# Patient Record
Sex: FEMALE | Race: WHITE | NOT HISPANIC OR LATINO
[De-identification: names, ages, dates, MRNs, and addresses within clinical notes are randomized per-mention and may not be internally consistent; named-entity substitution may affect disease eponyms.]

---

## 2017-01-12 ENCOUNTER — APPOINTMENT (OUTPATIENT)
Dept: FAMILY MEDICINE | Facility: CLINIC | Age: 67
End: 2017-01-12

## 2017-01-12 VITALS
WEIGHT: 193 LBS | HEIGHT: 62.5 IN | BODY MASS INDEX: 34.63 KG/M2 | SYSTOLIC BLOOD PRESSURE: 142 MMHG | DIASTOLIC BLOOD PRESSURE: 74 MMHG | RESPIRATION RATE: 16 BRPM | TEMPERATURE: 98.3 F | HEART RATE: 86 BPM | OXYGEN SATURATION: 97 %

## 2017-01-12 DIAGNOSIS — H81.10 BENIGN PAROXYSMAL VERTIGO, UNSPECIFIED EAR: ICD-10-CM

## 2017-01-26 ENCOUNTER — APPOINTMENT (OUTPATIENT)
Dept: FAMILY MEDICINE | Facility: CLINIC | Age: 67
End: 2017-01-26

## 2017-01-26 VITALS
HEIGHT: 62.5 IN | BODY MASS INDEX: 34.49 KG/M2 | SYSTOLIC BLOOD PRESSURE: 134 MMHG | HEART RATE: 82 BPM | WEIGHT: 192.25 LBS | DIASTOLIC BLOOD PRESSURE: 62 MMHG

## 2017-01-26 VITALS — HEART RATE: 80 BPM | DIASTOLIC BLOOD PRESSURE: 70 MMHG | RESPIRATION RATE: 16 BRPM | SYSTOLIC BLOOD PRESSURE: 112 MMHG

## 2017-01-26 RX ORDER — METHYLPREDNISOLONE 4 MG/1
4 TABLET ORAL
Qty: 1 | Refills: 0 | Status: COMPLETED | COMMUNITY
Start: 2017-01-12 | End: 2017-01-26

## 2017-01-26 RX ORDER — AZITHROMYCIN 250 MG/1
250 TABLET, FILM COATED ORAL
Qty: 1 | Refills: 0 | Status: COMPLETED | COMMUNITY
Start: 2017-01-12 | End: 2017-01-26

## 2017-01-28 LAB
ALBUMIN SERPL ELPH-MCNC: 4 G/DL
ALP BLD-CCNC: 136 U/L
ALT SERPL-CCNC: 11 U/L
ANION GAP SERPL CALC-SCNC: 17 MMOL/L
APPEARANCE: ABNORMAL
AST SERPL-CCNC: 17 U/L
BACTERIA: NEGATIVE
BASOPHILS # BLD AUTO: 0.02 K/UL
BASOPHILS NFR BLD AUTO: 0.2 %
BILIRUB SERPL-MCNC: 0.2 MG/DL
BILIRUBIN URINE: NEGATIVE
BLOOD URINE: NEGATIVE
BUN SERPL-MCNC: 16 MG/DL
CALCIUM SERPL-MCNC: 10 MG/DL
CHLORIDE SERPL-SCNC: 103 MMOL/L
CHOLEST SERPL-MCNC: 194 MG/DL
CHOLEST/HDLC SERPL: 5.1 RATIO
CO2 SERPL-SCNC: 22 MMOL/L
COLOR: YELLOW
CREAT SERPL-MCNC: 1.43 MG/DL
CREAT SPEC-SCNC: 89 MG/DL
EOSINOPHIL # BLD AUTO: 0.14 K/UL
EOSINOPHIL NFR BLD AUTO: 1.6 %
GLUCOSE QUALITATIVE U: NORMAL MG/DL
GLUCOSE SERPL-MCNC: 86 MG/DL
HBA1C MFR BLD HPLC: 6 %
HCT VFR BLD CALC: 33.8 %
HDLC SERPL-MCNC: 38 MG/DL
HGB BLD-MCNC: 10.7 G/DL
HYALINE CASTS: 0 /LPF
IMM GRANULOCYTES NFR BLD AUTO: 0.2 %
KETONES URINE: NEGATIVE
LDLC SERPL CALC-MCNC: 81 MG/DL
LEUKOCYTE ESTERASE URINE: NEGATIVE
LYMPHOCYTES # BLD AUTO: 1.9 K/UL
LYMPHOCYTES NFR BLD AUTO: 22.1 %
MAN DIFF?: NORMAL
MCHC RBC-ENTMCNC: 29.3 PG
MCHC RBC-ENTMCNC: 31.7 GM/DL
MCV RBC AUTO: 92.6 FL
MICROALBUMIN 24H UR DL<=1MG/L-MCNC: 1.1 MG/DL
MICROALBUMIN/CREAT 24H UR-RTO: 12 UG/MG
MICROSCOPIC-UA: NORMAL
MONOCYTES # BLD AUTO: 0.43 K/UL
MONOCYTES NFR BLD AUTO: 5 %
NEUTROPHILS # BLD AUTO: 6.09 K/UL
NEUTROPHILS NFR BLD AUTO: 70.9 %
NITRITE URINE: NEGATIVE
PH URINE: 6
PLATELET # BLD AUTO: 194 K/UL
POTASSIUM SERPL-SCNC: 5.4 MMOL/L
PROT SERPL-MCNC: 7.1 G/DL
PROTEIN URINE: NEGATIVE MG/DL
RBC # BLD: 3.65 M/UL
RBC # FLD: 13.7 %
RED BLOOD CELLS URINE: 3 /HPF
SODIUM SERPL-SCNC: 142 MMOL/L
SPECIFIC GRAVITY URINE: 1.01
SQUAMOUS EPITHELIAL CELLS: 1 /HPF
T4 SERPL-MCNC: 6.9 UG/DL
TRIGL SERPL-MCNC: 373 MG/DL
TSH SERPL-ACNC: 1.16 UIU/ML
URATE SERPL-MCNC: 8.8 MG/DL
UROBILINOGEN URINE: NORMAL MG/DL
WBC # FLD AUTO: 8.6 K/UL
WHITE BLOOD CELLS URINE: 0 /HPF

## 2017-02-08 ENCOUNTER — APPOINTMENT (OUTPATIENT)
Dept: FAMILY MEDICINE | Facility: CLINIC | Age: 67
End: 2017-02-08

## 2017-02-08 VITALS
SYSTOLIC BLOOD PRESSURE: 120 MMHG | HEIGHT: 62.5 IN | WEIGHT: 192 LBS | BODY MASS INDEX: 34.45 KG/M2 | DIASTOLIC BLOOD PRESSURE: 60 MMHG

## 2017-02-14 ENCOUNTER — RX RENEWAL (OUTPATIENT)
Age: 67
End: 2017-02-14

## 2017-02-14 ENCOUNTER — APPOINTMENT (OUTPATIENT)
Dept: FAMILY MEDICINE | Facility: CLINIC | Age: 67
End: 2017-02-14

## 2017-02-14 VITALS
HEIGHT: 62.5 IN | BODY MASS INDEX: 34.45 KG/M2 | SYSTOLIC BLOOD PRESSURE: 138 MMHG | OXYGEN SATURATION: 95 % | TEMPERATURE: 98.4 F | RESPIRATION RATE: 16 BRPM | HEART RATE: 101 BPM | DIASTOLIC BLOOD PRESSURE: 60 MMHG | WEIGHT: 192 LBS

## 2017-03-02 ENCOUNTER — APPOINTMENT (OUTPATIENT)
Dept: FAMILY MEDICINE | Facility: CLINIC | Age: 67
End: 2017-03-02

## 2017-03-02 VITALS
SYSTOLIC BLOOD PRESSURE: 118 MMHG | RESPIRATION RATE: 16 BRPM | OXYGEN SATURATION: 95 % | WEIGHT: 192 LBS | HEART RATE: 103 BPM | HEIGHT: 62.5 IN | BODY MASS INDEX: 34.45 KG/M2 | DIASTOLIC BLOOD PRESSURE: 62 MMHG | TEMPERATURE: 97.6 F

## 2017-04-08 ENCOUNTER — APPOINTMENT (OUTPATIENT)
Dept: FAMILY MEDICINE | Facility: CLINIC | Age: 67
End: 2017-04-08

## 2017-04-08 VITALS
HEART RATE: 96 BPM | SYSTOLIC BLOOD PRESSURE: 130 MMHG | OXYGEN SATURATION: 97 % | RESPIRATION RATE: 16 BRPM | DIASTOLIC BLOOD PRESSURE: 70 MMHG | WEIGHT: 192 LBS | HEIGHT: 62.5 IN | BODY MASS INDEX: 34.45 KG/M2

## 2017-04-09 ENCOUNTER — FORM ENCOUNTER (OUTPATIENT)
Age: 67
End: 2017-04-09

## 2017-04-10 ENCOUNTER — OUTPATIENT (OUTPATIENT)
Dept: OUTPATIENT SERVICES | Facility: HOSPITAL | Age: 67
LOS: 1 days | End: 2017-04-10
Payer: MEDICARE

## 2017-04-10 ENCOUNTER — APPOINTMENT (OUTPATIENT)
Dept: RADIOLOGY | Facility: CLINIC | Age: 67
End: 2017-04-10

## 2017-04-10 DIAGNOSIS — Z00.8 ENCOUNTER FOR OTHER GENERAL EXAMINATION: ICD-10-CM

## 2017-04-10 PROCEDURE — 73610 X-RAY EXAM OF ANKLE: CPT

## 2017-05-01 ENCOUNTER — FORM ENCOUNTER (OUTPATIENT)
Age: 67
End: 2017-05-01

## 2017-05-02 ENCOUNTER — RX RENEWAL (OUTPATIENT)
Age: 67
End: 2017-05-02

## 2017-05-02 ENCOUNTER — APPOINTMENT (OUTPATIENT)
Dept: RADIOLOGY | Facility: CLINIC | Age: 67
End: 2017-05-02

## 2017-05-02 ENCOUNTER — APPOINTMENT (OUTPATIENT)
Dept: FAMILY MEDICINE | Facility: CLINIC | Age: 67
End: 2017-05-02

## 2017-05-02 ENCOUNTER — OUTPATIENT (OUTPATIENT)
Dept: OUTPATIENT SERVICES | Facility: HOSPITAL | Age: 67
LOS: 1 days | End: 2017-05-02
Payer: MEDICARE

## 2017-05-02 VITALS
DIASTOLIC BLOOD PRESSURE: 80 MMHG | HEIGHT: 62.5 IN | WEIGHT: 192 LBS | HEART RATE: 99 BPM | TEMPERATURE: 97.8 F | OXYGEN SATURATION: 97 % | SYSTOLIC BLOOD PRESSURE: 160 MMHG | BODY MASS INDEX: 34.45 KG/M2

## 2017-05-02 DIAGNOSIS — Z00.8 ENCOUNTER FOR OTHER GENERAL EXAMINATION: ICD-10-CM

## 2017-05-02 PROCEDURE — 73630 X-RAY EXAM OF FOOT: CPT

## 2017-05-02 PROCEDURE — 71046 X-RAY EXAM CHEST 2 VIEWS: CPT

## 2017-05-11 ENCOUNTER — APPOINTMENT (OUTPATIENT)
Dept: ULTRASOUND IMAGING | Facility: CLINIC | Age: 67
End: 2017-05-11

## 2017-05-11 ENCOUNTER — OUTPATIENT (OUTPATIENT)
Dept: OUTPATIENT SERVICES | Facility: HOSPITAL | Age: 67
LOS: 1 days | End: 2017-05-11
Payer: MEDICARE

## 2017-05-11 DIAGNOSIS — Z00.8 ENCOUNTER FOR OTHER GENERAL EXAMINATION: ICD-10-CM

## 2017-05-11 PROCEDURE — 76856 US EXAM PELVIC COMPLETE: CPT

## 2017-05-11 PROCEDURE — 76830 TRANSVAGINAL US NON-OB: CPT

## 2017-05-16 ENCOUNTER — APPOINTMENT (OUTPATIENT)
Dept: FAMILY MEDICINE | Facility: CLINIC | Age: 67
End: 2017-05-16

## 2017-05-16 VITALS
HEIGHT: 62.5 IN | DIASTOLIC BLOOD PRESSURE: 68 MMHG | SYSTOLIC BLOOD PRESSURE: 130 MMHG | BODY MASS INDEX: 33.26 KG/M2 | WEIGHT: 185.38 LBS

## 2017-05-16 VITALS — SYSTOLIC BLOOD PRESSURE: 100 MMHG | HEART RATE: 96 BPM | DIASTOLIC BLOOD PRESSURE: 70 MMHG | RESPIRATION RATE: 16 BRPM

## 2017-05-16 DIAGNOSIS — R60.9 EDEMA, UNSPECIFIED: ICD-10-CM

## 2017-05-16 DIAGNOSIS — Z87.09 PERSONAL HISTORY OF OTHER DISEASES OF THE RESPIRATORY SYSTEM: ICD-10-CM

## 2017-05-16 DIAGNOSIS — S93.402A SPRAIN OF UNSPECIFIED LIGAMENT OF LEFT ANKLE, INITIAL ENCOUNTER: ICD-10-CM

## 2017-05-16 DIAGNOSIS — Z87.448 PERSONAL HISTORY OF OTHER DISEASES OF URINARY SYSTEM: ICD-10-CM

## 2017-05-16 DIAGNOSIS — S06.0X0A CONCUSSION W/OUT LOSS OF CONSCIOUSNESS, INITIAL ENCOUNTER: ICD-10-CM

## 2017-05-16 DIAGNOSIS — N28.9 DISORDER OF KIDNEY AND URETER, UNSPECIFIED: ICD-10-CM

## 2017-05-16 DIAGNOSIS — S01.01XA LACERATION W/OUT FOREIGN BODY OF SCALP, INITIAL ENCOUNTER: ICD-10-CM

## 2017-05-16 DIAGNOSIS — M79.672 PAIN IN LEFT FOOT: ICD-10-CM

## 2017-05-16 DIAGNOSIS — M70.22 OLECRANON BURSITIS, LEFT ELBOW: ICD-10-CM

## 2017-05-16 DIAGNOSIS — B36.0 PITYRIASIS VERSICOLOR: ICD-10-CM

## 2017-05-16 RX ORDER — LEVOFLOXACIN 500 MG/1
500 TABLET, FILM COATED ORAL DAILY
Qty: 10 | Refills: 0 | Status: COMPLETED | COMMUNITY
Start: 2017-05-02 | End: 2017-05-16

## 2017-05-16 RX ORDER — HYDROCODONE BITARTRATE AND ACETAMINOPHEN 5; 325 MG/1; MG/1
5-325 TABLET ORAL
Qty: 30 | Refills: 0 | Status: COMPLETED | COMMUNITY
Start: 2017-05-02 | End: 2017-05-16

## 2017-05-16 RX ORDER — METHYLPREDNISOLONE 4 MG/1
4 TABLET ORAL
Qty: 1 | Refills: 0 | Status: COMPLETED | COMMUNITY
Start: 2017-03-02 | End: 2017-05-16

## 2017-05-16 RX ORDER — AZITHROMYCIN 250 MG/1
250 TABLET, FILM COATED ORAL
Qty: 1 | Refills: 0 | Status: COMPLETED | COMMUNITY
Start: 2017-03-02 | End: 2017-05-16

## 2017-06-20 ENCOUNTER — APPOINTMENT (OUTPATIENT)
Dept: FAMILY MEDICINE | Facility: CLINIC | Age: 67
End: 2017-06-20

## 2017-06-20 VITALS
HEART RATE: 107 BPM | BODY MASS INDEX: 32.47 KG/M2 | SYSTOLIC BLOOD PRESSURE: 120 MMHG | DIASTOLIC BLOOD PRESSURE: 60 MMHG | WEIGHT: 181 LBS | RESPIRATION RATE: 16 BRPM | HEIGHT: 62.5 IN | TEMPERATURE: 98.6 F | OXYGEN SATURATION: 98 %

## 2017-06-20 VITALS — SYSTOLIC BLOOD PRESSURE: 120 MMHG | RESPIRATION RATE: 16 BRPM | DIASTOLIC BLOOD PRESSURE: 74 MMHG | HEART RATE: 92 BPM

## 2017-06-21 LAB
ALBUMIN SERPL ELPH-MCNC: 3.7 G/DL
ALP BLD-CCNC: 140 U/L
ALT SERPL-CCNC: 7 U/L
ANION GAP SERPL CALC-SCNC: 18 MMOL/L
APPEARANCE: CLEAR
AST SERPL-CCNC: 16 U/L
BACTERIA: NEGATIVE
BASOPHILS # BLD AUTO: 0.01 K/UL
BASOPHILS NFR BLD AUTO: 0.1 %
BILIRUB SERPL-MCNC: 0.2 MG/DL
BILIRUBIN URINE: NEGATIVE
BLOOD URINE: NEGATIVE
BUN SERPL-MCNC: 18 MG/DL
CALCIUM SERPL-MCNC: 9.4 MG/DL
CHLORIDE SERPL-SCNC: 101 MMOL/L
CHOLEST SERPL-MCNC: 170 MG/DL
CHOLEST/HDLC SERPL: 4 RATIO
CO2 SERPL-SCNC: 21 MMOL/L
COLOR: YELLOW
CREAT SERPL-MCNC: 1.52 MG/DL
CREAT SPEC-SCNC: 105 MG/DL
EOSINOPHIL # BLD AUTO: 0.16 K/UL
EOSINOPHIL NFR BLD AUTO: 2.3 %
GLUCOSE QUALITATIVE U: NORMAL MG/DL
GLUCOSE SERPL-MCNC: 79 MG/DL
HBA1C MFR BLD HPLC: 5.6 %
HCT VFR BLD CALC: 30.1 %
HDLC SERPL-MCNC: 43 MG/DL
HGB BLD-MCNC: 9.5 G/DL
HYALINE CASTS: 0 /LPF
IMM GRANULOCYTES NFR BLD AUTO: 0.3 %
KETONES URINE: NEGATIVE
LDLC SERPL CALC-MCNC: 64 MG/DL
LEUKOCYTE ESTERASE URINE: ABNORMAL
LYMPHOCYTES # BLD AUTO: 1.58 K/UL
LYMPHOCYTES NFR BLD AUTO: 22.3 %
MAN DIFF?: NORMAL
MCHC RBC-ENTMCNC: 28.5 PG
MCHC RBC-ENTMCNC: 31.6 GM/DL
MCV RBC AUTO: 90.4 FL
MICROALBUMIN 24H UR DL<=1MG/L-MCNC: 0.8 MG/DL
MICROALBUMIN/CREAT 24H UR-RTO: 8 MG/G
MICROSCOPIC-UA: NORMAL
MONOCYTES # BLD AUTO: 0.51 K/UL
MONOCYTES NFR BLD AUTO: 7.2 %
NEUTROPHILS # BLD AUTO: 4.79 K/UL
NEUTROPHILS NFR BLD AUTO: 67.8 %
NITRITE URINE: NEGATIVE
PH URINE: 6.5
PLATELET # BLD AUTO: 233 K/UL
POTASSIUM SERPL-SCNC: 5.5 MMOL/L
PROT SERPL-MCNC: 7.2 G/DL
PROTEIN URINE: NEGATIVE MG/DL
RBC # BLD: 3.33 M/UL
RBC # FLD: 14.8 %
RED BLOOD CELLS URINE: 3 /HPF
SODIUM SERPL-SCNC: 140 MMOL/L
SPECIFIC GRAVITY URINE: 1.02
SQUAMOUS EPITHELIAL CELLS: 8 /HPF
T4 SERPL-MCNC: 6.2 UG/DL
TRIGL SERPL-MCNC: 313 MG/DL
TSH SERPL-ACNC: 1.77 UIU/ML
URATE SERPL-MCNC: 8.8 MG/DL
UROBILINOGEN URINE: NORMAL MG/DL
WBC # FLD AUTO: 7.07 K/UL
WHITE BLOOD CELLS URINE: 4 /HPF

## 2017-08-15 ENCOUNTER — RX RENEWAL (OUTPATIENT)
Age: 67
End: 2017-08-15

## 2017-10-10 ENCOUNTER — APPOINTMENT (OUTPATIENT)
Dept: FAMILY MEDICINE | Facility: CLINIC | Age: 67
End: 2017-10-10
Payer: MEDICARE

## 2017-10-10 VITALS
DIASTOLIC BLOOD PRESSURE: 66 MMHG | WEIGHT: 181 LBS | HEART RATE: 90 BPM | OXYGEN SATURATION: 98 % | RESPIRATION RATE: 16 BRPM | TEMPERATURE: 98.4 F | SYSTOLIC BLOOD PRESSURE: 126 MMHG | HEIGHT: 62.5 IN | BODY MASS INDEX: 32.47 KG/M2

## 2017-10-10 PROCEDURE — 99214 OFFICE O/P EST MOD 30 MIN: CPT

## 2017-10-23 ENCOUNTER — RX RENEWAL (OUTPATIENT)
Age: 67
End: 2017-10-23

## 2017-10-31 ENCOUNTER — LABORATORY RESULT (OUTPATIENT)
Age: 67
End: 2017-10-31

## 2017-10-31 ENCOUNTER — APPOINTMENT (OUTPATIENT)
Dept: FAMILY MEDICINE | Facility: CLINIC | Age: 67
End: 2017-10-31
Payer: MEDICARE

## 2017-10-31 VITALS — HEART RATE: 72 BPM | SYSTOLIC BLOOD PRESSURE: 110 MMHG | DIASTOLIC BLOOD PRESSURE: 70 MMHG | RESPIRATION RATE: 16 BRPM

## 2017-10-31 VITALS
RESPIRATION RATE: 16 BRPM | WEIGHT: 182 LBS | HEART RATE: 98 BPM | SYSTOLIC BLOOD PRESSURE: 128 MMHG | DIASTOLIC BLOOD PRESSURE: 68 MMHG | BODY MASS INDEX: 32.65 KG/M2 | OXYGEN SATURATION: 98 % | HEIGHT: 62.5 IN

## 2017-10-31 DIAGNOSIS — N28.89 OTHER SPECIFIED DISORDERS OF KIDNEY AND URETER: ICD-10-CM

## 2017-10-31 PROCEDURE — 99214 OFFICE O/P EST MOD 30 MIN: CPT | Mod: 25

## 2017-10-31 PROCEDURE — 90662 IIV NO PRSV INCREASED AG IM: CPT

## 2017-10-31 PROCEDURE — G0008: CPT

## 2017-10-31 RX ORDER — AZITHROMYCIN 250 MG/1
250 TABLET, FILM COATED ORAL
Qty: 1 | Refills: 0 | Status: COMPLETED | COMMUNITY
Start: 2017-10-10 | End: 2017-10-31

## 2017-10-31 RX ORDER — METHYLPREDNISOLONE 4 MG/1
4 TABLET ORAL
Qty: 1 | Refills: 0 | Status: COMPLETED | COMMUNITY
Start: 2017-06-09 | End: 2017-10-31

## 2017-10-31 RX ORDER — MECLIZINE HYDROCHLORIDE 12.5 MG/1
12.5 TABLET ORAL
Qty: 30 | Refills: 0 | Status: COMPLETED | COMMUNITY
Start: 2017-01-12 | End: 2017-10-31

## 2017-11-01 LAB
ALBUMIN SERPL ELPH-MCNC: 3.8 G/DL
ALP BLD-CCNC: 136 U/L
ALT SERPL-CCNC: 8 U/L
ANION GAP SERPL CALC-SCNC: 22 MMOL/L
APPEARANCE: CLEAR
AST SERPL-CCNC: 20 U/L
BACTERIA: NEGATIVE
BASOPHILS # BLD AUTO: 0.01 K/UL
BASOPHILS NFR BLD AUTO: 0.2 %
BILIRUB SERPL-MCNC: 0.2 MG/DL
BILIRUBIN URINE: NEGATIVE
BLOOD URINE: NEGATIVE
BUN SERPL-MCNC: 19 MG/DL
CALCIUM SERPL-MCNC: 10 MG/DL
CHLORIDE SERPL-SCNC: 101 MMOL/L
CHOLEST SERPL-MCNC: 193 MG/DL
CHOLEST/HDLC SERPL: 4.4 RATIO
CO2 SERPL-SCNC: 19 MMOL/L
COLOR: YELLOW
CREAT SERPL-MCNC: 1.66 MG/DL
CREAT SPEC-SCNC: 161 MG/DL
EOSINOPHIL # BLD AUTO: 0.1 K/UL
EOSINOPHIL NFR BLD AUTO: 1.6 %
GLUCOSE QUALITATIVE U: NEGATIVE MG/DL
GLUCOSE SERPL-MCNC: 96 MG/DL
HBA1C MFR BLD HPLC: 5.7 %
HCT VFR BLD CALC: 30.9 %
HDLC SERPL-MCNC: 44 MG/DL
HGB BLD-MCNC: 9.6 G/DL
HYALINE CASTS: 0 /LPF
IMM GRANULOCYTES NFR BLD AUTO: 0.2 %
KETONES URINE: NEGATIVE
LDLC SERPL CALC-MCNC: 77 MG/DL
LEUKOCYTE ESTERASE URINE: NEGATIVE
LYMPHOCYTES # BLD AUTO: 1.62 K/UL
LYMPHOCYTES NFR BLD AUTO: 25.4 %
MAN DIFF?: NORMAL
MCHC RBC-ENTMCNC: 28.1 PG
MCHC RBC-ENTMCNC: 31.1 GM/DL
MCV RBC AUTO: 90.4 FL
MICROALBUMIN 24H UR DL<=1MG/L-MCNC: 1 MG/DL
MICROALBUMIN/CREAT 24H UR-RTO: 6 MG/G
MICROSCOPIC-UA: NORMAL
MONOCYTES # BLD AUTO: 0.43 K/UL
MONOCYTES NFR BLD AUTO: 6.7 %
NEUTROPHILS # BLD AUTO: 4.22 K/UL
NEUTROPHILS NFR BLD AUTO: 65.9 %
NITRITE URINE: NEGATIVE
PH URINE: 5
PLATELET # BLD AUTO: 219 K/UL
POTASSIUM SERPL-SCNC: 5.2 MMOL/L
PROT SERPL-MCNC: 7.5 G/DL
PROTEIN URINE: NEGATIVE MG/DL
RBC # BLD: 3.42 M/UL
RBC # FLD: 14.9 %
RED BLOOD CELLS URINE: 0 /HPF
SODIUM SERPL-SCNC: 142 MMOL/L
SPECIFIC GRAVITY URINE: 1.02
SQUAMOUS EPITHELIAL CELLS: 7 /HPF
T4 SERPL-MCNC: 7.4 UG/DL
TRIGL SERPL-MCNC: 361 MG/DL
TSH SERPL-ACNC: 1.81 UIU/ML
URATE SERPL-MCNC: 8.1 MG/DL
UROBILINOGEN URINE: NEGATIVE MG/DL
WBC # FLD AUTO: 6.39 K/UL
WHITE BLOOD CELLS URINE: 4 /HPF

## 2017-11-02 ENCOUNTER — MOBILE ON CALL (OUTPATIENT)
Age: 67
End: 2017-11-02

## 2017-11-08 ENCOUNTER — APPOINTMENT (OUTPATIENT)
Dept: FAMILY MEDICINE | Facility: CLINIC | Age: 67
End: 2017-11-08
Payer: MEDICARE

## 2017-11-08 VITALS
DIASTOLIC BLOOD PRESSURE: 80 MMHG | TEMPERATURE: 98.3 F | WEIGHT: 181.38 LBS | BODY MASS INDEX: 32.54 KG/M2 | HEIGHT: 62.5 IN | OXYGEN SATURATION: 98 % | SYSTOLIC BLOOD PRESSURE: 146 MMHG | HEART RATE: 98 BPM

## 2017-11-08 PROCEDURE — 99213 OFFICE O/P EST LOW 20 MIN: CPT

## 2017-11-08 RX ORDER — CLINDAMYCIN HYDROCHLORIDE 300 MG/1
300 CAPSULE ORAL
Qty: 21 | Refills: 0 | Status: DISCONTINUED | COMMUNITY
Start: 2017-05-22

## 2017-11-27 ENCOUNTER — APPOINTMENT (OUTPATIENT)
Dept: FAMILY MEDICINE | Facility: CLINIC | Age: 67
End: 2017-11-27
Payer: COMMERCIAL

## 2017-11-27 VITALS
DIASTOLIC BLOOD PRESSURE: 80 MMHG | BODY MASS INDEX: 33.08 KG/M2 | SYSTOLIC BLOOD PRESSURE: 130 MMHG | HEIGHT: 62.5 IN | WEIGHT: 184.38 LBS

## 2017-11-27 VITALS — HEART RATE: 76 BPM | DIASTOLIC BLOOD PRESSURE: 80 MMHG | SYSTOLIC BLOOD PRESSURE: 130 MMHG | RESPIRATION RATE: 16 BRPM

## 2017-11-27 DIAGNOSIS — S13.9XXA SPRAIN OF JOINTS AND LIGAMENTS OF UNSPECIFIED PARTS OF NECK, INITIAL ENCOUNTER: ICD-10-CM

## 2017-11-27 PROCEDURE — 99203 OFFICE O/P NEW LOW 30 MIN: CPT

## 2017-11-30 ENCOUNTER — OUTPATIENT (OUTPATIENT)
Dept: OUTPATIENT SERVICES | Facility: HOSPITAL | Age: 67
LOS: 1 days | Discharge: HOME | End: 2017-11-30

## 2017-11-30 ENCOUNTER — APPOINTMENT (OUTPATIENT)
Dept: FAMILY MEDICINE | Facility: CLINIC | Age: 67
End: 2017-11-30

## 2017-11-30 DIAGNOSIS — D50.9 IRON DEFICIENCY ANEMIA, UNSPECIFIED: ICD-10-CM

## 2017-12-05 ENCOUNTER — RESULT REVIEW (OUTPATIENT)
Age: 67
End: 2017-12-05

## 2017-12-05 ENCOUNTER — APPOINTMENT (OUTPATIENT)
Dept: HEMATOLOGY ONCOLOGY | Facility: CLINIC | Age: 67
End: 2017-12-05
Payer: MEDICARE

## 2017-12-05 VITALS
DIASTOLIC BLOOD PRESSURE: 70 MMHG | BODY MASS INDEX: 33.99 KG/M2 | HEART RATE: 98 BPM | HEIGHT: 61.81 IN | TEMPERATURE: 98.71 F | WEIGHT: 184.73 LBS | SYSTOLIC BLOOD PRESSURE: 127 MMHG | OXYGEN SATURATION: 99 %

## 2017-12-05 LAB
BASOPHILS # BLD AUTO: 0.1 K/UL — SIGNIFICANT CHANGE UP (ref 0–0.2)
BASOPHILS NFR BLD AUTO: 1.3 % — SIGNIFICANT CHANGE UP (ref 0–2)
EOSINOPHIL # BLD AUTO: 0.2 K/UL — SIGNIFICANT CHANGE UP (ref 0–0.5)
EOSINOPHIL NFR BLD AUTO: 3.2 % — SIGNIFICANT CHANGE UP (ref 0–6)
HCT VFR BLD CALC: 31 % — LOW (ref 34.5–45)
HGB BLD-MCNC: 10.2 G/DL — LOW (ref 11.5–15.5)
LYMPHOCYTES # BLD AUTO: 2 K/UL — SIGNIFICANT CHANGE UP (ref 1–3.3)
LYMPHOCYTES # BLD AUTO: 28.6 % — SIGNIFICANT CHANGE UP (ref 13–44)
MCHC RBC-ENTMCNC: 28.9 PG — SIGNIFICANT CHANGE UP (ref 27–34)
MCHC RBC-ENTMCNC: 32.8 GM/DL — SIGNIFICANT CHANGE UP (ref 32–36)
MCV RBC AUTO: 88.2 FL — SIGNIFICANT CHANGE UP (ref 80–100)
MONOCYTES # BLD AUTO: 0.4 K/UL — SIGNIFICANT CHANGE UP (ref 0–0.9)
MONOCYTES NFR BLD AUTO: 6.1 % — SIGNIFICANT CHANGE UP (ref 2–14)
NEUTROPHILS # BLD AUTO: 4.2 K/UL — SIGNIFICANT CHANGE UP (ref 1.8–7.4)
NEUTROPHILS NFR BLD AUTO: 60.9 % — SIGNIFICANT CHANGE UP (ref 43–77)
PLATELET # BLD AUTO: 244 K/UL — SIGNIFICANT CHANGE UP (ref 150–400)
RBC # BLD: 3.51 M/UL — LOW (ref 3.8–5.2)
RBC # FLD: 14.2 % — SIGNIFICANT CHANGE UP (ref 10.3–14.5)
WBC # BLD: 6.9 K/UL — SIGNIFICANT CHANGE UP (ref 3.8–10.5)
WBC # FLD AUTO: 6.9 K/UL — SIGNIFICANT CHANGE UP (ref 3.8–10.5)

## 2017-12-05 PROCEDURE — 99204 OFFICE O/P NEW MOD 45 MIN: CPT

## 2017-12-12 ENCOUNTER — APPOINTMENT (OUTPATIENT)
Dept: INFUSION THERAPY | Facility: CLINIC | Age: 67
End: 2017-12-12

## 2017-12-12 ENCOUNTER — OTHER (OUTPATIENT)
Age: 67
End: 2017-12-12

## 2017-12-13 DIAGNOSIS — N18.3 CHRONIC KIDNEY DISEASE, STAGE 3 (MODERATE): ICD-10-CM

## 2017-12-19 ENCOUNTER — APPOINTMENT (OUTPATIENT)
Dept: INFUSION THERAPY | Facility: CLINIC | Age: 67
End: 2017-12-19

## 2017-12-20 DIAGNOSIS — E53.8 DEFICIENCY OF OTHER SPECIFIED B GROUP VITAMINS: ICD-10-CM

## 2017-12-28 ENCOUNTER — APPOINTMENT (OUTPATIENT)
Dept: FAMILY MEDICINE | Facility: CLINIC | Age: 67
End: 2017-12-28
Payer: MEDICARE

## 2017-12-28 VITALS — SYSTOLIC BLOOD PRESSURE: 120 MMHG | RESPIRATION RATE: 16 BRPM | DIASTOLIC BLOOD PRESSURE: 80 MMHG | HEART RATE: 84 BPM

## 2017-12-28 VITALS
HEART RATE: 105 BPM | WEIGHT: 180.25 LBS | SYSTOLIC BLOOD PRESSURE: 128 MMHG | BODY MASS INDEX: 33.17 KG/M2 | DIASTOLIC BLOOD PRESSURE: 60 MMHG | HEIGHT: 62 IN | OXYGEN SATURATION: 98 %

## 2017-12-28 PROCEDURE — 99214 OFFICE O/P EST MOD 30 MIN: CPT

## 2017-12-28 RX ORDER — AZITHROMYCIN 250 MG/1
250 TABLET, FILM COATED ORAL
Qty: 1 | Refills: 0 | Status: COMPLETED | COMMUNITY
Start: 2017-11-08 | End: 2017-12-28

## 2017-12-28 RX ORDER — METHYLPREDNISOLONE 4 MG/1
4 TABLET ORAL
Qty: 1 | Refills: 0 | Status: COMPLETED | COMMUNITY
Start: 2017-11-08 | End: 2017-12-28

## 2017-12-28 RX ORDER — CYCLOBENZAPRINE HYDROCHLORIDE 10 MG/1
10 TABLET, FILM COATED ORAL
Qty: 30 | Refills: 0 | Status: COMPLETED | COMMUNITY
Start: 2017-11-27 | End: 2017-12-28

## 2017-12-31 LAB
BASOPHILS # BLD AUTO: 0.01 K/UL
BASOPHILS NFR BLD AUTO: 0.2 %
EOSINOPHIL # BLD AUTO: 0.11 K/UL
EOSINOPHIL NFR BLD AUTO: 1.7 %
HCT VFR BLD CALC: 34.8 %
HGB BLD-MCNC: 10.7 G/DL
IMM GRANULOCYTES NFR BLD AUTO: 0.2 %
LYMPHOCYTES # BLD AUTO: 1.65 K/UL
LYMPHOCYTES NFR BLD AUTO: 26.2 %
MAN DIFF?: NORMAL
MCHC RBC-ENTMCNC: 28.7 PG
MCHC RBC-ENTMCNC: 30.7 GM/DL
MCV RBC AUTO: 93.3 FL
MONOCYTES # BLD AUTO: 0.32 K/UL
MONOCYTES NFR BLD AUTO: 5.1 %
NEUTROPHILS # BLD AUTO: 4.2 K/UL
NEUTROPHILS NFR BLD AUTO: 66.6 %
PLATELET # BLD AUTO: 208 K/UL
RBC # BLD: 3.73 M/UL
RBC # FLD: 17.2 %
WBC # FLD AUTO: 6.3 K/UL

## 2018-01-26 ENCOUNTER — APPOINTMENT (OUTPATIENT)
Dept: FAMILY MEDICINE | Facility: CLINIC | Age: 68
End: 2018-01-26
Payer: MEDICARE

## 2018-01-26 ENCOUNTER — OUTPATIENT (OUTPATIENT)
Dept: OUTPATIENT SERVICES | Facility: HOSPITAL | Age: 68
LOS: 1 days | Discharge: HOME | End: 2018-01-26

## 2018-01-26 VITALS
OXYGEN SATURATION: 96 % | HEART RATE: 91 BPM | WEIGHT: 180 LBS | DIASTOLIC BLOOD PRESSURE: 66 MMHG | SYSTOLIC BLOOD PRESSURE: 138 MMHG | RESPIRATION RATE: 16 BRPM | HEIGHT: 62 IN | BODY MASS INDEX: 33.13 KG/M2 | TEMPERATURE: 98.6 F

## 2018-01-26 DIAGNOSIS — E53.8 DEFICIENCY OF OTHER SPECIFIED B GROUP VITAMINS: ICD-10-CM

## 2018-01-26 DIAGNOSIS — D50.9 IRON DEFICIENCY ANEMIA, UNSPECIFIED: ICD-10-CM

## 2018-01-26 DIAGNOSIS — N18.3 CHRONIC KIDNEY DISEASE, STAGE 3 (MODERATE): ICD-10-CM

## 2018-01-26 PROCEDURE — 99214 OFFICE O/P EST MOD 30 MIN: CPT

## 2018-02-21 ENCOUNTER — RESULT REVIEW (OUTPATIENT)
Age: 68
End: 2018-02-21

## 2018-02-21 ENCOUNTER — APPOINTMENT (OUTPATIENT)
Dept: HEMATOLOGY ONCOLOGY | Facility: CLINIC | Age: 68
End: 2018-02-21
Payer: MEDICARE

## 2018-02-21 VITALS
DIASTOLIC BLOOD PRESSURE: 70 MMHG | BODY MASS INDEX: 33.63 KG/M2 | HEART RATE: 86 BPM | TEMPERATURE: 99.5 F | SYSTOLIC BLOOD PRESSURE: 138 MMHG | WEIGHT: 183.84 LBS | OXYGEN SATURATION: 98 %

## 2018-02-21 LAB
BASOPHILS # BLD AUTO: 0.1 K/UL — SIGNIFICANT CHANGE UP (ref 0–0.2)
BASOPHILS NFR BLD AUTO: 1.5 % — SIGNIFICANT CHANGE UP (ref 0–2)
EOSINOPHIL # BLD AUTO: 0.2 K/UL — SIGNIFICANT CHANGE UP (ref 0–0.5)
EOSINOPHIL NFR BLD AUTO: 3.2 % — SIGNIFICANT CHANGE UP (ref 0–6)
HCT VFR BLD CALC: 35.7 % — SIGNIFICANT CHANGE UP (ref 34.5–45)
HGB BLD-MCNC: 12.2 G/DL — SIGNIFICANT CHANGE UP (ref 11.5–15.5)
LYMPHOCYTES # BLD AUTO: 1.8 K/UL — SIGNIFICANT CHANGE UP (ref 1–3.3)
LYMPHOCYTES # BLD AUTO: 29.3 % — SIGNIFICANT CHANGE UP (ref 13–44)
MCHC RBC-ENTMCNC: 32 PG — SIGNIFICANT CHANGE UP (ref 27–34)
MCHC RBC-ENTMCNC: 34.1 GM/DL — SIGNIFICANT CHANGE UP (ref 32–36)
MCV RBC AUTO: 94 FL — SIGNIFICANT CHANGE UP (ref 80–100)
MONOCYTES # BLD AUTO: 0.4 K/UL — SIGNIFICANT CHANGE UP (ref 0–0.9)
MONOCYTES NFR BLD AUTO: 5.8 % — SIGNIFICANT CHANGE UP (ref 2–14)
NEUTROPHILS # BLD AUTO: 3.7 K/UL — SIGNIFICANT CHANGE UP (ref 1.8–7.4)
NEUTROPHILS NFR BLD AUTO: 60.3 % — SIGNIFICANT CHANGE UP (ref 43–77)
PLATELET # BLD AUTO: 210 K/UL — SIGNIFICANT CHANGE UP (ref 150–400)
RBC # BLD: 3.8 M/UL — SIGNIFICANT CHANGE UP (ref 3.8–5.2)
RBC # FLD: 14.8 % — HIGH (ref 10.3–14.5)
WBC # BLD: 6.1 K/UL — SIGNIFICANT CHANGE UP (ref 3.8–10.5)
WBC # FLD AUTO: 6.1 K/UL — SIGNIFICANT CHANGE UP (ref 3.8–10.5)

## 2018-02-21 PROCEDURE — 99213 OFFICE O/P EST LOW 20 MIN: CPT

## 2018-02-22 ENCOUNTER — APPOINTMENT (OUTPATIENT)
Dept: FAMILY MEDICINE | Facility: CLINIC | Age: 68
End: 2018-02-22
Payer: MEDICARE

## 2018-02-22 VITALS — DIASTOLIC BLOOD PRESSURE: 80 MMHG | HEART RATE: 80 BPM | RESPIRATION RATE: 16 BRPM | SYSTOLIC BLOOD PRESSURE: 126 MMHG

## 2018-02-22 VITALS
SYSTOLIC BLOOD PRESSURE: 130 MMHG | WEIGHT: 184.25 LBS | BODY MASS INDEX: 33.9 KG/M2 | DIASTOLIC BLOOD PRESSURE: 60 MMHG | HEIGHT: 62 IN

## 2018-02-22 PROCEDURE — 99214 OFFICE O/P EST MOD 30 MIN: CPT

## 2018-02-22 RX ORDER — METHYLPREDNISOLONE 4 MG/1
4 TABLET ORAL
Qty: 1 | Refills: 0 | Status: COMPLETED | COMMUNITY
Start: 2018-01-26 | End: 2018-02-22

## 2018-02-22 RX ORDER — AZITHROMYCIN 250 MG/1
250 TABLET, FILM COATED ORAL
Qty: 1 | Refills: 0 | Status: COMPLETED | COMMUNITY
Start: 2018-01-26 | End: 2018-02-22

## 2018-02-22 RX ORDER — PREDNISONE 10 MG/1
10 TABLET ORAL DAILY
Qty: 20 | Refills: 0 | Status: COMPLETED | COMMUNITY
Start: 2017-12-28 | End: 2018-02-22

## 2018-02-23 ENCOUNTER — MOBILE ON CALL (OUTPATIENT)
Age: 68
End: 2018-02-23

## 2018-02-25 LAB
ALBUMIN SERPL ELPH-MCNC: 4.2 G/DL
ALP BLD-CCNC: 139 U/L
ALT SERPL-CCNC: 7 U/L
ANION GAP SERPL CALC-SCNC: 20 MMOL/L
APPEARANCE: CLEAR
AST SERPL-CCNC: 12 U/L
BACTERIA: NEGATIVE
BILIRUB SERPL-MCNC: <0.2 MG/DL
BILIRUBIN URINE: NEGATIVE
BLOOD URINE: NEGATIVE
BUN SERPL-MCNC: 24 MG/DL
CALCIUM SERPL-MCNC: 9.6 MG/DL
CHLORIDE SERPL-SCNC: 99 MMOL/L
CHOLEST SERPL-MCNC: 183 MG/DL
CHOLEST/HDLC SERPL: 4 RATIO
CO2 SERPL-SCNC: 23 MMOL/L
COLOR: YELLOW
CREAT SERPL-MCNC: 1.76 MG/DL
CREAT SPEC-SCNC: 95 MG/DL
GLUCOSE QUALITATIVE U: NEGATIVE MG/DL
GLUCOSE SERPL-MCNC: 93 MG/DL
HBA1C MFR BLD HPLC: 5.4 %
HDLC SERPL-MCNC: 46 MG/DL
HYALINE CASTS: 4 /LPF
KETONES URINE: NEGATIVE
LDLC SERPL CALC-MCNC: 85 MG/DL
LEUKOCYTE ESTERASE URINE: ABNORMAL
MICROALBUMIN 24H UR DL<=1MG/L-MCNC: 0.4 MG/DL
MICROALBUMIN/CREAT 24H UR-RTO: 4 MG/G
MICROSCOPIC-UA: NORMAL
NITRITE URINE: NEGATIVE
PH URINE: 6
POTASSIUM SERPL-SCNC: 5.4 MMOL/L
PROT SERPL-MCNC: 6.7 G/DL
PROTEIN URINE: NEGATIVE MG/DL
RED BLOOD CELLS URINE: 3 /HPF
SODIUM SERPL-SCNC: 142 MMOL/L
SPECIFIC GRAVITY URINE: 1.02
SQUAMOUS EPITHELIAL CELLS: 6 /HPF
T4 SERPL-MCNC: 7.6 UG/DL
TRIGL SERPL-MCNC: 259 MG/DL
TSH SERPL-ACNC: 1.97 UIU/ML
URATE SERPL-MCNC: 8.1 MG/DL
UROBILINOGEN URINE: NEGATIVE MG/DL
WHITE BLOOD CELLS URINE: 3 /HPF

## 2018-04-23 ENCOUNTER — APPOINTMENT (OUTPATIENT)
Dept: FAMILY MEDICINE | Facility: CLINIC | Age: 68
End: 2018-04-23
Payer: MEDICARE

## 2018-04-23 VITALS
SYSTOLIC BLOOD PRESSURE: 124 MMHG | WEIGHT: 184.38 LBS | DIASTOLIC BLOOD PRESSURE: 60 MMHG | BODY MASS INDEX: 33.93 KG/M2 | HEIGHT: 62 IN

## 2018-04-23 VITALS — SYSTOLIC BLOOD PRESSURE: 90 MMHG | RESPIRATION RATE: 16 BRPM | DIASTOLIC BLOOD PRESSURE: 70 MMHG | HEART RATE: 76 BPM

## 2018-04-23 PROCEDURE — 99214 OFFICE O/P EST MOD 30 MIN: CPT | Mod: 25

## 2018-04-23 PROCEDURE — 99406 BEHAV CHNG SMOKING 3-10 MIN: CPT

## 2018-04-29 ENCOUNTER — FORM ENCOUNTER (OUTPATIENT)
Age: 68
End: 2018-04-29

## 2018-04-30 ENCOUNTER — APPOINTMENT (OUTPATIENT)
Dept: FAMILY MEDICINE | Facility: CLINIC | Age: 68
End: 2018-04-30
Payer: MEDICARE

## 2018-04-30 ENCOUNTER — APPOINTMENT (OUTPATIENT)
Dept: ULTRASOUND IMAGING | Facility: CLINIC | Age: 68
End: 2018-04-30
Payer: MEDICARE

## 2018-04-30 ENCOUNTER — OUTPATIENT (OUTPATIENT)
Dept: OUTPATIENT SERVICES | Facility: HOSPITAL | Age: 68
LOS: 1 days | End: 2018-04-30
Payer: MEDICARE

## 2018-04-30 VITALS
HEIGHT: 62 IN | DIASTOLIC BLOOD PRESSURE: 80 MMHG | HEART RATE: 114 BPM | OXYGEN SATURATION: 98 % | SYSTOLIC BLOOD PRESSURE: 152 MMHG | WEIGHT: 184 LBS | BODY MASS INDEX: 33.86 KG/M2 | TEMPERATURE: 98.7 F

## 2018-04-30 DIAGNOSIS — Z00.8 ENCOUNTER FOR OTHER GENERAL EXAMINATION: ICD-10-CM

## 2018-04-30 DIAGNOSIS — M79.89 OTHER SPECIFIED SOFT TISSUE DISORDERS: ICD-10-CM

## 2018-04-30 PROCEDURE — 93971 EXTREMITY STUDY: CPT | Mod: 26,RT

## 2018-04-30 PROCEDURE — 93971 EXTREMITY STUDY: CPT

## 2018-04-30 PROCEDURE — 99213 OFFICE O/P EST LOW 20 MIN: CPT

## 2018-05-01 ENCOUNTER — RX RENEWAL (OUTPATIENT)
Age: 68
End: 2018-05-01

## 2018-05-16 ENCOUNTER — APPOINTMENT (OUTPATIENT)
Dept: FAMILY MEDICINE | Facility: CLINIC | Age: 68
End: 2018-05-16
Payer: MEDICARE

## 2018-05-16 VITALS
HEART RATE: 99 BPM | OXYGEN SATURATION: 97 % | WEIGHT: 180 LBS | HEIGHT: 62.5 IN | TEMPERATURE: 98.7 F | BODY MASS INDEX: 32.3 KG/M2 | SYSTOLIC BLOOD PRESSURE: 140 MMHG | DIASTOLIC BLOOD PRESSURE: 80 MMHG

## 2018-05-16 PROCEDURE — 99214 OFFICE O/P EST MOD 30 MIN: CPT

## 2018-05-16 NOTE — HISTORY OF PRESENT ILLNESS
[FreeTextEntry8] : few days of cough congestion pnd sinus pressure with discharge no fever otc not effective

## 2018-05-16 NOTE — PHYSICAL EXAM
[No Acute Distress] : no acute distress [Well Nourished] : well nourished [Well Developed] : well developed [Well-Appearing] : well-appearing [Normal Sclera/Conjunctiva] : normal sclera/conjunctiva [Supple] : supple [No Respiratory Distress] : no respiratory distress  [Clear to Auscultation] : lungs were clear to auscultation bilaterally [No Accessory Muscle Use] : no accessory muscle use [de-identified] : pnd congestion

## 2018-05-17 ENCOUNTER — OUTPATIENT (OUTPATIENT)
Dept: OUTPATIENT SERVICES | Facility: HOSPITAL | Age: 68
LOS: 1 days | Discharge: HOME | End: 2018-05-17

## 2018-05-17 DIAGNOSIS — D50.9 IRON DEFICIENCY ANEMIA, UNSPECIFIED: ICD-10-CM

## 2018-05-22 ENCOUNTER — APPOINTMENT (OUTPATIENT)
Dept: HEMATOLOGY ONCOLOGY | Facility: CLINIC | Age: 68
End: 2018-05-22
Payer: MEDICARE

## 2018-05-22 ENCOUNTER — RESULT REVIEW (OUTPATIENT)
Age: 68
End: 2018-05-22

## 2018-05-22 VITALS
DIASTOLIC BLOOD PRESSURE: 69 MMHG | HEART RATE: 97 BPM | SYSTOLIC BLOOD PRESSURE: 151 MMHG | BODY MASS INDEX: 33.63 KG/M2 | WEIGHT: 186.84 LBS | TEMPERATURE: 98.4 F | OXYGEN SATURATION: 97 %

## 2018-05-22 LAB
BASOPHILS # BLD AUTO: 0.1 K/UL — SIGNIFICANT CHANGE UP (ref 0–0.2)
BASOPHILS NFR BLD AUTO: 0.8 % — SIGNIFICANT CHANGE UP (ref 0–2)
EOSINOPHIL # BLD AUTO: 0.2 K/UL — SIGNIFICANT CHANGE UP (ref 0–0.5)
EOSINOPHIL NFR BLD AUTO: 1.9 % — SIGNIFICANT CHANGE UP (ref 0–6)
HCT VFR BLD CALC: 36.3 % — SIGNIFICANT CHANGE UP (ref 34.5–45)
HGB BLD-MCNC: 11.8 G/DL — SIGNIFICANT CHANGE UP (ref 11.5–15.5)
LYMPHOCYTES # BLD AUTO: 2.5 K/UL — SIGNIFICANT CHANGE UP (ref 1–3.3)
LYMPHOCYTES # BLD AUTO: 27.8 % — SIGNIFICANT CHANGE UP (ref 13–44)
MCHC RBC-ENTMCNC: 31.5 PG — SIGNIFICANT CHANGE UP (ref 27–34)
MCHC RBC-ENTMCNC: 32.5 GM/DL — SIGNIFICANT CHANGE UP (ref 32–36)
MCV RBC AUTO: 97.1 FL — SIGNIFICANT CHANGE UP (ref 80–100)
MONOCYTES # BLD AUTO: 0.5 K/UL — SIGNIFICANT CHANGE UP (ref 0–0.9)
MONOCYTES NFR BLD AUTO: 6.1 % — SIGNIFICANT CHANGE UP (ref 2–14)
NEUTROPHILS # BLD AUTO: 5.7 K/UL — SIGNIFICANT CHANGE UP (ref 1.8–7.4)
NEUTROPHILS NFR BLD AUTO: 63.4 % — SIGNIFICANT CHANGE UP (ref 43–77)
PLATELET # BLD AUTO: 209 K/UL — SIGNIFICANT CHANGE UP (ref 150–400)
RBC # BLD: 3.74 M/UL — LOW (ref 3.8–5.2)
RBC # FLD: 13.4 % — SIGNIFICANT CHANGE UP (ref 10.3–14.5)
WBC # BLD: 9 K/UL — SIGNIFICANT CHANGE UP (ref 3.8–10.5)
WBC # FLD AUTO: 9 K/UL — SIGNIFICANT CHANGE UP (ref 3.8–10.5)

## 2018-05-22 PROCEDURE — 99213 OFFICE O/P EST LOW 20 MIN: CPT

## 2018-05-22 RX ORDER — METHYLPREDNISOLONE 4 MG/1
4 TABLET ORAL
Qty: 1 | Refills: 0 | Status: DISCONTINUED | COMMUNITY
Start: 2018-04-30 | End: 2018-05-22

## 2018-05-22 RX ORDER — AZITHROMYCIN 250 MG/1
250 TABLET, FILM COATED ORAL
Qty: 1 | Refills: 0 | Status: DISCONTINUED | COMMUNITY
Start: 2018-05-16 | End: 2018-05-22

## 2018-05-23 LAB
ALBUMIN SERPL ELPH-MCNC: 4.3 G/DL
ALP BLD-CCNC: 132 U/L
ALT SERPL-CCNC: 10 U/L
ANION GAP SERPL CALC-SCNC: 16 MMOL/L
AST SERPL-CCNC: 10 U/L
BILIRUB SERPL-MCNC: 0.2 MG/DL
BUN SERPL-MCNC: 37 MG/DL
CALCIUM SERPL-MCNC: 9.7 MG/DL
CHLORIDE SERPL-SCNC: 103 MMOL/L
CO2 SERPL-SCNC: 23 MMOL/L
CREAT SERPL-MCNC: 1.62 MG/DL
FERRITIN SERPL-MCNC: 79 NG/ML
GLUCOSE SERPL-MCNC: 82 MG/DL
POTASSIUM SERPL-SCNC: 5.4 MMOL/L
PROT SERPL-MCNC: 6.9 G/DL
SODIUM SERPL-SCNC: 142 MMOL/L
VIT B12 SERPL-MCNC: 414 PG/ML

## 2018-06-06 ENCOUNTER — RX RENEWAL (OUTPATIENT)
Age: 68
End: 2018-06-06

## 2018-06-19 ENCOUNTER — RX RENEWAL (OUTPATIENT)
Age: 68
End: 2018-06-19

## 2018-06-24 ENCOUNTER — FORM ENCOUNTER (OUTPATIENT)
Age: 68
End: 2018-06-24

## 2018-06-25 ENCOUNTER — APPOINTMENT (OUTPATIENT)
Dept: FAMILY MEDICINE | Facility: CLINIC | Age: 68
End: 2018-06-25
Payer: MEDICARE

## 2018-06-25 ENCOUNTER — LABORATORY RESULT (OUTPATIENT)
Age: 68
End: 2018-06-25

## 2018-06-25 ENCOUNTER — APPOINTMENT (OUTPATIENT)
Dept: RADIOLOGY | Facility: CLINIC | Age: 68
End: 2018-06-25
Payer: MEDICARE

## 2018-06-25 ENCOUNTER — OUTPATIENT (OUTPATIENT)
Dept: OUTPATIENT SERVICES | Facility: HOSPITAL | Age: 68
LOS: 1 days | End: 2018-06-25
Payer: MEDICARE

## 2018-06-25 VITALS
WEIGHT: 187.38 LBS | SYSTOLIC BLOOD PRESSURE: 140 MMHG | BODY MASS INDEX: 33.62 KG/M2 | HEIGHT: 62.5 IN | DIASTOLIC BLOOD PRESSURE: 80 MMHG | HEART RATE: 100 BPM | OXYGEN SATURATION: 98 %

## 2018-06-25 VITALS — RESPIRATION RATE: 16 BRPM | SYSTOLIC BLOOD PRESSURE: 130 MMHG | DIASTOLIC BLOOD PRESSURE: 80 MMHG | HEART RATE: 80 BPM

## 2018-06-25 DIAGNOSIS — R10.30 LOWER ABDOMINAL PAIN, UNSPECIFIED: ICD-10-CM

## 2018-06-25 DIAGNOSIS — E88.81 METABOLIC SYNDROME: ICD-10-CM

## 2018-06-25 DIAGNOSIS — Z00.8 ENCOUNTER FOR OTHER GENERAL EXAMINATION: ICD-10-CM

## 2018-06-25 PROCEDURE — 73522 X-RAY EXAM HIPS BI 3-4 VIEWS: CPT

## 2018-06-25 PROCEDURE — 73522 X-RAY EXAM HIPS BI 3-4 VIEWS: CPT | Mod: 26

## 2018-06-25 PROCEDURE — 36415 COLL VENOUS BLD VENIPUNCTURE: CPT

## 2018-06-25 PROCEDURE — 99214 OFFICE O/P EST MOD 30 MIN: CPT | Mod: 25

## 2018-06-25 NOTE — HISTORY OF PRESENT ILLNESS
[Diabetes Mellitus] : Diabetes Mellitus [Hypertension] : Hypertension [Other: ___] : [unfilled] [No episodes] : No hypoglycemic episodes since the last visit. [Most Recent A1C: ___] : Most recent A1C was [unfilled] [Does not check BP] : The patient is not checking blood pressure [<130/90] : Target blood pressure is  <130/90 [Target goal met] : BP target goal met [FreeTextEntry6] : 2  recent  gout  attacks  has  had left  groin pain for 1 mo painful  when walking relieved when lying on right  side  tylenol did not help

## 2018-06-25 NOTE — PHYSICAL EXAM
[No Acute Distress] : no acute distress [Normal Voice/Communication] : normal voice/communication [Normal Sclera/Conjunctiva] : normal sclera/conjunctiva [Normal Outer Ear/Nose] : the outer ears and nose were normal in appearance [No JVD] : no jugular venous distention [Normal Rate] : normal rate  [No Edema] : there was no peripheral edema [Soft] : abdomen soft [Non Tender] : non-tender [Normal Posterior Cervical Nodes] : no posterior cervical lymphadenopathy [Normal Anterior Cervical Nodes] : no anterior cervical lymphadenopathy [No Joint Swelling] : no joint swelling [Normal Gait] : normal gait [Speech Grossly Normal] : speech grossly normal [de-identified] : bilateral  wheezes  [de-identified] : left  groin teneder  to palpation decreased rom of hip  pain on flexion no groin mass

## 2018-06-25 NOTE — REVIEW OF SYSTEMS
[Fever] : no fever [Shortness Of Breath] : no shortness of breath [Abdominal Pain] : no abdominal pain [Constipation] : no constipation [Diarrhea] : diarrhea [Dysuria] : no dysuria [FreeTextEntry9] : left  groin pain

## 2018-06-27 LAB
ALBUMIN SERPL ELPH-MCNC: 4.2 G/DL
ALP BLD-CCNC: 138 U/L
ALT SERPL-CCNC: 12 U/L
ANION GAP SERPL CALC-SCNC: 16 MMOL/L
APPEARANCE: CLEAR
AST SERPL-CCNC: 18 U/L
BACTERIA: NEGATIVE
BASOPHILS # BLD AUTO: 0.01 K/UL
BASOPHILS NFR BLD AUTO: 0.1 %
BILIRUB SERPL-MCNC: 0.2 MG/DL
BILIRUBIN URINE: NEGATIVE
BLOOD URINE: NEGATIVE
BUN SERPL-MCNC: 22 MG/DL
CALCIUM SERPL-MCNC: 10 MG/DL
CHLORIDE SERPL-SCNC: 103 MMOL/L
CHOLEST SERPL-MCNC: 180 MG/DL
CHOLEST/HDLC SERPL: 4.6 RATIO
CO2 SERPL-SCNC: 23 MMOL/L
COLOR: YELLOW
CREAT SERPL-MCNC: 1.55 MG/DL
EOSINOPHIL # BLD AUTO: 0.11 K/UL
EOSINOPHIL NFR BLD AUTO: 1.6 %
GLUCOSE QUALITATIVE U: NEGATIVE MG/DL
GLUCOSE SERPL-MCNC: 82 MG/DL
HBA1C MFR BLD HPLC: 5.7 %
HCT VFR BLD CALC: 35.4 %
HDLC SERPL-MCNC: 39 MG/DL
HGB BLD-MCNC: 11.7 G/DL
HYALINE CASTS: 3 /LPF
IMM GRANULOCYTES NFR BLD AUTO: 0.3 %
KETONES URINE: NEGATIVE
LDLC SERPL CALC-MCNC: 76 MG/DL
LEUKOCYTE ESTERASE URINE: NEGATIVE
LYMPHOCYTES # BLD AUTO: 2.18 K/UL
LYMPHOCYTES NFR BLD AUTO: 30.9 %
MAN DIFF?: NORMAL
MCHC RBC-ENTMCNC: 30.9 PG
MCHC RBC-ENTMCNC: 33.1 GM/DL
MCV RBC AUTO: 93.4 FL
MICROSCOPIC-UA: NORMAL
MONOCYTES # BLD AUTO: 0.38 K/UL
MONOCYTES NFR BLD AUTO: 5.4 %
NEUTROPHILS # BLD AUTO: 4.35 K/UL
NEUTROPHILS NFR BLD AUTO: 61.7 %
NITRITE URINE: NEGATIVE
PH URINE: 5.5
PLATELET # BLD AUTO: 199 K/UL
POTASSIUM SERPL-SCNC: 5.9 MMOL/L
PROT SERPL-MCNC: 7.2 G/DL
PROTEIN URINE: NEGATIVE MG/DL
RBC # BLD: 3.79 M/UL
RBC # FLD: 13.9 %
RED BLOOD CELLS URINE: 3 /HPF
SODIUM SERPL-SCNC: 142 MMOL/L
SPECIFIC GRAVITY URINE: 1.02
SQUAMOUS EPITHELIAL CELLS: 3 /HPF
T4 SERPL-MCNC: 8 UG/DL
TRIGL SERPL-MCNC: 326 MG/DL
TSH SERPL-ACNC: 2.84 UIU/ML
URATE SERPL-MCNC: 9.1 MG/DL
UROBILINOGEN URINE: NEGATIVE MG/DL
WBC # FLD AUTO: 7.05 K/UL
WHITE BLOOD CELLS URINE: 3 /HPF

## 2018-08-23 ENCOUNTER — APPOINTMENT (OUTPATIENT)
Dept: FAMILY MEDICINE | Facility: CLINIC | Age: 68
End: 2018-08-23
Payer: MEDICARE

## 2018-08-23 VITALS
HEART RATE: 116 BPM | TEMPERATURE: 99.2 F | BODY MASS INDEX: 33.44 KG/M2 | WEIGHT: 186.38 LBS | SYSTOLIC BLOOD PRESSURE: 120 MMHG | DIASTOLIC BLOOD PRESSURE: 80 MMHG | HEIGHT: 62.5 IN | OXYGEN SATURATION: 97 %

## 2018-08-23 VITALS — HEART RATE: 108 BPM | DIASTOLIC BLOOD PRESSURE: 80 MMHG | SYSTOLIC BLOOD PRESSURE: 110 MMHG | RESPIRATION RATE: 16 BRPM

## 2018-08-23 PROCEDURE — 99214 OFFICE O/P EST MOD 30 MIN: CPT

## 2018-08-23 RX ORDER — METHYLPREDNISOLONE 4 MG/1
4 TABLET ORAL
Qty: 1 | Refills: 1 | Status: COMPLETED | COMMUNITY
Start: 2017-10-10 | End: 2018-08-23

## 2018-08-23 NOTE — HISTORY OF PRESENT ILLNESS
[Diabetes Mellitus] : Diabetes Mellitus [Hyperlipidemia] : Hyperlipidemia [Hypertension] : Hypertension [FreeTextEntry6] : sinus  congestion productive  cough low  grade temp  for  2  days  [No episodes] : No hypoglycemic episodes since the last visit. [Check glucose ___ x/week] : Patient checks blood glucose [unfilled]  times a week [Fasting:  ___] : Fasting Blood Sugar: [unfilled] mg/dL [Most Recent A1C: ___] : Most recent A1C was [unfilled] [Does not check BP] : The patient is not checking blood pressure [<130/90] : Target blood pressure is  <130/90 [Target goal met] : BP target goal met [Doing Well] : Patient is doing well [Low Intensity Therapy] : Patient is currently on low intensity statin  therapy [Cigarettes ___ packs/day] : Patient smokes [unfilled] packs of cigarettes per day [Not Ready] : Patient is not ready for cessation intervention [Pre-contemplation] : Pre-contemplation: patient is not planning to quit within the next 6 months

## 2018-08-23 NOTE — REVIEW OF SYSTEMS
[Fever] : fever [Hoarseness] : hoarseness [Postnasal Drip] : postnasal drip [Shortness Of Breath] : shortness of breath [Wheezing] : wheezing [Cough] : cough [Diarrhea] : diarrhea

## 2018-08-23 NOTE — PHYSICAL EXAM
[No Acute Distress] : no acute distress [Normal Oropharynx] : the oropharynx was normal [Supple] : supple [Clear to Auscultation] : lungs were clear to auscultation bilaterally [Regular Rhythm] : with a regular rhythm [No Edema] : there was no peripheral edema [Soft] : abdomen soft [Non Tender] : non-tender [No HSM] : no HSM [Normal Bowel Sounds] : normal bowel sounds [Normal Gait] : normal gait [Speech Grossly Normal] : speech grossly normal [de-identified] : tachycardia

## 2018-10-11 ENCOUNTER — RX RENEWAL (OUTPATIENT)
Age: 68
End: 2018-10-11

## 2018-10-18 ENCOUNTER — APPOINTMENT (OUTPATIENT)
Dept: FAMILY MEDICINE | Facility: CLINIC | Age: 68
End: 2018-10-18
Payer: MEDICARE

## 2018-10-18 VITALS — SYSTOLIC BLOOD PRESSURE: 132 MMHG | DIASTOLIC BLOOD PRESSURE: 80 MMHG | HEART RATE: 72 BPM | RESPIRATION RATE: 16 BRPM

## 2018-10-18 VITALS
TEMPERATURE: 98.4 F | HEART RATE: 88 BPM | WEIGHT: 184 LBS | HEIGHT: 62.5 IN | OXYGEN SATURATION: 97 % | SYSTOLIC BLOOD PRESSURE: 150 MMHG | RESPIRATION RATE: 16 BRPM | DIASTOLIC BLOOD PRESSURE: 70 MMHG | BODY MASS INDEX: 33.01 KG/M2

## 2018-10-18 PROCEDURE — 90662 IIV NO PRSV INCREASED AG IM: CPT

## 2018-10-18 PROCEDURE — G0008: CPT

## 2018-10-18 PROCEDURE — 99214 OFFICE O/P EST MOD 30 MIN: CPT | Mod: 25

## 2018-10-18 RX ORDER — METHYLPREDNISOLONE 4 MG/1
4 TABLET ORAL
Qty: 1 | Refills: 0 | Status: COMPLETED | COMMUNITY
Start: 2018-09-17 | End: 2018-10-18

## 2018-10-18 RX ORDER — METHYLPREDNISOLONE 4 MG/1
4 TABLET ORAL
Qty: 1 | Refills: 0 | Status: COMPLETED | COMMUNITY
Start: 2018-08-23 | End: 2018-10-18

## 2018-10-18 RX ORDER — LEVOFLOXACIN 500 MG/1
500 TABLET, FILM COATED ORAL DAILY
Qty: 7 | Refills: 0 | Status: COMPLETED | COMMUNITY
Start: 2018-08-23 | End: 2018-10-18

## 2018-10-18 NOTE — PHYSICAL EXAM
[No Acute Distress] : no acute distress [Normal Oropharynx] : the oropharynx was normal [Supple] : supple [Clear to Auscultation] : lungs were clear to auscultation bilaterally [Normal Rate] : normal rate  [Regular Rhythm] : with a regular rhythm [No Murmur] : no murmur heard [No Edema] : there was no peripheral edema [Soft] : abdomen soft [Non Tender] : non-tender [No HSM] : no HSM [Normal Bowel Sounds] : normal bowel sounds [Normal Gait] : normal gait [Speech Grossly Normal] : speech grossly normal

## 2018-10-18 NOTE — REVIEW OF SYSTEMS
[Hoarseness] : hoarseness [Postnasal Drip] : postnasal drip [Shortness Of Breath] : shortness of breath [Wheezing] : wheezing [Fever] : no fever [Chest Pain] : no chest pain [Palpitations] : no palpitations [Cough] : no cough [Dyspnea on Exertion] : no dyspnea on exertion [Abdominal Pain] : no abdominal pain [Diarrhea] : diarrhea

## 2018-10-18 NOTE — HISTORY OF PRESENT ILLNESS
[Diabetes Mellitus] : Diabetes Mellitus [Hyperlipidemia] : Hyperlipidemia [Hypertension] : Hypertension [Check glucose ___ x/day] : Patient checks  blood glucose [unfilled]  times a day [Fasting:  ___] : Fasting Blood Sugar: [unfilled] mg/dL [Most Recent A1C: ___] : Most recent A1C was [unfilled] [Does not check BP] : The patient is not checking blood pressure [120/80] : Target blood pressure is 120/80 [Target goal met] : BP target goal met [Doing Well] : Patient is doing well [Low Intensity Therapy] : Patient is currently on low intensity statin  therapy [Weight Loss ___ Pounds] : Weight loss of [unfilled] pounds [Good understanding] : Patient has a good understanding of disease, goals and obesity follow-up plan [FreeTextEntry6] : still  smoking going  for mri of  abd  for  kidney cancer

## 2018-11-01 ENCOUNTER — APPOINTMENT (OUTPATIENT)
Dept: FAMILY MEDICINE | Facility: CLINIC | Age: 68
End: 2018-11-01
Payer: MEDICARE

## 2018-11-01 VITALS
TEMPERATURE: 98.8 F | WEIGHT: 183 LBS | OXYGEN SATURATION: 97 % | HEIGHT: 62.5 IN | DIASTOLIC BLOOD PRESSURE: 82 MMHG | HEART RATE: 75 BPM | BODY MASS INDEX: 32.83 KG/M2 | SYSTOLIC BLOOD PRESSURE: 134 MMHG

## 2018-11-01 PROCEDURE — 99214 OFFICE O/P EST MOD 30 MIN: CPT

## 2018-11-01 NOTE — HISTORY OF PRESENT ILLNESS
[FreeTextEntry8] : recent onset of cough congestion pnd sinus pressure with discharge no fever otc not effective

## 2018-11-01 NOTE — PHYSICAL EXAM
[No Acute Distress] : no acute distress [Well Nourished] : well nourished [Well Developed] : well developed [Well-Appearing] : well-appearing [Normal Sclera/Conjunctiva] : normal sclera/conjunctiva [Supple] : supple [de-identified] : pnd congestion  [de-identified] : mild right side wheeze

## 2018-12-13 ENCOUNTER — LABORATORY RESULT (OUTPATIENT)
Age: 68
End: 2018-12-13

## 2018-12-13 ENCOUNTER — APPOINTMENT (OUTPATIENT)
Dept: FAMILY MEDICINE | Facility: CLINIC | Age: 68
End: 2018-12-13
Payer: MEDICARE

## 2018-12-13 VITALS — RESPIRATION RATE: 16 BRPM | SYSTOLIC BLOOD PRESSURE: 90 MMHG | DIASTOLIC BLOOD PRESSURE: 70 MMHG | HEART RATE: 72 BPM

## 2018-12-13 VITALS
SYSTOLIC BLOOD PRESSURE: 132 MMHG | WEIGHT: 189 LBS | OXYGEN SATURATION: 94 % | RESPIRATION RATE: 18 BRPM | BODY MASS INDEX: 34.02 KG/M2 | TEMPERATURE: 98.9 F | HEART RATE: 95 BPM | DIASTOLIC BLOOD PRESSURE: 70 MMHG

## 2018-12-13 PROCEDURE — 99214 OFFICE O/P EST MOD 30 MIN: CPT

## 2018-12-13 RX ORDER — METHYLPREDNISOLONE 4 MG/1
4 TABLET ORAL
Qty: 1 | Refills: 0 | Status: COMPLETED | COMMUNITY
Start: 2018-10-18 | End: 2018-12-13

## 2018-12-13 RX ORDER — AZITHROMYCIN 250 MG/1
250 TABLET, FILM COATED ORAL
Qty: 1 | Refills: 0 | Status: COMPLETED | COMMUNITY
Start: 2018-11-01 | End: 2018-12-13

## 2018-12-13 NOTE — HISTORY OF PRESENT ILLNESS
[Diabetes Mellitus] : Diabetes Mellitus [Hyperlipidemia] : Hyperlipidemia [Hypertension] : Hypertension [No episodes] : No hypoglycemic episodes since the last visit. [Check glucose ___ x/day] : Patient checks  blood glucose [unfilled]  times a day [Review glucose log over ___ months] : Glucose logs reviewed over the past [unfilled] months reveal: [Most Recent A1C: ___] : Most recent A1C was [unfilled] [Doing Well] : Patient is doing well [Low Intensity Therapy] : Patient is currently on low intensity statin  therapy [Cigarettes ___ packs/day] : Patient smokes [unfilled] packs of cigarettes per day [Declined] : Patient has declined cessation intervention [Patient refused treatment] : Patient refused treatment [FreeTextEntry6] : cough congested for 2  days  productive  cough no temp

## 2018-12-18 LAB
ALBUMIN SERPL ELPH-MCNC: 4.2 G/DL
ALP BLD-CCNC: 192 U/L
ALT SERPL-CCNC: 7 U/L
ANION GAP SERPL CALC-SCNC: 12 MMOL/L
APPEARANCE: ABNORMAL
AST SERPL-CCNC: 10 U/L
BACTERIA: NEGATIVE
BASOPHILS # BLD AUTO: 0.01 K/UL
BASOPHILS NFR BLD AUTO: 0.1 %
BILIRUB SERPL-MCNC: <0.2 MG/DL
BILIRUBIN URINE: NEGATIVE
BLOOD URINE: NEGATIVE
BUN SERPL-MCNC: 26 MG/DL
CALCIUM SERPL-MCNC: 9.5 MG/DL
CHLORIDE SERPL-SCNC: 102 MMOL/L
CHOLEST SERPL-MCNC: 192 MG/DL
CHOLEST/HDLC SERPL: 5.1 RATIO
CO2 SERPL-SCNC: 26 MMOL/L
COLOR: YELLOW
CREAT SERPL-MCNC: 1.63 MG/DL
CREAT SPEC-SCNC: 129 MG/DL
EOSINOPHIL # BLD AUTO: 0.12 K/UL
EOSINOPHIL NFR BLD AUTO: 1.7 %
GLUCOSE QUALITATIVE U: NEGATIVE MG/DL
GLUCOSE SERPL-MCNC: 76 MG/DL
HBA1C MFR BLD HPLC: 5.7 %
HCT VFR BLD CALC: 35.2 %
HDLC SERPL-MCNC: 38 MG/DL
HGB BLD-MCNC: 11.1 G/DL
HYALINE CASTS: 1 /LPF
IMM GRANULOCYTES NFR BLD AUTO: 0.4 %
KETONES URINE: NEGATIVE
LDLC SERPL CALC-MCNC: 87 MG/DL
LEUKOCYTE ESTERASE URINE: ABNORMAL
LYMPHOCYTES # BLD AUTO: 1.68 K/UL
LYMPHOCYTES NFR BLD AUTO: 23.5 %
MAN DIFF?: NORMAL
MCHC RBC-ENTMCNC: 30.4 PG
MCHC RBC-ENTMCNC: 31.5 GM/DL
MCV RBC AUTO: 96.4 FL
MICROALBUMIN 24H UR DL<=1MG/L-MCNC: 2.1 MG/DL
MICROALBUMIN/CREAT 24H UR-RTO: 16 MG/G
MICROSCOPIC-UA: NORMAL
MONOCYTES # BLD AUTO: 0.54 K/UL
MONOCYTES NFR BLD AUTO: 7.6 %
NEUTROPHILS # BLD AUTO: 4.76 K/UL
NEUTROPHILS NFR BLD AUTO: 66.7 %
NITRITE URINE: NEGATIVE
PH URINE: 6
PLATELET # BLD AUTO: 243 K/UL
POTASSIUM SERPL-SCNC: 5.4 MMOL/L
PROT SERPL-MCNC: 6.6 G/DL
PROTEIN URINE: NEGATIVE MG/DL
RBC # BLD: 3.65 M/UL
RBC # FLD: 13.8 %
RED BLOOD CELLS URINE: 2 /HPF
SODIUM SERPL-SCNC: 140 MMOL/L
SPECIFIC GRAVITY URINE: 1.02
SQUAMOUS EPITHELIAL CELLS: 4 /HPF
T4 SERPL-MCNC: 7.3 UG/DL
TRIGL SERPL-MCNC: 335 MG/DL
TSH SERPL-ACNC: 1.75 UIU/ML
URATE SERPL-MCNC: 7.8 MG/DL
UROBILINOGEN URINE: NEGATIVE MG/DL
WBC # FLD AUTO: 7.14 K/UL
WHITE BLOOD CELLS URINE: 6 /HPF

## 2018-12-22 ENCOUNTER — APPOINTMENT (OUTPATIENT)
Dept: FAMILY MEDICINE | Facility: CLINIC | Age: 68
End: 2018-12-22
Payer: MEDICARE

## 2018-12-22 VITALS
BODY MASS INDEX: 33.91 KG/M2 | OXYGEN SATURATION: 93 % | WEIGHT: 189 LBS | SYSTOLIC BLOOD PRESSURE: 136 MMHG | DIASTOLIC BLOOD PRESSURE: 70 MMHG | HEIGHT: 62.5 IN | TEMPERATURE: 98.6 F | HEART RATE: 93 BPM

## 2018-12-22 VITALS — SYSTOLIC BLOOD PRESSURE: 120 MMHG | HEART RATE: 72 BPM | DIASTOLIC BLOOD PRESSURE: 80 MMHG | RESPIRATION RATE: 16 BRPM

## 2018-12-22 PROCEDURE — 99213 OFFICE O/P EST LOW 20 MIN: CPT | Mod: 25

## 2018-12-22 NOTE — HISTORY OF PRESENT ILLNESS
[Moderate] : moderate [___ Weeks ago] :  [unfilled] weeks ago [Constant] : constant [Wheezing] : wheezing [Shortness Of Breath] : shortness of breath [Congestion] : no congestion [Cough] : no cough [FreeTextEntry8] : still  smoking

## 2019-01-04 ENCOUNTER — RX RENEWAL (OUTPATIENT)
Age: 69
End: 2019-01-04

## 2019-01-07 ENCOUNTER — APPOINTMENT (OUTPATIENT)
Dept: FAMILY MEDICINE | Facility: CLINIC | Age: 69
End: 2019-01-07
Payer: MEDICARE

## 2019-01-07 VITALS
TEMPERATURE: 98.4 F | DIASTOLIC BLOOD PRESSURE: 80 MMHG | WEIGHT: 194 LBS | HEART RATE: 80 BPM | OXYGEN SATURATION: 94 % | BODY MASS INDEX: 34.81 KG/M2 | SYSTOLIC BLOOD PRESSURE: 140 MMHG | HEIGHT: 62.5 IN

## 2019-01-07 PROCEDURE — 99214 OFFICE O/P EST MOD 30 MIN: CPT

## 2019-01-07 RX ORDER — METHYLPREDNISOLONE 4 MG/1
4 TABLET ORAL
Qty: 1 | Refills: 0 | Status: COMPLETED | COMMUNITY
Start: 2018-12-13 | End: 2019-01-07

## 2019-01-07 RX ORDER — DOXYCYCLINE HYCLATE 100 MG/1
100 TABLET ORAL
Qty: 14 | Refills: 0 | Status: COMPLETED | COMMUNITY
Start: 2018-12-13 | End: 2019-01-07

## 2019-01-07 RX ORDER — PREDNISONE 10 MG/1
10 TABLET ORAL DAILY
Qty: 20 | Refills: 0 | Status: COMPLETED | COMMUNITY
Start: 2018-12-22 | End: 2019-01-07

## 2019-01-07 NOTE — PLAN
[FreeTextEntry1] : Sinusitis- start azithromycin. Cont flonase. Advised to add decongestant. f/u if no relief\par Gout flare- start medrol dose ashely

## 2019-01-07 NOTE — REVIEW OF SYSTEMS
[Fever] : no fever [Chills] : no chills [Discharge] : no discharge [Vision Problems] : no vision problems [Earache] : earache [Nasal Discharge] : nasal discharge [Sore Throat] : sore throat [Postnasal Drip] : postnasal drip [Chest Pain] : no chest pain [Palpitations] : no palpitations [Shortness Of Breath] : no shortness of breath [Wheezing] : wheezing [Cough] : cough [Abdominal Pain] : no abdominal pain [Nausea] : no nausea [Diarrhea] : diarrhea [Vomiting] : no vomiting [Muscle Pain] : no muscle pain [Back Pain] : no back pain [Headache] : headache [Dizziness] : no dizziness [Fainting] : no fainting

## 2019-01-07 NOTE — PHYSICAL EXAM
[No Acute Distress] : no acute distress [Well-Appearing] : well-appearing [Normal Sclera/Conjunctiva] : normal sclera/conjunctiva [PERRL] : pupils equal round and reactive to light [Normal Outer Ear/Nose] : the outer ears and nose were normal in appearance [Normal Oropharynx] : the oropharynx was normal [Supple] : supple [No Lymphadenopathy] : no lymphadenopathy [No Respiratory Distress] : no respiratory distress  [Normal Rate] : normal rate  [Regular Rhythm] : with a regular rhythm [Soft] : abdomen soft [Non Tender] : non-tender [No Masses] : no abdominal mass palpated [Normal Bowel Sounds] : normal bowel sounds [Normal Affect] : the affect was normal [Normal Insight/Judgement] : insight and judgment were intact [de-identified] : +TTP to frontal and maxillary sinuses  [de-identified] : + diffuse wheeze

## 2019-01-07 NOTE — HISTORY OF PRESENT ILLNESS
[FreeTextEntry8] : 68 year old female with pmhx of COPD, initially seen for symptoms 1 month ago and started on doxycycline. Returned with minimal improvement and was given nasal spray. She states she felt better for a few days, now presenting with 1 week history of headache, facial pressure and congestion. Also states she believes she is starting to get gout flare. Due to nephrectomy cannot take gout prophylaxis so is given medrol ashely.

## 2019-02-07 ENCOUNTER — APPOINTMENT (OUTPATIENT)
Dept: FAMILY MEDICINE | Facility: CLINIC | Age: 69
End: 2019-02-07
Payer: MEDICARE

## 2019-02-07 VITALS — DIASTOLIC BLOOD PRESSURE: 70 MMHG | HEART RATE: 96 BPM | SYSTOLIC BLOOD PRESSURE: 110 MMHG | RESPIRATION RATE: 16 BRPM

## 2019-02-07 VITALS — DIASTOLIC BLOOD PRESSURE: 70 MMHG | SYSTOLIC BLOOD PRESSURE: 114 MMHG | HEART RATE: 96 BPM

## 2019-02-07 VITALS
WEIGHT: 192 LBS | BODY MASS INDEX: 34.45 KG/M2 | SYSTOLIC BLOOD PRESSURE: 120 MMHG | OXYGEN SATURATION: 98 % | HEIGHT: 62.5 IN | HEART RATE: 118 BPM | DIASTOLIC BLOOD PRESSURE: 62 MMHG | TEMPERATURE: 98.3 F

## 2019-02-07 DIAGNOSIS — Z20.828 CONTACT WITH AND (SUSPECTED) EXPOSURE TO OTHER VIRAL COMMUNICABLE DISEASES: ICD-10-CM

## 2019-02-07 DIAGNOSIS — Z87.09 PERSONAL HISTORY OF OTHER DISEASES OF THE RESPIRATORY SYSTEM: ICD-10-CM

## 2019-02-07 PROCEDURE — 99213 OFFICE O/P EST LOW 20 MIN: CPT | Mod: 25

## 2019-02-07 PROCEDURE — G0438: CPT

## 2019-02-07 NOTE — ASSESSMENT
[FreeTextEntry1] : influenza  neg  but  daughter  has  flu will treat  with tamiflu and medrol dospak for gout and  rtc  1 mo  for starting of allopurinol

## 2019-02-07 NOTE — HEALTH RISK ASSESSMENT
[No falls in past year] : Patient reported no falls in the past year [0] : 2) Feeling down, depressed, or hopeless: Not at all (0) [Patient reported mammogram was normal] : Patient reported mammogram was normal [None] : None [With Significant Other] : lives with significant other [Retired] : retired [High School] : high school [] :  [Feels Safe at Home] : Feels safe at home [Fully functional (bathing, dressing, toileting, transferring, walking, feeding)] : Fully functional (bathing, dressing, toileting, transferring, walking, feeding) [Fully functional (using the telephone, shopping, preparing meals, housekeeping, doing laundry, using] : Fully functional and needs no help or supervision to perform IADLs (using the telephone, shopping, preparing meals, housekeeping, doing laundry, using transportation, managing medications and managing finances) [Smoke Detector] : smoke detector [Carbon Monoxide Detector] : carbon monoxide detector [Safety elements used in home] : safety elements used in home [Seat Belt] :  uses seat belt [Sunscreen] : uses sunscreen [] : No [de-identified] : smokes 14-15 cigarettes a day [Change in mental status noted] : No change in mental status noted [Reports changes in hearing] : Reports no changes in hearing [Reports changes in vision] : Reports no changes in vision [Reports changes in dental health] : Reports no changes in dental health [Travel to Developing Areas] : does not  travel to developing areas [TB Exposure] : is not being exposed to tuberculosis [Caregiver Concerns] : does not have caregiver concerns [MammogramDate] : 4/2018 [ColonoscopyComments] : polyps removed, due next January

## 2019-02-07 NOTE — PHYSICAL EXAM
[No Acute Distress] : no acute distress [Normal Oropharynx] : the oropharynx was normal [Supple] : supple [Clear to Auscultation] : lungs were clear to auscultation bilaterally [Normal Rate] : normal rate  [Regular Rhythm] : with a regular rhythm [No Murmur] : no murmur heard [No Edema] : there was no peripheral edema [Soft] : abdomen soft [Non Tender] : non-tender [No HSM] : no HSM [Normal Bowel Sounds] : normal bowel sounds [Normal Gait] : normal gait [Speech Grossly Normal] : speech grossly normal [de-identified] : swelling  right  foot redness and  tender

## 2019-02-07 NOTE — REVIEW OF SYSTEMS
[Fatigue] : fatigue [Vision Problems] : vision problems [Nosebleed] : nosebleed [Nasal Discharge] : nasal discharge [Sore Throat] : sore throat [Lower Ext Edema] : lower extremity edema [Headache] : headache [Dizziness] : dizziness [Fever] : no fever [Chills] : no chills [Hot Flashes] : no hot flashes [Night Sweats] : no night sweats [Recent Change In Weight] : ~T no recent weight change [Discharge] : no discharge [Pain] : no pain [Redness] : no redness [Dryness] : no dryness  [Itching] : no itching [Earache] : no earache [Hearing Loss] : no hearing loss [Hoarseness] : no hoarseness [Postnasal Drip] : no postnasal drip [Chest Pain] : no chest pain [Palpitations] : no palpitations [Leg Claudication] : no leg claudication [Orthopnea] : no orthopnea [Paroysmal Nocturnal Dyspnea] : no paroysmal nocturnal dyspnea [Abdominal Pain] : no abdominal pain [Nausea] : no nausea [Constipation] : no constipation [Diarrhea] : diarrhea [Vomiting] : no vomiting [Heartburn] : no heartburn [Melena] : no melena [Dysuria] : no dysuria [Incontinence] : no incontinence [Nocturia] : no nocturia [Poor Libido] : libido not poor [Hematuria] : no hematuria [Frequency] : no frequency [Vaginal Discharge] : no vaginal discharge [Dysmenorrhea] : no dysmenorrhea [Joint Pain] : no joint pain [Joint Stiffness] : no joint stiffness [Joint Swelling] : no joint swelling [Muscle Weakness] : no muscle weakness [Muscle Pain] : no muscle pain [Back Pain] : no back pain [Itching] : no itching [Mole Changes] : no mole changes [Nail Changes] : no nail changes [Hair Changes] : no hair changes [Skin Rash] : no skin rash [Fainting] : no fainting [Confusion] : no confusion [Memory Loss] : no memory loss [Unsteady Walking] : no ataxia [Suicidal] : not suicidal [Insomnia] : no insomnia [Anxiety] : no anxiety [Depression] : no depression [Easy Bleeding] : no easy bleeding [Easy Bruising] : no easy bruising [Swollen Glands] : no swollen glands [FreeTextEntry3] : s [FreeTextEntry5] : right foot

## 2019-02-07 NOTE — HISTORY OF PRESENT ILLNESS
[FreeTextEntry1] : CC: congestion\par  [de-identified] : 68 year old female history oh HLD, HTN, DM, gout\par hx of partial nephrectomy\par presents today for cough, congestion, headache and body aches for two days\par helps daughter with , daughter was recently diagnosed with the flu\par denies fever, chills\par c/o right foot pain and swelling for a few days, exacerbated by activity, described as constant aching, 10/10 in severity\par taking tylenol with minimal relief\par HTN & HLD  reports that she takes her medications as prescribed\par DM: checks blood glucose once a day reports readings around 130\par

## 2019-02-13 ENCOUNTER — FORM ENCOUNTER (OUTPATIENT)
Age: 69
End: 2019-02-13

## 2019-02-14 ENCOUNTER — OUTPATIENT (OUTPATIENT)
Dept: OUTPATIENT SERVICES | Facility: HOSPITAL | Age: 69
LOS: 1 days | End: 2019-02-14
Payer: MEDICARE

## 2019-02-14 ENCOUNTER — APPOINTMENT (OUTPATIENT)
Dept: CT IMAGING | Facility: CLINIC | Age: 69
End: 2019-02-14
Payer: MEDICARE

## 2019-02-14 DIAGNOSIS — Z00.8 ENCOUNTER FOR OTHER GENERAL EXAMINATION: ICD-10-CM

## 2019-02-14 DIAGNOSIS — F17.210 NICOTINE DEPENDENCE, CIGARETTES, UNCOMPLICATED: ICD-10-CM

## 2019-02-14 PROCEDURE — G0297: CPT | Mod: 26

## 2019-02-14 PROCEDURE — G0297: CPT

## 2019-02-26 ENCOUNTER — APPOINTMENT (OUTPATIENT)
Dept: FAMILY MEDICINE | Facility: CLINIC | Age: 69
End: 2019-02-26
Payer: MEDICARE

## 2019-02-26 VITALS
OXYGEN SATURATION: 97 % | HEIGHT: 62.5 IN | HEART RATE: 106 BPM | BODY MASS INDEX: 33.37 KG/M2 | WEIGHT: 186 LBS | SYSTOLIC BLOOD PRESSURE: 138 MMHG | DIASTOLIC BLOOD PRESSURE: 86 MMHG | TEMPERATURE: 98.7 F

## 2019-02-26 PROCEDURE — 99214 OFFICE O/P EST MOD 30 MIN: CPT

## 2019-02-26 NOTE — PHYSICAL EXAM
[No Acute Distress] : no acute distress [Well Nourished] : well nourished [Normal Sclera/Conjunctiva] : normal sclera/conjunctiva [Supple] : supple [No Lymphadenopathy] : no lymphadenopathy [de-identified] : pnd congestion  [de-identified] : mild left inspiratory wheeze

## 2019-02-26 NOTE — HISTORY OF PRESENT ILLNESS
[FreeTextEntry8] : recent onset of cough congestion pnd sinus pressure with discharge no fever has inhaler smokes 1ppd discussed

## 2019-02-26 NOTE — REVIEW OF SYSTEMS
[Nasal Discharge] : nasal discharge [Wheezing] : wheezing [Cough] : cough [Negative] : Cardiovascular

## 2019-03-07 ENCOUNTER — APPOINTMENT (OUTPATIENT)
Dept: FAMILY MEDICINE | Facility: CLINIC | Age: 69
End: 2019-03-07
Payer: MEDICARE

## 2019-03-07 VITALS
HEART RATE: 98 BPM | WEIGHT: 193.13 LBS | RESPIRATION RATE: 16 BRPM | HEIGHT: 62.5 IN | DIASTOLIC BLOOD PRESSURE: 60 MMHG | SYSTOLIC BLOOD PRESSURE: 100 MMHG | OXYGEN SATURATION: 94 % | BODY MASS INDEX: 34.65 KG/M2

## 2019-03-07 VITALS — HEART RATE: 80 BPM | SYSTOLIC BLOOD PRESSURE: 90 MMHG | DIASTOLIC BLOOD PRESSURE: 70 MMHG | RESPIRATION RATE: 16 BRPM

## 2019-03-07 PROCEDURE — 99214 OFFICE O/P EST MOD 30 MIN: CPT

## 2019-03-07 NOTE — PHYSICAL EXAM
[No Acute Distress] : no acute distress [Normal Oropharynx] : the oropharynx was normal [Supple] : supple [Clear to Auscultation] : lungs were clear to auscultation bilaterally [Normal Rate] : normal rate  [Regular Rhythm] : with a regular rhythm [No Murmur] : no murmur heard [No Edema] : there was no peripheral edema [Soft] : abdomen soft [Non Tender] : non-tender [No HSM] : no HSM [Normal Bowel Sounds] : normal bowel sounds [Normal Gait] : normal gait [Speech Grossly Normal] : speech grossly normal [de-identified] : swelling  right  foot gone

## 2019-03-07 NOTE — REVIEW OF SYSTEMS
[Fever] : no fever [Chills] : no chills [Discharge] : no discharge [Vision Problems] : no vision problems [Earache] : no earache [Nasal Discharge] : no nasal discharge [Postnasal Drip] : no postnasal drip [Chest Pain] : no chest pain [Palpitations] : no palpitations [Shortness Of Breath] : no shortness of breath [Cough] : no cough [Diarrhea] : diarrhea [Joint Pain] : no joint pain

## 2019-03-07 NOTE — HISTORY OF PRESENT ILLNESS
[de-identified] : 68 year old female with a history of COPD, HTN, DM, renal CA, partial left nephrectomy , gout \par Last seen 2/26 for sinus infection, complete azithromycin, reports resolution of symptoms\par R foot gout- completed Medrol dose pack for gout flare, denies symptoms\par active smoker\par see Dr Gabriel nephrologist, next appt is May having  5  episodes of  gout  per yr \par

## 2019-04-11 ENCOUNTER — APPOINTMENT (OUTPATIENT)
Dept: FAMILY MEDICINE | Facility: CLINIC | Age: 69
End: 2019-04-11
Payer: MEDICARE

## 2019-04-11 VITALS
WEIGHT: 193 LBS | RESPIRATION RATE: 16 BRPM | OXYGEN SATURATION: 95 % | BODY MASS INDEX: 34.63 KG/M2 | HEIGHT: 62.5 IN | HEART RATE: 72 BPM | SYSTOLIC BLOOD PRESSURE: 114 MMHG | DIASTOLIC BLOOD PRESSURE: 68 MMHG

## 2019-04-11 VITALS — DIASTOLIC BLOOD PRESSURE: 80 MMHG | RESPIRATION RATE: 72 BRPM | SYSTOLIC BLOOD PRESSURE: 100 MMHG | HEART RATE: 16 BPM

## 2019-04-11 PROCEDURE — 99214 OFFICE O/P EST MOD 30 MIN: CPT

## 2019-04-11 RX ORDER — AZITHROMYCIN 250 MG/1
250 TABLET, FILM COATED ORAL DAILY
Qty: 1 | Refills: 0 | Status: COMPLETED | COMMUNITY
Start: 2019-01-07 | End: 2019-04-11

## 2019-04-11 RX ORDER — SOD CHLOR,BICARB/SQUEEZ BOTTLE
PACKET, WITH RINSE DEVICE NASAL
Qty: 1 | Refills: 0 | Status: COMPLETED | COMMUNITY
Start: 2018-12-22 | End: 2019-04-11

## 2019-04-11 RX ORDER — OSELTAMIVIR PHOSPHATE 75 MG/1
75 CAPSULE ORAL TWICE DAILY
Qty: 1 | Refills: 0 | Status: COMPLETED | COMMUNITY
Start: 2019-02-07 | End: 2019-04-11

## 2019-04-11 RX ORDER — METHYLPREDNISOLONE 4 MG/1
4 TABLET ORAL
Qty: 1 | Refills: 1 | Status: COMPLETED | COMMUNITY
Start: 2019-01-07 | End: 2019-04-11

## 2019-04-11 RX ORDER — ALBUTEROL SULFATE 90 UG/1
AEROSOL, METERED RESPIRATORY (INHALATION)
Refills: 0 | Status: COMPLETED | COMMUNITY
End: 2019-04-11

## 2019-04-11 RX ORDER — METHYLPREDNISOLONE 4 MG/1
4 TABLET ORAL
Qty: 1 | Refills: 0 | Status: COMPLETED | COMMUNITY
Start: 2019-02-07 | End: 2019-04-11

## 2019-04-11 NOTE — REVIEW OF SYSTEMS
[Fever] : no fever [Chills] : no chills [Discharge] : no discharge [Vision Problems] : no vision problems [Earache] : no earache [Nasal Discharge] : no nasal discharge [Postnasal Drip] : no postnasal drip [Chest Pain] : no chest pain [Palpitations] : no palpitations [Shortness Of Breath] : no shortness of breath [Diarrhea] : diarrhea [Cough] : no cough [Joint Pain] : no joint pain

## 2019-04-11 NOTE — PHYSICAL EXAM
[No Acute Distress] : no acute distress [Normal Oropharynx] : the oropharynx was normal [Supple] : supple [Clear to Auscultation] : lungs were clear to auscultation bilaterally [Regular Rhythm] : with a regular rhythm [Normal Rate] : normal rate  [No Edema] : there was no peripheral edema [Soft] : abdomen soft [No Murmur] : no murmur heard [Non Tender] : non-tender [No HSM] : no HSM [Normal Bowel Sounds] : normal bowel sounds [Normal Gait] : normal gait [Speech Grossly Normal] : speech grossly normal [de-identified] : swelling  right  foot with redness rt hip  pain on rom

## 2019-04-11 NOTE — HISTORY OF PRESENT ILLNESS
[Hyperlipidemia] : Hyperlipidemia [Diabetes Mellitus] : Diabetes Mellitus [Hypertension] : Hypertension [Other: ___] : [unfilled] [Check glucose ___ x/day] : Patient checks  blood glucose [unfilled]  times a day [Fasting:  ___] : Fasting Blood Sugar: [unfilled] mg/dL [Most Recent A1C: ___] : Most recent A1C was [unfilled] [Does not check BP] : The patient is not checking blood pressure [120/80] : Target blood pressure is 120/80 [Doing Well] : Patient is doing well [Target goal met] : BP target goal met [Managed with medications] : managed with  medication [Low Intensity Therapy] : Patient is currently on low intensity statin  therapy [FreeTextEntry6] : c/o  rt  leg  pain for 2  wks   [FreeTextEntry1] : has  another  gout attack  rt  foot not  taking allopurinol or  colchicine  because of  side afftects

## 2019-04-12 LAB
ALBUMIN SERPL ELPH-MCNC: 4.3 G/DL
ALP BLD-CCNC: 228 U/L
ALT SERPL-CCNC: 9 U/L
ANION GAP SERPL CALC-SCNC: 14 MMOL/L
APPEARANCE: CLEAR
AST SERPL-CCNC: 13 U/L
BACTERIA: NEGATIVE
BASOPHILS # BLD AUTO: 0.02 K/UL
BASOPHILS NFR BLD AUTO: 0.3 %
BILIRUB SERPL-MCNC: <0.2 MG/DL
BILIRUBIN URINE: NEGATIVE
BLOOD URINE: NEGATIVE
BUN SERPL-MCNC: 25 MG/DL
CALCIUM SERPL-MCNC: 9.8 MG/DL
CHLORIDE SERPL-SCNC: 104 MMOL/L
CHOLEST SERPL-MCNC: 189 MG/DL
CHOLEST/HDLC SERPL: 4.9 RATIO
CO2 SERPL-SCNC: 26 MMOL/L
COLOR: YELLOW
CREAT SERPL-MCNC: 1.89 MG/DL
CREAT SPEC-SCNC: 120 MG/DL
EOSINOPHIL # BLD AUTO: 0.17 K/UL
EOSINOPHIL NFR BLD AUTO: 2.7 %
ESTIMATED AVERAGE GLUCOSE: 128 MG/DL
GLUCOSE QUALITATIVE U: NEGATIVE
GLUCOSE SERPL-MCNC: 91 MG/DL
HBA1C MFR BLD HPLC: 6.1 %
HCT VFR BLD CALC: 34.6 %
HDLC SERPL-MCNC: 39 MG/DL
HGB BLD-MCNC: 10.4 G/DL
HYALINE CASTS: 0 /LPF
IMM GRANULOCYTES NFR BLD AUTO: 0.3 %
KETONES URINE: NEGATIVE
LDLC SERPL CALC-MCNC: 90 MG/DL
LEUKOCYTE ESTERASE URINE: NEGATIVE
LYMPHOCYTES # BLD AUTO: 1.51 K/UL
LYMPHOCYTES NFR BLD AUTO: 23.7 %
MAN DIFF?: NORMAL
MCHC RBC-ENTMCNC: 30.1 GM/DL
MCHC RBC-ENTMCNC: 30.1 PG
MCV RBC AUTO: 100 FL
MICROALBUMIN 24H UR DL<=1MG/L-MCNC: 3.7 MG/DL
MICROALBUMIN/CREAT 24H UR-RTO: 31 MG/G
MICROSCOPIC-UA: NORMAL
MONOCYTES # BLD AUTO: 0.43 K/UL
MONOCYTES NFR BLD AUTO: 6.8 %
NEUTROPHILS # BLD AUTO: 4.21 K/UL
NEUTROPHILS NFR BLD AUTO: 66.2 %
NITRITE URINE: NEGATIVE
PH URINE: 6.5
PLATELET # BLD AUTO: 235 K/UL
POTASSIUM SERPL-SCNC: 6.1 MMOL/L
PROT SERPL-MCNC: 6.8 G/DL
PROTEIN URINE: NORMAL
RBC # BLD: 3.46 M/UL
RBC # FLD: 14.2 %
RED BLOOD CELLS URINE: 1 /HPF
SODIUM SERPL-SCNC: 144 MMOL/L
SPECIFIC GRAVITY URINE: 1.02
SQUAMOUS EPITHELIAL CELLS: 1 /HPF
T4 SERPL-MCNC: 7.2 UG/DL
TRIGL SERPL-MCNC: 300 MG/DL
TSH SERPL-ACNC: 1.81 UIU/ML
URATE SERPL-MCNC: 7.2 MG/DL
UROBILINOGEN URINE: NORMAL
WBC # FLD AUTO: 6.36 K/UL
WHITE BLOOD CELLS URINE: 1 /HPF

## 2019-04-16 LAB — POTASSIUM SERPL-SCNC: 6 MMOL/L

## 2019-05-07 ENCOUNTER — APPOINTMENT (OUTPATIENT)
Dept: FAMILY MEDICINE | Facility: CLINIC | Age: 69
End: 2019-05-07
Payer: MEDICARE

## 2019-05-07 VITALS
DIASTOLIC BLOOD PRESSURE: 80 MMHG | HEIGHT: 62.5 IN | HEART RATE: 95 BPM | WEIGHT: 193 LBS | TEMPERATURE: 98.5 F | SYSTOLIC BLOOD PRESSURE: 150 MMHG | OXYGEN SATURATION: 96 % | BODY MASS INDEX: 34.63 KG/M2

## 2019-05-07 PROCEDURE — 99214 OFFICE O/P EST MOD 30 MIN: CPT

## 2019-05-07 NOTE — PHYSICAL EXAM
[Well Nourished] : well nourished [Normal Sclera/Conjunctiva] : normal sclera/conjunctiva [de-identified] : gout [de-identified] : redness of right elbow

## 2019-05-07 NOTE — HISTORY OF PRESENT ILLNESS
[FreeTextEntry8] : exacerbation of gout bilateral lower legs on cholchicine states she did not tolerate allopurinol also pus from right elbow

## 2019-06-11 ENCOUNTER — APPOINTMENT (OUTPATIENT)
Dept: FAMILY MEDICINE | Facility: CLINIC | Age: 69
End: 2019-06-11
Payer: MEDICARE

## 2019-06-11 VITALS
RESPIRATION RATE: 16 BRPM | TEMPERATURE: 98.4 F | HEIGHT: 62.5 IN | HEART RATE: 104 BPM | SYSTOLIC BLOOD PRESSURE: 130 MMHG | BODY MASS INDEX: 34.63 KG/M2 | OXYGEN SATURATION: 90 % | DIASTOLIC BLOOD PRESSURE: 60 MMHG | WEIGHT: 193 LBS

## 2019-06-11 DIAGNOSIS — M54.5 LOW BACK PAIN: ICD-10-CM

## 2019-06-11 DIAGNOSIS — M54.31 SCIATICA, RIGHT SIDE: ICD-10-CM

## 2019-06-11 PROCEDURE — 99213 OFFICE O/P EST LOW 20 MIN: CPT

## 2019-06-11 NOTE — HISTORY OF PRESENT ILLNESS
[FreeTextEntry8] : continues to have gout both legs improved with medrol and tramadol checking sugars did not tolerated chochiicine or alopurinol

## 2019-06-11 NOTE — PHYSICAL EXAM
[No Acute Distress] : no acute distress [Well Nourished] : well nourished [Normal Sclera/Conjunctiva] : normal sclera/conjunctiva [Normal Outer Ear/Nose] : the outer ears and nose were normal in appearance [Normal Oropharynx] : the oropharynx was normal [Supple] : supple [Clear to Auscultation] : lungs were clear to auscultation bilaterally [No Accessory Muscle Use] : no accessory muscle use [de-identified] : marked gout pulsess present feet discolored swollen

## 2019-07-02 ENCOUNTER — APPOINTMENT (OUTPATIENT)
Dept: FAMILY MEDICINE | Facility: CLINIC | Age: 69
End: 2019-07-02
Payer: MEDICARE

## 2019-07-02 VITALS
OXYGEN SATURATION: 96 % | BODY MASS INDEX: 36.49 KG/M2 | SYSTOLIC BLOOD PRESSURE: 124 MMHG | HEART RATE: 111 BPM | DIASTOLIC BLOOD PRESSURE: 70 MMHG | HEIGHT: 62.5 IN | WEIGHT: 203.38 LBS

## 2019-07-02 VITALS — DIASTOLIC BLOOD PRESSURE: 80 MMHG | SYSTOLIC BLOOD PRESSURE: 130 MMHG | HEART RATE: 72 BPM | RESPIRATION RATE: 16 BRPM

## 2019-07-02 PROCEDURE — 99214 OFFICE O/P EST MOD 30 MIN: CPT | Mod: 25

## 2019-07-02 PROCEDURE — 99406 BEHAV CHNG SMOKING 3-10 MIN: CPT

## 2019-07-02 RX ORDER — COLCHICINE 0.6 MG/1
0.6 CAPSULE ORAL
Qty: 90 | Refills: 1 | Status: DISCONTINUED | COMMUNITY
Start: 2019-03-07 | End: 2019-07-02

## 2019-07-02 RX ORDER — METHYLPREDNISOLONE 4 MG/1
4 TABLET ORAL
Qty: 1 | Refills: 2 | Status: COMPLETED | COMMUNITY
Start: 2019-04-11 | End: 2019-07-02

## 2019-07-02 NOTE — HISTORY OF PRESENT ILLNESS
[Diabetes Mellitus] : Diabetes Mellitus [Depression] : Depression [Hyperlipidemia] : Hyperlipidemia [Hypertension] : Hypertension [Spouse] : spouse [No episodes] : No hypoglycemic episodes since the last visit. [No Retinopathy] : No retinopathy [Check glucose ___ x/day] : Patient checks  blood glucose [unfilled]  times a day [Does not check BP] : The patient is not checking blood pressure [Most Recent A1C: ___] : Most recent A1C was [unfilled] [<130/90] : Target blood pressure is  <130/90 [Managed with medications] : managed with  medication [Doing Well] : Patient is doing well [Low Intensity Therapy] : Patient is currently on low intensity statin  therapy [Cigarettes ___ packs/day] : Patient smokes [unfilled] packs of cigarettes per day [Discussed Cessation Medication] : cessation medication was discussed [Discussed Risks and Advised to Quit Smoking] : Discussed risks and advised to quit smoking [Smoking Cessation Counseling Given (more than 3 min less than10 min) and Resources Provided] : Smoking cessation counseling given (more than 3 min less than 10 min) and resources provided [Discussed Cessation Strategies] : cessation strategies were discussed [Patient refused treatment] : Patient refused treatment [Pre-contemplation] : Pre-contemplation: patient is not planning to quit within the next 6 months [Not Ready] : Patient is not ready for cessation intervention [FreeTextEntry6] : not  feeling well has  edema both legs  unchanged no  change in pain  has  gained  10 lbs saw  cardiology  all ok  [de-identified] : [address]

## 2019-07-02 NOTE — PHYSICAL EXAM
[No Acute Distress] : no acute distress [Normal Oropharynx] : the oropharynx was normal [PERRL] : pupils equal round and reactive to light [No Accessory Muscle Use] : no accessory muscle use [No Lymphadenopathy] : no lymphadenopathy [Regular Rhythm] : with a regular rhythm [Soft] : abdomen soft [Normal] : no rash [Normal Insight/Judgement] : insight and judgment were intact [de-identified] : 3+ edema

## 2019-07-19 ENCOUNTER — APPOINTMENT (OUTPATIENT)
Dept: VASCULAR SURGERY | Facility: CLINIC | Age: 69
End: 2019-07-19
Payer: MEDICARE

## 2019-07-19 VITALS
TEMPERATURE: 98.5 F | HEART RATE: 112 BPM | OXYGEN SATURATION: 99 % | HEIGHT: 61 IN | WEIGHT: 206 LBS | SYSTOLIC BLOOD PRESSURE: 110 MMHG | BODY MASS INDEX: 38.89 KG/M2 | DIASTOLIC BLOOD PRESSURE: 67 MMHG

## 2019-07-19 PROCEDURE — 99203 OFFICE O/P NEW LOW 30 MIN: CPT

## 2019-07-19 PROCEDURE — 93970 EXTREMITY STUDY: CPT

## 2019-07-23 ENCOUNTER — APPOINTMENT (OUTPATIENT)
Dept: FAMILY MEDICINE | Facility: CLINIC | Age: 69
End: 2019-07-23
Payer: MEDICARE

## 2019-07-23 VITALS
HEIGHT: 60 IN | BODY MASS INDEX: 40.13 KG/M2 | WEIGHT: 204.38 LBS | HEART RATE: 105 BPM | SYSTOLIC BLOOD PRESSURE: 120 MMHG | DIASTOLIC BLOOD PRESSURE: 62 MMHG | OXYGEN SATURATION: 96 %

## 2019-07-23 VITALS — RESPIRATION RATE: 16 BRPM | HEART RATE: 78 BPM | DIASTOLIC BLOOD PRESSURE: 70 MMHG | SYSTOLIC BLOOD PRESSURE: 100 MMHG

## 2019-07-23 DIAGNOSIS — Z85.528 PERSONAL HISTORY OF OTHER MALIGNANT NEOPLASM OF KIDNEY: ICD-10-CM

## 2019-07-23 DIAGNOSIS — Z87.39 PERSONAL HISTORY OF OTHER DISEASES OF THE MUSCULOSKELETAL SYSTEM AND CONNECTIVE TISSUE: ICD-10-CM

## 2019-07-23 DIAGNOSIS — Z82.49 FAMILY HISTORY OF ISCHEMIC HEART DISEASE AND OTHER DISEASES OF THE CIRCULATORY SYSTEM: ICD-10-CM

## 2019-07-23 DIAGNOSIS — Z83.3 FAMILY HISTORY OF DIABETES MELLITUS: ICD-10-CM

## 2019-07-23 DIAGNOSIS — Z86.79 PERSONAL HISTORY OF OTHER DISEASES OF THE CIRCULATORY SYSTEM: ICD-10-CM

## 2019-07-23 DIAGNOSIS — Z87.448 PERSONAL HISTORY OF OTHER DISEASES OF URINARY SYSTEM: ICD-10-CM

## 2019-07-23 DIAGNOSIS — Z86.39 PERSONAL HISTORY OF OTHER ENDOCRINE, NUTRITIONAL AND METABOLIC DISEASE: ICD-10-CM

## 2019-07-23 DIAGNOSIS — Z84.1 FAMILY HISTORY OF DISORDERS OF KIDNEY AND URETER: ICD-10-CM

## 2019-07-23 DIAGNOSIS — Z86.2 PERSONAL HISTORY OF DISEASES OF THE BLOOD AND BLOOD-FORMING ORGANS AND CERTAIN DISORDERS INVOLVING THE IMMUNE MECHANISM: ICD-10-CM

## 2019-07-23 PROCEDURE — 99214 OFFICE O/P EST MOD 30 MIN: CPT

## 2019-07-23 RX ORDER — FUROSEMIDE 20 MG/1
20 TABLET ORAL DAILY
Qty: 30 | Refills: 1 | Status: DISCONTINUED | COMMUNITY
Start: 2019-07-02 | End: 2019-07-23

## 2019-07-23 NOTE — HISTORY OF PRESENT ILLNESS
[Diabetes Mellitus] : Diabetes Mellitus [Hyperlipidemia] : Hyperlipidemia [Hypertension] : Hypertension [Other: ___] : [unfilled] [No episodes] : No hypoglycemic episodes since the last visit. [Check glucose ___ x/day] : Patient checks  blood glucose [unfilled]  times a day [Doing Well] : Patient is doing well [Low Intensity Therapy] : Patient is currently on low intensity statin  therapy [Cigarettes ___ packs/day] : Patient smokes [unfilled] packs of cigarettes per day [___ Year(s)] : [unfilled] year(s) ago [de-identified] : ave<130  [FreeTextEntry6] : saw  gu  no recurrence of renal cancer still has  rt  foot  swelling  and  painful  saw  vascular  swelling is  from gout

## 2019-07-23 NOTE — ASSESSMENT
[FreeTextEntry1] : acute  goat medrol dospak  rtc  2 wks will  increase  allopurinol to  200  mg  next wk dc lasix for now

## 2019-07-23 NOTE — PHYSICAL EXAM
[No Acute Distress] : no acute distress [Normal Voice/Communication] : normal voice/communication [PERRL] : pupils equal round and reactive to light [No Lymphadenopathy] : no lymphadenopathy [No Accessory Muscle Use] : no accessory muscle use [Regular Rhythm] : with a regular rhythm [Normal Supraclavicular Nodes] : no supraclavicular lymphadenopathy [Normal] : normal gait, coordination grossly intact, no focal deficits and deep tendon reflexes were 2+ and symmetric [de-identified] : right  ashok  swelling

## 2019-08-07 ENCOUNTER — APPOINTMENT (OUTPATIENT)
Dept: FAMILY MEDICINE | Facility: CLINIC | Age: 69
End: 2019-08-07
Payer: MEDICARE

## 2019-08-07 VITALS
HEIGHT: 60 IN | WEIGHT: 201.38 LBS | BODY MASS INDEX: 39.53 KG/M2 | OXYGEN SATURATION: 97 % | HEART RATE: 113 BPM | DIASTOLIC BLOOD PRESSURE: 80 MMHG | SYSTOLIC BLOOD PRESSURE: 110 MMHG

## 2019-08-07 VITALS — SYSTOLIC BLOOD PRESSURE: 110 MMHG | RESPIRATION RATE: 16 BRPM | HEART RATE: 72 BPM | DIASTOLIC BLOOD PRESSURE: 80 MMHG

## 2019-08-07 DIAGNOSIS — Z00.00 ENCOUNTER FOR GENERAL ADULT MEDICAL EXAMINATION W/OUT ABNORMAL FINDINGS: ICD-10-CM

## 2019-08-07 PROCEDURE — 99214 OFFICE O/P EST MOD 30 MIN: CPT

## 2019-08-07 NOTE — PHYSICAL EXAM
[No Acute Distress] : no acute distress [PERRL] : pupils equal round and reactive to light [Supple] : supple [Normal Oropharynx] : the oropharynx was normal [Clear to Auscultation] : lungs were clear to auscultation bilaterally [Normal] : soft, non-tender, non-distended, no masses palpated, no HSM and normal bowel sounds [No Rash] : no rash [Normal Insight/Judgement] : insight and judgment were intact [No Focal Deficits] : no focal deficits [de-identified] : tr edema

## 2019-08-07 NOTE — REVIEW OF SYSTEMS
[Fever] : no fever [Chills] : no chills [Discharge] : no discharge [Vision Problems] : no vision problems [Earache] : no earache [Nasal Discharge] : no nasal discharge [Chest Pain] : no chest pain [Postnasal Drip] : no postnasal drip [Palpitations] : no palpitations [Shortness Of Breath] : no shortness of breath [Cough] : no cough [Diarrhea] : diarrhea [Joint Pain] : no joint pain

## 2019-08-07 NOTE — ASSESSMENT
[FreeTextEntry1] : medically stable  continue all meds\par continue  allopurinol 200  mg  daily rtc  2 mos

## 2019-08-07 NOTE — HISTORY OF PRESENT ILLNESS
[Diabetes Mellitus] : Diabetes Mellitus [Hyperlipidemia] : Hyperlipidemia [Hypertension] : Hypertension [Other: ___] : [unfilled] [Check glucose ___ x/week] : Patient checks blood glucose [unfilled]  times a week [Most Recent A1C: ___] : Most recent A1C was [unfilled] [120/80] : Target blood pressure is 120/80 [Does not check BP] : The patient is not checking blood pressure [Doing Well] : Patient is doing well [Target goal met] : BP target goal met [Low Intensity Therapy] : Patient is currently on low intensity statin  therapy [FreeTextEntry6] : foot much  better  no  more pain taking  200  mg of  allopurinol per  day  [FreeTextEntry1] : still smoking

## 2019-08-08 LAB
ALBUMIN SERPL ELPH-MCNC: 3.7 G/DL
ALP BLD-CCNC: 198 U/L
ALT SERPL-CCNC: 8 U/L
ANION GAP SERPL CALC-SCNC: 14 MMOL/L
AST SERPL-CCNC: 11 U/L
BASOPHILS # BLD AUTO: 0.01 K/UL
BASOPHILS NFR BLD AUTO: 0.1 %
BILIRUB SERPL-MCNC: 0.2 MG/DL
BUN SERPL-MCNC: 33 MG/DL
CALCIUM SERPL-MCNC: 10 MG/DL
CHLORIDE SERPL-SCNC: 105 MMOL/L
CHOLEST SERPL-MCNC: 189 MG/DL
CHOLEST/HDLC SERPL: 6.1 RATIO
CO2 SERPL-SCNC: 22 MMOL/L
CREAT SERPL-MCNC: 2.15 MG/DL
EOSINOPHIL # BLD AUTO: 0.14 K/UL
EOSINOPHIL NFR BLD AUTO: 1.9 %
ESTIMATED AVERAGE GLUCOSE: 146 MG/DL
GLUCOSE SERPL-MCNC: 154 MG/DL
HBA1C MFR BLD HPLC: 6.7 %
HCT VFR BLD CALC: 30.9 %
HDLC SERPL-MCNC: 31 MG/DL
HGB BLD-MCNC: 9.6 G/DL
IMM GRANULOCYTES NFR BLD AUTO: 0.3 %
LDLC SERPL CALC-MCNC: 84 MG/DL
LYMPHOCYTES # BLD AUTO: 0.91 K/UL
LYMPHOCYTES NFR BLD AUTO: 12.5 %
MAN DIFF?: NORMAL
MCHC RBC-ENTMCNC: 30.8 PG
MCHC RBC-ENTMCNC: 31.1 GM/DL
MCV RBC AUTO: 99 FL
MONOCYTES # BLD AUTO: 0.54 K/UL
MONOCYTES NFR BLD AUTO: 7.4 %
NEUTROPHILS # BLD AUTO: 5.64 K/UL
NEUTROPHILS NFR BLD AUTO: 77.8 %
PLATELET # BLD AUTO: 166 K/UL
POTASSIUM SERPL-SCNC: 5.3 MMOL/L
PROT SERPL-MCNC: 6.4 G/DL
RBC # BLD: 3.12 M/UL
RBC # FLD: 15.1 %
SODIUM SERPL-SCNC: 141 MMOL/L
T4 SERPL-MCNC: 5.9 UG/DL
TRIGL SERPL-MCNC: 372 MG/DL
TSH SERPL-ACNC: 1.67 UIU/ML
URATE SERPL-MCNC: 5.6 MG/DL
WBC # FLD AUTO: 7.26 K/UL

## 2019-09-16 ENCOUNTER — FORM ENCOUNTER (OUTPATIENT)
Age: 69
End: 2019-09-16

## 2019-09-17 ENCOUNTER — OUTPATIENT (OUTPATIENT)
Dept: OUTPATIENT SERVICES | Facility: HOSPITAL | Age: 69
LOS: 1 days | End: 2019-09-17
Payer: MEDICARE

## 2019-09-17 ENCOUNTER — APPOINTMENT (OUTPATIENT)
Dept: RADIOLOGY | Facility: CLINIC | Age: 69
End: 2019-09-17
Payer: MEDICARE

## 2019-09-17 ENCOUNTER — APPOINTMENT (OUTPATIENT)
Dept: FAMILY MEDICINE | Facility: CLINIC | Age: 69
End: 2019-09-17
Payer: MEDICARE

## 2019-09-17 VITALS
RESPIRATION RATE: 16 BRPM | BODY MASS INDEX: 39.46 KG/M2 | SYSTOLIC BLOOD PRESSURE: 126 MMHG | HEART RATE: 67 BPM | OXYGEN SATURATION: 96 % | DIASTOLIC BLOOD PRESSURE: 60 MMHG | HEIGHT: 60 IN | WEIGHT: 201 LBS

## 2019-09-17 VITALS — DIASTOLIC BLOOD PRESSURE: 70 MMHG | SYSTOLIC BLOOD PRESSURE: 110 MMHG | RESPIRATION RATE: 16 BRPM | HEART RATE: 72 BPM
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
## 2019-09-17 PROCEDURE — 73562 X-RAY EXAM OF KNEE 3: CPT | Mod: 26,50

## 2019-09-17 PROCEDURE — 73562 X-RAY EXAM OF KNEE 3: CPT

## 2019-09-17 PROCEDURE — 99214 OFFICE O/P EST MOD 30 MIN: CPT

## 2019-09-17 NOTE — PHYSICAL EXAM
[No Acute Distress] : no acute distress [Normal Oropharynx] : the oropharynx was normal [PERRL] : pupils equal round and reactive to light [Supple] : supple [Clear to Auscultation] : lungs were clear to auscultation bilaterally [No Murmur] : no murmur heard [No Edema] : there was no peripheral edema [No Rash] : no rash [Normal] : affect was normal and insight and judgment were intact [de-identified] : swelling  left  kne  wit ecchymosis and effusion  rt  knee  no swelling

## 2019-09-17 NOTE — HISTORY OF PRESENT ILLNESS
[Spouse] : spouse [FreeTextEntry8] : feel  yesterday  on  both knees ans  banged head and injured  left arm no loc just  tripped no  was  dizzy after  fall glucose has  been ok  bp at goal  gets  dizzy when hgb low has  bilateral tka

## 2019-09-17 NOTE — REVIEW OF SYSTEMS
[Fever] : no fever [Palpitations] : no palpitations [Chest Pain] : no chest pain [Diarrhea] : diarrhea

## 2019-09-18 LAB
BASOPHILS # BLD AUTO: 0.02 K/UL
BASOPHILS NFR BLD AUTO: 0.4 %
EOSINOPHIL # BLD AUTO: 0.21 K/UL
EOSINOPHIL NFR BLD AUTO: 4.1 %
HCT VFR BLD CALC: 29.8 %
HGB BLD-MCNC: 9 G/DL
IMM GRANULOCYTES NFR BLD AUTO: 0.2 %
LYMPHOCYTES # BLD AUTO: 1.01 K/UL
LYMPHOCYTES NFR BLD AUTO: 19.5 %
MAN DIFF?: NORMAL
MCHC RBC-ENTMCNC: 30.2 GM/DL
MCHC RBC-ENTMCNC: 30.8 PG
MCV RBC AUTO: 102.1 FL
MONOCYTES # BLD AUTO: 0.42 K/UL
MONOCYTES NFR BLD AUTO: 8.1 %
NEUTROPHILS # BLD AUTO: 3.51 K/UL
NEUTROPHILS NFR BLD AUTO: 67.7 %
PLATELET # BLD AUTO: 177 K/UL
RBC # BLD: 2.92 M/UL
RBC # FLD: 15.6 %
WBC # FLD AUTO: 5.18 K/UL

## 2019-10-09 ENCOUNTER — OUTPATIENT (OUTPATIENT)
Dept: OUTPATIENT SERVICES | Facility: HOSPITAL | Age: 69
LOS: 1 days | Discharge: HOME | End: 2019-10-09

## 2019-10-09 DIAGNOSIS — D50.9 IRON DEFICIENCY ANEMIA, UNSPECIFIED: ICD-10-CM

## 2019-10-10 ENCOUNTER — APPOINTMENT (OUTPATIENT)
Dept: FAMILY MEDICINE | Facility: CLINIC | Age: 69
End: 2019-10-10
Payer: MEDICARE

## 2019-10-10 ENCOUNTER — APPOINTMENT (OUTPATIENT)
Dept: ORTHOPEDIC SURGERY | Facility: CLINIC | Age: 69
End: 2019-10-10
Payer: MEDICARE

## 2019-10-10 VITALS — DIASTOLIC BLOOD PRESSURE: 80 MMHG | RESPIRATION RATE: 16 BRPM | SYSTOLIC BLOOD PRESSURE: 124 MMHG | HEART RATE: 94 BPM

## 2019-10-10 VITALS
HEART RATE: 109 BPM | WEIGHT: 204.25 LBS | OXYGEN SATURATION: 95 % | DIASTOLIC BLOOD PRESSURE: 70 MMHG | BODY MASS INDEX: 36.65 KG/M2 | HEIGHT: 62.5 IN | SYSTOLIC BLOOD PRESSURE: 138 MMHG | RESPIRATION RATE: 16 BRPM

## 2019-10-10 VITALS
HEIGHT: 62.5 IN | WEIGHT: 204 LBS | BODY MASS INDEX: 36.6 KG/M2 | HEART RATE: 99 BPM | SYSTOLIC BLOOD PRESSURE: 125 MMHG | DIASTOLIC BLOOD PRESSURE: 70 MMHG | TEMPERATURE: 98.7 F

## 2019-10-10 DIAGNOSIS — M25.469 EFFUSION, UNSPECIFIED KNEE: ICD-10-CM

## 2019-10-10 DIAGNOSIS — Z86.39 PERSONAL HISTORY OF OTHER ENDOCRINE, NUTRITIONAL AND METABOLIC DISEASE: ICD-10-CM

## 2019-10-10 DIAGNOSIS — Z87.09 PERSONAL HISTORY OF OTHER DISEASES OF THE RESPIRATORY SYSTEM: ICD-10-CM

## 2019-10-10 DIAGNOSIS — Z86.69 PERSONAL HISTORY OF OTHER DISEASES OF THE NERVOUS SYSTEM AND SENSE ORGANS: ICD-10-CM

## 2019-10-10 PROCEDURE — G0008: CPT

## 2019-10-10 PROCEDURE — 73560 X-RAY EXAM OF KNEE 1 OR 2: CPT | Mod: LT

## 2019-10-10 PROCEDURE — 99214 OFFICE O/P EST MOD 30 MIN: CPT | Mod: 25

## 2019-10-10 PROCEDURE — 99203 OFFICE O/P NEW LOW 30 MIN: CPT

## 2019-10-10 PROCEDURE — 90662 IIV NO PRSV INCREASED AG IM: CPT

## 2019-10-10 RX ORDER — METHYLPREDNISOLONE 4 MG/1
4 TABLET ORAL
Qty: 1 | Refills: 0 | Status: COMPLETED | COMMUNITY
Start: 2019-07-23 | End: 2019-10-10

## 2019-10-10 NOTE — REVIEW OF SYSTEMS
[Fever] : no fever [Chills] : no chills [Discharge] : no discharge [Vision Problems] : no vision problems [Earache] : no earache [Nasal Discharge] : no nasal discharge [Postnasal Drip] : no postnasal drip [Chest Pain] : no chest pain [Palpitations] : no palpitations [Shortness Of Breath] : no shortness of breath [Cough] : no cough [Diarrhea] : diarrhea [Joint Pain] : no joint pain [FreeTextEntry9] : swelling and  redness  left knee

## 2019-10-10 NOTE — PHYSICAL EXAM
[No Acute Distress] : no acute distress [PERRL] : pupils equal round and reactive to light [Normal Oropharynx] : the oropharynx was normal [No Lymphadenopathy] : no lymphadenopathy [Supple] : supple [Clear to Auscultation] : lungs were clear to auscultation bilaterally [Regular Rhythm] : with a regular rhythm [No Murmur] : no murmur heard [No Edema] : there was no peripheral edema [Normal] : affect was normal and insight and judgment were intact [No Rash] : no rash [de-identified] : swelling  left  kne  wit ecchymosis and effusion  rt  knee  no swelling

## 2019-10-10 NOTE — HISTORY OF PRESENT ILLNESS
[Diabetes Mellitus] : Diabetes Mellitus [Hyperlipidemia] : Hyperlipidemia [Hypertension] : Hypertension [Other: ___] : [unfilled] [Check glucose ___ x/week] : Patient checks blood glucose [unfilled]  times a week [Most Recent A1C: ___] : Most recent A1C was [unfilled] [Does not check BP] : The patient is not checking blood pressure [120/80] : Target blood pressure is 120/80 [Target goal met] : BP target goal met [Doing Well] : Patient is doing well [Low Intensity Therapy] : Patient is currently on low intensity statin  therapy [FreeTextEntry6] : fell  injured  left  knee 1 mo  ago knee still swollen and  painful   going  for iron infusions tomorrow  [FreeTextEntry1] : still smoking

## 2019-10-11 ENCOUNTER — RESULT REVIEW (OUTPATIENT)
Age: 69
End: 2019-10-11

## 2019-10-11 ENCOUNTER — APPOINTMENT (OUTPATIENT)
Dept: HEMATOLOGY ONCOLOGY | Facility: CLINIC | Age: 69
End: 2019-10-11
Payer: MEDICARE

## 2019-10-11 VITALS
SYSTOLIC BLOOD PRESSURE: 145 MMHG | BODY MASS INDEX: 36.6 KG/M2 | HEART RATE: 104 BPM | DIASTOLIC BLOOD PRESSURE: 71 MMHG | TEMPERATURE: 99 F | HEIGHT: 62.5 IN | WEIGHT: 204.01 LBS | OXYGEN SATURATION: 95 %

## 2019-10-11 LAB
BASOPHILS # BLD AUTO: 0 K/UL — SIGNIFICANT CHANGE UP (ref 0–0.2)
BASOPHILS NFR BLD AUTO: 0.5 % — SIGNIFICANT CHANGE UP (ref 0–2)
EOSINOPHIL # BLD AUTO: 0.1 K/UL — SIGNIFICANT CHANGE UP (ref 0–0.5)
EOSINOPHIL NFR BLD AUTO: 2.3 % — SIGNIFICANT CHANGE UP (ref 0–6)
HCT VFR BLD CALC: 28.6 % — LOW (ref 34.5–45)
HGB BLD-MCNC: 8.9 G/DL — LOW (ref 11.5–15.5)
LYMPHOCYTES # BLD AUTO: 0.7 K/UL — LOW (ref 1–3.3)
LYMPHOCYTES # BLD AUTO: 16.6 % — SIGNIFICANT CHANGE UP (ref 13–44)
MCHC RBC-ENTMCNC: 31.2 G/DL — LOW (ref 32–36)
MCHC RBC-ENTMCNC: 31.3 PG — SIGNIFICANT CHANGE UP (ref 27–34)
MCV RBC AUTO: 100.4 FL — HIGH (ref 80–100)
MONOCYTES # BLD AUTO: 0.2 K/UL — SIGNIFICANT CHANGE UP (ref 0–0.9)
MONOCYTES NFR BLD AUTO: 4.8 % — SIGNIFICANT CHANGE UP (ref 2–14)
NEUTROPHILS # BLD AUTO: 3.4 K/UL — SIGNIFICANT CHANGE UP (ref 1.8–7.4)
NEUTROPHILS NFR BLD AUTO: 75.8 % — SIGNIFICANT CHANGE UP (ref 43–77)
PLATELET # BLD AUTO: 147 K/UL — LOW (ref 150–400)
RBC # BLD: 2.85 M/UL — LOW (ref 3.8–5.2)
RBC # FLD: 15.9 % — HIGH (ref 10.3–14.5)
WBC # BLD: 4.5 K/UL — SIGNIFICANT CHANGE UP (ref 3.8–10.5)
WBC # FLD AUTO: 4.5 K/UL — SIGNIFICANT CHANGE UP (ref 3.8–10.5)

## 2019-10-11 PROCEDURE — 99213 OFFICE O/P EST LOW 20 MIN: CPT

## 2019-10-11 NOTE — ASSESSMENT
[FreeTextEntry1] : Mrs. Herndon is a 68 WF who received Feraheme in Dec 2017 for an iron deficiency anemia. History of Vitamin B 12 deficiency in the past. Is S/P left partial nephrectomy for cancer. Last feraheme in Dec 2017..HGb today 8.9, but no SOB or Chest pain.  History of GI polyps in the past, so gave cards for stool guiacs to R/O GI bleeding, no gross evidence of bleeding. Labs sent for Vit. B12 level, ( .4), ferritin, and CMP to follow renal function. Will review labs with Dr. Stacy, and treat as necessary.

## 2019-10-11 NOTE — HISTORY OF PRESENT ILLNESS
[de-identified] : \par Mrs. Herndon is a 68 WF who received Feraheme in Dec 2017 for an iron deficiency anemia. History of Vitamin B 12 deficiency in the past. Is S/P left partial nephrectomy for cancer. Last feraheme in Dec 2017. [de-identified] : Patient C/O fatigue, no evidence of bleeding.not Vitamin B 12.

## 2019-10-14 LAB
ALBUMIN SERPL ELPH-MCNC: 4 G/DL
ALP BLD-CCNC: 217 U/L
ALT SERPL-CCNC: 6 U/L
ANION GAP SERPL CALC-SCNC: 14 MMOL/L
AST SERPL-CCNC: 11 U/L
BILIRUB SERPL-MCNC: 0.2 MG/DL
BUN SERPL-MCNC: 21 MG/DL
CALCIUM SERPL-MCNC: 9.8 MG/DL
CHLORIDE SERPL-SCNC: 107 MMOL/L
CO2 SERPL-SCNC: 21 MMOL/L
CREAT SERPL-MCNC: 1.84 MG/DL
FERRITIN SERPL-MCNC: 79 NG/ML
GLUCOSE SERPL-MCNC: 178 MG/DL
IRON SATN MFR SERPL: 13 %
IRON SERPL-MCNC: 40 UG/DL
POTASSIUM SERPL-SCNC: 5.3 MMOL/L
PROT SERPL-MCNC: 6.6 G/DL
SODIUM SERPL-SCNC: 142 MMOL/L
TIBC SERPL-MCNC: 311 UG/DL
UIBC SERPL-MCNC: 271 UG/DL
VIT B12 SERPL-MCNC: 265 PG/ML

## 2019-10-16 PROBLEM — Z87.09 HISTORY OF CHRONIC OBSTRUCTIVE LUNG DISEASE: Status: RESOLVED | Noted: 2019-10-10 | Resolved: 2019-10-16

## 2019-10-18 ENCOUNTER — RESULT REVIEW (OUTPATIENT)
Age: 69
End: 2019-10-18

## 2019-10-19 ENCOUNTER — RESULT REVIEW (OUTPATIENT)
Age: 69
End: 2019-10-19

## 2019-10-22 ENCOUNTER — RESULT REVIEW (OUTPATIENT)
Age: 69
End: 2019-10-22

## 2019-10-22 ENCOUNTER — APPOINTMENT (OUTPATIENT)
Age: 69
End: 2019-10-22

## 2019-10-22 LAB
ANISOCYTOSIS BLD QL: SLIGHT — SIGNIFICANT CHANGE UP
BASOPHILS # BLD AUTO: 0.1 K/UL — SIGNIFICANT CHANGE UP (ref 0–0.2)
BASOPHILS NFR BLD AUTO: 0.9 % — SIGNIFICANT CHANGE UP (ref 0–2)
EOSINOPHIL # BLD AUTO: 0.2 K/UL — SIGNIFICANT CHANGE UP (ref 0–0.5)
EOSINOPHIL NFR BLD AUTO: 3.5 % — SIGNIFICANT CHANGE UP (ref 0–6)
HCT VFR BLD CALC: 29 % — LOW (ref 34.5–45)
HGB BLD-MCNC: 9.1 G/DL — LOW (ref 11.5–15.5)
HYPOCHROMIA BLD QL: SLIGHT — SIGNIFICANT CHANGE UP
LYMPHOCYTES # BLD AUTO: 1.6 K/UL — SIGNIFICANT CHANGE UP (ref 1–3.3)
LYMPHOCYTES # BLD AUTO: 28.3 % — SIGNIFICANT CHANGE UP (ref 13–44)
MACROCYTES BLD QL: SLIGHT — SIGNIFICANT CHANGE UP
MCHC RBC-ENTMCNC: 30.7 PG — SIGNIFICANT CHANGE UP (ref 27–34)
MCHC RBC-ENTMCNC: 31.4 G/DL — LOW (ref 32–36)
MCV RBC AUTO: 98 FL — SIGNIFICANT CHANGE UP (ref 80–100)
MICROCYTES BLD QL: SLIGHT — SIGNIFICANT CHANGE UP
MONOCYTES # BLD AUTO: 0.3 K/UL — SIGNIFICANT CHANGE UP (ref 0–0.9)
MONOCYTES NFR BLD AUTO: 5.3 % — SIGNIFICANT CHANGE UP (ref 2–14)
NEUTROPHILS # BLD AUTO: 3.6 K/UL — SIGNIFICANT CHANGE UP (ref 1.8–7.4)
NEUTROPHILS NFR BLD AUTO: 62 % — SIGNIFICANT CHANGE UP (ref 43–77)
OB PNL STL: NEGATIVE — SIGNIFICANT CHANGE UP
PLAT MORPH BLD: NORMAL — SIGNIFICANT CHANGE UP
PLATELET # BLD AUTO: SIGNIFICANT CHANGE UP (ref 150–400)
PLATELET # BLD MANUAL: 163 K/UL — SIGNIFICANT CHANGE UP (ref 150–400)
POIKILOCYTOSIS BLD QL AUTO: SLIGHT — SIGNIFICANT CHANGE UP
POLYCHROMASIA BLD QL SMEAR: SLIGHT — SIGNIFICANT CHANGE UP
RBC # BLD: 2.96 M/UL — LOW (ref 3.8–5.2)
RBC # FLD: 15.8 % — HIGH (ref 10.3–14.5)
RBC BLD AUTO: SIGNIFICANT CHANGE UP
WBC # BLD: 5.7 K/UL — SIGNIFICANT CHANGE UP (ref 3.8–10.5)
WBC # FLD AUTO: 5.7 K/UL — SIGNIFICANT CHANGE UP (ref 3.8–10.5)

## 2019-10-23 DIAGNOSIS — D63.1 ANEMIA IN CHRONIC KIDNEY DISEASE: ICD-10-CM

## 2019-10-23 DIAGNOSIS — N18.4 CHRONIC KIDNEY DISEASE, STAGE 4 (SEVERE): ICD-10-CM

## 2019-10-23 NOTE — DISCUSSION/SUMMARY
[de-identified] : At this time, due to prepatellar bursitis status post contusion of the left knee, I recommend wrapping, ice, anti-inflammatories and reassessment in three weeks.

## 2019-10-23 NOTE — PHYSICAL EXAM
[de-identified] : Right Knee: Range of Motion in Degrees	\par 	                  Claimant:	Normal:	\par Flexion Active	  135 	                135-degrees	\par Flexion Passive	  135	                135-degrees	\par Extension Active	  0-5	                0-5-degrees	\par Extension Passive	  0-5	                0-5-degrees	\par \par No instability.  No weakness to flexion/extension.  No evidence of instability in the AP plane or varus or valgus stress.  Negative  Lachman.  Negative pivot shift.  Negative anterior drawer test.  Negative posterior drawer test.  Negative Fortino.  Negative Apley grind.  No medial or lateral joint line tenderness.  No tenderness over the medial and lateral facet of the patella.  No patellofemoral crepitations.  No lateral tilting patella.  No patellar apprehension.  No crepitation in the medial and lateral femoral condyle.  No proximal or distal swelling, edema or tenderness.  No gross motor or sensory deficits.  No intra-articular swelling.  2+ DP and PT pulses. No varus or valgus malalignment.  Well-healed scar.\par \par Left Knee: Range of Motion in Degrees	\par 	                  Claimant:	Normal:	\par Flexion Active	  135 	                135-degrees	\par Flexion Passive	  135	                135-degrees	\par Extension Active	  0-5	                0-5-degrees	\par Extension Passive	  0-5	                0-5-degrees	\par \par No signs of infection.  No instabillity.  No weakness to flexion/extension.  Positive prepatellar swelling.  No evidence of instability in the AP plane or varus or valgus stress.  Negative  Lachman.  Negative pivot shift.  Negative anterior drawer test.  Negative posterior drawer test.  Negative Fortino.  Negative Apley grind.  No medial or lateral joint line tenderness.  No tenderness over the medial and lateral facet of the patella.  No patellofemoral crepitations.  No lateral tilting patella.  No patella apprehension.  No crepitation in the medial and lateral femoral condyle.  No proximal or distal swelling, edema or tenderness.  No gross motor or sensory deficits.  No intra-articular swelling.  2+ DP and PT pulses. No varus or valgus malalignment.  \par    [de-identified] : Appearance:  Well-developed, well-nourished female in no acute distress.\par   [de-identified] : Gait and Station:  Ambulating with a slightly antalgic to antalgic gait.  Station:  Normal.  [de-identified] : Radiographs, one to two views of the left knee, show excellent position of the implant with no osseous abnormalities.

## 2019-10-23 NOTE — HISTORY OF PRESENT ILLNESS
[6] : a current pain level of 6/10 [de-identified] : The patient comes in today with complaints of pain to her left knee.  She states she had bilateral total knee arthroplasties years ago.  She has most recently, three to four weeks ago, slipped and fell landing on her daughter's step, banging her left knee. She had a lot of swelling, which has gone down some, but she is still having some pain and swelling.  The patient states the onset/injury occurred in September of 2019.  This injury is not due to an automobile accident.  The patient states the pain is localized.  The patient describes the pain as burning and throbbing. [de-identified] : walking, bending [de-identified] : elevation [] : No [de-identified] : Falls, loss of balance.

## 2019-10-23 NOTE — ADDENDUM
[FreeTextEntry1] : This note was written by Gin Sharp on 10/23/2019 acting as scribe for Dalton Martin III, MD

## 2019-10-25 ENCOUNTER — RX RENEWAL (OUTPATIENT)
Age: 69
End: 2019-10-25

## 2019-10-31 ENCOUNTER — APPOINTMENT (OUTPATIENT)
Dept: ORTHOPEDIC SURGERY | Facility: CLINIC | Age: 69
End: 2019-10-31
Payer: MEDICARE

## 2019-10-31 VITALS
BODY MASS INDEX: 36.6 KG/M2 | SYSTOLIC BLOOD PRESSURE: 119 MMHG | WEIGHT: 204 LBS | DIASTOLIC BLOOD PRESSURE: 65 MMHG | HEIGHT: 62.5 IN | HEART RATE: 96 BPM | TEMPERATURE: 98.6 F

## 2019-10-31 DIAGNOSIS — M70.42 PREPATELLAR BURSITIS, LEFT KNEE: ICD-10-CM

## 2019-10-31 PROCEDURE — 99213 OFFICE O/P EST LOW 20 MIN: CPT

## 2019-11-01 ENCOUNTER — OUTPATIENT (OUTPATIENT)
Dept: OUTPATIENT SERVICES | Facility: HOSPITAL | Age: 69
LOS: 1 days | Discharge: HOME | End: 2019-11-01

## 2019-11-01 DIAGNOSIS — D50.9 IRON DEFICIENCY ANEMIA, UNSPECIFIED: ICD-10-CM

## 2019-11-12 ENCOUNTER — RESULT REVIEW (OUTPATIENT)
Age: 69
End: 2019-11-12

## 2019-11-12 ENCOUNTER — APPOINTMENT (OUTPATIENT)
Age: 69
End: 2019-11-12

## 2019-11-12 LAB
BASOPHILS # BLD AUTO: 0 K/UL — SIGNIFICANT CHANGE UP (ref 0–0.2)
BASOPHILS NFR BLD AUTO: 0.6 % — SIGNIFICANT CHANGE UP (ref 0–2)
EOSINOPHIL # BLD AUTO: 0.4 K/UL — SIGNIFICANT CHANGE UP (ref 0–0.5)
EOSINOPHIL NFR BLD AUTO: 7.4 % — HIGH (ref 0–6)
HCT VFR BLD CALC: 32.5 % — LOW (ref 34.5–45)
HGB BLD-MCNC: 9.9 G/DL — LOW (ref 11.5–15.5)
LYMPHOCYTES # BLD AUTO: 0.9 K/UL — LOW (ref 1–3.3)
LYMPHOCYTES # BLD AUTO: 15.6 % — SIGNIFICANT CHANGE UP (ref 13–44)
MCHC RBC-ENTMCNC: 30.4 G/DL — LOW (ref 32–36)
MCHC RBC-ENTMCNC: 30.8 PG — SIGNIFICANT CHANGE UP (ref 27–34)
MCV RBC AUTO: 101.3 FL — HIGH (ref 80–100)
MONOCYTES # BLD AUTO: 0.4 K/UL — SIGNIFICANT CHANGE UP (ref 0–0.9)
MONOCYTES NFR BLD AUTO: 6.5 % — SIGNIFICANT CHANGE UP (ref 2–14)
NEUTROPHILS # BLD AUTO: 3.9 K/UL — SIGNIFICANT CHANGE UP (ref 1.8–7.4)
NEUTROPHILS NFR BLD AUTO: 69.9 % — SIGNIFICANT CHANGE UP (ref 43–77)
PLATELET # BLD AUTO: 169 K/UL — SIGNIFICANT CHANGE UP (ref 150–400)
RBC # BLD: 3.21 M/UL — LOW (ref 3.8–5.2)
RBC # FLD: 15.7 % — HIGH (ref 10.3–14.5)
WBC # BLD: 5.6 K/UL — SIGNIFICANT CHANGE UP (ref 3.8–10.5)
WBC # FLD AUTO: 5.6 K/UL — SIGNIFICANT CHANGE UP (ref 3.8–10.5)

## 2019-11-12 NOTE — DISCUSSION/SUMMARY
[de-identified] : At this time, the patient is doing well due to prepatellar bursitis of the left knee status post left total knee arthroplasty.  She is instructed on home therapeutic modalities.  She will continue wrapping over the next two weeks.  She will be reassessed on an as needed basis.

## 2019-11-12 NOTE — PHYSICAL EXAM
[de-identified] : Left Knee: \par Range of Motion in Degrees	\par 	  Claimant:	Normal:	\par Flexion Active	 135 	 135-degrees	\par Flexion Passive	 135	 135-degrees	\par Extension Active	 0-5	 0-5-degrees	\par Extension Passive	 0-5	 0-5-degrees	\par \par The prepatellar bursa is markedly improved.  No signs of infection. No instability. No weakness to flexion/extension. Positive prepatellar swelling. No evidence of instability in the AP plane or varus or valgus stress. Negative Lachman. Negative pivot shift. Negative anterior drawer test. Negative posterior drawer test. Negative Fortino. Negative Apley grind. No medial or lateral joint line tenderness. No tenderness over the medial and lateral facet of the patella. No patellofemoral crepitations. No lateral tilting patella. No patella apprehension. No crepitation in the medial and lateral femoral condyle. No proximal or distal swelling, edema or tenderness. No gross motor or sensory deficits. No intra-articular swelling. 2+ DP and PT pulses. No varus or valgus malalignment. \par  [de-identified] : Ambulating with a slightly antalgic to antalgic gait.  Station:  Normal.  [de-identified] : Appearance:  Well-developed, well-nourished female in no acute distress.\par \par

## 2019-11-12 NOTE — HISTORY OF PRESENT ILLNESS
[de-identified] : The patient comes in today.  She states that the swelling is definitely down on exam.

## 2019-11-12 NOTE — ADDENDUM
[FreeTextEntry1] : This note was written by Tami Cook on 11/05/2019 acting as a scribe for ANISA VIRGEN III, MD

## 2019-11-13 DIAGNOSIS — D63.1 ANEMIA IN CHRONIC KIDNEY DISEASE: ICD-10-CM

## 2019-11-13 DIAGNOSIS — N18.4 CHRONIC KIDNEY DISEASE, STAGE 4 (SEVERE): ICD-10-CM

## 2019-11-14 ENCOUNTER — RX RENEWAL (OUTPATIENT)
Age: 69
End: 2019-11-14

## 2019-11-27 ENCOUNTER — OUTPATIENT (OUTPATIENT)
Dept: OUTPATIENT SERVICES | Facility: HOSPITAL | Age: 69
LOS: 1 days | Discharge: HOME | End: 2019-11-27

## 2019-11-27 DIAGNOSIS — N18.4 CHRONIC KIDNEY DISEASE, STAGE 4 (SEVERE): ICD-10-CM

## 2019-11-27 DIAGNOSIS — E53.8 DEFICIENCY OF OTHER SPECIFIED B GROUP VITAMINS: ICD-10-CM

## 2019-11-27 DIAGNOSIS — D63.1 ANEMIA IN CHRONIC KIDNEY DISEASE: ICD-10-CM

## 2019-11-27 DIAGNOSIS — D50.9 IRON DEFICIENCY ANEMIA, UNSPECIFIED: ICD-10-CM

## 2019-12-03 ENCOUNTER — APPOINTMENT (OUTPATIENT)
Age: 69
End: 2019-12-03

## 2019-12-03 ENCOUNTER — RESULT REVIEW (OUTPATIENT)
Age: 69
End: 2019-12-03

## 2019-12-03 LAB
BASOPHILS # BLD AUTO: 0.1 K/UL — SIGNIFICANT CHANGE UP (ref 0–0.2)
BASOPHILS NFR BLD AUTO: 1.6 % — SIGNIFICANT CHANGE UP (ref 0–2)
EOSINOPHIL # BLD AUTO: 0.2 K/UL — SIGNIFICANT CHANGE UP (ref 0–0.5)
EOSINOPHIL NFR BLD AUTO: 3 % — SIGNIFICANT CHANGE UP (ref 0–6)
HCT VFR BLD CALC: 32 % — LOW (ref 34.5–45)
HGB BLD-MCNC: 9.8 G/DL — LOW (ref 11.5–15.5)
LYMPHOCYTES # BLD AUTO: 0.9 K/UL — LOW (ref 1–3.3)
LYMPHOCYTES # BLD AUTO: 14.6 % — SIGNIFICANT CHANGE UP (ref 13–44)
MCHC RBC-ENTMCNC: 30 PG — SIGNIFICANT CHANGE UP (ref 27–34)
MCHC RBC-ENTMCNC: 30.5 G/DL — LOW (ref 32–36)
MCV RBC AUTO: 98.3 FL — SIGNIFICANT CHANGE UP (ref 80–100)
MONOCYTES # BLD AUTO: 0.4 K/UL — SIGNIFICANT CHANGE UP (ref 0–0.9)
MONOCYTES NFR BLD AUTO: 6.6 % — SIGNIFICANT CHANGE UP (ref 2–14)
NEUTROPHILS # BLD AUTO: 4.5 K/UL — SIGNIFICANT CHANGE UP (ref 1.8–7.4)
NEUTROPHILS NFR BLD AUTO: 74.3 % — SIGNIFICANT CHANGE UP (ref 43–77)
PLATELET # BLD AUTO: 146 K/UL — LOW (ref 150–400)
RBC # BLD: 3.26 M/UL — LOW (ref 3.8–5.2)
RBC # FLD: 15.5 % — HIGH (ref 10.3–14.5)
WBC # BLD: 6.1 K/UL — SIGNIFICANT CHANGE UP (ref 3.8–10.5)
WBC # FLD AUTO: 6.1 K/UL — SIGNIFICANT CHANGE UP (ref 3.8–10.5)

## 2019-12-04 ENCOUNTER — APPOINTMENT (OUTPATIENT)
Dept: FAMILY MEDICINE | Facility: CLINIC | Age: 69
End: 2019-12-04
Payer: MEDICARE

## 2019-12-04 VITALS
HEART RATE: 108 BPM | DIASTOLIC BLOOD PRESSURE: 78 MMHG | RESPIRATION RATE: 16 BRPM | SYSTOLIC BLOOD PRESSURE: 148 MMHG | WEIGHT: 208.25 LBS | OXYGEN SATURATION: 96 % | HEIGHT: 62.5 IN | BODY MASS INDEX: 37.36 KG/M2

## 2019-12-04 VITALS — SYSTOLIC BLOOD PRESSURE: 132 MMHG | DIASTOLIC BLOOD PRESSURE: 80 MMHG | RESPIRATION RATE: 16 BRPM | HEART RATE: 72 BPM

## 2019-12-04 DIAGNOSIS — N28.9 DISORDER OF KIDNEY AND URETER, UNSPECIFIED: ICD-10-CM

## 2019-12-04 PROCEDURE — 99214 OFFICE O/P EST MOD 30 MIN: CPT | Mod: 25

## 2019-12-04 PROCEDURE — 36415 COLL VENOUS BLD VENIPUNCTURE: CPT

## 2019-12-04 NOTE — REVIEW OF SYSTEMS
[Fever] : no fever [Discharge] : no discharge [Chills] : no chills [Vision Problems] : no vision problems [Nasal Discharge] : no nasal discharge [Earache] : no earache [Postnasal Drip] : no postnasal drip [Palpitations] : no palpitations [Chest Pain] : no chest pain [Cough] : no cough [Shortness Of Breath] : no shortness of breath [Diarrhea] : diarrhea [Joint Pain] : no joint pain [FreeTextEntry9] : swelling and  redness  left knee

## 2019-12-04 NOTE — PHYSICAL EXAM
[No Acute Distress] : no acute distress [Normal Oropharynx] : the oropharynx was normal [PERRL] : pupils equal round and reactive to light [No Lymphadenopathy] : no lymphadenopathy [Clear to Auscultation] : lungs were clear to auscultation bilaterally [Supple] : supple [No Edema] : there was no peripheral edema [No Murmur] : no murmur heard [Regular Rhythm] : with a regular rhythm [Normal] : affect was normal and insight and judgment were intact [No Rash] : no rash [de-identified] : swelling  left  kne  wit ecchymosis and effusion  rt  knee  no swelling

## 2019-12-05 LAB
ALBUMIN SERPL ELPH-MCNC: 4.2 G/DL
ALP BLD-CCNC: 203 U/L
ALT SERPL-CCNC: 6 U/L
ANION GAP SERPL CALC-SCNC: 14 MMOL/L
APPEARANCE: CLEAR
AST SERPL-CCNC: 11 U/L
BACTERIA: NEGATIVE
BILIRUB SERPL-MCNC: 0.2 MG/DL
BILIRUBIN URINE: NEGATIVE
BLOOD URINE: NEGATIVE
BUN SERPL-MCNC: 28 MG/DL
CALCIUM SERPL-MCNC: 10.4 MG/DL
CHLORIDE SERPL-SCNC: 106 MMOL/L
CHOLEST SERPL-MCNC: 189 MG/DL
CHOLEST/HDLC SERPL: 5 RATIO
CO2 SERPL-SCNC: 23 MMOL/L
COLOR: NORMAL
CREAT SERPL-MCNC: 1.79 MG/DL
CREAT SPEC-SCNC: 73 MG/DL
ESTIMATED AVERAGE GLUCOSE: 134 MG/DL
GLUCOSE QUALITATIVE U: NEGATIVE
GLUCOSE SERPL-MCNC: 96 MG/DL
HBA1C MFR BLD HPLC: 6.3 %
HDLC SERPL-MCNC: 38 MG/DL
HYALINE CASTS: 0 /LPF
KETONES URINE: NEGATIVE
LDLC SERPL CALC-MCNC: 84 MG/DL
LEUKOCYTE ESTERASE URINE: NEGATIVE
MICROALBUMIN 24H UR DL<=1MG/L-MCNC: 5 MG/DL
MICROALBUMIN/CREAT 24H UR-RTO: 69 MG/G
MICROSCOPIC-UA: NORMAL
NITRITE URINE: NEGATIVE
PH URINE: 6.5
POTASSIUM SERPL-SCNC: 5.9 MMOL/L
PROT SERPL-MCNC: 6.7 G/DL
PROTEIN URINE: NORMAL
RED BLOOD CELLS URINE: 1 /HPF
SODIUM SERPL-SCNC: 143 MMOL/L
SPECIFIC GRAVITY URINE: 1.02
SQUAMOUS EPITHELIAL CELLS: 1 /HPF
TRIGL SERPL-MCNC: 337 MG/DL
URATE SERPL-MCNC: 4.9 MG/DL
UROBILINOGEN URINE: NORMAL
WHITE BLOOD CELLS URINE: 0 /HPF

## 2019-12-24 ENCOUNTER — RESULT REVIEW (OUTPATIENT)
Age: 69
End: 2019-12-24

## 2019-12-24 ENCOUNTER — APPOINTMENT (OUTPATIENT)
Age: 69
End: 2019-12-24

## 2019-12-24 LAB
BASOPHILS # BLD AUTO: 0 K/UL — SIGNIFICANT CHANGE UP (ref 0–0.2)
BASOPHILS NFR BLD AUTO: 0.9 % — SIGNIFICANT CHANGE UP (ref 0–2)
EOSINOPHIL # BLD AUTO: 0.1 K/UL — SIGNIFICANT CHANGE UP (ref 0–0.5)
EOSINOPHIL NFR BLD AUTO: 2.1 % — SIGNIFICANT CHANGE UP (ref 0–6)
HCT VFR BLD CALC: 37 % — SIGNIFICANT CHANGE UP (ref 34.5–45)
HGB BLD-MCNC: 10.4 G/DL — LOW (ref 11.5–15.5)
LYMPHOCYTES # BLD AUTO: 0.6 K/UL — LOW (ref 1–3.3)
LYMPHOCYTES # BLD AUTO: 11.4 % — LOW (ref 13–44)
MCHC RBC-ENTMCNC: 27.9 PG — SIGNIFICANT CHANGE UP (ref 27–34)
MCHC RBC-ENTMCNC: 28.2 G/DL — LOW (ref 32–36)
MCV RBC AUTO: 99.1 FL — SIGNIFICANT CHANGE UP (ref 80–100)
MONOCYTES # BLD AUTO: 0.4 K/UL — SIGNIFICANT CHANGE UP (ref 0–0.9)
MONOCYTES NFR BLD AUTO: 8.1 % — SIGNIFICANT CHANGE UP (ref 2–14)
NEUTROPHILS # BLD AUTO: 3.9 K/UL — SIGNIFICANT CHANGE UP (ref 1.8–7.4)
NEUTROPHILS NFR BLD AUTO: 77.4 % — HIGH (ref 43–77)
PLATELET # BLD AUTO: 141 K/UL — LOW (ref 150–400)
RBC # BLD: 3.73 M/UL — LOW (ref 3.8–5.2)
RBC # FLD: 15.6 % — HIGH (ref 10.3–14.5)
WBC # BLD: 5 K/UL — SIGNIFICANT CHANGE UP (ref 3.8–10.5)
WBC # FLD AUTO: 5 K/UL — SIGNIFICANT CHANGE UP (ref 3.8–10.5)

## 2020-01-04 ENCOUNTER — APPOINTMENT (OUTPATIENT)
Dept: FAMILY MEDICINE | Facility: CLINIC | Age: 70
End: 2020-01-04
Payer: MEDICARE

## 2020-01-04 VITALS
HEART RATE: 100 BPM | OXYGEN SATURATION: 96 % | BODY MASS INDEX: 34.63 KG/M2 | TEMPERATURE: 98.3 F | DIASTOLIC BLOOD PRESSURE: 76 MMHG | SYSTOLIC BLOOD PRESSURE: 136 MMHG | WEIGHT: 193 LBS | HEIGHT: 62.5 IN

## 2020-01-04 LAB
FLUAV SPEC QL CULT: NORMAL
FLUBV AG SPEC QL IA: NORMAL

## 2020-01-04 PROCEDURE — 99213 OFFICE O/P EST LOW 20 MIN: CPT | Mod: 25

## 2020-01-04 PROCEDURE — 87804 INFLUENZA ASSAY W/OPTIC: CPT | Mod: QW

## 2020-01-04 NOTE — HISTORY OF PRESENT ILLNESS
[FreeTextEntry8] : 69 year old female complaining of one week history of cough and congestion. Admits to increased wheezing and using her inhaler more frequently. Has felt warm with chills, did not take temperature. Also admits to body aches. She is on aranesp injections every 3 weeks

## 2020-01-04 NOTE — PHYSICAL EXAM
[Well-Appearing] : well-appearing [No Acute Distress] : no acute distress [Normal Sclera/Conjunctiva] : normal sclera/conjunctiva [PERRL] : pupils equal round and reactive to light [Normal Outer Ear/Nose] : the outer ears and nose were normal in appearance [No Lymphadenopathy] : no lymphadenopathy [Normal Oropharynx] : the oropharynx was normal [Supple] : supple [No Accessory Muscle Use] : no accessory muscle use [No Respiratory Distress] : no respiratory distress  [Regular Rhythm] : with a regular rhythm [Normal Rate] : normal rate  [Clear to Auscultation] : lungs were clear to auscultation bilaterally [No Focal Deficits] : no focal deficits [Normal Affect] : the affect was normal [Normal Insight/Judgement] : insight and judgment were intact

## 2020-01-04 NOTE — REVIEW OF SYSTEMS
[Fever] : fever [Chills] : chills [Fatigue] : fatigue [Discharge] : no discharge [Vision Problems] : no vision problems [Nasal Discharge] : no nasal discharge [Earache] : no earache [Sore Throat] : sore throat [Postnasal Drip] : postnasal drip [Palpitations] : no palpitations [Shortness Of Breath] : shortness of breath [Chest Pain] : no chest pain [Wheezing] : wheezing [Nausea] : no nausea [Cough] : cough [Abdominal Pain] : no abdominal pain [Vomiting] : no vomiting [Muscle Pain] : muscle pain [Dizziness] : no dizziness [Headache] : headache

## 2020-01-13 ENCOUNTER — OUTPATIENT (OUTPATIENT)
Dept: OUTPATIENT SERVICES | Facility: HOSPITAL | Age: 70
LOS: 1 days | Discharge: HOME | End: 2020-01-13

## 2020-01-13 DIAGNOSIS — E53.8 DEFICIENCY OF OTHER SPECIFIED B GROUP VITAMINS: ICD-10-CM

## 2020-01-13 DIAGNOSIS — D50.9 IRON DEFICIENCY ANEMIA, UNSPECIFIED: ICD-10-CM

## 2020-01-13 DIAGNOSIS — D63.1 ANEMIA IN CHRONIC KIDNEY DISEASE: ICD-10-CM

## 2020-01-13 DIAGNOSIS — N18.4 CHRONIC KIDNEY DISEASE, STAGE 4 (SEVERE): ICD-10-CM

## 2020-01-14 ENCOUNTER — RESULT REVIEW (OUTPATIENT)
Age: 70
End: 2020-01-14

## 2020-01-14 ENCOUNTER — APPOINTMENT (OUTPATIENT)
Dept: HEMATOLOGY ONCOLOGY | Facility: CLINIC | Age: 70
End: 2020-01-14
Payer: MEDICARE

## 2020-01-14 ENCOUNTER — APPOINTMENT (OUTPATIENT)
Age: 70
End: 2020-01-14

## 2020-01-14 VITALS
WEIGHT: 204 LBS | DIASTOLIC BLOOD PRESSURE: 74 MMHG | HEART RATE: 88 BPM | SYSTOLIC BLOOD PRESSURE: 147 MMHG | OXYGEN SATURATION: 99 % | BODY MASS INDEX: 36.6 KG/M2 | HEIGHT: 62.5 IN | TEMPERATURE: 98 F

## 2020-01-14 LAB
BASOPHILS # BLD AUTO: 0 K/UL — SIGNIFICANT CHANGE UP (ref 0–0.2)
BASOPHILS NFR BLD AUTO: 0.7 % — SIGNIFICANT CHANGE UP (ref 0–2)
EOSINOPHIL # BLD AUTO: 0.1 K/UL — SIGNIFICANT CHANGE UP (ref 0–0.5)
EOSINOPHIL NFR BLD AUTO: 2.1 % — SIGNIFICANT CHANGE UP (ref 0–6)
FERRITIN SERPL-MCNC: 72 NG/ML
HCT VFR BLD CALC: 39 % — SIGNIFICANT CHANGE UP (ref 34.5–45)
HGB BLD-MCNC: 11.3 G/DL — LOW (ref 11.5–15.5)
LYMPHOCYTES # BLD AUTO: 0.8 K/UL — LOW (ref 1–3.3)
LYMPHOCYTES # BLD AUTO: 15.1 % — SIGNIFICANT CHANGE UP (ref 13–44)
MCHC RBC-ENTMCNC: 28.5 PG — SIGNIFICANT CHANGE UP (ref 27–34)
MCHC RBC-ENTMCNC: 29 G/DL — LOW (ref 32–36)
MCV RBC AUTO: 98.1 FL — SIGNIFICANT CHANGE UP (ref 80–100)
MONOCYTES # BLD AUTO: 0.3 K/UL — SIGNIFICANT CHANGE UP (ref 0–0.9)
MONOCYTES NFR BLD AUTO: 6 % — SIGNIFICANT CHANGE UP (ref 2–14)
NEUTROPHILS # BLD AUTO: 4.3 K/UL — SIGNIFICANT CHANGE UP (ref 1.8–7.4)
NEUTROPHILS NFR BLD AUTO: 76.1 % — SIGNIFICANT CHANGE UP (ref 43–77)
PLATELET # BLD AUTO: 136 K/UL — LOW (ref 150–400)
RBC # BLD: 3.98 M/UL — SIGNIFICANT CHANGE UP (ref 3.8–5.2)
RBC # FLD: 16 % — HIGH (ref 10.3–14.5)
WBC # BLD: 5.6 K/UL — SIGNIFICANT CHANGE UP (ref 3.8–10.5)
WBC # FLD AUTO: 5.6 K/UL — SIGNIFICANT CHANGE UP (ref 3.8–10.5)

## 2020-01-14 PROCEDURE — 99213 OFFICE O/P EST LOW 20 MIN: CPT

## 2020-01-15 LAB
ALBUMIN SERPL ELPH-MCNC: 3.9 G/DL
ALP BLD-CCNC: 193 U/L
ALT SERPL-CCNC: 5 U/L
ANION GAP SERPL CALC-SCNC: 15 MMOL/L
AST SERPL-CCNC: 10 U/L
BILIRUB SERPL-MCNC: 0.3 MG/DL
BUN SERPL-MCNC: 35 MG/DL
CALCIUM SERPL-MCNC: 9.4 MG/DL
CHLORIDE SERPL-SCNC: 105 MMOL/L
CO2 SERPL-SCNC: 20 MMOL/L
CREAT SERPL-MCNC: 1.92 MG/DL
GLUCOSE SERPL-MCNC: 173 MG/DL
POTASSIUM SERPL-SCNC: 5.3 MMOL/L
PROT SERPL-MCNC: 6.2 G/DL
SODIUM SERPL-SCNC: 140 MMOL/L

## 2020-01-15 RX ORDER — METHYLPREDNISOLONE 4 MG/1
4 TABLET ORAL
Qty: 1 | Refills: 0 | Status: DISCONTINUED | COMMUNITY
Start: 2020-01-04 | End: 2020-01-14

## 2020-01-15 RX ORDER — AZITHROMYCIN 250 MG/1
250 TABLET, FILM COATED ORAL
Qty: 1 | Refills: 0 | Status: DISCONTINUED | COMMUNITY
Start: 2020-01-04 | End: 2020-01-14

## 2020-01-15 NOTE — HISTORY OF PRESENT ILLNESS
[de-identified] : Mrs. Herndon is a 69 WF who received Feraheme in Dec 2017 for an iron deficiency anemia. History of Vitamin B 12 deficiency in the past. Is S/P left partial nephrectomy for cancer. Last feraheme  and Vitamin B 12  in Dec 2017. Started on Aranesp on 10-22-19, with Hgb around 9 gms and creatinine of 1.8, EGFR 28 ( stage 4 renal disease) )\par  [de-identified] : Feeling well, no acute SOB. no evidence of bleeding

## 2020-01-15 NOTE — ASSESSMENT
[FreeTextEntry1] : Mrs. Herndon is a 69 WF who received Feraheme in Dec 2017 for an iron deficiency anemia. History of Vitamin B 12 deficiency in the past. Is S/P left partial nephrectomy for cancer. Last feraheme  and Vitamin B 12  in Dec 2017. Started on Aranesp on 10-22-19, with Hgb around 9 gms and creatinine of 1.8, EGFR 28 ( stage 4 renal disease) . Today her Hgb is 11.3 gms, so Aranesp is held. Will recheck her HGb in 3 weeks. Checking Ferritin level and CMP today.

## 2020-02-04 ENCOUNTER — RESULT REVIEW (OUTPATIENT)
Age: 70
End: 2020-02-04

## 2020-02-04 ENCOUNTER — APPOINTMENT (OUTPATIENT)
Age: 70
End: 2020-02-04

## 2020-02-04 LAB
BASOPHILS # BLD AUTO: 0.1 K/UL — SIGNIFICANT CHANGE UP (ref 0–0.2)
BASOPHILS NFR BLD AUTO: 1 % — SIGNIFICANT CHANGE UP (ref 0–2)
EOSINOPHIL # BLD AUTO: 0.2 K/UL — SIGNIFICANT CHANGE UP (ref 0–0.5)
EOSINOPHIL NFR BLD AUTO: 3.2 % — SIGNIFICANT CHANGE UP (ref 0–6)
HCT VFR BLD CALC: 33.5 % — LOW (ref 34.5–45)
HGB BLD-MCNC: 10.2 G/DL — LOW (ref 11.5–15.5)
LYMPHOCYTES # BLD AUTO: 0.9 K/UL — LOW (ref 1–3.3)
LYMPHOCYTES # BLD AUTO: 15.8 % — SIGNIFICANT CHANGE UP (ref 13–44)
MCHC RBC-ENTMCNC: 29.4 PG — SIGNIFICANT CHANGE UP (ref 27–34)
MCHC RBC-ENTMCNC: 30.5 G/DL — LOW (ref 32–36)
MCV RBC AUTO: 96.3 FL — SIGNIFICANT CHANGE UP (ref 80–100)
MONOCYTES # BLD AUTO: 0.3 K/UL — SIGNIFICANT CHANGE UP (ref 0–0.9)
MONOCYTES NFR BLD AUTO: 4.7 % — SIGNIFICANT CHANGE UP (ref 2–14)
NEUTROPHILS # BLD AUTO: 4.1 K/UL — SIGNIFICANT CHANGE UP (ref 1.8–7.4)
NEUTROPHILS NFR BLD AUTO: 75.3 % — SIGNIFICANT CHANGE UP (ref 43–77)
PLATELET # BLD AUTO: 150 K/UL — SIGNIFICANT CHANGE UP (ref 150–400)
RBC # BLD: 3.47 M/UL — LOW (ref 3.8–5.2)
RBC # FLD: 16.3 % — HIGH (ref 10.3–14.5)
WBC # BLD: 5.4 K/UL — SIGNIFICANT CHANGE UP (ref 3.8–10.5)
WBC # FLD AUTO: 5.4 K/UL — SIGNIFICANT CHANGE UP (ref 3.8–10.5)

## 2020-02-05 ENCOUNTER — APPOINTMENT (OUTPATIENT)
Dept: FAMILY MEDICINE | Facility: CLINIC | Age: 70
End: 2020-02-05
Payer: MEDICARE

## 2020-02-05 VITALS
BODY MASS INDEX: 36.6 KG/M2 | DIASTOLIC BLOOD PRESSURE: 62 MMHG | HEIGHT: 62.5 IN | WEIGHT: 204 LBS | SYSTOLIC BLOOD PRESSURE: 142 MMHG | OXYGEN SATURATION: 98 % | HEART RATE: 107 BPM

## 2020-02-05 VITALS — RESPIRATION RATE: 16 BRPM | SYSTOLIC BLOOD PRESSURE: 124 MMHG | HEART RATE: 72 BPM | DIASTOLIC BLOOD PRESSURE: 74 MMHG

## 2020-02-05 VITALS — TEMPERATURE: 98.1 F

## 2020-02-05 PROCEDURE — 99214 OFFICE O/P EST MOD 30 MIN: CPT

## 2020-02-05 NOTE — ASSESSMENT
[FreeTextEntry1] : symptomatic  treatment  fluids  aspirin tylenol advill  notify office  if  symptoms gets  worse  or  temp over  102 medically stable  continue all meds\par \par

## 2020-02-05 NOTE — HISTORY OF PRESENT ILLNESS
[Diabetes Mellitus] : Diabetes Mellitus [Hypertension] : Hypertension [Hyperlipidemia] : Hyperlipidemia [Other: ___] : [unfilled] [Check glucose ___ x/week] : Patient checks blood glucose [unfilled]  times a week [120/80] : Target blood pressure is 120/80 [Does not check BP] : The patient is not checking blood pressure [Doing Well] : Patient is doing well [Target goal met] : BP target goal met [Low Intensity Therapy] : Patient is currently on low intensity statin  therapy [FreeTextEntry6] : t getting  iron infusins and  aranesp last  hgb  10.4   knee   feeling  better c/o  congested  cough no temp for  3  days  [Most Recent A1C: ___] : Most recent A1C was [unfilled] [FreeTextEntry1] : still smoking

## 2020-02-05 NOTE — PHYSICAL EXAM
[No Acute Distress] : no acute distress [PERRL] : pupils equal round and reactive to light [Normal Oropharynx] : the oropharynx was normal [Supple] : supple [Clear to Auscultation] : lungs were clear to auscultation bilaterally [Regular Rhythm] : with a regular rhythm [No Murmur] : no murmur heard [No Edema] : there was no peripheral edema [Normal] : no rash [No Focal Deficits] : no focal deficits [Normal Insight/Judgement] : insight and judgment were intact

## 2020-02-18 ENCOUNTER — APPOINTMENT (OUTPATIENT)
Dept: FAMILY MEDICINE | Facility: CLINIC | Age: 70
End: 2020-02-18
Payer: MEDICARE

## 2020-02-18 VITALS
WEIGHT: 204 LBS | HEART RATE: 85 BPM | DIASTOLIC BLOOD PRESSURE: 66 MMHG | OXYGEN SATURATION: 98 % | HEIGHT: 62.5 IN | BODY MASS INDEX: 36.6 KG/M2 | TEMPERATURE: 98.1 F | SYSTOLIC BLOOD PRESSURE: 136 MMHG

## 2020-02-18 DIAGNOSIS — Z23 ENCOUNTER FOR IMMUNIZATION: ICD-10-CM

## 2020-02-18 DIAGNOSIS — J06.9 ACUTE UPPER RESPIRATORY INFECTION, UNSPECIFIED: ICD-10-CM

## 2020-02-18 DIAGNOSIS — Z87.09 PERSONAL HISTORY OF OTHER DISEASES OF THE RESPIRATORY SYSTEM: ICD-10-CM

## 2020-02-18 DIAGNOSIS — S91.332A PUNCTURE WOUND W/OUT FOREIGN BODY, LEFT FOOT, INITIAL ENCOUNTER: ICD-10-CM

## 2020-02-18 PROCEDURE — 99214 OFFICE O/P EST MOD 30 MIN: CPT | Mod: 25

## 2020-02-18 PROCEDURE — 90471 IMMUNIZATION ADMIN: CPT

## 2020-02-18 PROCEDURE — 90715 TDAP VACCINE 7 YRS/> IM: CPT | Mod: GY

## 2020-02-18 NOTE — HISTORY OF PRESENT ILLNESS
[FreeTextEntry8] : Stepped on something in basement three days ago. Has neuropathy and did not feel what she stepped on.  Noted a cut and sore foot. Was not wearing shoes.  Now left foot red, swollen and painful for past two days. No fever or chills. \par Hx of gout and takes allopurinol daily.

## 2020-02-18 NOTE — PHYSICAL EXAM
[Normal] : no acute distress, well nourished, well developed and well-appearing [No Joint Swelling] : no joint swelling [de-identified] : Puncture wound plantar mid left foot. Swelling and erythema into medial left foot, plantar and dorsum. Very tender. No discharge from wound.  [de-identified] : No joint tenderness.

## 2020-02-18 NOTE — PLAN
[FreeTextEntry1] : Wound cleansed, bacitracin and bandage applied.\par Elevate leg, wear shoes.  \par FU in 48 hours if not resolving or increasing redness and swelling. \par OTC pain medication.

## 2020-02-20 ENCOUNTER — OUTPATIENT (OUTPATIENT)
Dept: OUTPATIENT SERVICES | Facility: HOSPITAL | Age: 70
LOS: 1 days | Discharge: HOME | End: 2020-02-20

## 2020-02-20 DIAGNOSIS — N18.4 CHRONIC KIDNEY DISEASE, STAGE 4 (SEVERE): ICD-10-CM

## 2020-02-20 DIAGNOSIS — D63.1 ANEMIA IN CHRONIC KIDNEY DISEASE: ICD-10-CM

## 2020-02-20 DIAGNOSIS — D50.9 IRON DEFICIENCY ANEMIA, UNSPECIFIED: ICD-10-CM

## 2020-02-20 DIAGNOSIS — E53.8 DEFICIENCY OF OTHER SPECIFIED B GROUP VITAMINS: ICD-10-CM

## 2020-02-25 ENCOUNTER — APPOINTMENT (OUTPATIENT)
Age: 70
End: 2020-02-25

## 2020-02-25 ENCOUNTER — RESULT REVIEW (OUTPATIENT)
Age: 70
End: 2020-02-25

## 2020-02-25 LAB
BASOPHILS # BLD AUTO: 0 K/UL — SIGNIFICANT CHANGE UP (ref 0–0.2)
BASOPHILS NFR BLD AUTO: 0.9 % — SIGNIFICANT CHANGE UP (ref 0–2)
EOSINOPHIL # BLD AUTO: 0.1 K/UL — SIGNIFICANT CHANGE UP (ref 0–0.5)
EOSINOPHIL NFR BLD AUTO: 2.3 % — SIGNIFICANT CHANGE UP (ref 0–6)
HCT VFR BLD CALC: 35 % — SIGNIFICANT CHANGE UP (ref 34.5–45)
HGB BLD-MCNC: 10.8 G/DL — LOW (ref 11.5–15.5)
LYMPHOCYTES # BLD AUTO: 0.7 K/UL — LOW (ref 1–3.3)
LYMPHOCYTES # BLD AUTO: 13.6 % — SIGNIFICANT CHANGE UP (ref 13–44)
MCHC RBC-ENTMCNC: 29.6 PG — SIGNIFICANT CHANGE UP (ref 27–34)
MCHC RBC-ENTMCNC: 30.8 G/DL — LOW (ref 32–36)
MCV RBC AUTO: 96.2 FL — SIGNIFICANT CHANGE UP (ref 80–100)
MONOCYTES # BLD AUTO: 0.3 K/UL — SIGNIFICANT CHANGE UP (ref 0–0.9)
MONOCYTES NFR BLD AUTO: 6.1 % — SIGNIFICANT CHANGE UP (ref 2–14)
NEUTROPHILS # BLD AUTO: 4 K/UL — SIGNIFICANT CHANGE UP (ref 1.8–7.4)
NEUTROPHILS NFR BLD AUTO: 77 % — SIGNIFICANT CHANGE UP (ref 43–77)
PLATELET # BLD AUTO: 137 K/UL — LOW (ref 150–400)
RBC # BLD: 3.64 M/UL — LOW (ref 3.8–5.2)
RBC # FLD: 17.7 % — HIGH (ref 10.3–14.5)
WBC # BLD: 5.2 K/UL — SIGNIFICANT CHANGE UP (ref 3.8–10.5)
WBC # FLD AUTO: 5.2 K/UL — SIGNIFICANT CHANGE UP (ref 3.8–10.5)

## 2020-03-17 ENCOUNTER — APPOINTMENT (OUTPATIENT)
Age: 70
End: 2020-03-17

## 2020-03-17 ENCOUNTER — APPOINTMENT (OUTPATIENT)
Dept: HEMATOLOGY ONCOLOGY | Facility: CLINIC | Age: 70
End: 2020-03-17

## 2020-04-07 ENCOUNTER — APPOINTMENT (OUTPATIENT)
Dept: FAMILY MEDICINE | Facility: CLINIC | Age: 70
End: 2020-04-07

## 2020-05-06 ENCOUNTER — APPOINTMENT (OUTPATIENT)
Dept: FAMILY MEDICINE | Facility: CLINIC | Age: 70
End: 2020-05-06
Payer: MEDICARE

## 2020-05-06 PROCEDURE — 99442: CPT | Mod: CR

## 2020-05-26 ENCOUNTER — RX RENEWAL (OUTPATIENT)
Age: 70
End: 2020-05-26

## 2020-06-02 ENCOUNTER — APPOINTMENT (OUTPATIENT)
Dept: FAMILY MEDICINE | Facility: CLINIC | Age: 70
End: 2020-06-02
Payer: MEDICARE

## 2020-06-02 VITALS — SYSTOLIC BLOOD PRESSURE: 110 MMHG | RESPIRATION RATE: 16 BRPM | DIASTOLIC BLOOD PRESSURE: 80 MMHG | HEART RATE: 96 BPM

## 2020-06-02 VITALS
WEIGHT: 192 LBS | RESPIRATION RATE: 16 BRPM | BODY MASS INDEX: 34.45 KG/M2 | OXYGEN SATURATION: 97 % | HEIGHT: 62.5 IN | HEART RATE: 108 BPM | SYSTOLIC BLOOD PRESSURE: 130 MMHG | DIASTOLIC BLOOD PRESSURE: 62 MMHG

## 2020-06-02 DIAGNOSIS — M10.9 GOUT, UNSPECIFIED: ICD-10-CM

## 2020-06-02 PROCEDURE — 99214 OFFICE O/P EST MOD 30 MIN: CPT

## 2020-06-02 RX ORDER — BENZONATATE 200 MG/1
200 CAPSULE ORAL 3 TIMES DAILY
Qty: 30 | Refills: 0 | Status: COMPLETED | COMMUNITY
Start: 2020-02-05 | End: 2020-06-02

## 2020-06-02 RX ORDER — METHYLPREDNISOLONE 4 MG/1
4 TABLET ORAL
Qty: 1 | Refills: 0 | Status: COMPLETED | COMMUNITY
Start: 2020-05-06 | End: 2020-06-02

## 2020-06-02 RX ORDER — LEVOFLOXACIN 500 MG/1
500 TABLET, FILM COATED ORAL DAILY
Qty: 10 | Refills: 0 | Status: COMPLETED | COMMUNITY
Start: 2020-02-18 | End: 2020-06-02

## 2020-06-02 NOTE — REVIEW OF SYSTEMS
[Fatigue] : fatigue [Chest Pain] : no chest pain [Abdominal Pain] : no abdominal pain [Shortness Of Breath] : no shortness of breath [Diarrhea] : diarrhea

## 2020-06-02 NOTE — PHYSICAL EXAM
[No Acute Distress] : no acute distress [PERRL] : pupils equal round and reactive to light [Normal Oropharynx] : the oropharynx was normal [Regular Rhythm] : with a regular rhythm [Clear to Auscultation] : lungs were clear to auscultation bilaterally [No Edema] : there was no peripheral edema [No Murmur] : no murmur heard [Normal] : soft, non-tender, non-distended, no masses palpated, no HSM and normal bowel sounds [No Rash] : no rash [No Focal Deficits] : no focal deficits [de-identified] : left hip  pain on rom

## 2020-06-02 NOTE — HISTORY OF PRESENT ILLNESS
[Diabetes Mellitus] : Diabetes Mellitus [Hyperlipidemia] : Hyperlipidemia [Hypertension] : Hypertension [Other: ___] : [unfilled] [Check glucose ___ x/week] : Patient checks blood glucose [unfilled]  times a week [Most Recent A1C: ___] : Most recent A1C was [unfilled] [Does not check BP] : The patient is not checking blood pressure [120/80] : Target blood pressure is 120/80 [Target goal met] : BP target goal met [Doing Well] : Patient is doing well [Low Intensity Therapy] : Patient is currently on low intensity statin  therapy [FreeTextEntry6] : not  getting iron infusions too expensive  does not to go to [place]  because of covid lost  12 lbs very nervous had  got attack   1 mo ago using  ventolin 2 x  a wk still smoking left hip pain  [FreeTextEntry1] : still smoking

## 2020-06-03 LAB
ALBUMIN SERPL ELPH-MCNC: 4.1 G/DL
ALP BLD-CCNC: 252 U/L
ALT SERPL-CCNC: 7 U/L
ANION GAP SERPL CALC-SCNC: 14 MMOL/L
APPEARANCE: CLEAR
AST SERPL-CCNC: 11 U/L
BACTERIA: NEGATIVE
BASOPHILS # BLD AUTO: 0.02 K/UL
BASOPHILS NFR BLD AUTO: 0.4 %
BILIRUB SERPL-MCNC: <0.2 MG/DL
BILIRUBIN URINE: NEGATIVE
BLOOD URINE: NEGATIVE
BUN SERPL-MCNC: 31 MG/DL
CALCIUM SERPL-MCNC: 9.6 MG/DL
CHLORIDE SERPL-SCNC: 106 MMOL/L
CHOLEST SERPL-MCNC: 209 MG/DL
CHOLEST/HDLC SERPL: 6.2 RATIO
CO2 SERPL-SCNC: 22 MMOL/L
COLOR: YELLOW
CREAT SERPL-MCNC: 2 MG/DL
CREAT SPEC-SCNC: 128 MG/DL
EOSINOPHIL # BLD AUTO: 0.15 K/UL
EOSINOPHIL NFR BLD AUTO: 2.9 %
ESTIMATED AVERAGE GLUCOSE: 120 MG/DL
GLUCOSE QUALITATIVE U: NEGATIVE
GLUCOSE SERPL-MCNC: 84 MG/DL
HBA1C MFR BLD HPLC: 5.8 %
HCT VFR BLD CALC: 31.7 %
HDLC SERPL-MCNC: 34 MG/DL
HGB BLD-MCNC: 9.7 G/DL
HYALINE CASTS: 1 /LPF
IMM GRANULOCYTES NFR BLD AUTO: 0.4 %
KETONES URINE: NEGATIVE
LDLC SERPL CALC-MCNC: NORMAL MG/DL
LEUKOCYTE ESTERASE URINE: NEGATIVE
LYMPHOCYTES # BLD AUTO: 0.98 K/UL
LYMPHOCYTES NFR BLD AUTO: 19 %
MAN DIFF?: NORMAL
MCHC RBC-ENTMCNC: 30.6 GM/DL
MCHC RBC-ENTMCNC: 31.2 PG
MCV RBC AUTO: 101.9 FL
MICROALBUMIN 24H UR DL<=1MG/L-MCNC: 2.3 MG/DL
MICROALBUMIN/CREAT 24H UR-RTO: 18 MG/G
MICROSCOPIC-UA: NORMAL
MONOCYTES # BLD AUTO: 0.35 K/UL
MONOCYTES NFR BLD AUTO: 6.8 %
NEUTROPHILS # BLD AUTO: 3.65 K/UL
NEUTROPHILS NFR BLD AUTO: 70.5 %
NITRITE URINE: NEGATIVE
PH URINE: 6
PLATELET # BLD AUTO: 190 K/UL
POTASSIUM SERPL-SCNC: 5.3 MMOL/L
PROT SERPL-MCNC: 6.4 G/DL
PROTEIN URINE: NORMAL
RBC # BLD: 3.11 M/UL
RBC # FLD: 14.8 %
RED BLOOD CELLS URINE: 2 /HPF
SODIUM SERPL-SCNC: 141 MMOL/L
SPECIFIC GRAVITY URINE: 1.02
SQUAMOUS EPITHELIAL CELLS: 2 /HPF
T4 SERPL-MCNC: 5.6 UG/DL
TRIGL SERPL-MCNC: 415 MG/DL
TSH SERPL-ACNC: 1.91 UIU/ML
URATE SERPL-MCNC: 5.6 MG/DL
UROBILINOGEN URINE: NORMAL
WBC # FLD AUTO: 5.17 K/UL
WHITE BLOOD CELLS URINE: 2 /HPF

## 2020-06-08 VITALS — HEIGHT: 62 IN | BODY MASS INDEX: 35.33 KG/M2 | WEIGHT: 192 LBS

## 2020-06-08 NOTE — HISTORY OF PRESENT ILLNESS
[TextBox_13] : Referred by Dr. Mauro Goel.\par \par Ms. BARCENAS is a 70 year old female with a history of high cholesterol, HTN, PVD, COPD and left renal cell CA s/p left partial nephrectomy.\par \par She was called today to review eligibility for Low-Dose CT lung cancer screening.  Reviewed and confirmed that the patient meets screening eligibility criteria:\par \par 70 years old \par \par Smoking Status: Current Smoker\par \par Number of pack(s) per day: 1\par Number of years smoked: 50\par Number of pack years smokin\par \par Ms. BARCENAS denies any symptoms of lung cancer, including new cough, change in cough, hemoptysis, and unintentional weight loss.\par \par Ms. BARCENAS denies any personal history of lung cancer.  No lung cancer in a first degree relative.  Denies any history of occupational exposures.

## 2020-06-08 NOTE — DATA REVIEWED
Pt is scheduled with Dr Jeneal Ahumada for 4/16/18 @ 1:30    Confirmed with pt [Lung Cancer Screening] : Patient underwent lung cancer screening [2] : 2 [4A] : 4A   [TextBox_27] : 02/19 [TextBox_12] : 05/16

## 2020-06-09 LAB
ALKPISO INTERP: NORMAL
ALP BLD-CCNC: 253 U/L
ALP BONE CFR SERPL: 61 %
ALP INTEST CFR SERPL: 0 %
ALP LIVER CFR SERPL: 39 %
ALP MACRO CFR SERPL: 0 %
ALP PLAC CFR SERPL: 0 %

## 2020-06-11 ENCOUNTER — APPOINTMENT (OUTPATIENT)
Dept: CT IMAGING | Facility: CLINIC | Age: 70
End: 2020-06-11
Payer: MEDICARE

## 2020-06-11 ENCOUNTER — OUTPATIENT (OUTPATIENT)
Dept: OUTPATIENT SERVICES | Facility: HOSPITAL | Age: 70
LOS: 1 days | End: 2020-06-11
Payer: MEDICARE

## 2020-06-11 ENCOUNTER — RESULT REVIEW (OUTPATIENT)
Age: 70
End: 2020-06-11

## 2020-06-11 DIAGNOSIS — C64.2 MALIGNANT NEOPLASM OF LEFT KIDNEY, EXCEPT RENAL PELVIS: ICD-10-CM

## 2020-06-11 DIAGNOSIS — F17.210 NICOTINE DEPENDENCE, CIGARETTES, UNCOMPLICATED: ICD-10-CM

## 2020-06-11 PROCEDURE — G0297: CPT | Mod: 26

## 2020-06-11 PROCEDURE — G0297: CPT

## 2020-06-26 ENCOUNTER — APPOINTMENT (OUTPATIENT)
Dept: RADIOLOGY | Facility: CLINIC | Age: 70
End: 2020-06-26
Payer: MEDICARE

## 2020-06-26 ENCOUNTER — OUTPATIENT (OUTPATIENT)
Dept: OUTPATIENT SERVICES | Facility: HOSPITAL | Age: 70
LOS: 1 days | End: 2020-06-26
Payer: MEDICARE

## 2020-06-26 DIAGNOSIS — R10.32 LEFT LOWER QUADRANT PAIN: ICD-10-CM

## 2020-06-26 PROCEDURE — 73502 X-RAY EXAM HIP UNI 2-3 VIEWS: CPT | Mod: 26,LT

## 2020-06-26 PROCEDURE — 73502 X-RAY EXAM HIP UNI 2-3 VIEWS: CPT

## 2020-07-17 ENCOUNTER — APPOINTMENT (OUTPATIENT)
Dept: HEMATOLOGY ONCOLOGY | Facility: CLINIC | Age: 70
End: 2020-07-17

## 2020-07-17 ENCOUNTER — RESULT REVIEW (OUTPATIENT)
Age: 70
End: 2020-07-17

## 2020-07-17 ENCOUNTER — OUTPATIENT (OUTPATIENT)
Dept: OUTPATIENT SERVICES | Facility: HOSPITAL | Age: 70
LOS: 1 days | Discharge: HOME | End: 2020-07-17

## 2020-07-17 DIAGNOSIS — D50.9 IRON DEFICIENCY ANEMIA, UNSPECIFIED: ICD-10-CM

## 2020-07-17 LAB
BASOPHILS # BLD AUTO: 0 K/UL — SIGNIFICANT CHANGE UP (ref 0–0.2)
BASOPHILS NFR BLD AUTO: 0.4 % — SIGNIFICANT CHANGE UP (ref 0–2)
EOSINOPHIL # BLD AUTO: 0.2 K/UL — SIGNIFICANT CHANGE UP (ref 0–0.5)
EOSINOPHIL NFR BLD AUTO: 2.4 % — SIGNIFICANT CHANGE UP (ref 0–6)
HCT VFR BLD CALC: 33.2 % — LOW (ref 34.5–45)
HGB BLD-MCNC: 10.6 G/DL — LOW (ref 11.5–15.5)
LYMPHOCYTES # BLD AUTO: 1.1 K/UL — SIGNIFICANT CHANGE UP (ref 1–3.3)
LYMPHOCYTES # BLD AUTO: 17.4 % — SIGNIFICANT CHANGE UP (ref 13–44)
MCHC RBC-ENTMCNC: 31.9 G/DL — LOW (ref 32–36)
MCHC RBC-ENTMCNC: 32 PG — SIGNIFICANT CHANGE UP (ref 27–34)
MCV RBC AUTO: 100.3 FL — HIGH (ref 80–100)
MONOCYTES # BLD AUTO: 0.3 K/UL — SIGNIFICANT CHANGE UP (ref 0–0.9)
MONOCYTES NFR BLD AUTO: 5.1 % — SIGNIFICANT CHANGE UP (ref 2–14)
NEUTROPHILS # BLD AUTO: 4.9 K/UL — SIGNIFICANT CHANGE UP (ref 1.8–7.4)
NEUTROPHILS NFR BLD AUTO: 74.6 % — SIGNIFICANT CHANGE UP (ref 43–77)
PLATELET # BLD AUTO: 140 K/UL — LOW (ref 150–400)
RBC # BLD: 3.31 M/UL — LOW (ref 3.8–5.2)
RBC # FLD: 15.6 % — HIGH (ref 10.3–14.5)
WBC # BLD: 6.5 K/UL — SIGNIFICANT CHANGE UP (ref 3.8–10.5)
WBC # FLD AUTO: 6.5 K/UL — SIGNIFICANT CHANGE UP (ref 3.8–10.5)

## 2020-07-20 LAB — FERRITIN SERPL-MCNC: 59 NG/ML

## 2020-08-26 ENCOUNTER — APPOINTMENT (OUTPATIENT)
Dept: FAMILY MEDICINE | Facility: CLINIC | Age: 70
End: 2020-08-26
Payer: MEDICARE

## 2020-08-26 VITALS — DIASTOLIC BLOOD PRESSURE: 80 MMHG | RESPIRATION RATE: 72 BRPM | HEART RATE: 94 BPM | SYSTOLIC BLOOD PRESSURE: 100 MMHG

## 2020-08-26 VITALS
OXYGEN SATURATION: 93 % | WEIGHT: 191 LBS | HEART RATE: 102 BPM | HEIGHT: 62 IN | SYSTOLIC BLOOD PRESSURE: 128 MMHG | BODY MASS INDEX: 35.15 KG/M2 | DIASTOLIC BLOOD PRESSURE: 60 MMHG | RESPIRATION RATE: 16 BRPM

## 2020-08-26 PROCEDURE — 99214 OFFICE O/P EST MOD 30 MIN: CPT

## 2020-08-26 NOTE — PHYSICAL EXAM
[No Acute Distress] : no acute distress [PERRL] : pupils equal round and reactive to light [Clear to Auscultation] : lungs were clear to auscultation bilaterally [Normal Oropharynx] : the oropharynx was normal [Regular Rhythm] : with a regular rhythm [No Edema] : there was no peripheral edema [Normal] : soft, non-tender, non-distended, no masses palpated, no HSM and normal bowel sounds [No Murmur] : no murmur heard [No Rash] : no rash [No Focal Deficits] : no focal deficits [de-identified] : left hip  pain on rom

## 2020-08-26 NOTE — HISTORY OF PRESENT ILLNESS
[Diabetes Mellitus] : Diabetes Mellitus [Hyperlipidemia] : Hyperlipidemia [Other: ___] : [unfilled] [Hypertension] : Hypertension [Most Recent A1C: ___] : Most recent A1C was [unfilled] [Check glucose ___ x/week] : Patient checks blood glucose [unfilled]  times a week [Does not check BP] : The patient is not checking blood pressure [120/80] : Target blood pressure is 120/80 [Target goal met] : BP target goal met [Doing Well] : Patient is doing well [Low Intensity Therapy] : Patient is currently on low intensity statin  therapy [Stable] : Overall status has been stable [No New Symptoms] : Patient denies any new symptoms [FreeTextEntry6] : not getting iron infusions hg 10.6  had ct of  chest neg  mri renal neg  renal us neg  [FreeTextEntry1] : still smoking

## 2020-11-11 ENCOUNTER — APPOINTMENT (OUTPATIENT)
Dept: FAMILY MEDICINE | Facility: CLINIC | Age: 70
End: 2020-11-11
Payer: MEDICARE

## 2020-11-11 VITALS — RESPIRATION RATE: 16 BRPM | DIASTOLIC BLOOD PRESSURE: 80 MMHG | SYSTOLIC BLOOD PRESSURE: 110 MMHG | HEART RATE: 88 BPM

## 2020-11-11 VITALS
BODY MASS INDEX: 34.99 KG/M2 | OXYGEN SATURATION: 95 % | SYSTOLIC BLOOD PRESSURE: 136 MMHG | DIASTOLIC BLOOD PRESSURE: 80 MMHG | WEIGHT: 195 LBS | HEART RATE: 105 BPM | TEMPERATURE: 97.3 F | HEIGHT: 62.5 IN

## 2020-11-11 PROCEDURE — 99214 OFFICE O/P EST MOD 30 MIN: CPT | Mod: 25

## 2020-11-11 PROCEDURE — 36415 COLL VENOUS BLD VENIPUNCTURE: CPT

## 2020-11-11 PROCEDURE — G0008: CPT

## 2020-11-11 PROCEDURE — 90662 IIV NO PRSV INCREASED AG IM: CPT

## 2020-11-11 NOTE — HISTORY OF PRESENT ILLNESS
[Diabetes Mellitus] : Diabetes Mellitus [Hyperlipidemia] : Hyperlipidemia [Hypertension] : Hypertension [Other: ___] : [unfilled] [Check glucose ___ x/week] : Patient checks blood glucose [unfilled]  times a week [Most Recent A1C: ___] : Most recent A1C was [unfilled] [Does not check BP] : The patient is not checking blood pressure [120/80] : Target blood pressure is 120/80 [Target goal met] : BP target goal met [Doing Well] : Patient is doing well [Low Intensity Therapy] : Patient is currently on low intensity statin  therapy [No New Symptoms] : Patient denies any new symptoms [Stable] : Overall status has been stable [FreeTextEntry6] : still smoking c/o rash  both axilla seeing  gu next  month no recent iron infusions  [de-identified] : av  130  [FreeTextEntry1] : still smoking

## 2020-11-11 NOTE — PHYSICAL EXAM
[No Acute Distress] : no acute distress [PERRL] : pupils equal round and reactive to light [Normal Oropharynx] : the oropharynx was normal [Clear to Auscultation] : lungs were clear to auscultation bilaterally [Regular Rhythm] : with a regular rhythm [No Murmur] : no murmur heard [No Edema] : there was no peripheral edema [Normal] : soft, non-tender, non-distended, no masses palpated, no HSM and normal bowel sounds [No Rash] : no rash [No Focal Deficits] : no focal deficits [de-identified] : left hip  pain on rom

## 2020-11-11 NOTE — REVIEW OF SYSTEMS
[Joint Pain] : joint pain [Fever] : no fever [Chest Pain] : no chest pain [Shortness Of Breath] : no shortness of breath [FreeTextEntry9] : foot pain

## 2020-11-12 LAB
ALBUMIN SERPL ELPH-MCNC: 4.3 G/DL
ALP BLD-CCNC: 265 U/L
ALT SERPL-CCNC: 5 U/L
ANION GAP SERPL CALC-SCNC: 15 MMOL/L
APPEARANCE: CLEAR
AST SERPL-CCNC: 9 U/L
BACTERIA: NEGATIVE
BASOPHILS # BLD AUTO: 0.02 K/UL
BASOPHILS NFR BLD AUTO: 0.3 %
BILIRUB SERPL-MCNC: 0.2 MG/DL
BILIRUBIN URINE: NEGATIVE
BLOOD URINE: NEGATIVE
BUN SERPL-MCNC: 29 MG/DL
CALCIUM SERPL-MCNC: 9.8 MG/DL
CHLORIDE SERPL-SCNC: 106 MMOL/L
CHOLEST SERPL-MCNC: 191 MG/DL
CO2 SERPL-SCNC: 24 MMOL/L
COLOR: NORMAL
CREAT SERPL-MCNC: 1.87 MG/DL
CREAT SPEC-SCNC: 82 MG/DL
EOSINOPHIL # BLD AUTO: 0.16 K/UL
EOSINOPHIL NFR BLD AUTO: 2.6 %
ESTIMATED AVERAGE GLUCOSE: 117 MG/DL
GLUCOSE QUALITATIVE U: NEGATIVE
GLUCOSE SERPL-MCNC: 104 MG/DL
HBA1C MFR BLD HPLC: 5.7 %
HCT VFR BLD CALC: 32.2 %
HDLC SERPL-MCNC: 37 MG/DL
HGB BLD-MCNC: 9.9 G/DL
HYALINE CASTS: 1 /LPF
IMM GRANULOCYTES NFR BLD AUTO: 0.3 %
KETONES URINE: NEGATIVE
LDLC SERPL CALC-MCNC: 97 MG/DL
LEUKOCYTE ESTERASE URINE: NEGATIVE
LYMPHOCYTES # BLD AUTO: 1.23 K/UL
LYMPHOCYTES NFR BLD AUTO: 20.4 %
MAN DIFF?: NORMAL
MCHC RBC-ENTMCNC: 30.7 GM/DL
MCHC RBC-ENTMCNC: 31.9 PG
MCV RBC AUTO: 103.9 FL
MICROALBUMIN 24H UR DL<=1MG/L-MCNC: 3.4 MG/DL
MICROALBUMIN/CREAT 24H UR-RTO: 41 MG/G
MICROSCOPIC-UA: NORMAL
MONOCYTES # BLD AUTO: 0.43 K/UL
MONOCYTES NFR BLD AUTO: 7.1 %
NEUTROPHILS # BLD AUTO: 4.18 K/UL
NEUTROPHILS NFR BLD AUTO: 69.3 %
NITRITE URINE: NEGATIVE
NONHDLC SERPL-MCNC: 154 MG/DL
PH URINE: 6
PLATELET # BLD AUTO: 167 K/UL
POTASSIUM SERPL-SCNC: 5.6 MMOL/L
PROT SERPL-MCNC: 6.6 G/DL
PROTEIN URINE: NORMAL
RBC # BLD: 3.1 M/UL
RBC # FLD: 15 %
RED BLOOD CELLS URINE: 1 /HPF
SODIUM SERPL-SCNC: 144 MMOL/L
SPECIFIC GRAVITY URINE: 1.02
SQUAMOUS EPITHELIAL CELLS: 1 /HPF
T4 SERPL-MCNC: 5.7 UG/DL
TRIGL SERPL-MCNC: 281 MG/DL
TSH SERPL-ACNC: 1.54 UIU/ML
URATE SERPL-MCNC: 5.5 MG/DL
UROBILINOGEN URINE: NORMAL
WBC # FLD AUTO: 6.04 K/UL
WHITE BLOOD CELLS URINE: 1 /HPF

## 2020-12-21 PROBLEM — Z87.09 HISTORY OF INFLUENZA: Status: RESOLVED | Noted: 2019-02-07 | Resolved: 2020-12-21

## 2021-01-11 ENCOUNTER — APPOINTMENT (OUTPATIENT)
Dept: FAMILY MEDICINE | Facility: CLINIC | Age: 71
End: 2021-01-11
Payer: MEDICARE

## 2021-01-11 VITALS
WEIGHT: 196 LBS | TEMPERATURE: 97 F | HEIGHT: 62.5 IN | SYSTOLIC BLOOD PRESSURE: 160 MMHG | OXYGEN SATURATION: 96 % | HEART RATE: 88 BPM | RESPIRATION RATE: 16 BRPM | DIASTOLIC BLOOD PRESSURE: 74 MMHG | BODY MASS INDEX: 35.16 KG/M2

## 2021-01-11 VITALS — HEART RATE: 72 BPM | DIASTOLIC BLOOD PRESSURE: 70 MMHG | SYSTOLIC BLOOD PRESSURE: 120 MMHG | RESPIRATION RATE: 16 BRPM

## 2021-01-11 DIAGNOSIS — M25.561 PAIN IN RIGHT KNEE: ICD-10-CM

## 2021-01-11 DIAGNOSIS — M25.562 PAIN IN RIGHT KNEE: ICD-10-CM

## 2021-01-11 DIAGNOSIS — Z96.659 PRESENCE OF UNSPECIFIED ARTIFICIAL KNEE JOINT: ICD-10-CM

## 2021-01-11 DIAGNOSIS — R21 RASH AND OTHER NONSPECIFIC SKIN ERUPTION: ICD-10-CM

## 2021-01-11 PROCEDURE — 36415 COLL VENOUS BLD VENIPUNCTURE: CPT

## 2021-01-11 PROCEDURE — 99214 OFFICE O/P EST MOD 30 MIN: CPT | Mod: 25

## 2021-01-11 RX ORDER — PREDNISONE 20 MG/1
20 TABLET ORAL
Qty: 10 | Refills: 0 | Status: COMPLETED | COMMUNITY
Start: 2020-09-11

## 2021-01-11 NOTE — HISTORY OF PRESENT ILLNESS
[Diabetes Mellitus] : Diabetes Mellitus [Hyperlipidemia] : Hyperlipidemia [Hypertension] : Hypertension [Other: ___] : [unfilled]: [FreeTextEntry6] : still smoking  has not had any iron infusions c/o  bilateral knee pain had  tka rash  improving  [Check glucose ___ x/week] : Patient checks blood glucose [unfilled]  times a week [Most Recent A1C: ___] : Most recent A1C was [unfilled] [de-identified] : av  130  [Does not check BP] : The patient is not checking blood pressure [120/80] : Target blood pressure is 120/80 [Target goal met] : BP target goal met [Doing Well] : Patient is doing well [Low Intensity Therapy] : Patient is currently on low intensity statin  therapy [No New Symptoms] : Patient denies any new symptoms [Stable] : Overall status has been stable [FreeTextEntry1] : still smoking

## 2021-01-11 NOTE — PHYSICAL EXAM
[No Acute Distress] : no acute distress [PERRL] : pupils equal round and reactive to light [Normal Oropharynx] : the oropharynx was normal [Regular Rhythm] : with a regular rhythm [No Murmur] : no murmur heard [No Edema] : there was no peripheral edema [Normal] : soft, non-tender, non-distended, no masses palpated, no HSM and normal bowel sounds [No Rash] : no rash [No Focal Deficits] : no focal deficits [de-identified] : occ  ronhi [de-identified] : left hip  pain on rom

## 2021-01-11 NOTE — REVIEW OF SYSTEMS
[Fever] : no fever [Chest Pain] : no chest pain [Shortness Of Breath] : no shortness of breath [Joint Pain] : joint pain [FreeTextEntry9] : foot pain

## 2021-01-13 LAB
ALBUMIN SERPL ELPH-MCNC: 4.6 G/DL
ALP BLD-CCNC: 348 U/L
ALT SERPL-CCNC: 8 U/L
ANION GAP SERPL CALC-SCNC: 12 MMOL/L
APPEARANCE: CLEAR
AST SERPL-CCNC: 10 U/L
BACTERIA: NEGATIVE
BASOPHILS # BLD AUTO: 0.01 K/UL
BASOPHILS NFR BLD AUTO: 0.2 %
BILIRUB SERPL-MCNC: 0.2 MG/DL
BILIRUBIN URINE: NEGATIVE
BLOOD URINE: NEGATIVE
BUN SERPL-MCNC: 28 MG/DL
CALCIUM SERPL-MCNC: 9.7 MG/DL
CHLORIDE SERPL-SCNC: 108 MMOL/L
CHOLEST SERPL-MCNC: 172 MG/DL
CO2 SERPL-SCNC: 22 MMOL/L
COLOR: NORMAL
CREAT SERPL-MCNC: 1.97 MG/DL
CREAT SPEC-SCNC: 76 MG/DL
EOSINOPHIL # BLD AUTO: 0.13 K/UL
EOSINOPHIL NFR BLD AUTO: 2.2 %
ESTIMATED AVERAGE GLUCOSE: 114 MG/DL
GGT SERPL-CCNC: 19 U/L
GLUCOSE QUALITATIVE U: NEGATIVE
GLUCOSE SERPL-MCNC: 93 MG/DL
HBA1C MFR BLD HPLC: 5.6 %
HCT VFR BLD CALC: 34.1 %
HDLC SERPL-MCNC: 37 MG/DL
HGB BLD-MCNC: 10.2 G/DL
HYALINE CASTS: 0 /LPF
IMM GRANULOCYTES NFR BLD AUTO: 0.3 %
KETONES URINE: NEGATIVE
LDLC SERPL CALC-MCNC: 72 MG/DL
LEUKOCYTE ESTERASE URINE: NEGATIVE
LYMPHOCYTES # BLD AUTO: 0.94 K/UL
LYMPHOCYTES NFR BLD AUTO: 16.2 %
MAN DIFF?: NORMAL
MCHC RBC-ENTMCNC: 29.9 GM/DL
MCHC RBC-ENTMCNC: 31.1 PG
MCV RBC AUTO: 104 FL
MICROALBUMIN 24H UR DL<=1MG/L-MCNC: 5.3 MG/DL
MICROALBUMIN/CREAT 24H UR-RTO: 70 MG/G
MICROSCOPIC-UA: NORMAL
MONOCYTES # BLD AUTO: 0.42 K/UL
MONOCYTES NFR BLD AUTO: 7.3 %
NEUTROPHILS # BLD AUTO: 4.27 K/UL
NEUTROPHILS NFR BLD AUTO: 73.8 %
NITRITE URINE: NEGATIVE
NONHDLC SERPL-MCNC: 135 MG/DL
PH URINE: 6
PLATELET # BLD AUTO: 148 K/UL
POTASSIUM SERPL-SCNC: 6 MMOL/L
PROT SERPL-MCNC: 6.8 G/DL
PROTEIN URINE: ABNORMAL
RBC # BLD: 3.28 M/UL
RBC # FLD: 14.8 %
RED BLOOD CELLS URINE: 2 /HPF
SODIUM SERPL-SCNC: 142 MMOL/L
SPECIFIC GRAVITY URINE: 1.02
SQUAMOUS EPITHELIAL CELLS: 3 /HPF
T4 SERPL-MCNC: 5.9 UG/DL
TRIGL SERPL-MCNC: 314 MG/DL
TSH SERPL-ACNC: 1.35 UIU/ML
URATE SERPL-MCNC: 5.1 MG/DL
UROBILINOGEN URINE: NORMAL
WBC # FLD AUTO: 5.79 K/UL
WHITE BLOOD CELLS URINE: 1 /HPF

## 2021-01-20 ENCOUNTER — OUTPATIENT (OUTPATIENT)
Dept: OUTPATIENT SERVICES | Facility: HOSPITAL | Age: 71
LOS: 1 days | End: 2021-01-20
Payer: MEDICARE

## 2021-01-20 ENCOUNTER — APPOINTMENT (OUTPATIENT)
Dept: ULTRASOUND IMAGING | Facility: CLINIC | Age: 71
End: 2021-01-20
Payer: MEDICARE

## 2021-01-20 DIAGNOSIS — R74.8 ABNORMAL LEVELS OF OTHER SERUM ENZYMES: ICD-10-CM

## 2021-01-20 PROCEDURE — 76700 US EXAM ABDOM COMPLETE: CPT | Mod: 26

## 2021-01-20 PROCEDURE — 76700 US EXAM ABDOM COMPLETE: CPT

## 2021-01-25 LAB
ALBUMIN SERPL ELPH-MCNC: 4.3 G/DL
ALP BLD-CCNC: 372 U/L
ALT SERPL-CCNC: 5 U/L
ANION GAP SERPL CALC-SCNC: 14 MMOL/L
AST SERPL-CCNC: 9 U/L
BILIRUB SERPL-MCNC: 0.2 MG/DL
BUN SERPL-MCNC: 30 MG/DL
CALCIUM SERPL-MCNC: 9.7 MG/DL
CHLORIDE SERPL-SCNC: 107 MMOL/L
CO2 SERPL-SCNC: 22 MMOL/L
CREAT SERPL-MCNC: 2.03 MG/DL
GLUCOSE SERPL-MCNC: 102 MG/DL
POTASSIUM SERPL-SCNC: 5.6 MMOL/L
PROT SERPL-MCNC: 6.7 G/DL
SODIUM SERPL-SCNC: 143 MMOL/L

## 2021-01-28 LAB
ALKPISO INTERP: NORMAL
ALP BLD-CCNC: 326 U/L
ALP BONE CFR SERPL: 64 %
ALP INTEST CFR SERPL: 0 %
ALP LIVER CFR SERPL: 36 %
ALP MACRO CFR SERPL: 0 %
ALP PLAC CFR SERPL: 0 %

## 2021-02-08 ENCOUNTER — APPOINTMENT (OUTPATIENT)
Dept: ULTRASOUND IMAGING | Facility: CLINIC | Age: 71
End: 2021-02-08
Payer: MEDICARE

## 2021-02-08 ENCOUNTER — APPOINTMENT (OUTPATIENT)
Dept: MRI IMAGING | Facility: CLINIC | Age: 71
End: 2021-02-08
Payer: MEDICARE

## 2021-02-08 ENCOUNTER — OUTPATIENT (OUTPATIENT)
Dept: OUTPATIENT SERVICES | Facility: HOSPITAL | Age: 71
LOS: 1 days | End: 2021-02-08
Payer: MEDICARE

## 2021-02-08 DIAGNOSIS — Z00.8 ENCOUNTER FOR OTHER GENERAL EXAMINATION: ICD-10-CM

## 2021-02-08 DIAGNOSIS — C64.2 MALIGNANT NEOPLASM OF LEFT KIDNEY, EXCEPT RENAL PELVIS: ICD-10-CM

## 2021-02-08 PROCEDURE — 76775 US EXAM ABDO BACK WALL LIM: CPT

## 2021-02-08 PROCEDURE — 74181 MRI ABDOMEN W/O CONTRAST: CPT

## 2021-02-08 PROCEDURE — 74181 MRI ABDOMEN W/O CONTRAST: CPT | Mod: 26,MH

## 2021-02-08 PROCEDURE — 76775 US EXAM ABDO BACK WALL LIM: CPT | Mod: 26

## 2021-02-16 ENCOUNTER — OUTPATIENT (OUTPATIENT)
Dept: OUTPATIENT SERVICES | Facility: HOSPITAL | Age: 71
LOS: 1 days | End: 2021-02-16
Payer: MEDICARE

## 2021-02-16 ENCOUNTER — APPOINTMENT (OUTPATIENT)
Dept: ULTRASOUND IMAGING | Facility: CLINIC | Age: 71
End: 2021-02-16
Payer: MEDICARE

## 2021-02-16 DIAGNOSIS — Z00.8 ENCOUNTER FOR OTHER GENERAL EXAMINATION: ICD-10-CM

## 2021-02-16 PROCEDURE — 20611 DRAIN/INJ JOINT/BURSA W/US: CPT | Mod: LT

## 2021-02-16 PROCEDURE — 20611 DRAIN/INJ JOINT/BURSA W/US: CPT

## 2021-03-15 ENCOUNTER — APPOINTMENT (OUTPATIENT)
Dept: FAMILY MEDICINE | Facility: CLINIC | Age: 71
End: 2021-03-15
Payer: MEDICARE

## 2021-03-15 VITALS — RESPIRATION RATE: 16 BRPM | DIASTOLIC BLOOD PRESSURE: 70 MMHG | HEART RATE: 84 BPM | SYSTOLIC BLOOD PRESSURE: 114 MMHG

## 2021-03-15 VITALS
SYSTOLIC BLOOD PRESSURE: 130 MMHG | OXYGEN SATURATION: 92 % | DIASTOLIC BLOOD PRESSURE: 60 MMHG | HEIGHT: 62.5 IN | HEART RATE: 94 BPM | WEIGHT: 204.13 LBS | TEMPERATURE: 97.5 F | BODY MASS INDEX: 36.62 KG/M2

## 2021-03-15 PROCEDURE — 99214 OFFICE O/P EST MOD 30 MIN: CPT | Mod: 25

## 2021-03-15 PROCEDURE — 99497 ADVNCD CARE PLAN 30 MIN: CPT | Mod: 33

## 2021-03-15 PROCEDURE — G0439: CPT

## 2021-03-15 PROCEDURE — G0444 DEPRESSION SCREEN ANNUAL: CPT | Mod: 59

## 2021-03-15 RX ORDER — ALBUTEROL 90 MCG
AEROSOL (GRAM) INHALATION
Refills: 0 | Status: COMPLETED | COMMUNITY
End: 2021-03-15

## 2021-03-15 RX ORDER — AMLODIPINE BESYLATE 2.5 MG/1
2.5 TABLET ORAL
Qty: 90 | Refills: 0 | Status: DISCONTINUED | COMMUNITY
Start: 2021-03-03 | End: 2021-03-15

## 2021-03-15 NOTE — ASSESSMENT
[FreeTextEntry1] :   70 YR OLD FEMALE  RECENTLY RESTARTED  AMLODIPINE HAS INCREASE EDEMA  LE WILL DC  AND RESTART LASIIX lab evaluation CHECK A1C FOR DM

## 2021-03-15 NOTE — PHYSICAL EXAM
[No Acute Distress] : no acute distress [PERRL] : pupils equal round and reactive to light [Normal TMs] : both tympanic membranes were normal [No Lymphadenopathy] : no lymphadenopathy [Clear to Auscultation] : lungs were clear to auscultation bilaterally [Regular Rhythm] : with a regular rhythm [Normal] : soft, non-tender, non-distended, no masses palpated, no HSM and normal bowel sounds [Normal Supraclavicular Nodes] : no supraclavicular lymphadenopathy [Normal Posterior Cervical Nodes] : no posterior cervical lymphadenopathy [Normal Anterior Cervical Nodes] : no anterior cervical lymphadenopathy [No CVA Tenderness] : no CVA  tenderness [No Joint Swelling] : no joint swelling [No Rash] : no rash [Normal Gait] : normal gait [Normal Affect] : the affect was normal [Normal Insight/Judgement] : insight and judgment were intact [de-identified] : SYSTOLIC MURMUR  INCREASES  WITH EXPIRATION 2/4  [de-identified] : 2+  EDEMA

## 2021-03-15 NOTE — HISTORY OF PRESENT ILLNESS
[FreeTextEntry1] : pt her for cpe and mangement of HTN HLD CKD AND KIDNEY CANCER  [de-identified] : SAW  CARDIOLOGY AND  WAS PUT AMLODIPINE HAS  DEVELOPED  SWELLING IN LEGS AND HANDS AND LEGS ARE PAINFUL HAS GE  NO CP  GLUCOSE  CHECKED DAILY LESS THAN 130

## 2021-03-15 NOTE — REVIEW OF SYSTEMS
[Patient Intake Form Reviewed] : Patient intake form was reviewed [Fatigue] : fatigue [Wheezing] : wheezing [Dyspnea on Exertion] : dyspnea on exertion [Dizziness] : dizziness [Negative] : Heme/Lymph

## 2021-03-15 NOTE — HEALTH RISK ASSESSMENT
[Good] : ~his/her~  mood as  good [] : Yes [No] : No [No falls in past year] : Patient reported no falls in the past year [0] : 2) Feeling down, depressed, or hopeless: Not at all (0) [Patient reported mammogram was normal] : Patient reported mammogram was normal [Patient reported colonoscopy was normal] : Patient reported colonoscopy was normal [With Family] : lives with family [Retired] : retired [High School] : high school [] :  [# Of Children ___] : has [unfilled] children [Fully functional (bathing, dressing, toileting, transferring, walking, feeding)] : Fully functional (bathing, dressing, toileting, transferring, walking, feeding) [Fully functional (using the telephone, shopping, preparing meals, housekeeping, doing laundry, using] : Fully functional and needs no help or supervision to perform IADLs (using the telephone, shopping, preparing meals, housekeeping, doing laundry, using transportation, managing medications and managing finances) [Smoke Detector] : smoke detector [Carbon Monoxide Detector] : carbon monoxide detector [Seat Belt] :  uses seat belt [With Patient/Caregiver] : With Patient/Caregiver [de-identified] : NO  [[id number]] : 0 [Reports changes in vision] : Reports no changes in vision [Reports changes in dental health] : Reports no changes in dental health [MammogramDate] : 1/20 [ColonoscopyDate] : 1/17 [de-identified] : TINNITUS  [AdvancecareDate] : 3/21 [FreeTextEntry4] : 16 minutes  spent discussing  end of life care and options for treatment such as, a DNR, DNI, dialysis, tube feeds and if patient becomes unable to make decisions for self and has incurable disease that treatment outweighs benefits.\par

## 2021-03-16 LAB
ALBUMIN SERPL ELPH-MCNC: 3.8 G/DL
ALP BLD-CCNC: 424 U/L
ALT SERPL-CCNC: 9 U/L
ANION GAP SERPL CALC-SCNC: 13 MMOL/L
APPEARANCE: CLEAR
AST SERPL-CCNC: 17 U/L
BACTERIA: NEGATIVE
BASOPHILS # BLD AUTO: 0.02 K/UL
BASOPHILS NFR BLD AUTO: 0.3 %
BILIRUB SERPL-MCNC: 0.2 MG/DL
BILIRUBIN URINE: NEGATIVE
BLOOD URINE: NEGATIVE
BUN SERPL-MCNC: 29 MG/DL
CALCIUM SERPL-MCNC: 9.3 MG/DL
CHLORIDE SERPL-SCNC: 104 MMOL/L
CHOLEST SERPL-MCNC: 144 MG/DL
CO2 SERPL-SCNC: 23 MMOL/L
COLOR: NORMAL
CREAT SERPL-MCNC: 1.86 MG/DL
CREAT SPEC-SCNC: 72 MG/DL
EOSINOPHIL # BLD AUTO: 0.12 K/UL
EOSINOPHIL NFR BLD AUTO: 1.9 %
ESTIMATED AVERAGE GLUCOSE: 120 MG/DL
GLUCOSE QUALITATIVE U: NEGATIVE
GLUCOSE SERPL-MCNC: 94 MG/DL
HBA1C MFR BLD HPLC: 5.8 %
HCT VFR BLD CALC: 32 %
HDLC SERPL-MCNC: 37 MG/DL
HGB BLD-MCNC: 9.4 G/DL
HYALINE CASTS: 1 /LPF
IMM GRANULOCYTES NFR BLD AUTO: 0.5 %
KETONES URINE: NEGATIVE
LDLC SERPL CALC-MCNC: 68 MG/DL
LEUKOCYTE ESTERASE URINE: NEGATIVE
LYMPHOCYTES # BLD AUTO: 0.87 K/UL
LYMPHOCYTES NFR BLD AUTO: 13.8 %
MAN DIFF?: NORMAL
MCHC RBC-ENTMCNC: 29.4 GM/DL
MCHC RBC-ENTMCNC: 30.5 PG
MCV RBC AUTO: 103.9 FL
MICROALBUMIN 24H UR DL<=1MG/L-MCNC: 9.3 MG/DL
MICROALBUMIN/CREAT 24H UR-RTO: 129 MG/G
MICROSCOPIC-UA: NORMAL
MONOCYTES # BLD AUTO: 0.44 K/UL
MONOCYTES NFR BLD AUTO: 7 %
NEUTROPHILS # BLD AUTO: 4.83 K/UL
NEUTROPHILS NFR BLD AUTO: 76.5 %
NITRITE URINE: NEGATIVE
NONHDLC SERPL-MCNC: 107 MG/DL
NT-PROBNP SERPL-MCNC: 2452 PG/ML
PH URINE: 6
PLATELET # BLD AUTO: 161 K/UL
POTASSIUM SERPL-SCNC: 5.6 MMOL/L
PROT SERPL-MCNC: 6.2 G/DL
PROTEIN URINE: ABNORMAL
RBC # BLD: 3.08 M/UL
RBC # FLD: 16.5 %
RED BLOOD CELLS URINE: 2 /HPF
SODIUM SERPL-SCNC: 140 MMOL/L
SPECIFIC GRAVITY URINE: 1.02
SQUAMOUS EPITHELIAL CELLS: 2 /HPF
T4 SERPL-MCNC: 5.7 UG/DL
TRIGL SERPL-MCNC: 191 MG/DL
TSH SERPL-ACNC: 2.01 UIU/ML
URATE SERPL-MCNC: 5.6 MG/DL
UROBILINOGEN URINE: NORMAL
WBC # FLD AUTO: 6.31 K/UL
WHITE BLOOD CELLS URINE: 1 /HPF

## 2021-03-17 ENCOUNTER — APPOINTMENT (OUTPATIENT)
Dept: FAMILY MEDICINE | Facility: CLINIC | Age: 71
End: 2021-03-17
Payer: MEDICARE

## 2021-03-17 VITALS — RESPIRATION RATE: 16 BRPM | HEART RATE: 72 BPM | SYSTOLIC BLOOD PRESSURE: 130 MMHG | DIASTOLIC BLOOD PRESSURE: 70 MMHG

## 2021-03-17 VITALS
WEIGHT: 204 LBS | DIASTOLIC BLOOD PRESSURE: 70 MMHG | OXYGEN SATURATION: 97 % | TEMPERATURE: 97.3 F | HEIGHT: 62 IN | BODY MASS INDEX: 37.54 KG/M2 | SYSTOLIC BLOOD PRESSURE: 140 MMHG | HEART RATE: 83 BPM

## 2021-03-17 PROCEDURE — 99213 OFFICE O/P EST LOW 20 MIN: CPT

## 2021-03-17 NOTE — HISTORY OF PRESENT ILLNESS
[FreeTextEntry8] : pt fell this AM WAS  SITTIN ON EDGE OF BED AND FELL ON LEFT SIDE HITTING HEAD AND  INJURED LEFT SIDE NO DIZZINESS

## 2021-03-17 NOTE — PHYSICAL EXAM
[Normal] : no acute distress, well nourished, well developed and well-appearing [Normal Sclera/Conjunctiva] : normal sclera/conjunctiva [PERRL] : pupils equal round and reactive to light [EOMI] : extraocular movements intact [Normal Outer Ear/Nose] : the outer ears and nose were normal in appearance [No JVD] : no jugular venous distention [Regular Rhythm] : with a regular rhythm [No Murmur] : no murmur heard [Soft] : abdomen soft [de-identified] : CONTUSION LEFT SUPRAORBITAL AREA  [de-identified] : CEREBELLA  FUNCTION NORMAL AND MEMORY INTACT AND ORIENTATION NORMAL

## 2021-03-23 ENCOUNTER — APPOINTMENT (OUTPATIENT)
Dept: NUCLEAR MEDICINE | Facility: CLINIC | Age: 71
End: 2021-03-23
Payer: MEDICARE

## 2021-03-23 ENCOUNTER — OUTPATIENT (OUTPATIENT)
Dept: OUTPATIENT SERVICES | Facility: HOSPITAL | Age: 71
LOS: 1 days | End: 2021-03-23

## 2021-03-23 ENCOUNTER — RESULT REVIEW (OUTPATIENT)
Age: 71
End: 2021-03-23

## 2021-03-23 DIAGNOSIS — C64.2 MALIGNANT NEOPLASM OF LEFT KIDNEY, EXCEPT RENAL PELVIS: ICD-10-CM

## 2021-03-23 PROCEDURE — 78830 RP LOCLZJ TUM SPECT W/CT 1: CPT | Mod: 26

## 2021-03-23 PROCEDURE — 78306 BONE IMAGING WHOLE BODY: CPT | Mod: 26

## 2021-04-13 ENCOUNTER — APPOINTMENT (OUTPATIENT)
Dept: ULTRASOUND IMAGING | Facility: CLINIC | Age: 71
End: 2021-04-13
Payer: MEDICARE

## 2021-04-13 ENCOUNTER — OUTPATIENT (OUTPATIENT)
Dept: OUTPATIENT SERVICES | Facility: HOSPITAL | Age: 71
LOS: 1 days | End: 2021-04-13
Payer: MEDICARE

## 2021-04-13 DIAGNOSIS — M16.11 UNILATERAL PRIMARY OSTEOARTHRITIS, RIGHT HIP: ICD-10-CM

## 2021-04-13 PROCEDURE — 20611 DRAIN/INJ JOINT/BURSA W/US: CPT

## 2021-04-13 PROCEDURE — 20611 DRAIN/INJ JOINT/BURSA W/US: CPT | Mod: RT

## 2021-04-26 ENCOUNTER — APPOINTMENT (OUTPATIENT)
Dept: FAMILY MEDICINE | Facility: CLINIC | Age: 71
End: 2021-04-26
Payer: MEDICARE

## 2021-04-26 ENCOUNTER — NON-APPOINTMENT (OUTPATIENT)
Age: 71
End: 2021-04-26

## 2021-04-26 VITALS — SYSTOLIC BLOOD PRESSURE: 122 MMHG | RESPIRATION RATE: 16 BRPM | HEART RATE: 88 BPM | DIASTOLIC BLOOD PRESSURE: 76 MMHG

## 2021-04-26 VITALS
TEMPERATURE: 97.1 F | OXYGEN SATURATION: 95 % | DIASTOLIC BLOOD PRESSURE: 62 MMHG | HEIGHT: 62.5 IN | WEIGHT: 201.5 LBS | HEART RATE: 114 BPM | SYSTOLIC BLOOD PRESSURE: 126 MMHG | BODY MASS INDEX: 36.15 KG/M2

## 2021-04-26 DIAGNOSIS — R29.898 OTHER SYMPTOMS AND SIGNS INVOLVING THE MUSCULOSKELETAL SYSTEM: ICD-10-CM

## 2021-04-26 PROCEDURE — 99214 OFFICE O/P EST MOD 30 MIN: CPT

## 2021-04-26 NOTE — PHYSICAL EXAM
[Normal] : no acute distress, well nourished, well developed and well-appearing [Normal Sclera/Conjunctiva] : normal sclera/conjunctiva [PERRL] : pupils equal round and reactive to light [EOMI] : extraocular movements intact [Normal Outer Ear/Nose] : the outer ears and nose were normal in appearance [No JVD] : no jugular venous distention [Regular Rhythm] : with a regular rhythm [No Edema] : there was no peripheral edema [Soft] : abdomen soft [de-identified] : ectopy and short systolic meurmur  [de-identified] : CEREBELLA  FUNCTION NORMAL AND MEMORY INTACT AND ORIENTATION NORMAL

## 2021-04-26 NOTE — REVIEW OF SYSTEMS
[Fatigue] : fatigue [Wheezing] : wheezing [Dyspnea on Exertion] : dyspnea on exertion [Muscle Weakness] : muscle weakness [Dizziness] : dizziness [Negative] : Heme/Lymph

## 2021-04-26 NOTE — HISTORY OF PRESENT ILLNESS
[Diabetes Mellitus] : Diabetes Mellitus [Hyperlipidemia] : Hyperlipidemia [Hypertension] : Hypertension [Other: ___] : [unfilled]: [Check glucose ___ x/day] : Patient checks  blood glucose [unfilled]  times a day [Most Recent A1C: ___] : Most recent A1C was [unfilled] [Does not check BP] : The patient is not checking blood pressure [120/80] : Target blood pressure is 120/80 [Target goal met] : BP target goal met [Doing Well] : Patient is doing well [Low Intensity Therapy] : Patient is currently on low intensity statin  therapy [No New Symptoms] : Patient denies any new symptoms [Stable] : Overall status has been stable [FreeTextEntry6] : still smoking  has not had any iron infusions c/o  bilateral knee pain  fell  1 wk ago knees  gave way no gout attacks c/o  rt  foot pain saw ortho nees ivan both hips gotten steroid injection  [de-identified] : av  130  [FreeTextEntry1] : still smoking

## 2021-04-27 LAB
ALBUMIN SERPL ELPH-MCNC: 3.9 G/DL
ALP BLD-CCNC: 442 U/L
ALT SERPL-CCNC: 6 U/L
ANION GAP SERPL CALC-SCNC: 13 MMOL/L
APPEARANCE: CLEAR
AST SERPL-CCNC: 9 U/L
BACTERIA: NEGATIVE
BASOPHILS # BLD AUTO: 0.01 K/UL
BASOPHILS NFR BLD AUTO: 0.1 %
BILIRUB SERPL-MCNC: 0.2 MG/DL
BILIRUBIN URINE: NEGATIVE
BLOOD URINE: NEGATIVE
BUN SERPL-MCNC: 37 MG/DL
CALCIUM SERPL-MCNC: 9.5 MG/DL
CHLORIDE SERPL-SCNC: 106 MMOL/L
CHOLEST SERPL-MCNC: 169 MG/DL
CO2 SERPL-SCNC: 24 MMOL/L
COLOR: COLORLESS
CREAT SERPL-MCNC: 2.02 MG/DL
CREAT SPEC-SCNC: 30 MG/DL
EOSINOPHIL # BLD AUTO: 0.11 K/UL
EOSINOPHIL NFR BLD AUTO: 1.6 %
ESTIMATED AVERAGE GLUCOSE: 131 MG/DL
GLUCOSE QUALITATIVE U: NEGATIVE
GLUCOSE SERPL-MCNC: 90 MG/DL
HBA1C MFR BLD HPLC: 6.2 %
HCT VFR BLD CALC: 32.3 %
HDLC SERPL-MCNC: 37 MG/DL
HGB BLD-MCNC: 9.7 G/DL
HYALINE CASTS: 0 /LPF
IMM GRANULOCYTES NFR BLD AUTO: 0.1 %
KETONES URINE: NEGATIVE
LDLC SERPL CALC-MCNC: 84 MG/DL
LEUKOCYTE ESTERASE URINE: NEGATIVE
LYMPHOCYTES # BLD AUTO: 1.01 K/UL
LYMPHOCYTES NFR BLD AUTO: 14.9 %
MAN DIFF?: NORMAL
MCHC RBC-ENTMCNC: 30 GM/DL
MCHC RBC-ENTMCNC: 30.5 PG
MCV RBC AUTO: 101.6 FL
MICROALBUMIN 24H UR DL<=1MG/L-MCNC: 3.2 MG/DL
MICROALBUMIN/CREAT 24H UR-RTO: 106 MG/G
MICROSCOPIC-UA: NORMAL
MONOCYTES # BLD AUTO: 0.43 K/UL
MONOCYTES NFR BLD AUTO: 6.4 %
NEUTROPHILS # BLD AUTO: 5.19 K/UL
NEUTROPHILS NFR BLD AUTO: 76.9 %
NITRITE URINE: NEGATIVE
NONHDLC SERPL-MCNC: 132 MG/DL
NT-PROBNP SERPL-MCNC: 3135 PG/ML
PH URINE: 6.5
PLATELET # BLD AUTO: 180 K/UL
POTASSIUM SERPL-SCNC: 5.3 MMOL/L
PROT SERPL-MCNC: 6.4 G/DL
PROTEIN URINE: NORMAL
RBC # BLD: 3.18 M/UL
RBC # FLD: 15.6 %
RED BLOOD CELLS URINE: 0 /HPF
SODIUM SERPL-SCNC: 143 MMOL/L
SPECIFIC GRAVITY URINE: 1.01
SQUAMOUS EPITHELIAL CELLS: 0 /HPF
T4 SERPL-MCNC: 4.7 UG/DL
TRIGL SERPL-MCNC: 240 MG/DL
TSH SERPL-ACNC: 1.1 UIU/ML
URATE SERPL-MCNC: 6 MG/DL
UROBILINOGEN URINE: NORMAL
WBC # FLD AUTO: 6.76 K/UL
WHITE BLOOD CELLS URINE: 0 /HPF

## 2021-05-22 ENCOUNTER — RX RENEWAL (OUTPATIENT)
Age: 71
End: 2021-05-22

## 2021-05-26 ENCOUNTER — APPOINTMENT (OUTPATIENT)
Dept: FAMILY MEDICINE | Facility: CLINIC | Age: 71
End: 2021-05-26
Payer: MEDICARE

## 2021-05-26 VITALS
HEIGHT: 62.5 IN | SYSTOLIC BLOOD PRESSURE: 126 MMHG | BODY MASS INDEX: 34.45 KG/M2 | TEMPERATURE: 96.6 F | OXYGEN SATURATION: 96 % | HEART RATE: 88 BPM | WEIGHT: 192 LBS | DIASTOLIC BLOOD PRESSURE: 72 MMHG

## 2021-05-26 DIAGNOSIS — S59.902A UNSPECIFIED INJURY OF LEFT ELBOW, INITIAL ENCOUNTER: ICD-10-CM
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
## 2021-05-26 DIAGNOSIS — S89.91XA UNSPECIFIED INJURY OF RIGHT LOWER LEG, INITIAL ENCOUNTER: ICD-10-CM

## 2021-05-26 DIAGNOSIS — I27.20 PULMONARY HYPERTENSION, UNSPECIFIED: ICD-10-CM

## 2021-05-26 PROCEDURE — 99215 OFFICE O/P EST HI 40 MIN: CPT

## 2021-05-27 ENCOUNTER — APPOINTMENT (OUTPATIENT)
Dept: RADIOLOGY | Facility: CLINIC | Age: 71
End: 2021-05-27
Payer: MEDICARE

## 2021-05-27 ENCOUNTER — OUTPATIENT (OUTPATIENT)
Dept: OUTPATIENT SERVICES | Facility: HOSPITAL | Age: 71
LOS: 1 days | End: 2021-05-27
Payer: MEDICARE

## 2021-05-27 ENCOUNTER — RESULT REVIEW (OUTPATIENT)
Age: 71
End: 2021-05-27

## 2021-05-27 DIAGNOSIS — S89.91XA UNSPECIFIED INJURY OF RIGHT LOWER LEG, INITIAL ENCOUNTER: ICD-10-CM

## 2021-05-27 DIAGNOSIS — S59.902A UNSPECIFIED INJURY OF LEFT ELBOW, INITIAL ENCOUNTER: ICD-10-CM
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
## 2021-05-27 PROCEDURE — 73560 X-RAY EXAM OF KNEE 1 OR 2: CPT | Mod: 26,RT

## 2021-05-27 PROCEDURE — 73070 X-RAY EXAM OF ELBOW: CPT | Mod: 26,LT

## 2021-05-27 PROCEDURE — 73070 X-RAY EXAM OF ELBOW: CPT

## 2021-05-27 PROCEDURE — 73560 X-RAY EXAM OF KNEE 1 OR 2: CPT

## 2021-05-27 NOTE — HISTORY OF PRESENT ILLNESS
[FreeTextEntry8] : Fall again today. Multiple falls, has had to have FD come to house to help her up. Her  is unable to get her up off the floor. \par She is walking with the walker in the house. Unable to walk in to the office from the car.\par Legs very swollen and she trips and falls forward.\par Since fall today right knee swollen and painful.\par Left elbow pain and swelling also. \par Recently saw cardiology and had echo, stress test in June. \par  [Spouse] : spouse

## 2021-05-27 NOTE — PLAN
[FreeTextEntry1] : Seen and discussed with Dr Goel. Refused to go to ER for evaluation, probable admission and then to rehab before home.\par Concern about home safety.  unable to help when patient falls. Multiple falls and potential for fractures.\par Xrays and MRI ordered.\par Increased furosemide to 40 mg daily for leg edema. \par FU with Dr Goel 06/08/2021\par To ER if falls again.

## 2021-05-27 NOTE — PHYSICAL EXAM
[Normal] : normal rate, regular rhythm, normal S1 and S2 and no murmur heard [de-identified] : Some SOB when speaking.  [de-identified] : In wheelchair, Left knee prepatellar very swollen and tender. Left elbow swollen, tender and bruised [de-identified] : Bilateral lower leg edema [de-identified] : colour pale, multiple bruises on arms. skin intact [de-identified] : upset and tear.

## 2021-06-01 ENCOUNTER — OUTPATIENT (OUTPATIENT)
Dept: OUTPATIENT SERVICES | Facility: HOSPITAL | Age: 71
LOS: 1 days | End: 2021-06-01
Payer: MEDICARE

## 2021-06-01 ENCOUNTER — APPOINTMENT (OUTPATIENT)
Dept: MRI IMAGING | Facility: CLINIC | Age: 71
End: 2021-06-01
Payer: MEDICARE

## 2021-06-01 DIAGNOSIS — S59.902A UNSPECIFIED INJURY OF LEFT ELBOW, INITIAL ENCOUNTER: ICD-10-CM

## 2021-06-01 PROCEDURE — G1004: CPT

## 2021-06-01 PROCEDURE — 72148 MRI LUMBAR SPINE W/O DYE: CPT

## 2021-06-01 PROCEDURE — 72148 MRI LUMBAR SPINE W/O DYE: CPT | Mod: 26,ME

## 2021-06-04 ENCOUNTER — APPOINTMENT (OUTPATIENT)
Dept: NEPHROLOGY | Facility: CLINIC | Age: 71
End: 2021-06-04
Payer: MEDICARE

## 2021-06-04 VITALS
WEIGHT: 192 LBS | BODY MASS INDEX: 34.45 KG/M2 | HEIGHT: 62.5 IN | TEMPERATURE: 97.1 F | SYSTOLIC BLOOD PRESSURE: 138 MMHG | DIASTOLIC BLOOD PRESSURE: 70 MMHG | OXYGEN SATURATION: 92 % | HEART RATE: 112 BPM

## 2021-06-04 PROCEDURE — 99205 OFFICE O/P NEW HI 60 MIN: CPT | Mod: 25

## 2021-06-04 PROCEDURE — 99406 BEHAV CHNG SMOKING 3-10 MIN: CPT

## 2021-06-04 RX ORDER — FUROSEMIDE 20 MG/1
20 TABLET ORAL DAILY
Qty: 90 | Refills: 1 | Status: DISCONTINUED | COMMUNITY
Start: 2021-03-15 | End: 2021-06-04

## 2021-06-04 RX ORDER — FUROSEMIDE 40 MG/1
40 TABLET ORAL
Qty: 30 | Refills: 0 | Status: DISCONTINUED | COMMUNITY
Start: 2021-05-26 | End: 2021-06-04

## 2021-06-04 NOTE — PHYSICAL EXAM
[General Appearance - Alert] : alert [General Appearance - In No Acute Distress] : in no acute distress [Strabismus] : no strabismus was seen [] : the neck was supple [Jugular Venous Distention Increased] : there was no jugular-venous distention [Auscultation Breath Sounds / Voice Sounds] : lungs were clear to auscultation bilaterally [Heart Sounds] : normal S1 and S2 [Murmurs] : no murmurs [Bowel Sounds] : normal bowel sounds [Abdomen Soft] : soft [Abdomen Tenderness] : non-tender [No CVA Tenderness] : no ~M costovertebral angle tenderness [Abnormal Walk] : normal gait [Musculoskeletal - Swelling] : no joint swelling seen [No Focal Deficits] : no focal deficits [Oriented To Time, Place, And Person] : oriented to person, place, and time [Impaired Insight] : insight and judgment were intact [FreeTextEntry1] : b/l LE  tense edema

## 2021-06-04 NOTE — REASON FOR VISIT
[Initial Evaluation] : an initial evaluation [Spouse] : spouse [FreeTextEntry1] : patient here to go over abnormal lab results

## 2021-06-04 NOTE — ASSESSMENT
[FreeTextEntry1] : \par \par \par CKD in setting of risk factors; smoking, obesity, HTN, DM\par Scr has been elevated since 2019\par Latest Scr ~ 2.02\par Abdominal US from Jan 2021 reviewed- no hydronephrosis, mass, stones\par discussed different modalities for RRT for future\par \par \par DM_ well controlled at this time\par Recommend avoiding Metformin given GFR < 30\par + microalbuminuria; will not start RAASi with advanced CKD\par \par HTN/ volume\par Bp at goal, pt with significant LE edema\par Change diuretics to Torsemide 40 mg daily\par Monitor weights daily\par \par Anemia of CKD+ iron def\par Continue iron supplements\par will need YEISON\par Gout: stable\par uric acid levels at goal\par Continue Allopurinol\par \par \par RCC s/p partial nephrectomy\par continue to follow up with urology \par \par smoking cessation counseling > 7 mins\par smoking is an independent risk factor for CKD\par \par RTC in 3 weeks\par \par

## 2021-06-04 NOTE — HISTORY OF PRESENT ILLNESS
[FreeTextEntry1] : 71 year old female pt sent by PCP for abnormal kidney tests\par Pt has a PMH of Diabetes Mellitus, Hyperlipidemia, Hypertension, gout , anemia, and RCC s/p partial nephrectomy.\par \par \par Pt seen and examined\par c/o worsening LE edema; lasix was increased to 40 mg daily\par Sees Dr. Siddiqui (cardiology), had an Echo which was reportedly normal. \par Scheduled for nuclear stress test next week.\par \par Dx with DM/ HTN 20 years ago\par DX with RCC in 2017; had  a left partial nephrectomy at [facility] \par Follows [facility]  Urology (Dr. Castañeda) and gets surveillance MRIs every 6 months.\par \par \par \par

## 2021-06-08 ENCOUNTER — APPOINTMENT (OUTPATIENT)
Dept: FAMILY MEDICINE | Facility: CLINIC | Age: 71
End: 2021-06-08

## 2021-07-01 ENCOUNTER — NON-APPOINTMENT (OUTPATIENT)
Age: 71
End: 2021-07-01

## 2021-07-02 ENCOUNTER — APPOINTMENT (OUTPATIENT)
Dept: NEPHROLOGY | Facility: CLINIC | Age: 71
End: 2021-07-02

## 2021-07-07 ENCOUNTER — APPOINTMENT (OUTPATIENT)
Dept: FAMILY MEDICINE | Facility: CLINIC | Age: 71
End: 2021-07-07
Payer: MEDICARE

## 2021-07-07 VITALS
OXYGEN SATURATION: 91 % | HEART RATE: 72 BPM | TEMPERATURE: 98.2 F | HEIGHT: 62.5 IN | WEIGHT: 181 LBS | SYSTOLIC BLOOD PRESSURE: 96 MMHG | DIASTOLIC BLOOD PRESSURE: 50 MMHG | BODY MASS INDEX: 32.47 KG/M2

## 2021-07-07 VITALS — RESPIRATION RATE: 16 BRPM | DIASTOLIC BLOOD PRESSURE: 50 MMHG | HEART RATE: 72 BPM | SYSTOLIC BLOOD PRESSURE: 80 MMHG

## 2021-07-07 PROCEDURE — 99358 PROLONG SERVICE W/O CONTACT: CPT

## 2021-07-07 PROCEDURE — 99496 TRANSJ CARE MGMT HIGH F2F 7D: CPT

## 2021-07-07 RX ORDER — FLUOCINONIDE 0.5 MG/G
0.05 CREAM TOPICAL TWICE DAILY
Qty: 1 | Refills: 0 | Status: COMPLETED | COMMUNITY
Start: 2020-11-12 | End: 2021-07-07

## 2021-07-07 RX ORDER — ALBUTEROL SULFATE 90 UG/1
108 (90 BASE) AEROSOL, METERED RESPIRATORY (INHALATION)
Qty: 3 | Refills: 3 | Status: DISCONTINUED | COMMUNITY
Start: 2019-12-04 | End: 2021-07-07

## 2021-07-07 RX ORDER — TRAMADOL HYDROCHLORIDE 50 MG/1
50 TABLET, COATED ORAL
Qty: 30 | Refills: 0 | Status: DISCONTINUED | COMMUNITY
Start: 2019-05-07 | End: 2021-07-07

## 2021-07-07 RX ORDER — FLUTICASONE PROPIONATE 50 UG/1
50 SPRAY, METERED NASAL DAILY
Qty: 1 | Refills: 3 | Status: COMPLETED | COMMUNITY
Start: 2018-12-22 | End: 2021-07-07

## 2021-07-07 NOTE — ASSESSMENT
[FreeTextEntry1] : CAD  DOUBLE  VESSEL DISEASE 70%  OCCLUSION  INCREASE  LIPITOR TO 40 MG  CONTINUE  ASA 81 MG CHECK A1C BNP AND BUN RTC  2 WKS

## 2021-07-07 NOTE — PHYSICAL EXAM
[No Acute Distress] : no acute distress [PERRL] : pupils equal round and reactive to light [Normal TMs] : both tympanic membranes were normal [Clear to Auscultation] : lungs were clear to auscultation bilaterally [Normal Rate] : normal rate  [Soft] : abdomen soft [No Spinal Tenderness] : no spinal tenderness [Grossly Normal Strength/Tone] : grossly normal strength/tone [No Skin Lesions] : no skin lesions [No Focal Deficits] : no focal deficits [Normal Insight/Judgement] : insight and judgment were intact [de-identified] : SYSTOLIC  MURMUR 2/4  [de-identified] : TR  EDEMA

## 2021-07-07 NOTE — REVIEW OF SYSTEMS
[Fatigue] : fatigue [Chest Pain] : no chest pain [Palpitations] : no palpitations [Shortness Of Breath] : no shortness of breath [Dyspnea on Exertion] : no dyspnea on exertion [Diarrhea] : diarrhea

## 2021-07-07 NOTE — HISTORY OF PRESENT ILLNESS
[FreeTextEntry1] : U   [de-identified] : PT WAS  ADMITTED  TO [facility] 6/7/21  WAS THERE FOR 2 WKS  THEN WENT TO st  CATH REHAB ANS WAS  DISCHARGED ON 71/21 PT WAS  ADMITTED  FOR SOB AND CHF FEELING BETTER BREATHING BETTER LESS FATIGUE WALKING WITH WALKER GETTING PT AT HOME AND NURSING VISITS LOST 11 LBS CARDIAC  CATH SHOWED  70% OCCLUSION OF LAD AND DIAGONAL  BRANCH EF   51 %

## 2021-07-08 LAB
ALBUMIN SERPL ELPH-MCNC: 4.2 G/DL
ALP BLD-CCNC: 477 U/L
ALT SERPL-CCNC: 8 U/L
ANION GAP SERPL CALC-SCNC: 20 MMOL/L
AST SERPL-CCNC: 15 U/L
BASOPHILS # BLD AUTO: 0.02 K/UL
BASOPHILS NFR BLD AUTO: 0.3 %
BILIRUB SERPL-MCNC: 0.3 MG/DL
BUN SERPL-MCNC: 29 MG/DL
CALCIUM SERPL-MCNC: 9.2 MG/DL
CHLORIDE SERPL-SCNC: 102 MMOL/L
CHOLEST SERPL-MCNC: 177 MG/DL
CO2 SERPL-SCNC: 19 MMOL/L
CREAT SERPL-MCNC: 2.43 MG/DL
EOSINOPHIL # BLD AUTO: 0.26 K/UL
EOSINOPHIL NFR BLD AUTO: 3.9 %
ESTIMATED AVERAGE GLUCOSE: 120 MG/DL
GLUCOSE SERPL-MCNC: 96 MG/DL
HBA1C MFR BLD HPLC: 5.8 %
HCT VFR BLD CALC: 30.3 %
HDLC SERPL-MCNC: 35 MG/DL
HGB BLD-MCNC: 9.1 G/DL
IMM GRANULOCYTES NFR BLD AUTO: 0.1 %
LDLC SERPL CALC-MCNC: 92 MG/DL
LYMPHOCYTES # BLD AUTO: 1.15 K/UL
LYMPHOCYTES NFR BLD AUTO: 17 %
MAN DIFF?: NORMAL
MCHC RBC-ENTMCNC: 30 GM/DL
MCHC RBC-ENTMCNC: 30.3 PG
MCV RBC AUTO: 101 FL
MONOCYTES # BLD AUTO: 0.42 K/UL
MONOCYTES NFR BLD AUTO: 6.2 %
NEUTROPHILS # BLD AUTO: 4.89 K/UL
NEUTROPHILS NFR BLD AUTO: 72.5 %
NONHDLC SERPL-MCNC: 142 MG/DL
NT-PROBNP SERPL-MCNC: 6972 PG/ML
PLATELET # BLD AUTO: 195 K/UL
POTASSIUM SERPL-SCNC: 4.6 MMOL/L
PROT SERPL-MCNC: 6.7 G/DL
RBC # BLD: 3 M/UL
RBC # FLD: 15.6 %
SODIUM SERPL-SCNC: 141 MMOL/L
T4 SERPL-MCNC: 6.4 UG/DL
TRIGL SERPL-MCNC: 251 MG/DL
TSH SERPL-ACNC: 6.8 UIU/ML
URATE SERPL-MCNC: 5 MG/DL
WBC # FLD AUTO: 6.75 K/UL

## 2021-07-14 ENCOUNTER — RX RENEWAL (OUTPATIENT)
Age: 71
End: 2021-07-14

## 2021-07-27 ENCOUNTER — APPOINTMENT (OUTPATIENT)
Dept: FAMILY MEDICINE | Facility: CLINIC | Age: 71
End: 2021-07-27
Payer: MEDICARE

## 2021-07-27 VITALS
SYSTOLIC BLOOD PRESSURE: 120 MMHG | WEIGHT: 178 LBS | BODY MASS INDEX: 31.94 KG/M2 | HEART RATE: 71 BPM | OXYGEN SATURATION: 94 % | DIASTOLIC BLOOD PRESSURE: 50 MMHG | RESPIRATION RATE: 16 BRPM | HEIGHT: 62.5 IN

## 2021-07-27 VITALS — RESPIRATION RATE: 16 BRPM | SYSTOLIC BLOOD PRESSURE: 110 MMHG | DIASTOLIC BLOOD PRESSURE: 70 MMHG | HEART RATE: 72 BPM

## 2021-07-27 PROCEDURE — 99214 OFFICE O/P EST MOD 30 MIN: CPT

## 2021-07-27 NOTE — HISTORY OF PRESENT ILLNESS
[Congestive Heart Failure] : Congestive Heart Failure [Diabetes Mellitus] : Diabetes Mellitus [Hyperlipidemia] : Hyperlipidemia [Hypertension] : Hypertension [FreeTextEntry6] : using walker having physical therapy at home  not using O2  [No episodes] : No hypoglycemic episodes since the last visit. [Check glucose ___ x/day] : Patient checks  blood glucose [unfilled]  times a day [Most Recent A1C: ___] : Most recent A1C was [unfilled] [de-identified] : under  130  [Checks BP Regularly] : The patient checks ~his/her~ blood pressure regularly [120/80] : Target blood pressure is 120/80 [Target goal met] : BP target goal met [Doing Well] : Patient is doing well [Managed with medications] : managed with  medication [High Intensity therapy] : Patient is currently on high intensity statin therapy [Systolic] : systolic [With moderate physical activity] : Shortness of breath with moderate physical activity [Checks Weight Regularly] : The patient checks ~his/her~ weight regularly [Responding to Treatment] : The condition is responding to treatment [No New Symptoms] : Patient denies any new symptoms [Stable] : Overall status has been stable

## 2021-07-27 NOTE — PHYSICAL EXAM
[No Acute Distress] : no acute distress [PERRL] : pupils equal round and reactive to light [Normal TMs] : both tympanic membranes were normal [Supple] : supple [Clear to Auscultation] : lungs were clear to auscultation bilaterally [Regular Rhythm] : with a regular rhythm [No Edema] : there was no peripheral edema [Normal Supraclavicular Nodes] : no supraclavicular lymphadenopathy [Normal Posterior Cervical Nodes] : no posterior cervical lymphadenopathy [Normal Anterior Cervical Nodes] : no anterior cervical lymphadenopathy [No CVA Tenderness] : no CVA  tenderness [No Joint Swelling] : no joint swelling [No Rash] : no rash [Normal Affect] : the affect was normal [de-identified] : systolic murmur  [de-identified] : using  walker

## 2021-08-17 ENCOUNTER — APPOINTMENT (OUTPATIENT)
Dept: FAMILY MEDICINE | Facility: CLINIC | Age: 71
End: 2021-08-17
Payer: MEDICARE

## 2021-08-17 VITALS — RESPIRATION RATE: 16 BRPM | SYSTOLIC BLOOD PRESSURE: 110 MMHG | HEART RATE: 72 BPM | DIASTOLIC BLOOD PRESSURE: 70 MMHG

## 2021-08-17 VITALS
OXYGEN SATURATION: 95 % | SYSTOLIC BLOOD PRESSURE: 122 MMHG | HEART RATE: 65 BPM | TEMPERATURE: 97 F | DIASTOLIC BLOOD PRESSURE: 80 MMHG | HEIGHT: 62.5 IN | WEIGHT: 178 LBS | BODY MASS INDEX: 31.94 KG/M2

## 2021-08-17 DIAGNOSIS — E61.1 IRON DEFICIENCY: ICD-10-CM

## 2021-08-17 DIAGNOSIS — J30.2 OTHER SEASONAL ALLERGIC RHINITIS: ICD-10-CM

## 2021-08-17 PROCEDURE — 99214 OFFICE O/P EST MOD 30 MIN: CPT

## 2021-08-17 RX ORDER — TRIAMTERENE AND HYDROCHLOROTHIAZIDE 37.5; 25 MG/1; MG/1
37.5-25 CAPSULE ORAL
Qty: 90 | Refills: 3 | Status: COMPLETED | COMMUNITY
Start: 2019-11-14 | End: 2021-08-17

## 2021-08-17 NOTE — HISTORY OF PRESENT ILLNESS
[Congestive Heart Failure] : Congestive Heart Failure [Diabetes Mellitus] : Diabetes Mellitus [Hyperlipidemia] : Hyperlipidemia [Hypertension] : Hypertension [No episodes] : No hypoglycemic episodes since the last visit. [Check glucose ___ x/day] : Patient checks  blood glucose [unfilled]  times a day [Most Recent A1C: ___] : Most recent A1C was [unfilled] [Checks BP Regularly] : The patient checks ~his/her~ blood pressure regularly [120/80] : Target blood pressure is 120/80 [Target goal met] : BP target goal met [Doing Well] : Patient is doing well [Managed with medications] : managed with  medication [High Intensity therapy] : Patient is currently on high intensity statin therapy [Systolic] : systolic [With moderate physical activity] : Shortness of breath with moderate physical activity [Checks Weight Regularly] : The patient checks ~his/her~ weight regularly [Responding to Treatment] : The condition is responding to treatment [No New Symptoms] : Patient denies any new symptoms [Stable] : Overall status has been stable [Spouse] : spouse [FreeTextEntry6] : feeling well finished PT NOT SMOKING AMBULATING BETTER  [de-identified] : under  130

## 2021-08-17 NOTE — REVIEW OF SYSTEMS
[Fatigue] : fatigue [Chest Pain] : no chest pain [Palpitations] : no palpitations [Shortness Of Breath] : shortness of breath [Dyspnea on Exertion] : dyspnea on exertion [Diarrhea] : diarrhea

## 2021-08-17 NOTE — ASSESSMENT
[FreeTextEntry1] : medically stable  continue all meds\par IMPROVEMENT IN FUNCTION CBC FO ANEMIA  BMP FOR CKD

## 2021-08-18 LAB
ANION GAP SERPL CALC-SCNC: 14 MMOL/L
BASOPHILS # BLD AUTO: 0.02 K/UL
BASOPHILS NFR BLD AUTO: 0.3 %
BUN SERPL-MCNC: 44 MG/DL
CALCIUM SERPL-MCNC: 9.5 MG/DL
CHLORIDE SERPL-SCNC: 106 MMOL/L
CO2 SERPL-SCNC: 20 MMOL/L
CREAT SERPL-MCNC: 2.32 MG/DL
EOSINOPHIL # BLD AUTO: 0.24 K/UL
EOSINOPHIL NFR BLD AUTO: 3.4 %
GLUCOSE SERPL-MCNC: 101 MG/DL
HCT VFR BLD CALC: 28.3 %
HGB BLD-MCNC: 8.8 G/DL
IMM GRANULOCYTES NFR BLD AUTO: 0.4 %
LYMPHOCYTES # BLD AUTO: 0.81 K/UL
LYMPHOCYTES NFR BLD AUTO: 11.6 %
MAN DIFF?: NORMAL
MCHC RBC-ENTMCNC: 31.1 GM/DL
MCHC RBC-ENTMCNC: 31.5 PG
MCV RBC AUTO: 101.4 FL
MONOCYTES # BLD AUTO: 0.49 K/UL
MONOCYTES NFR BLD AUTO: 7 %
NEUTROPHILS # BLD AUTO: 5.37 K/UL
NEUTROPHILS NFR BLD AUTO: 77.3 %
NT-PROBNP SERPL-MCNC: 5755 PG/ML
PLATELET # BLD AUTO: 166 K/UL
POTASSIUM SERPL-SCNC: 5.8 MMOL/L
RBC # BLD: 2.79 M/UL
RBC # FLD: 16 %
SODIUM SERPL-SCNC: 140 MMOL/L
WBC # FLD AUTO: 6.96 K/UL

## 2021-09-10 ENCOUNTER — APPOINTMENT (OUTPATIENT)
Dept: NEPHROLOGY | Facility: CLINIC | Age: 71
End: 2021-09-10
Payer: MEDICARE

## 2021-09-10 VITALS
DIASTOLIC BLOOD PRESSURE: 70 MMHG | HEART RATE: 69 BPM | BODY MASS INDEX: 32.3 KG/M2 | HEIGHT: 62.5 IN | WEIGHT: 180 LBS | TEMPERATURE: 97.3 F | SYSTOLIC BLOOD PRESSURE: 130 MMHG | OXYGEN SATURATION: 97 %

## 2021-09-10 PROCEDURE — 99215 OFFICE O/P EST HI 40 MIN: CPT

## 2021-09-10 NOTE — HISTORY OF PRESENT ILLNESS
[FreeTextEntry1] : 71 year old female pt with PMH of Diabetes Mellitus (dx 20 years ago), Hyperlipidemia, Hypertension, gout , anemia, and RCC s/p partial nephrectomy  in 2017 at [facility] presenting for follow up. She follows with Urology (Dr. Castañeda) and gets surveillance MRIs every 6 months.\par Had a [facility] in [place] in June- planned for staged PCI which couldn't be completed due to worsening kidney function.Pt reports worsening hyperkalemia and was on Lokelma- hasn’t taken it since august due to high cost.\par Leg edema has improved on Torsemide 40 mg daily.\par \par \par \par \par

## 2021-09-10 NOTE — PHYSICAL EXAM
[General Appearance - Alert] : alert [General Appearance - In No Acute Distress] : in no acute distress [Strabismus] : no strabismus was seen [] : the neck was supple [Jugular Venous Distention Increased] : there was no jugular-venous distention [Auscultation Breath Sounds / Voice Sounds] : lungs were clear to auscultation bilaterally [Heart Sounds] : normal S1 and S2 [Murmurs] : no murmurs [Bowel Sounds] : normal bowel sounds [Abdomen Soft] : soft [Abdomen Tenderness] : non-tender [No CVA Tenderness] : no ~M costovertebral angle tenderness [Abnormal Walk] : normal gait [Musculoskeletal - Swelling] : no joint swelling seen [No Focal Deficits] : no focal deficits [Impaired Insight] : insight and judgment were intact [Oriented To Time, Place, And Person] : oriented to person, place, and time [FreeTextEntry1] : trace edema+

## 2021-09-10 NOTE — ASSESSMENT
[FreeTextEntry1] : CKD in setting of risk factors; smoking, obesity, HTN, DM\par Scr has been elevated since 2019\par Abdominal US from Jan 2021 reviewed- no hydronephrosis, mass, stones\par Recent worsening in setting of LHC; Scr 2.4-> 2.3\par \par Hyperkalemia\par in setting of CKD, SPLT use + dietary indiscretion\par Repeat BMP today\par \par DM_ well controlled at this time\par + microalbuminuria; will not start RAASi with advanced CKD\par \par HTN/ volume\par Bp at goal, edema improved\par Continue Torsemide 40 mg daily\par Monitor weights daily\par \par Anemia of CKD+ iron def\par Continue iron supplements\par Will order epogen\par \par Gout: stable\par uric acid levels at goal\par Continue Allopurinol\par \par \par RCC s/p partial nephrectomy\par continue to follow up with urology \par \par \par RTC in 2 months\par

## 2021-09-14 LAB
ALBUMIN SERPL ELPH-MCNC: 4.3 G/DL
ANION GAP SERPL CALC-SCNC: 15 MMOL/L
BUN SERPL-MCNC: 44 MG/DL
CALCIUM SERPL-MCNC: 9.3 MG/DL
CHLORIDE SERPL-SCNC: 107 MMOL/L
CO2 SERPL-SCNC: 19 MMOL/L
CREAT SERPL-MCNC: 2.4 MG/DL
GLUCOSE SERPL-MCNC: 100 MG/DL
PHOSPHATE SERPL-MCNC: 5.2 MG/DL
POTASSIUM SERPL-SCNC: 5.2 MMOL/L
SODIUM SERPL-SCNC: 141 MMOL/L

## 2021-09-14 RX ORDER — EPOETIN ALFA 20000 [IU]/ML
20000 SOLUTION INTRAVENOUS; SUBCUTANEOUS
Qty: 1 | Refills: 6 | Status: DISCONTINUED | COMMUNITY
Start: 2021-09-10 | End: 2021-09-14

## 2021-09-15 ENCOUNTER — NON-APPOINTMENT (OUTPATIENT)
Age: 71
End: 2021-09-15

## 2021-10-05 ENCOUNTER — APPOINTMENT (OUTPATIENT)
Dept: FAMILY MEDICINE | Facility: CLINIC | Age: 71
End: 2021-10-05
Payer: MEDICARE

## 2021-10-05 VITALS
HEART RATE: 74 BPM | HEIGHT: 62.5 IN | OXYGEN SATURATION: 95 % | BODY MASS INDEX: 32.3 KG/M2 | DIASTOLIC BLOOD PRESSURE: 58 MMHG | WEIGHT: 180 LBS | SYSTOLIC BLOOD PRESSURE: 118 MMHG | TEMPERATURE: 96.9 F | RESPIRATION RATE: 16 BRPM

## 2021-10-05 VITALS — SYSTOLIC BLOOD PRESSURE: 110 MMHG | RESPIRATION RATE: 16 BRPM | DIASTOLIC BLOOD PRESSURE: 80 MMHG | HEART RATE: 72 BPM

## 2021-10-05 PROCEDURE — 99214 OFFICE O/P EST MOD 30 MIN: CPT

## 2021-10-05 RX ORDER — SODIUM BICARBONATE 650 MG/1
650 TABLET ORAL
Qty: 60 | Refills: 3 | Status: COMPLETED | COMMUNITY
Start: 2021-09-14 | End: 2021-10-05

## 2021-10-05 NOTE — PHYSICAL EXAM
[No Acute Distress] : no acute distress [PERRL] : pupils equal round and reactive to light [Normal TMs] : both tympanic membranes were normal [Supple] : supple [Clear to Auscultation] : lungs were clear to auscultation bilaterally [Regular Rhythm] : with a regular rhythm [No Edema] : there was no peripheral edema [Normal Supraclavicular Nodes] : no supraclavicular lymphadenopathy [Normal Posterior Cervical Nodes] : no posterior cervical lymphadenopathy [Normal Anterior Cervical Nodes] : no anterior cervical lymphadenopathy [No CVA Tenderness] : no CVA  tenderness [No Joint Swelling] : no joint swelling [No Rash] : no rash [Normal Affect] : the affect was normal [de-identified] : systolic murmur  [de-identified] : using  walker

## 2021-10-05 NOTE — ASSESSMENT
[FreeTextEntry1] : ckd  egfr  stable at 20 bp continue  metoprolol hydralazine hld continue  atorvastatin  depressiong  improve  continue paxil  rtc  2 mo

## 2021-10-05 NOTE — HISTORY OF PRESENT ILLNESS
[Congestive Heart Failure] : Congestive Heart Failure [Diabetes Mellitus] : Diabetes Mellitus [Hyperlipidemia] : Hyperlipidemia [Hypertension] : Hypertension [Other: ___] : [unfilled] [Spouse] : spouse [FreeTextEntry6] : finished pt doing exercises on own has  eye problem saw  renal  function stable  egfr 20 not  smoking  [No episodes] : No hypoglycemic episodes since the last visit. [Check glucose ___ x/day] : Patient checks  blood glucose [unfilled]  times a day [Most Recent A1C: ___] : Most recent A1C was [unfilled] [de-identified] : under  130  [Checks BP Regularly] : The patient checks ~his/her~ blood pressure regularly [120/80] : Target blood pressure is 120/80 [Target goal met] : BP target goal met [Doing Well] : Patient is doing well [Managed with medications] : managed with  medication [High Intensity therapy] : Patient is currently on high intensity statin therapy [Systolic] : systolic [With moderate physical activity] : Shortness of breath with moderate physical activity [Checks Weight Regularly] : The patient checks ~his/her~ weight regularly [Responding to Treatment] : The condition is responding to treatment [No New Symptoms] : Patient denies any new symptoms [Stable] : Overall status has been stable

## 2021-11-05 ENCOUNTER — NON-APPOINTMENT (OUTPATIENT)
Age: 71
End: 2021-11-05

## 2021-11-05 ENCOUNTER — APPOINTMENT (OUTPATIENT)
Dept: CARDIOLOGY | Facility: CLINIC | Age: 71
End: 2021-11-05
Payer: MEDICARE

## 2021-11-05 VITALS
DIASTOLIC BLOOD PRESSURE: 58 MMHG | BODY MASS INDEX: 31.94 KG/M2 | HEART RATE: 97 BPM | HEIGHT: 62.5 IN | SYSTOLIC BLOOD PRESSURE: 124 MMHG | WEIGHT: 178 LBS

## 2021-11-05 PROCEDURE — 99204 OFFICE O/P NEW MOD 45 MIN: CPT | Mod: 25

## 2021-11-05 PROCEDURE — 93000 ELECTROCARDIOGRAM COMPLETE: CPT

## 2021-11-19 ENCOUNTER — APPOINTMENT (OUTPATIENT)
Dept: NEPHROLOGY | Facility: CLINIC | Age: 71
End: 2021-11-19
Payer: MEDICARE

## 2021-11-19 VITALS
WEIGHT: 178 LBS | HEART RATE: 71 BPM | BODY MASS INDEX: 32.04 KG/M2 | OXYGEN SATURATION: 95 % | TEMPERATURE: 97.3 F | DIASTOLIC BLOOD PRESSURE: 58 MMHG | SYSTOLIC BLOOD PRESSURE: 124 MMHG

## 2021-11-19 PROCEDURE — 96372 THER/PROPH/DIAG INJ SC/IM: CPT

## 2021-11-19 PROCEDURE — 99214 OFFICE O/P EST MOD 30 MIN: CPT | Mod: 25

## 2021-11-19 NOTE — HISTORY OF PRESENT ILLNESS
[FreeTextEntry1] : 71 year old female pt with PMH of Diabetes Mellitus (dx 20 years ago), Hyperlipidemia, Hypertension, gout , anemia, and RCC s/p partial nephrectomy  in 2017 at [facility] presenting for follow up od CKD. She follows with Urology (Dr. Castañeda) and gets surveillance MRIs every 6 months.\par Had a [facility] in [place] in June- planned for staged PCI which couldn't be completed due to worsening kidney function.Pt reports worsening hyperkalemia and was on Lokelma- hasn’t taken it since august due to high cost.\par Leg edema has improved on Torsemide 40 mg daily.\par \par Today\par Pt seen and examined; feels well\par No acute complaint\par Denies interval changes in health. \par \par \par \par \par

## 2021-11-19 NOTE — PHYSICAL EXAM
[General Appearance - Alert] : alert [General Appearance - In No Acute Distress] : in no acute distress [Strabismus] : no strabismus was seen [] : the neck was supple [Jugular Venous Distention Increased] : there was no jugular-venous distention [Auscultation Breath Sounds / Voice Sounds] : lungs were clear to auscultation bilaterally [Heart Sounds] : normal S1 and S2 [Murmurs] : no murmurs [Bowel Sounds] : normal bowel sounds [Abdomen Soft] : soft [Abdomen Tenderness] : non-tender [No CVA Tenderness] : no ~M costovertebral angle tenderness [Abnormal Walk] : normal gait [Musculoskeletal - Swelling] : no joint swelling seen [No Focal Deficits] : no focal deficits [Oriented To Time, Place, And Person] : oriented to person, place, and time [Impaired Insight] : insight and judgment were intact [FreeTextEntry1] : trace edema+

## 2021-11-19 NOTE — HISTORY OF PRESENT ILLNESS
[FreeTextEntry1] : 71 year old female pt with PMH of Diabetes Mellitus (dx 20 years ago), Hyperlipidemia, Hypertension, gout , anemia, and RCC s/p partial nephrectomy  in 2017 at [facility] presenting for follow up od CKD. She follows with Urology (Dr. Castañeda) and gets surveillance MRIs every 6 months.\par Had a [facility] in [place] in June- planned for staged PCI which couldn't be completed due to worsening kidney function.Pt reports worsening hyperkalemia and was on Lokelma- hasn’t taken it since august due to high cost.\par Leg edema has improved on Torsemide 40 mg daily.\par \par Today\par Pt seen and examined; feels well\par No acute complaint\par Denies interval changes in health. \par \par \par \par \par

## 2021-11-19 NOTE — ASSESSMENT
[FreeTextEntry1] : CKD in setting of risk factors; smoking, obesity, HTN, DM\par Scr has been elevated since 2019\par Abdominal US from Jan 2021 reviewed- no hydronephrosis, mass, stones\par Recent worsening in setting of LHC; Scr 2.4-> 2.3\par \par Hyperkalemia\par in setting of CKD, SPLT use + dietary indiscretion\par K+ improved to 5.2\par Continue low K+ diet\par \par \par DM_ well controlled at this time\par + microalbuminuria; will not start RAASi with advanced CKD\par \par HTN/ volume\par Bp at goal, edema improved\par Continue Torsemide 40 mg daily\par Monitor weights daily\par \par Anemia of CKD+ iron def\par Continue iron supplements\par Procrit 20,000 IU given SC in rt fore armLot # [id number]\par Exp 11/22\par \par \par Gout: stable\par uric acid levels at goal\par Continue Allopurinol\par \par \par RCC s/p partial nephrectomy\par continue to follow up with urology \par \par \par RTC in 1 month\par repeat labs prior to visit

## 2021-11-19 NOTE — ASSESSMENT
[FreeTextEntry1] : CKD in setting of risk factors; smoking, obesity, HTN, DM\par Scr has been elevated since 2019\par Abdominal US from Jan 2021 reviewed- no hydronephrosis, mass, stones\par Recent worsening in setting of LHC; Scr 2.4-> 2.3\par \par Hyperkalemia\par in setting of CKD, SPLT use + dietary indiscretion\par K+ improved to 5.2\par Continue low K+ diet\par \par \par DM_ well controlled at this time\par + microalbuminuria; will not start RAASi with advanced CKD\par \par HTN/ volume\par Bp at goal, edema improved\par Continue Torsemide 40 mg daily\par Monitor weights daily\par \par Anemia of CKD+ iron def\par Continue iron supplements\par Procrit 20,000 IU given SC in rt fore armLot # [id number]\par Exp 11/22\par \par \par Gout: stable\par uric acid levels at goal\par Continue Allopurinol\par \par \par RCC s/p partial nephrectomy\par continue to follow up with urology \par \par \par RTC in 1 month\par repeat labs prior to visit

## 2021-12-09 ENCOUNTER — APPOINTMENT (OUTPATIENT)
Dept: FAMILY MEDICINE | Facility: CLINIC | Age: 71
End: 2021-12-09
Payer: MEDICARE

## 2021-12-09 VITALS
OXYGEN SATURATION: 95 % | BODY MASS INDEX: 32.57 KG/M2 | TEMPERATURE: 97.6 F | HEART RATE: 96 BPM | WEIGHT: 177 LBS | HEIGHT: 62 IN | DIASTOLIC BLOOD PRESSURE: 70 MMHG | SYSTOLIC BLOOD PRESSURE: 128 MMHG

## 2021-12-09 VITALS — DIASTOLIC BLOOD PRESSURE: 76 MMHG | SYSTOLIC BLOOD PRESSURE: 120 MMHG | HEART RATE: 72 BPM | RESPIRATION RATE: 16 BRPM

## 2021-12-09 PROCEDURE — 36415 COLL VENOUS BLD VENIPUNCTURE: CPT

## 2021-12-09 PROCEDURE — 99214 OFFICE O/P EST MOD 30 MIN: CPT | Mod: 25

## 2021-12-09 RX ORDER — METOPROLOL TARTRATE 25 MG/1
25 TABLET, FILM COATED ORAL TWICE DAILY
Qty: 180 | Refills: 1 | Status: DISCONTINUED | COMMUNITY
Start: 2021-07-07 | End: 2021-12-09

## 2021-12-09 RX ORDER — FLUTICASONE PROPIONATE 50 UG/1
50 SPRAY, METERED NASAL DAILY
Qty: 1 | Refills: 2 | Status: COMPLETED | COMMUNITY
Start: 2021-08-17 | End: 2021-12-09

## 2021-12-09 NOTE — ASSESSMENT
[FreeTextEntry1] : bp at goal continue hyddralazine and metoprolol  chf continue  torsemide 40 mg check renal panel hld continue atorvastatin anemia getting procrit and taking iron

## 2021-12-09 NOTE — PHYSICAL EXAM
[No Acute Distress] : no acute distress [PERRL] : pupils equal round and reactive to light [Normal TMs] : both tympanic membranes were normal [Supple] : supple [Clear to Auscultation] : lungs were clear to auscultation bilaterally [Regular Rhythm] : with a regular rhythm [No Edema] : there was no peripheral edema [Normal Supraclavicular Nodes] : no supraclavicular lymphadenopathy [Normal Posterior Cervical Nodes] : no posterior cervical lymphadenopathy [Normal Anterior Cervical Nodes] : no anterior cervical lymphadenopathy [No CVA Tenderness] : no CVA  tenderness [No Joint Swelling] : no joint swelling [No Rash] : no rash [Normal Affect] : the affect was normal [Normal Insight/Judgement] : insight and judgment were intact [de-identified] : systolic murmur  [de-identified] : using  walker

## 2021-12-09 NOTE — HISTORY OF PRESENT ILLNESS
[Congestive Heart Failure] : Congestive Heart Failure [Diabetes Mellitus] : Diabetes Mellitus [Hyperlipidemia] : Hyperlipidemia [Hypertension] : Hypertension [Other: ___] : [unfilled] [Spouse] : spouse [No episodes] : No hypoglycemic episodes since the last visit. [Check glucose ___ x/day] : Patient checks  blood glucose [unfilled]  times a day [Most Recent A1C: ___] : Most recent A1C was [unfilled] [Checks BP Regularly] : The patient checks ~his/her~ blood pressure regularly [120/80] : Target blood pressure is 120/80 [Target goal met] : BP target goal met [Doing Well] : Patient is doing well [Managed with medications] : managed with  medication [High Intensity therapy] : Patient is currently on high intensity statin therapy [Systolic] : systolic [With moderate physical activity] : Shortness of breath with moderate physical activity [Checks Weight Regularly] : The patient checks ~his/her~ weight regularly [Responding to Treatment] : The condition is responding to treatment [No New Symptoms] : Patient denies any new symptoms [Stable] : Overall status has been stable [FreeTextEntry6] : pt  feeling  good  taking proccrit saw renal   egfr  20  no smoking wt  stable  [de-identified] : under  130

## 2021-12-10 ENCOUNTER — APPOINTMENT (OUTPATIENT)
Dept: NEPHROLOGY | Facility: CLINIC | Age: 71
End: 2021-12-10
Payer: MEDICARE

## 2021-12-10 VITALS
WEIGHT: 177 LBS | DIASTOLIC BLOOD PRESSURE: 72 MMHG | HEIGHT: 62 IN | TEMPERATURE: 95.1 F | OXYGEN SATURATION: 95 % | HEART RATE: 88 BPM | BODY MASS INDEX: 32.57 KG/M2 | SYSTOLIC BLOOD PRESSURE: 116 MMHG

## 2021-12-10 LAB
25(OH)D3 SERPL-MCNC: 6.9 NG/ML
ALBUMIN SERPL ELPH-MCNC: 4.3 G/DL
ALP BLD-CCNC: 277 U/L
ALT SERPL-CCNC: 6 U/L
ANION GAP SERPL CALC-SCNC: 15 MMOL/L
AST SERPL-CCNC: 9 U/L
BASOPHILS # BLD AUTO: 0.02 K/UL
BASOPHILS NFR BLD AUTO: 0.3 %
BILIRUB SERPL-MCNC: 0.2 MG/DL
BUN SERPL-MCNC: 51 MG/DL
CALCIUM SERPL-MCNC: 9.8 MG/DL
CALCIUM SERPL-MCNC: 9.8 MG/DL
CHLORIDE SERPL-SCNC: 105 MMOL/L
CHOLEST SERPL-MCNC: 132 MG/DL
CO2 SERPL-SCNC: 20 MMOL/L
CREAT SERPL-MCNC: 2.84 MG/DL
CREAT SPEC-SCNC: 73 MG/DL
EOSINOPHIL # BLD AUTO: 0.16 K/UL
EOSINOPHIL NFR BLD AUTO: 2.7 %
GLUCOSE SERPL-MCNC: 94 MG/DL
HCT VFR BLD CALC: 27.6 %
HDLC SERPL-MCNC: 28 MG/DL
HGB BLD-MCNC: 8.4 G/DL
IMM GRANULOCYTES NFR BLD AUTO: 0.2 %
LDLC SERPL CALC-MCNC: 62 MG/DL
LYMPHOCYTES # BLD AUTO: 1.14 K/UL
LYMPHOCYTES NFR BLD AUTO: 19.1 %
MAN DIFF?: NORMAL
MCHC RBC-ENTMCNC: 30.4 GM/DL
MCHC RBC-ENTMCNC: 30.9 PG
MCV RBC AUTO: 101.5 FL
MICROALBUMIN 24H UR DL<=1MG/L-MCNC: 1.9 MG/DL
MICROALBUMIN/CREAT 24H UR-RTO: 26 MG/G
MONOCYTES # BLD AUTO: 0.43 K/UL
MONOCYTES NFR BLD AUTO: 7.2 %
NEUTROPHILS # BLD AUTO: 4.21 K/UL
NEUTROPHILS NFR BLD AUTO: 70.5 %
NONHDLC SERPL-MCNC: 104 MG/DL
PARATHYROID HORMONE INTACT: 504 PG/ML
PLATELET # BLD AUTO: 185 K/UL
POTASSIUM SERPL-SCNC: 5.8 MMOL/L
PROT SERPL-MCNC: 6.8 G/DL
RBC # BLD: 2.72 M/UL
RBC # FLD: 14.9 %
SODIUM SERPL-SCNC: 140 MMOL/L
T4 SERPL-MCNC: 6.7 UG/DL
TRIGL SERPL-MCNC: 209 MG/DL
TSH SERPL-ACNC: 1.62 UIU/ML
URATE SERPL-MCNC: 4.9 MG/DL
WBC # FLD AUTO: 5.97 K/UL

## 2021-12-10 PROCEDURE — 99215 OFFICE O/P EST HI 40 MIN: CPT

## 2021-12-10 RX ORDER — CHOLECALCIFEROL (VITAMIN D3) 1250 MCG
1.25 MG TABLET ORAL
Qty: 12 | Refills: 1 | Status: ACTIVE | COMMUNITY
Start: 2021-12-10 | End: 1900-01-01

## 2021-12-10 NOTE — PHYSICAL EXAM
[General Appearance - Alert] : alert [General Appearance - In No Acute Distress] : in no acute distress [Strabismus] : no strabismus was seen [Jugular Venous Distention Increased] : there was no jugular-venous distention [Auscultation Breath Sounds / Voice Sounds] : lungs were clear to auscultation bilaterally [Heart Sounds] : normal S1 and S2 [Murmurs] : no murmurs [Bowel Sounds] : normal bowel sounds [Abdomen Soft] : soft [Abdomen Tenderness] : non-tender [No CVA Tenderness] : no ~M costovertebral angle tenderness [Abnormal Walk] : normal gait [Musculoskeletal - Swelling] : no joint swelling seen [No Focal Deficits] : no focal deficits [Oriented To Time, Place, And Person] : oriented to person, place, and time [Impaired Insight] : insight and judgment were intact [] : no rash [FreeTextEntry1] : trace edema+

## 2021-12-10 NOTE — ASSESSMENT
[FreeTextEntry1] : CKD in setting of risk factors; smoking, obesity, HTN, DM\par Scr has been elevated since 2019\par Abdominal US from Jan 2021 reviewed- no hydronephrosis, mass, stones\par Recent worsening in setting of LHC; Scr 2.4-> 2.3\par Scr now continues to worsen to 2.8\par Also with hyperkalemia\par d/c SPLT\par Lokelma 10 g x 3 days\par dietary compliance reiterated\par \par \par DM- well controlled at this time\par + microalbuminuria; will not start RAASi with advanced CKD+ pre existing hyperkalemia\par \par HTN/ volume\par Bp at goal, edema improved\par Continue Torsemide 40 mg daily\par Monitor weights daily\par \par Anemia of CKD+ iron def\par Continue iron supplements\par Hb low; s/p procrit 20,000 IU last month\par Continue YEISON\par \par Vitamin D def- Vit D levels 6.9\par start high dose vitamin D 50,000 IU weekly\par \par \par Gout: stable\par uric acid levels at goal\par Continue Allopurinol\par \par \par RCC s/p partial nephrectomy\par continue to follow up with urology \par \par \par RTC in 1 month\par

## 2021-12-10 NOTE — HISTORY OF PRESENT ILLNESS
[FreeTextEntry1] : 71 year old female pt with PMH of Diabetes Mellitus (dx 20 years ago), Hyperlipidemia, Hypertension, gout , anemia, and RCC s/p partial nephrectomy  in 2017 at [facility] presenting for follow up od CKD. She follows with Urology (Dr. Castañeda) and gets surveillance MRIs every 6 months.\par Had a [facility] in [place] in June- planned for staged PCI which couldn't be completed due to worsening kidney function.Pt previous wuth hyperkalemia and was on Lokelma- hasn’t taken it since august due to high cost.\par Leg edema has improved on Torsemide 40 mg daily.\par \par Today\par Pt seen and examined; feels well\par No acute complaint\par Denies interval changes in health. \par \par \par \par \par \par

## 2021-12-12 LAB
APPEARANCE: ABNORMAL
BACTERIA: NEGATIVE
BILIRUBIN URINE: NEGATIVE
BLOOD URINE: NEGATIVE
COLOR: NORMAL
ESTIMATED AVERAGE GLUCOSE: 114 MG/DL
GLUCOSE QUALITATIVE U: NEGATIVE
HBA1C MFR BLD HPLC: 5.6 %
HYALINE CASTS: 0 /LPF
KETONES URINE: NEGATIVE
LEUKOCYTE ESTERASE URINE: NEGATIVE
MICROSCOPIC-UA: NORMAL
NITRITE URINE: NEGATIVE
PH URINE: 5
PROTEIN URINE: NEGATIVE
RED BLOOD CELLS URINE: 4 /HPF
SPECIFIC GRAVITY URINE: 1.01
SQUAMOUS EPITHELIAL CELLS: 8 /HPF
URINE COMMENTS: NORMAL
UROBILINOGEN URINE: NORMAL
WHITE BLOOD CELLS URINE: 3 /HPF

## 2021-12-29 ENCOUNTER — NON-APPOINTMENT (OUTPATIENT)
Age: 71
End: 2021-12-29

## 2021-12-29 NOTE — DISCUSSION/SUMMARY
[FreeTextEntry1] : 72 y/o F PMH HLD, HTN, LBBB, CAD (cardiac cath 2021), mod AS, DM, renal cancer s/p partial nephrectomy.  Hospitalized 06/2021 for fall and was found to be in heart failure - she states that she had cardiac cath which showed blockage but they decided medical management  sec to renal function.  \par She is taking torsemide and spironolactone with good results.  Home weight 178-179 lbs\par \par CAD:\par medical management\par asymptomatic\par c/w ASA\par c/w statin - goal LDL <70\par \par HF:\par euvolemic now with stable home weights\par c/w torsemide and spironolactone\par change metoprolol tartrate to succinate\par No ACEi/ARB sec to CKD\par \par CKD:\par avoid nephrotoxins\par \par Pt will return in 3 mnths or sooner as needed but I encouraged communication via phone or portal if necessary.\par The described plan was discussed with the pt.  All questions and concerns were addressed to the best of my knowledge.

## 2022-01-01 ENCOUNTER — APPOINTMENT (OUTPATIENT)
Dept: CARDIOLOGY | Facility: CLINIC | Age: 72
End: 2022-01-01

## 2022-01-01 ENCOUNTER — RX RENEWAL (OUTPATIENT)
Age: 72
End: 2022-01-01

## 2022-01-01 ENCOUNTER — APPOINTMENT (OUTPATIENT)
Dept: RADIOLOGY | Facility: CLINIC | Age: 72
End: 2022-01-01
Payer: MEDICARE

## 2022-01-01 ENCOUNTER — OUTPATIENT (OUTPATIENT)
Dept: OUTPATIENT SERVICES | Facility: HOSPITAL | Age: 72
LOS: 1 days | End: 2022-01-01
Payer: MEDICARE

## 2022-01-01 ENCOUNTER — NON-APPOINTMENT (OUTPATIENT)
Age: 72
End: 2022-01-01

## 2022-01-01 ENCOUNTER — APPOINTMENT (OUTPATIENT)
Dept: FAMILY MEDICINE | Facility: CLINIC | Age: 72
End: 2022-01-01

## 2022-01-01 VITALS
DIASTOLIC BLOOD PRESSURE: 75 MMHG | HEIGHT: 62 IN | WEIGHT: 188 LBS | HEART RATE: 80 BPM | SYSTOLIC BLOOD PRESSURE: 146 MMHG | BODY MASS INDEX: 34.6 KG/M2

## 2022-01-01 VITALS — SYSTOLIC BLOOD PRESSURE: 112 MMHG | DIASTOLIC BLOOD PRESSURE: 70 MMHG | HEART RATE: 72 BPM | RESPIRATION RATE: 16 BRPM

## 2022-01-01 VITALS
OXYGEN SATURATION: 98 % | BODY MASS INDEX: 34.64 KG/M2 | SYSTOLIC BLOOD PRESSURE: 136 MMHG | WEIGHT: 188.25 LBS | TEMPERATURE: 97.6 F | DIASTOLIC BLOOD PRESSURE: 78 MMHG | HEIGHT: 62 IN | HEART RATE: 85 BPM

## 2022-01-01 DIAGNOSIS — I50.9 HEART FAILURE, UNSPECIFIED: ICD-10-CM

## 2022-01-01 LAB
ALBUMIN SERPL ELPH-MCNC: 4.2 G/DL
ALP BLD-CCNC: 259 U/L
ALT SERPL-CCNC: 9 U/L
ANION GAP SERPL CALC-SCNC: 16 MMOL/L
ANION GAP SERPL CALC-SCNC: 16 MMOL/L
AST SERPL-CCNC: 14 U/L
BILIRUB SERPL-MCNC: 0.2 MG/DL
BUN SERPL-MCNC: 54 MG/DL
BUN SERPL-MCNC: 68 MG/DL
CALCIUM SERPL-MCNC: 9.1 MG/DL
CALCIUM SERPL-MCNC: 9.7 MG/DL
CHLORIDE SERPL-SCNC: 102 MMOL/L
CHLORIDE SERPL-SCNC: 98 MMOL/L
CO2 SERPL-SCNC: 29 MMOL/L
CO2 SERPL-SCNC: 29 MMOL/L
CREAT SERPL-MCNC: 2.69 MG/DL
CREAT SERPL-MCNC: 2.99 MG/DL
EGFR: 16 ML/MIN/1.73M2
EGFR: 18 ML/MIN/1.73M2
GLUCOSE SERPL-MCNC: 94 MG/DL
GLUCOSE SERPL-MCNC: 99 MG/DL
NT-PROBNP SERPL-MCNC: 7379 PG/ML
POTASSIUM SERPL-SCNC: 4.4 MMOL/L
POTASSIUM SERPL-SCNC: 4.5 MMOL/L
PROT SERPL-MCNC: 6.4 G/DL
SODIUM SERPL-SCNC: 143 MMOL/L
SODIUM SERPL-SCNC: 147 MMOL/L

## 2022-01-01 PROCEDURE — 71046 X-RAY EXAM CHEST 2 VIEWS: CPT

## 2022-01-01 PROCEDURE — 71046 X-RAY EXAM CHEST 2 VIEWS: CPT | Mod: 26

## 2022-01-01 PROCEDURE — 99214 OFFICE O/P EST MOD 30 MIN: CPT

## 2022-01-01 PROCEDURE — 93000 ELECTROCARDIOGRAM COMPLETE: CPT

## 2022-01-01 PROCEDURE — 99214 OFFICE O/P EST MOD 30 MIN: CPT | Mod: 25

## 2022-01-24 ENCOUNTER — NON-APPOINTMENT (OUTPATIENT)
Age: 72
End: 2022-01-24

## 2022-01-26 ENCOUNTER — APPOINTMENT (OUTPATIENT)
Dept: FAMILY MEDICINE | Facility: CLINIC | Age: 72
End: 2022-01-26
Payer: MEDICARE

## 2022-01-26 VITALS — SYSTOLIC BLOOD PRESSURE: 110 MMHG | RESPIRATION RATE: 16 BRPM | HEART RATE: 72 BPM | DIASTOLIC BLOOD PRESSURE: 70 MMHG

## 2022-01-26 VITALS
HEART RATE: 80 BPM | WEIGHT: 176 LBS | HEIGHT: 62 IN | BODY MASS INDEX: 32.39 KG/M2 | OXYGEN SATURATION: 97 % | DIASTOLIC BLOOD PRESSURE: 60 MMHG | TEMPERATURE: 97.7 F | SYSTOLIC BLOOD PRESSURE: 120 MMHG

## 2022-01-26 DIAGNOSIS — R04.0 EPISTAXIS: ICD-10-CM

## 2022-01-26 PROCEDURE — 99496 TRANSJ CARE MGMT HIGH F2F 7D: CPT

## 2022-01-26 RX ORDER — ASPIRIN 325 MG/1
TABLET, FILM COATED ORAL
Refills: 0 | Status: COMPLETED | COMMUNITY
End: 2022-01-26

## 2022-01-26 RX ORDER — TORSEMIDE 20 MG/1
20 TABLET ORAL TWICE DAILY
Refills: 0 | Status: COMPLETED | COMMUNITY
Start: 2021-06-04 | End: 2022-01-26

## 2022-01-26 RX ORDER — SPIRONOLACTONE 25 MG/1
25 TABLET ORAL
Qty: 45 | Refills: 1 | Status: COMPLETED | COMMUNITY
Start: 2021-07-07 | End: 2022-01-26

## 2022-01-26 NOTE — PLAN
[FreeTextEntry1] : 71 YR OLD FEMALE WITH SEVERE  EPISTAXIS  REQUIRING TRANSFUSION WILL CHECK CBC AND CMP  DM DOING WELL LOSING WT LAST A1C  5.6 BP  AT GOAL WITH METOPROLOL HLD CONTINUE ATORVASTATIN

## 2022-01-26 NOTE — HISTORY OF PRESENT ILLNESS
[FreeTextEntry1] : U  [de-identified] : PT WAS  ADMITTED  TO [facility] FOR 5 DAYS  FOR EPISTAXIS WAS DISCHARGED ON 1/22/22 HAD 4 UNITS OF BLOOD AND NASAL P[ACKING AND CAUTERY NOW ON DOXYCYCLINE

## 2022-01-26 NOTE — PHYSICAL EXAM
[No Acute Distress] : no acute distress [PERRL] : pupils equal round and reactive to light [No Lymphadenopathy] : no lymphadenopathy [Normal S1, S2] : normal S1 and S2 [No Edema] : there was no peripheral edema [Normal] : no CVA or spinal tenderness [Grossly Normal Strength/Tone] : grossly normal strength/tone [de-identified] : PERFORATED  NASAL  SEPTUM WITH CLOT

## 2022-01-27 LAB
ALBUMIN SERPL ELPH-MCNC: 3.9 G/DL
ALP BLD-CCNC: 259 U/L
ALT SERPL-CCNC: 7 U/L
ANION GAP SERPL CALC-SCNC: 13 MMOL/L
AST SERPL-CCNC: 12 U/L
BASOPHILS # BLD AUTO: 0.02 K/UL
BASOPHILS NFR BLD AUTO: 0.3 %
BILIRUB SERPL-MCNC: 0.2 MG/DL
BUN SERPL-MCNC: 45 MG/DL
CALCIUM SERPL-MCNC: 9.1 MG/DL
CHLORIDE SERPL-SCNC: 110 MMOL/L
CO2 SERPL-SCNC: 23 MMOL/L
CREAT SERPL-MCNC: 2.19 MG/DL
EOSINOPHIL # BLD AUTO: 0.11 K/UL
EOSINOPHIL NFR BLD AUTO: 1.9 %
GLUCOSE SERPL-MCNC: 80 MG/DL
HCT VFR BLD CALC: 29.4 %
HGB BLD-MCNC: 9 G/DL
IMM GRANULOCYTES NFR BLD AUTO: 0.3 %
LYMPHOCYTES # BLD AUTO: 0.77 K/UL
LYMPHOCYTES NFR BLD AUTO: 13.4 %
MAN DIFF?: NORMAL
MCHC RBC-ENTMCNC: 29.5 PG
MCHC RBC-ENTMCNC: 30.6 GM/DL
MCV RBC AUTO: 96.4 FL
MONOCYTES # BLD AUTO: 0.4 K/UL
MONOCYTES NFR BLD AUTO: 6.9 %
NEUTROPHILS # BLD AUTO: 4.44 K/UL
NEUTROPHILS NFR BLD AUTO: 77.2 %
PLATELET # BLD AUTO: 104 K/UL
POTASSIUM SERPL-SCNC: 4.9 MMOL/L
PROT SERPL-MCNC: 6 G/DL
RBC # BLD: 3.05 M/UL
RBC # FLD: 17.2 %
SODIUM SERPL-SCNC: 145 MMOL/L
WBC # FLD AUTO: 5.76 K/UL

## 2022-02-11 ENCOUNTER — APPOINTMENT (OUTPATIENT)
Dept: CARDIOLOGY | Facility: CLINIC | Age: 72
End: 2022-02-11
Payer: MEDICARE

## 2022-02-11 ENCOUNTER — NON-APPOINTMENT (OUTPATIENT)
Age: 72
End: 2022-02-11

## 2022-02-11 VITALS
WEIGHT: 176 LBS | DIASTOLIC BLOOD PRESSURE: 74 MMHG | HEIGHT: 62 IN | HEART RATE: 77 BPM | BODY MASS INDEX: 32.39 KG/M2 | OXYGEN SATURATION: 94 % | SYSTOLIC BLOOD PRESSURE: 126 MMHG

## 2022-02-11 PROCEDURE — 93000 ELECTROCARDIOGRAM COMPLETE: CPT

## 2022-02-11 PROCEDURE — 99214 OFFICE O/P EST MOD 30 MIN: CPT | Mod: 25

## 2022-02-16 ENCOUNTER — NON-APPOINTMENT (OUTPATIENT)
Age: 72
End: 2022-02-16

## 2022-02-16 NOTE — DISCUSSION/SUMMARY
[FreeTextEntry1] : 72 y/o F PMH HLD, HTN, LBBB, CAD (cardiac cath 2021), mod AS, DM, renal cancer s/p partial nephrectomy.  Hospitalized 06/2021 for fall and was found to be in heart failure - she states that she had cardiac cath which showed blockage but they decided medical management  sec to renal function.  \par \par CAD:\par medical management\par asymptomatic\par off ASA due to epistaxis - will hopefully restart\par c/w statin - goal LDL <70\par \par HF:\par will restart torsemide \par c/w metoprolol tartrate to succinate\par No ACEi/ARB sec to CKD\par \par CKD:\par avoid nephrotoxins\par \par Pt will return in 1 mnths or sooner as needed but I encouraged communication via phone or portal if necessary.\par The described plan was discussed with the pt.  All questions and concerns were addressed to the best of my knowledge.

## 2022-02-16 NOTE — HISTORY OF PRESENT ILLNESS
[FreeTextEntry1] : Pt is a 70 y/o F PMH HLD, HTN, LBBB, CAD (cardiac cath 2021), mod AS, DM, renal cancer s/p partial nephrectomy.  Hospitalized 06/2021 for fall and was found to be in heart failure - she states that she had cardiac cath which showed blockage but they decided medical management  sec to renal function.  \par She was recently hospitalzed for epistaxis requiring blood transfusions - she was taken off diuretics\par COVID vaccine 10/2021 Pfizer\par \par cardiac cath 06/2021 [facility] \par TTE 03/2021 mod AS, mod MR\par \par PMH: HLD, HTN, LBBB, CAD, renal cancer s/p partial nephrectomy 2017.\par Smoking status: quit 06/2021 (smoked 50yrs)\par social ETOH \par no drug use\par Current exercise: none\par Daily water intake: 1 quart\par Daily caffeine intake: 1 cup tea\par OTC medications: iron, B12, ASA\par Previous hospitalizations: knee replacements, partial nephrectomy - no problems with anesthesia\par children - no problem with pregnancies\par

## 2022-02-24 ENCOUNTER — APPOINTMENT (OUTPATIENT)
Dept: FAMILY MEDICINE | Facility: CLINIC | Age: 72
End: 2022-02-24
Payer: MEDICARE

## 2022-02-24 VITALS
BODY MASS INDEX: 31.22 KG/M2 | HEART RATE: 98 BPM | WEIGHT: 174 LBS | DIASTOLIC BLOOD PRESSURE: 72 MMHG | OXYGEN SATURATION: 96 % | TEMPERATURE: 98 F | HEIGHT: 62.5 IN | SYSTOLIC BLOOD PRESSURE: 130 MMHG

## 2022-02-24 VITALS — RESPIRATION RATE: 16 BRPM | DIASTOLIC BLOOD PRESSURE: 70 MMHG | HEART RATE: 76 BPM | SYSTOLIC BLOOD PRESSURE: 120 MMHG

## 2022-02-24 PROCEDURE — 99214 OFFICE O/P EST MOD 30 MIN: CPT

## 2022-02-24 RX ORDER — TORSEMIDE 10 MG/1
10 TABLET ORAL
Qty: 90 | Refills: 3 | Status: COMPLETED | COMMUNITY
Start: 2022-02-24 | End: 2022-02-24

## 2022-02-24 RX ORDER — HYDRALAZINE HYDROCHLORIDE 25 MG/1
25 TABLET ORAL
Qty: 270 | Refills: 1 | Status: COMPLETED | COMMUNITY
Start: 2021-07-07 | End: 2022-02-24

## 2022-02-24 NOTE — ASSESSMENT
[FreeTextEntry1] : anemia continue  procrit and iron chf continue  torsemide htn continue metoprolol depression continue  paroxetine  cbc and  a bmp

## 2022-02-24 NOTE — PHYSICAL EXAM
[No Acute Distress] : no acute distress [PERRL] : pupils equal round and reactive to light [No Lymphadenopathy] : no lymphadenopathy [Normal S1, S2] : normal S1 and S2 [No Edema] : there was no peripheral edema [Normal] : no CVA or spinal tenderness [Grossly Normal Strength/Tone] : grossly normal strength/tone [de-identified] : PERFORATED  NASAL  SEPTUM [de-identified] : systolic  murmur occ ectopy

## 2022-02-24 NOTE — HISTORY OF PRESENT ILLNESS
[Congestive Heart Failure] : Congestive Heart Failure [Diabetes Mellitus] : Diabetes Mellitus [Hyperlipidemia] : Hyperlipidemia [Hypertension] : Hypertension [Other: ___] : [unfilled] [Spouse] : spouse [No episodes] : No hypoglycemic episodes since the last visit. [Check glucose ___ x/day] : Patient checks  blood glucose [unfilled]  times a day [Most Recent A1C: ___] : Most recent A1C was [unfilled] [Checks BP Regularly] : The patient checks ~his/her~ blood pressure regularly [120/80] : Target blood pressure is 120/80 [Target goal met] : BP target goal met [Doing Well] : Patient is doing well [Managed with medications] : managed with  medication [High Intensity therapy] : Patient is currently on high intensity statin therapy [Systolic] : systolic [With moderate physical activity] : Shortness of breath with moderate physical activity [Checks Weight Regularly] : The patient checks ~his/her~ weight regularly [Responding to Treatment] : The condition is responding to treatment [No New Symptoms] : Patient denies any new symptoms [Stable] : Overall status has been stable [FreeTextEntry6] : pt  feeling  good  taking proccrit saw renal   egfr  20  no smoking wt  stable saw  cardiology restarted  metoprolol and torsemide breathing  better  with torsemide  [de-identified] : under  130

## 2022-02-26 LAB
ALBUMIN SERPL ELPH-MCNC: 4 G/DL
ALP BLD-CCNC: 233 U/L
ALT SERPL-CCNC: 7 U/L
ANION GAP SERPL CALC-SCNC: 14 MMOL/L
AST SERPL-CCNC: 11 U/L
BACTERIA UR CULT: NORMAL
BASOPHILS # BLD AUTO: 0.01 K/UL
BASOPHILS NFR BLD AUTO: 0.2 %
BILIRUB SERPL-MCNC: 0.2 MG/DL
BUN SERPL-MCNC: 31 MG/DL
CALCIUM SERPL-MCNC: 9.6 MG/DL
CHLORIDE SERPL-SCNC: 106 MMOL/L
CO2 SERPL-SCNC: 24 MMOL/L
CREAT SERPL-MCNC: 2.25 MG/DL
EOSINOPHIL # BLD AUTO: 0.08 K/UL
EOSINOPHIL NFR BLD AUTO: 1.4 %
GLUCOSE SERPL-MCNC: 92 MG/DL
HCT VFR BLD CALC: 28.1 %
HGB BLD-MCNC: 8.9 G/DL
IMM GRANULOCYTES NFR BLD AUTO: 0.2 %
LYMPHOCYTES # BLD AUTO: 0.79 K/UL
LYMPHOCYTES NFR BLD AUTO: 13.9 %
MAN DIFF?: NORMAL
MCHC RBC-ENTMCNC: 30.8 PG
MCHC RBC-ENTMCNC: 31.7 GM/DL
MCV RBC AUTO: 97.2 FL
MONOCYTES # BLD AUTO: 0.34 K/UL
MONOCYTES NFR BLD AUTO: 6 %
NEUTROPHILS # BLD AUTO: 4.44 K/UL
NEUTROPHILS NFR BLD AUTO: 78.3 %
NT-PROBNP SERPL-MCNC: 8316 PG/ML
PLATELET # BLD AUTO: 157 K/UL
POTASSIUM SERPL-SCNC: 4.8 MMOL/L
PROT SERPL-MCNC: 6.2 G/DL
RBC # BLD: 2.89 M/UL
RBC # FLD: 18.5 %
SODIUM SERPL-SCNC: 144 MMOL/L
WBC # FLD AUTO: 5.67 K/UL

## 2022-03-10 ENCOUNTER — APPOINTMENT (OUTPATIENT)
Dept: FAMILY MEDICINE | Facility: CLINIC | Age: 72
End: 2022-03-10

## 2022-03-11 ENCOUNTER — APPOINTMENT (OUTPATIENT)
Dept: CARDIOLOGY | Facility: CLINIC | Age: 72
End: 2022-03-11
Payer: MEDICARE

## 2022-03-11 ENCOUNTER — NON-APPOINTMENT (OUTPATIENT)
Age: 72
End: 2022-03-11

## 2022-03-11 VITALS
WEIGHT: 174 LBS | BODY MASS INDEX: 31.22 KG/M2 | HEIGHT: 62.5 IN | SYSTOLIC BLOOD PRESSURE: 122 MMHG | DIASTOLIC BLOOD PRESSURE: 64 MMHG | HEART RATE: 78 BPM | OXYGEN SATURATION: 96 %

## 2022-03-11 PROCEDURE — 99214 OFFICE O/P EST MOD 30 MIN: CPT | Mod: 25

## 2022-03-11 PROCEDURE — 93000 ELECTROCARDIOGRAM COMPLETE: CPT

## 2022-03-11 NOTE — DISCUSSION/SUMMARY
[FreeTextEntry1] : 70 y/o F PMH HLD, HTN, LBBB, CAD (cardiac cath 2021), mod AS, DM, renal cancer s/p partial nephrectomy.   \par \par CAD:\par medical management\par asymptomatic\par she will try to restart ASA 81mg \par c/w statin - goal LDL <70\par c/w metoprolol\par no ACEi/ARB sec to CKD\par \par HF:\par c/w torsemide \par elevated BNP noted but pt appears more euvolemic\par c/w metoprolol tartrate to succinate\par No ACEi/ARB sec to CKD\par \par AS:\par asymptomatic - monitor clsoely\par repeat TTE 06/2022\par \par CKD:\par avoid nephrotoxins\par \par Pt will return in 3-4 mnths or sooner as needed but I encouraged communication via phone or portal if necessary.\par The described plan was discussed with the pt.  All questions and concerns were addressed to the best of my knowledge.

## 2022-03-11 NOTE — HISTORY OF PRESENT ILLNESS
[FreeTextEntry1] : Pt is a 70 y/o F PMH HLD, HTN, LBBB, CAD (cardiac cath 2021), mod AS, DM, renal cancer s/p partial nephrectomy.  Hospitalized 06/2021 for fall and was found to be in heart failure - she states that she had cardiac cath which showed blockage but they decided medical management  sec to renal function. \par Torsemide was restarted last OV and she is feeling well now, lost 2 lbs.  She is not SOB, minimal episodic edema, ambulates with walker\par She was recently hospitalized for epistaxis requiring blood transfusions.  She is following with renal and is getting procrit\par BNP 8316\par Cr 2.25\par COVID vaccine 10/2021 Pfizer\par \par cardiac cath 06/2021 [facility] pLAD 70%, D1 70%, mRCA 50%\par TTE 03/2021 mod AS, mod MR\par \par PMH: HLD, HTN, LBBB, CAD, renal cancer s/p partial nephrectomy 2017.\par Smoking status: quit 06/2021 (smoked 50yrs)\par social ETOH \par no drug use\par Current exercise: none\par Daily water intake: 1 quart\par Daily caffeine intake: 1 cup tea\par OTC medications: iron, B12, ASA\par Previous hospitalizations: knee replacements, partial nephrectomy - no problems with anesthesia\par

## 2022-04-07 ENCOUNTER — APPOINTMENT (OUTPATIENT)
Dept: FAMILY MEDICINE | Facility: CLINIC | Age: 72
End: 2022-04-07
Payer: MEDICARE

## 2022-04-07 VITALS — RESPIRATION RATE: 16 BRPM | DIASTOLIC BLOOD PRESSURE: 70 MMHG | SYSTOLIC BLOOD PRESSURE: 110 MMHG | HEART RATE: 72 BPM

## 2022-04-07 VITALS
BODY MASS INDEX: 31.4 KG/M2 | WEIGHT: 175 LBS | OXYGEN SATURATION: 96 % | TEMPERATURE: 97.2 F | RESPIRATION RATE: 16 BRPM | HEIGHT: 62.5 IN | SYSTOLIC BLOOD PRESSURE: 134 MMHG | HEART RATE: 93 BPM | DIASTOLIC BLOOD PRESSURE: 70 MMHG

## 2022-04-07 PROCEDURE — 99214 OFFICE O/P EST MOD 30 MIN: CPT

## 2022-04-07 NOTE — ASSESSMENT
[FreeTextEntry1] : dm  well controlled ckd  check egfr chf continue torsemide   anemia procrit injection given bp accptable continue metoprolol   depression doing  well continue paroxetine hld continue atorvastatin

## 2022-04-07 NOTE — PHYSICAL EXAM
[No Acute Distress] : no acute distress [PERRL] : pupils equal round and reactive to light [No Lymphadenopathy] : no lymphadenopathy [Normal S1, S2] : normal S1 and S2 [No Edema] : there was no peripheral edema [Normal] : no CVA or spinal tenderness [Grossly Normal Strength/Tone] : grossly normal strength/tone [de-identified] : PERFORATED  NASAL  SEPTUM [de-identified] : systolic  murmur occ ectopy

## 2022-04-07 NOTE — HISTORY OF PRESENT ILLNESS
[Congestive Heart Failure] : Congestive Heart Failure [Diabetes Mellitus] : Diabetes Mellitus [Hyperlipidemia] : Hyperlipidemia [Hypertension] : Hypertension [Other: ___] : [unfilled] [Spouse] : spouse [FreeTextEntry6] : pt  feeling  good  taking proccrit saw renal   egfr  20  no smoking wt  stable saw  cardiology restarted  metoprolol and torsemide breathing  better  with torsemide  [No episodes] : No hypoglycemic episodes since the last visit. [Check glucose ___ x/day] : Patient checks  blood glucose [unfilled]  times a day [Most Recent A1C: ___] : Most recent A1C was [unfilled] [de-identified] : under  130  [Checks BP Regularly] : The patient checks ~his/her~ blood pressure regularly [120/80] : Target blood pressure is 120/80 [Target goal met] : BP target goal met [Doing Well] : Patient is doing well [Managed with medications] : managed with  medication [High Intensity therapy] : Patient is currently on high intensity statin therapy [Systolic] : systolic [With moderate physical activity] : Shortness of breath with moderate physical activity [Checks Weight Regularly] : The patient checks ~his/her~ weight regularly [Responding to Treatment] : The condition is responding to treatment [No New Symptoms] : Patient denies any new symptoms [Stable] : Overall status has been stable

## 2022-04-08 LAB
ANION GAP SERPL CALC-SCNC: 13 MMOL/L
BASOPHILS # BLD AUTO: 0.02 K/UL
BASOPHILS NFR BLD AUTO: 0.4 %
BUN SERPL-MCNC: 33 MG/DL
CALCIUM SERPL-MCNC: 9.4 MG/DL
CHLORIDE SERPL-SCNC: 107 MMOL/L
CO2 SERPL-SCNC: 24 MMOL/L
CREAT SERPL-MCNC: 2.03 MG/DL
EGFR: 26 ML/MIN/1.73M2
EOSINOPHIL # BLD AUTO: 0.09 K/UL
EOSINOPHIL NFR BLD AUTO: 1.9 %
GLUCOSE SERPL-MCNC: 86 MG/DL
HCT VFR BLD CALC: 29.2 %
HGB BLD-MCNC: 9.5 G/DL
IMM GRANULOCYTES NFR BLD AUTO: 0.2 %
LYMPHOCYTES # BLD AUTO: 0.94 K/UL
LYMPHOCYTES NFR BLD AUTO: 20.3 %
MAN DIFF?: NORMAL
MCHC RBC-ENTMCNC: 31.8 PG
MCHC RBC-ENTMCNC: 32.5 GM/DL
MCV RBC AUTO: 97.7 FL
MONOCYTES # BLD AUTO: 0.32 K/UL
MONOCYTES NFR BLD AUTO: 6.9 %
NEUTROPHILS # BLD AUTO: 3.24 K/UL
NEUTROPHILS NFR BLD AUTO: 70.3 %
NT-PROBNP SERPL-MCNC: 7901 PG/ML
PLATELET # BLD AUTO: 133 K/UL
POTASSIUM SERPL-SCNC: 5.1 MMOL/L
RBC # BLD: 2.99 M/UL
RBC # FLD: 16.3 %
SODIUM SERPL-SCNC: 144 MMOL/L
WBC # FLD AUTO: 4.62 K/UL

## 2022-05-05 ENCOUNTER — APPOINTMENT (OUTPATIENT)
Dept: FAMILY MEDICINE | Facility: CLINIC | Age: 72
End: 2022-05-05
Payer: MEDICARE

## 2022-05-05 VITALS — RESPIRATION RATE: 16 BRPM | HEART RATE: 72 BPM | SYSTOLIC BLOOD PRESSURE: 120 MMHG | DIASTOLIC BLOOD PRESSURE: 80 MMHG

## 2022-05-05 VITALS
HEART RATE: 81 BPM | HEIGHT: 62.5 IN | WEIGHT: 175 LBS | TEMPERATURE: 98.1 F | OXYGEN SATURATION: 92 % | SYSTOLIC BLOOD PRESSURE: 120 MMHG | DIASTOLIC BLOOD PRESSURE: 60 MMHG | BODY MASS INDEX: 31.4 KG/M2

## 2022-05-05 DIAGNOSIS — C64.2 MALIGNANT NEOPLASM OF LEFT KIDNEY, EXCEPT RENAL PELVIS: ICD-10-CM

## 2022-05-05 PROCEDURE — 99214 OFFICE O/P EST MOD 30 MIN: CPT

## 2022-05-05 NOTE — PHYSICAL EXAM
[No Acute Distress] : no acute distress [PERRL] : pupils equal round and reactive to light [No Lymphadenopathy] : no lymphadenopathy [Normal S1, S2] : normal S1 and S2 [No Edema] : there was no peripheral edema [Normal] : no CVA or spinal tenderness [Grossly Normal Strength/Tone] : grossly normal strength/tone [de-identified] : PERFORATED  NASAL  SEPTUM [de-identified] : systolic  murmur occ ectopy

## 2022-05-05 NOTE — HISTORY OF PRESENT ILLNESS
[Congestive Heart Failure] : Congestive Heart Failure [Diabetes Mellitus] : Diabetes Mellitus [Hyperlipidemia] : Hyperlipidemia [Hypertension] : Hypertension [Other: ___] : [unfilled] [Spouse] : spouse [No episodes] : No hypoglycemic episodes since the last visit. [Check glucose ___ x/day] : Patient checks  blood glucose [unfilled]  times a day [Most Recent A1C: ___] : Most recent A1C was [unfilled] [Checks BP Regularly] : The patient checks ~his/her~ blood pressure regularly [120/80] : Target blood pressure is 120/80 [Target goal met] : BP target goal met [Doing Well] : Patient is doing well [Managed with medications] : managed with  medication [High Intensity therapy] : Patient is currently on high intensity statin therapy [Systolic] : systolic [With moderate physical activity] : Shortness of breath with moderate physical activity [Checks Weight Regularly] : The patient checks ~his/her~ weight regularly [Responding to Treatment] : The condition is responding to treatment [No New Symptoms] : Patient denies any new symptoms [Stable] : Overall status has been stable [FreeTextEntry6] : pt  feeling  good  taking proccrit saw renal   egfr  20  no smoking wt  stable saw  cardiology restarted  metoprolol and torsemide breathing  better  with torsemide breathing  good  [de-identified] : under  130

## 2022-05-05 NOTE — ASSESSMENT
[FreeTextEntry1] : ckd check egfr anemia procrit  given chf continue torsemide check bnp and cbc bmp

## 2022-05-06 LAB
ANION GAP SERPL CALC-SCNC: 13 MMOL/L
BASOPHILS # BLD AUTO: 0.02 K/UL
BASOPHILS NFR BLD AUTO: 0.4 %
BUN SERPL-MCNC: 36 MG/DL
CALCIUM SERPL-MCNC: 9.3 MG/DL
CHLORIDE SERPL-SCNC: 106 MMOL/L
CO2 SERPL-SCNC: 24 MMOL/L
CREAT SERPL-MCNC: 2.19 MG/DL
EGFR: 24 ML/MIN/1.73M2
EOSINOPHIL # BLD AUTO: 0.12 K/UL
EOSINOPHIL NFR BLD AUTO: 2.2 %
GLUCOSE SERPL-MCNC: 111 MG/DL
HCT VFR BLD CALC: 31.9 %
HGB BLD-MCNC: 9.8 G/DL
IMM GRANULOCYTES NFR BLD AUTO: 0.4 %
LYMPHOCYTES # BLD AUTO: 1.09 K/UL
LYMPHOCYTES NFR BLD AUTO: 19.7 %
MAN DIFF?: NORMAL
MCHC RBC-ENTMCNC: 30.6 PG
MCHC RBC-ENTMCNC: 30.7 GM/DL
MCV RBC AUTO: 99.7 FL
MONOCYTES # BLD AUTO: 0.39 K/UL
MONOCYTES NFR BLD AUTO: 7 %
NEUTROPHILS # BLD AUTO: 3.9 K/UL
NEUTROPHILS NFR BLD AUTO: 70.3 %
NT-PROBNP SERPL-MCNC: 6354 PG/ML
PLATELET # BLD AUTO: 139 K/UL
POTASSIUM SERPL-SCNC: 4.7 MMOL/L
RBC # BLD: 3.2 M/UL
RBC # FLD: 15.1 %
SODIUM SERPL-SCNC: 143 MMOL/L
WBC # FLD AUTO: 5.54 K/UL

## 2022-05-10 ENCOUNTER — RX RENEWAL (OUTPATIENT)
Age: 72
End: 2022-05-10

## 2022-05-10 DIAGNOSIS — K21.9 GASTRO-ESOPHAGEAL REFLUX DISEASE W/OUT ESOPHAGITIS: ICD-10-CM

## 2022-05-16 ENCOUNTER — APPOINTMENT (OUTPATIENT)
Dept: FAMILY MEDICINE | Facility: CLINIC | Age: 72
End: 2022-05-16
Payer: MEDICARE

## 2022-05-16 DIAGNOSIS — U07.1 COVID-19: ICD-10-CM

## 2022-05-16 PROCEDURE — 99442: CPT | Mod: CS,95

## 2022-05-16 NOTE — HISTORY OF PRESENT ILLNESS
[Home] : at home, [unfilled] , at the time of the visit. [Medical Office: ([facility])___] : at the medical office located in  [Verbal consent obtained from patient] : the patient, [unfilled] [FreeTextEntry8] : pt develop  temp and congestion 102 no cp no sob

## 2022-05-16 NOTE — ASSESSMENT
[FreeTextEntry1] : 72 yr old femal with multipl;e comorbidities chk copd dm ckd not a candidate for paxlovid will refer to er for monoclonal antibodies

## 2022-06-09 ENCOUNTER — APPOINTMENT (OUTPATIENT)
Dept: FAMILY MEDICINE | Facility: CLINIC | Age: 72
End: 2022-06-09
Payer: MEDICARE

## 2022-06-09 VITALS — RESPIRATION RATE: 16 BRPM | HEART RATE: 72 BPM | SYSTOLIC BLOOD PRESSURE: 110 MMHG | DIASTOLIC BLOOD PRESSURE: 70 MMHG

## 2022-06-09 VITALS
HEART RATE: 98 BPM | TEMPERATURE: 97.9 F | WEIGHT: 176 LBS | OXYGEN SATURATION: 90 % | HEIGHT: 62 IN | BODY MASS INDEX: 32.39 KG/M2 | DIASTOLIC BLOOD PRESSURE: 78 MMHG | SYSTOLIC BLOOD PRESSURE: 142 MMHG

## 2022-06-09 PROCEDURE — 99214 OFFICE O/P EST MOD 30 MIN: CPT

## 2022-06-09 RX ORDER — DOXYCYCLINE HYCLATE 100 MG/1
100 CAPSULE ORAL
Qty: 6 | Refills: 0 | Status: COMPLETED | COMMUNITY
Start: 2022-01-21

## 2022-06-09 RX ORDER — NYSTATIN 100000 [USP'U]/G
100000 POWDER TOPICAL
Qty: 15 | Refills: 0 | Status: COMPLETED | COMMUNITY
Start: 2022-02-04

## 2022-06-09 NOTE — HISTORY OF PRESENT ILLNESS
[Congestive Heart Failure] : Congestive Heart Failure [Diabetes Mellitus] : Diabetes Mellitus [Hyperlipidemia] : Hyperlipidemia [Hypertension] : Hypertension [Other: ___] : [unfilled] [Spouse] : spouse [No episodes] : No hypoglycemic episodes since the last visit. [Check glucose ___ x/day] : Patient checks  blood glucose [unfilled]  times a day [Most Recent A1C: ___] : Most recent A1C was [unfilled] [Checks BP Regularly] : The patient checks ~his/her~ blood pressure regularly [120/80] : Target blood pressure is 120/80 [Target goal met] : BP target goal met [Doing Well] : Patient is doing well [Managed with medications] : managed with  medication [High Intensity therapy] : Patient is currently on high intensity statin therapy [Systolic] : systolic [With moderate physical activity] : Shortness of breath with moderate physical activity [Checks Weight Regularly] : The patient checks ~his/her~ weight regularly [Responding to Treatment] : The condition is responding to treatment [No New Symptoms] : Patient denies any new symptoms [Stable] : Overall status has been stable [FreeTextEntry6] : pt  feeling  good  taking proccrit saw renal   egfr  20  no smoking wt  stable saw  cardiology restarted  metoprolol and torsemide breathing  better  with torsemide breathing  good  [de-identified] : under  130

## 2022-06-09 NOTE — PHYSICAL EXAM
[No Acute Distress] : no acute distress [PERRL] : pupils equal round and reactive to light [No Lymphadenopathy] : no lymphadenopathy [Normal S1, S2] : normal S1 and S2 [No Edema] : there was no peripheral edema [Normal] : no CVA or spinal tenderness [Grossly Normal Strength/Tone] : grossly normal strength/tone [de-identified] : PERFORATED  NASAL  SEPTUM [de-identified] : systolic  murmur occ ectopy

## 2022-06-10 ENCOUNTER — APPOINTMENT (OUTPATIENT)
Dept: CARDIOLOGY | Facility: CLINIC | Age: 72
End: 2022-06-10
Payer: MEDICARE

## 2022-06-10 PROCEDURE — 93306 TTE W/DOPPLER COMPLETE: CPT

## 2022-06-11 LAB
ANION GAP SERPL CALC-SCNC: 14 MMOL/L
BASOPHILS # BLD AUTO: 0.01 K/UL
BASOPHILS NFR BLD AUTO: 0.2 %
BUN SERPL-MCNC: 31 MG/DL
CALCIUM SERPL-MCNC: 9.4 MG/DL
CHLORIDE SERPL-SCNC: 106 MMOL/L
CO2 SERPL-SCNC: 26 MMOL/L
CREAT SERPL-MCNC: 2.07 MG/DL
EGFR: 25 ML/MIN/1.73M2
EOSINOPHIL # BLD AUTO: 0.09 K/UL
EOSINOPHIL NFR BLD AUTO: 1.8 %
GLUCOSE SERPL-MCNC: 91 MG/DL
HCT VFR BLD CALC: 30.8 %
HGB BLD-MCNC: 9.7 G/DL
IMM GRANULOCYTES NFR BLD AUTO: 0.4 %
LYMPHOCYTES # BLD AUTO: 1.06 K/UL
LYMPHOCYTES NFR BLD AUTO: 20.6 %
MAN DIFF?: NORMAL
MCHC RBC-ENTMCNC: 31.5 GM/DL
MCHC RBC-ENTMCNC: 31.6 PG
MCV RBC AUTO: 100.3 FL
MONOCYTES # BLD AUTO: 0.4 K/UL
MONOCYTES NFR BLD AUTO: 7.8 %
NEUTROPHILS # BLD AUTO: 3.56 K/UL
NEUTROPHILS NFR BLD AUTO: 69.2 %
PLATELET # BLD AUTO: 143 K/UL
POTASSIUM SERPL-SCNC: 4.8 MMOL/L
RBC # BLD: 3.07 M/UL
RBC # FLD: 15.7 %
SODIUM SERPL-SCNC: 146 MMOL/L
WBC # FLD AUTO: 5.14 K/UL

## 2022-06-29 ENCOUNTER — RX RENEWAL (OUTPATIENT)
Age: 72
End: 2022-06-29

## 2022-07-01 ENCOUNTER — NON-APPOINTMENT (OUTPATIENT)
Age: 72
End: 2022-07-01

## 2022-07-01 ENCOUNTER — APPOINTMENT (OUTPATIENT)
Dept: CARDIOLOGY | Facility: CLINIC | Age: 72
End: 2022-07-01

## 2022-07-01 VITALS
HEIGHT: 62 IN | HEART RATE: 96 BPM | OXYGEN SATURATION: 93 % | BODY MASS INDEX: 32.39 KG/M2 | SYSTOLIC BLOOD PRESSURE: 150 MMHG | DIASTOLIC BLOOD PRESSURE: 80 MMHG | WEIGHT: 176 LBS

## 2022-07-01 PROCEDURE — 99406 BEHAV CHNG SMOKING 3-10 MIN: CPT

## 2022-07-01 PROCEDURE — 93000 ELECTROCARDIOGRAM COMPLETE: CPT

## 2022-07-01 PROCEDURE — 99214 OFFICE O/P EST MOD 30 MIN: CPT | Mod: 25

## 2022-07-01 NOTE — HISTORY OF PRESENT ILLNESS
[FreeTextEntry1] : Pt is a 71 y/o F smoker (started again 2 mnths ago), PMH HLD, HTN, LBBB, CAD (cardiac cath 2021), mod AS, DM, CKD, renal cancer s/p partial nephrectomy.  Hospitalized 06/2021 for fall and was found to be in heart failure - she states that she had cardiac cath which showed blockage but they decided medical management  sec to renal function. \par Torsemide 10mg qd, she states that her weight is stable.  She tells me that she SOB with some exertion but feels this is at her baseline, minimal episodic edema, ambulates with walker\par She is following with renal and is getting procrit.  She has required transfusion from epistaxis in the past\par Home 's/60's-70's\par Cr 2.25 - 2.07\par COVID vaccine 10/2021 Pfizer\par \par cardiac cath 06/2021 [facility] pLAD 70%, D1 70%, mRCA 50%\par TTE 03/2021 mod AS, mod MR\par TTE 06/2022 EF 58%, mild AS MG 21, mild MS, mod-sev MR, sev LAE, mod TR\par \par PMH: HLD, HTN, LBBB, CAD, renal cancer s/p partial nephrectomy 2017.\par Smoking status: 1/2 PPD\par social ETOH \par no drug use\par Current exercise: none\par Daily water intake: 1 quart\par Daily caffeine intake: 1 cup tea\par OTC medications: iron, B12, ASA\par Previous hospitalizations: knee replacements, partial nephrectomy - no problems with anesthesia\par

## 2022-07-01 NOTE — REVIEW OF SYSTEMS
[Negative] : Heme/Lymph [SOB] : no shortness of breath [Dyspnea on exertion] : dyspnea during exertion [Leg Claudication] : no intermittent leg claudication

## 2022-07-01 NOTE — DISCUSSION/SUMMARY
[FreeTextEntry1] : 73 y/o F PMH HLD, HTN, LBBB, CAD (cardiac cath 2021), mod AS, DM, CKD, renal cancer s/p partial nephrectomy.   \par \par CAD:\par medical management\par asymptomatic\par c/w ASA 81mg \par c/w statin - goal LDL <70\par c/w metoprolol\par no ACEi/ARB sec to CKD\par \par HF:\par c/w torsemide \par appears euvolemic\par c/w metoprolol succinate\par No ACEi/ARB sec to CKD\par check labs\par \par AS:\par asymptomatic - monitor closely\par serial TTEs\par \par CKD:\par avoid nephrotoxins\par \par Discussed smoking cessation in great detail >3min \par Pt will return in 4-6 mnths or sooner as needed but I encouraged communication via phone or portal if necessary.\par The described plan was discussed with the pt.  All questions and concerns were addressed to the best of my knowledge.

## 2022-07-01 NOTE — PHYSICAL EXAM

## 2022-07-11 ENCOUNTER — APPOINTMENT (OUTPATIENT)
Dept: FAMILY MEDICINE | Facility: CLINIC | Age: 72
End: 2022-07-11

## 2022-07-11 VITALS — DIASTOLIC BLOOD PRESSURE: 80 MMHG | HEART RATE: 72 BPM | RESPIRATION RATE: 16 BRPM | SYSTOLIC BLOOD PRESSURE: 130 MMHG

## 2022-07-11 VITALS
HEIGHT: 62 IN | TEMPERATURE: 97.3 F | WEIGHT: 178 LBS | OXYGEN SATURATION: 92 % | BODY MASS INDEX: 32.76 KG/M2 | SYSTOLIC BLOOD PRESSURE: 125 MMHG | HEART RATE: 79 BPM | DIASTOLIC BLOOD PRESSURE: 64 MMHG

## 2022-07-11 PROCEDURE — 36415 COLL VENOUS BLD VENIPUNCTURE: CPT

## 2022-07-11 PROCEDURE — 99214 OFFICE O/P EST MOD 30 MIN: CPT | Mod: 25

## 2022-07-11 RX ORDER — SODIUM ZIRCONIUM CYCLOSILICATE 10 G/10G
10 POWDER, FOR SUSPENSION ORAL
Qty: 10 | Refills: 0 | Status: COMPLETED | COMMUNITY
Start: 2021-12-10 | End: 2022-07-11

## 2022-07-11 NOTE — PHYSICAL EXAM
[No Acute Distress] : no acute distress [PERRL] : pupils equal round and reactive to light [No Lymphadenopathy] : no lymphadenopathy [Normal S1, S2] : normal S1 and S2 [No Edema] : there was no peripheral edema [Normal] : no CVA or spinal tenderness [Grossly Normal Strength/Tone] : grossly normal strength/tone [de-identified] : PERFORATED  NASAL  SEPTUM [de-identified] : systolic  murmur occ ectopy

## 2022-07-11 NOTE — HISTORY OF PRESENT ILLNESS
[Congestive Heart Failure] : Congestive Heart Failure [Diabetes Mellitus] : Diabetes Mellitus [Hyperlipidemia] : Hyperlipidemia [Hypertension] : Hypertension [Other: ___] : [unfilled] [Spouse] : spouse [No episodes] : No hypoglycemic episodes since the last visit. [Check glucose ___ x/day] : Patient checks  blood glucose [unfilled]  times a day [Most Recent A1C: ___] : Most recent A1C was [unfilled] [Checks BP Regularly] : The patient checks ~his/her~ blood pressure regularly [120/80] : Target blood pressure is 120/80 [Target goal met] : BP target goal met [Doing Well] : Patient is doing well [Managed with medications] : managed with  medication [High Intensity therapy] : Patient is currently on high intensity statin therapy [Systolic] : systolic [With moderate physical activity] : Shortness of breath with moderate physical activity [Checks Weight Regularly] : The patient checks ~his/her~ weight regularly [Responding to Treatment] : The condition is responding to treatment [No New Symptoms] : Patient denies any new symptoms [Stable] : Overall status has been stable [FreeTextEntry6] : pt  feeling  good  taking proccrit saw renal   egfr  20  no smoking wt  stable saw  cardiology restarted  metoprolol and torsemide breathing  better  with torsemide breathing  good baron   [de-identified] : under  130

## 2022-07-11 NOTE — ASSESSMENT
[FreeTextEntry1] : htn continue  metoprolol  hld continue atorvastatin edema continue torsimide given procrit for anemia lab evaluation\par

## 2022-07-12 LAB
ALBUMIN SERPL ELPH-MCNC: 4.2 G/DL
ALP BLD-CCNC: 227 U/L
ALT SERPL-CCNC: 7 U/L
ANION GAP SERPL CALC-SCNC: 12 MMOL/L
APPEARANCE: ABNORMAL
AST SERPL-CCNC: 14 U/L
BACTERIA: ABNORMAL
BASOPHILS # BLD AUTO: 0.02 K/UL
BASOPHILS NFR BLD AUTO: 0.4 %
BILIRUB SERPL-MCNC: 0.2 MG/DL
BILIRUBIN URINE: NEGATIVE
BLOOD URINE: NEGATIVE
BUN SERPL-MCNC: 34 MG/DL
CALCIUM SERPL-MCNC: 9.3 MG/DL
CHLORIDE SERPL-SCNC: 109 MMOL/L
CHOLEST SERPL-MCNC: 127 MG/DL
CO2 SERPL-SCNC: 24 MMOL/L
COLOR: NORMAL
CREAT SERPL-MCNC: 2.24 MG/DL
CREAT SPEC-SCNC: 70 MG/DL
EGFR: 23 ML/MIN/1.73M2
EOSINOPHIL # BLD AUTO: 0.09 K/UL
EOSINOPHIL NFR BLD AUTO: 1.8 %
ESTIMATED AVERAGE GLUCOSE: 114 MG/DL
GLUCOSE QUALITATIVE U: NEGATIVE
GLUCOSE SERPL-MCNC: 80 MG/DL
HBA1C MFR BLD HPLC: 5.6 %
HCT VFR BLD CALC: 29.2 %
HDLC SERPL-MCNC: 32 MG/DL
HGB BLD-MCNC: 8.9 G/DL
HYALINE CASTS: 2 /LPF
IMM GRANULOCYTES NFR BLD AUTO: 0.2 %
KETONES URINE: NEGATIVE
LDLC SERPL CALC-MCNC: 63 MG/DL
LEUKOCYTE ESTERASE URINE: NEGATIVE
LYMPHOCYTES # BLD AUTO: 1.04 K/UL
LYMPHOCYTES NFR BLD AUTO: 20.4 %
MAN DIFF?: NORMAL
MCHC RBC-ENTMCNC: 30.5 GM/DL
MCHC RBC-ENTMCNC: 31.4 PG
MCV RBC AUTO: 103.2 FL
MICROALBUMIN 24H UR DL<=1MG/L-MCNC: 2.9 MG/DL
MICROALBUMIN/CREAT 24H UR-RTO: 42 MG/G
MICROSCOPIC-UA: NORMAL
MONOCYTES # BLD AUTO: 0.32 K/UL
MONOCYTES NFR BLD AUTO: 6.3 %
NEUTROPHILS # BLD AUTO: 3.63 K/UL
NEUTROPHILS NFR BLD AUTO: 70.9 %
NITRITE URINE: NEGATIVE
NONHDLC SERPL-MCNC: 95 MG/DL
NT-PROBNP SERPL-MCNC: 6347 PG/ML
PH URINE: 5.5
PLATELET # BLD AUTO: 135 K/UL
POTASSIUM SERPL-SCNC: 5 MMOL/L
PROT SERPL-MCNC: 6.1 G/DL
PROTEIN URINE: NORMAL
RBC # BLD: 2.83 M/UL
RBC # FLD: 16.6 %
RED BLOOD CELLS URINE: 2 /HPF
SODIUM SERPL-SCNC: 146 MMOL/L
SPECIFIC GRAVITY URINE: 1.01
SQUAMOUS EPITHELIAL CELLS: 3 /HPF
T4 SERPL-MCNC: 7.5 UG/DL
TRIGL SERPL-MCNC: 159 MG/DL
TSH SERPL-ACNC: 1.45 UIU/ML
URATE SERPL-MCNC: 5.7 MG/DL
URINE COMMENTS: NORMAL
UROBILINOGEN URINE: NORMAL
WBC # FLD AUTO: 5.11 K/UL
WHITE BLOOD CELLS URINE: 1 /HPF

## 2022-08-10 ENCOUNTER — APPOINTMENT (OUTPATIENT)
Dept: FAMILY MEDICINE | Facility: CLINIC | Age: 72
End: 2022-08-10

## 2022-08-10 VITALS
BODY MASS INDEX: 33.13 KG/M2 | SYSTOLIC BLOOD PRESSURE: 126 MMHG | DIASTOLIC BLOOD PRESSURE: 70 MMHG | TEMPERATURE: 97.7 F | HEIGHT: 62 IN | WEIGHT: 180 LBS | RESPIRATION RATE: 16 BRPM | OXYGEN SATURATION: 89 % | HEART RATE: 83 BPM

## 2022-08-10 VITALS — RESPIRATION RATE: 16 BRPM | HEART RATE: 88 BPM | SYSTOLIC BLOOD PRESSURE: 130 MMHG | DIASTOLIC BLOOD PRESSURE: 80 MMHG

## 2022-08-10 PROCEDURE — 99214 OFFICE O/P EST MOD 30 MIN: CPT | Mod: 25

## 2022-08-10 NOTE — ASSESSMENT
[FreeTextEntry1] : still smoking anemia given procrit hld continue atorvastatin  ckd and chf continue torsemide bmp and cbc

## 2022-08-10 NOTE — PHYSICAL EXAM
[No Acute Distress] : no acute distress [PERRL] : pupils equal round and reactive to light [No Lymphadenopathy] : no lymphadenopathy [Normal S1, S2] : normal S1 and S2 [No Edema] : there was no peripheral edema [Normal] : no CVA or spinal tenderness [Grossly Normal Strength/Tone] : grossly normal strength/tone [de-identified] : PERFORATED  NASAL  SEPTUM [de-identified] : systolic  murmur occ ectopy

## 2022-08-10 NOTE — HISTORY OF PRESENT ILLNESS
[Congestive Heart Failure] : Congestive Heart Failure [Diabetes Mellitus] : Diabetes Mellitus [Hyperlipidemia] : Hyperlipidemia [Hypertension] : Hypertension [Other: ___] : [unfilled] [Spouse] : spouse [FreeTextEntry6] : pt  feeling  good  taking proccrit saw renal   egfr  20  no smoking wt  stable saw  cardiology restarted  metoprolol and torsemide breathing  better  with torsemide breathing  good baron   [No episodes] : No hypoglycemic episodes since the last visit. [Check glucose ___ x/day] : Patient checks  blood glucose [unfilled]  times a day [Most Recent A1C: ___] : Most recent A1C was [unfilled] [de-identified] : under  130  [Checks BP Regularly] : The patient checks ~his/her~ blood pressure regularly [120/80] : Target blood pressure is 120/80 [Target goal met] : BP target goal met [Doing Well] : Patient is doing well [Managed with medications] : managed with  medication [High Intensity therapy] : Patient is currently on high intensity statin therapy [Systolic] : systolic [With moderate physical activity] : Shortness of breath with moderate physical activity [Checks Weight Regularly] : The patient checks ~his/her~ weight regularly [Responding to Treatment] : The condition is responding to treatment [No New Symptoms] : Patient denies any new symptoms [Stable] : Overall status has been stable

## 2022-08-11 LAB
ANION GAP SERPL CALC-SCNC: 14 MMOL/L
BASOPHILS # BLD AUTO: 0.01 K/UL
BASOPHILS NFR BLD AUTO: 0.2 %
BUN SERPL-MCNC: 40 MG/DL
CALCIUM SERPL-MCNC: 9.9 MG/DL
CHLORIDE SERPL-SCNC: 103 MMOL/L
CO2 SERPL-SCNC: 27 MMOL/L
CREAT SERPL-MCNC: 2.49 MG/DL
EGFR: 20 ML/MIN/1.73M2
EOSINOPHIL # BLD AUTO: 0.12 K/UL
EOSINOPHIL NFR BLD AUTO: 2.1 %
GLUCOSE SERPL-MCNC: 102 MG/DL
HCT VFR BLD CALC: 32.2 %
HGB BLD-MCNC: 10 G/DL
IMM GRANULOCYTES NFR BLD AUTO: 0.3 %
LYMPHOCYTES # BLD AUTO: 1.17 K/UL
LYMPHOCYTES NFR BLD AUTO: 20.2 %
MAN DIFF?: NORMAL
MCHC RBC-ENTMCNC: 31.1 GM/DL
MCHC RBC-ENTMCNC: 31.1 PG
MCV RBC AUTO: 100 FL
MONOCYTES # BLD AUTO: 0.38 K/UL
MONOCYTES NFR BLD AUTO: 6.6 %
NEUTROPHILS # BLD AUTO: 4.1 K/UL
NEUTROPHILS NFR BLD AUTO: 70.6 %
PLATELET # BLD AUTO: 126 K/UL
POTASSIUM SERPL-SCNC: 4.5 MMOL/L
RBC # BLD: 3.22 M/UL
RBC # FLD: 16 %
SODIUM SERPL-SCNC: 144 MMOL/L
WBC # FLD AUTO: 5.8 K/UL

## 2022-09-26 ENCOUNTER — APPOINTMENT (OUTPATIENT)
Dept: FAMILY MEDICINE | Facility: CLINIC | Age: 72
End: 2022-09-26

## 2022-09-26 VITALS
WEIGHT: 183 LBS | HEIGHT: 62 IN | HEART RATE: 80 BPM | DIASTOLIC BLOOD PRESSURE: 80 MMHG | OXYGEN SATURATION: 97 % | SYSTOLIC BLOOD PRESSURE: 132 MMHG | BODY MASS INDEX: 33.68 KG/M2 | TEMPERATURE: 98 F

## 2022-09-26 VITALS — SYSTOLIC BLOOD PRESSURE: 90 MMHG | HEART RATE: 76 BPM | RESPIRATION RATE: 16 BRPM | DIASTOLIC BLOOD PRESSURE: 76 MMHG

## 2022-09-26 PROCEDURE — G0008: CPT

## 2022-09-26 PROCEDURE — 90662 IIV NO PRSV INCREASED AG IM: CPT

## 2022-09-26 PROCEDURE — 99214 OFFICE O/P EST MOD 30 MIN: CPT | Mod: 25

## 2022-09-26 NOTE — ASSESSMENT
[FreeTextEntry1] : anemia hgb  improved to 10 gms  check cbc chf continue torsimide hld continue atorvastatin  bp acceptable continue metoprolol guot continue alopurinol

## 2022-09-26 NOTE — HISTORY OF PRESENT ILLNESS
[Congestive Heart Failure] : Congestive Heart Failure [Diabetes Mellitus] : Diabetes Mellitus [Hyperlipidemia] : Hyperlipidemia [Hypertension] : Hypertension [Other: ___] : [unfilled]: [Spouse] : spouse [No episodes] : No hypoglycemic episodes since the last visit. [Check glucose ___ x/day] : Patient checks  blood glucose [unfilled]  times a day [Most Recent A1C: ___] : Most recent A1C was [unfilled] [Checks BP Regularly] : The patient checks ~his/her~ blood pressure regularly [120/80] : Target blood pressure is 120/80 [Target goal met] : BP target goal met [Doing Well] : Patient is doing well [Managed with medications] : managed with  medication [High Intensity therapy] : Patient is currently on high intensity statin therapy [Systolic] : systolic [With moderate physical activity] : Shortness of breath with moderate physical activity [Checks Weight Regularly] : The patient checks ~his/her~ weight regularly [Responding to Treatment] : The condition is responding to treatment [No New Symptoms] : Patient denies any new symptoms [Stable] : Overall status has been stable [FreeTextEntry6] : pt  feeling  good  taking proccrit saw renal   egfr  20  no smoking wt  stable saw  cardiology restarted  metoprolol and torsemide breathing  better  with torsemide breathing  good baron  getting last dose of procrit  [de-identified] : under  130

## 2022-09-26 NOTE — PHYSICAL EXAM
[No Acute Distress] : no acute distress [PERRL] : pupils equal round and reactive to light [No Lymphadenopathy] : no lymphadenopathy [Normal S1, S2] : normal S1 and S2 [No Edema] : there was no peripheral edema [Normal] : no CVA or spinal tenderness [Grossly Normal Strength/Tone] : grossly normal strength/tone [de-identified] : PERFORATED  NASAL  SEPTUM [de-identified] : systolic  murmur occ ectopy

## 2022-09-27 LAB
ALBUMIN SERPL ELPH-MCNC: 4.1 G/DL
ALP BLD-CCNC: 224 U/L
ALT SERPL-CCNC: 8 U/L
ANION GAP SERPL CALC-SCNC: 15 MMOL/L
AST SERPL-CCNC: 13 U/L
BASOPHILS # BLD AUTO: 0.01 K/UL
BASOPHILS NFR BLD AUTO: 0.2 %
BILIRUB SERPL-MCNC: 0.2 MG/DL
BUN SERPL-MCNC: 46 MG/DL
CALCIUM SERPL-MCNC: 9.6 MG/DL
CHLORIDE SERPL-SCNC: 106 MMOL/L
CHOLEST SERPL-MCNC: 132 MG/DL
CO2 SERPL-SCNC: 25 MMOL/L
CREAT SERPL-MCNC: 2.34 MG/DL
EGFR: 22 ML/MIN/1.73M2
EOSINOPHIL # BLD AUTO: 0.1 K/UL
EOSINOPHIL NFR BLD AUTO: 1.9 %
ESTIMATED AVERAGE GLUCOSE: 126 MG/DL
GLUCOSE SERPL-MCNC: 135 MG/DL
HBA1C MFR BLD HPLC: 6 %
HCT VFR BLD CALC: 33.6 %
HDLC SERPL-MCNC: 30 MG/DL
HGB BLD-MCNC: 10.3 G/DL
IMM GRANULOCYTES NFR BLD AUTO: 0.4 %
LDLC SERPL CALC-MCNC: 51 MG/DL
LYMPHOCYTES # BLD AUTO: 0.9 K/UL
LYMPHOCYTES NFR BLD AUTO: 17 %
MAN DIFF?: NORMAL
MCHC RBC-ENTMCNC: 30.7 GM/DL
MCHC RBC-ENTMCNC: 31.5 PG
MCV RBC AUTO: 102.8 FL
MONOCYTES # BLD AUTO: 0.3 K/UL
MONOCYTES NFR BLD AUTO: 5.7 %
NEUTROPHILS # BLD AUTO: 3.95 K/UL
NEUTROPHILS NFR BLD AUTO: 74.8 %
NONHDLC SERPL-MCNC: 102 MG/DL
NT-PROBNP SERPL-MCNC: 5345 PG/ML
PLATELET # BLD AUTO: 112 K/UL
POTASSIUM SERPL-SCNC: 3.9 MMOL/L
PROT SERPL-MCNC: 6.1 G/DL
RBC # BLD: 3.27 M/UL
RBC # FLD: 15.4 %
SODIUM SERPL-SCNC: 146 MMOL/L
T4 SERPL-MCNC: 7.2 UG/DL
TRIGL SERPL-MCNC: 257 MG/DL
TSH SERPL-ACNC: 1.51 UIU/ML
URATE SERPL-MCNC: 6 MG/DL
WBC # FLD AUTO: 5.28 K/UL

## 2022-10-06 LAB
APPEARANCE: CLEAR
BACTERIA: ABNORMAL
BILIRUBIN URINE: NEGATIVE
BLOOD URINE: NEGATIVE
COLOR: NORMAL
CREAT SPEC-SCNC: 77 MG/DL
GLUCOSE QUALITATIVE U: NORMAL
HYALINE CASTS: 1 /LPF
KETONES URINE: NEGATIVE
LEUKOCYTE ESTERASE URINE: ABNORMAL
MICROALBUMIN 24H UR DL<=1MG/L-MCNC: 5.1 MG/DL
MICROALBUMIN/CREAT 24H UR-RTO: 66 MG/G
MICROSCOPIC-UA: NORMAL
NITRITE URINE: NEGATIVE
PH URINE: 6
PROTEIN URINE: ABNORMAL
RED BLOOD CELLS URINE: 1 /HPF
SPECIFIC GRAVITY URINE: 1.02
SQUAMOUS EPITHELIAL CELLS: 3 /HPF
UROBILINOGEN URINE: NORMAL
WHITE BLOOD CELLS URINE: 5 /HPF

## 2022-10-26 ENCOUNTER — APPOINTMENT (OUTPATIENT)
Dept: CARDIOLOGY | Facility: CLINIC | Age: 72
End: 2022-10-26

## 2022-10-26 ENCOUNTER — NON-APPOINTMENT (OUTPATIENT)
Age: 72
End: 2022-10-26

## 2022-10-26 VITALS
HEIGHT: 62 IN | BODY MASS INDEX: 32.76 KG/M2 | HEART RATE: 70 BPM | WEIGHT: 178 LBS | OXYGEN SATURATION: 94 % | SYSTOLIC BLOOD PRESSURE: 130 MMHG | DIASTOLIC BLOOD PRESSURE: 82 MMHG

## 2022-10-26 PROCEDURE — 99406 BEHAV CHNG SMOKING 3-10 MIN: CPT

## 2022-10-26 PROCEDURE — 93000 ELECTROCARDIOGRAM COMPLETE: CPT

## 2022-10-26 PROCEDURE — 99214 OFFICE O/P EST MOD 30 MIN: CPT | Mod: 25

## 2022-10-26 NOTE — DISCUSSION/SUMMARY
[FreeTextEntry1] : 73 y/o F PMH HLD, HTN, LBBB, CAD (cardiac cath 2021), mod AS, DM, CKD, renal cancer s/p partial nephrectomy.   She presents with worsening edema\par \par CAD:\par medical management\par asymptomatic\par c/w ASA 81mg \par c/w statin - goal LDL <70\par c/w metoprolol\par no ACEi/ARB sec to CKD\par \par HF:\par overloaded today\par increase torsemide to 20mg qd \par c/w metoprolol succinate\par No ACEi/ARB sec to CKD\par check labs\par \par AS:\par asymptomatic - monitor closely\par serial TTEs\par \par CKD:\par avoid nephrotoxins\par \par Discussed smoking cessation in great detail >3min \par Pt will return in 2 mnths or sooner as needed but I encouraged communication via phone or portal if necessary.\par The described plan was discussed with the pt.  All questions and concerns were addressed to the best of my knowledge.

## 2022-10-26 NOTE — HISTORY OF PRESENT ILLNESS
[FreeTextEntry1] : Pt is a 71 y/o F smoker (started again 2 mnths ago), PMH HLD, HTN, LBBB, CAD (cardiac cath 2021), mod AS, DM, CKD, renal cancer s/p partial nephrectomy.  Hospitalized 06/2021 for fall and was found to be in heart failure - she states that she had cardiac cath which showed blockage but they decided medical management  sec to renal function. \par She has noticed that her legs have been swelling more for the past 2 weeks.  She notes that she has been eating pizza and olives. \par She currently takes torsemide 10mg qd, has noticed about a 2lb weight gain\par Ambulates with walker\par BNP 6347 -> 5345\par She is following with renal and is getting procrit.  She has required transfusion from epistaxis in the past\par Home -120's/70-80's\par Cr 2.25 - 2.07 - 2.34\par GFR 22\par COVID vaccine 10/2021 Pfizer\par \par cardiac cath 06/2021 [facility]BU pLAD 70%, D1 70%, mRCA 50%\par TTE 03/2021 mod AS, mod MR\par TTE 06/2022 EF 58%, mild AS MG 21, mild MS, mod-sev MR, sev LAE, mod TR\par \par PMH: HLD, HTN, LBBB, CAD, renal cancer s/p partial nephrectomy 2017.\par Smoking status: 5-6 cigarettes \par social ETOH \par no drug use\par Current exercise: none\par Daily water intake: 1 quart\par Daily caffeine intake: 1 cup tea\par OTC medications: iron, B12, ASA\par Previous hospitalizations: knee replacements, partial nephrectomy - no problems with anesthesia\par

## 2022-10-26 NOTE — REVIEW OF SYSTEMS
[Dyspnea on exertion] : dyspnea during exertion [Negative] : Heme/Lymph [SOB] : no shortness of breath [Leg Claudication] : no intermittent leg claudication

## 2022-10-26 NOTE — PHYSICAL EXAM

## 2022-11-05 ENCOUNTER — NON-APPOINTMENT (OUTPATIENT)
Age: 72
End: 2022-11-05

## 2022-11-05 ENCOUNTER — APPOINTMENT (OUTPATIENT)
Dept: FAMILY MEDICINE | Facility: CLINIC | Age: 72
End: 2022-11-05

## 2022-11-05 VITALS
WEIGHT: 188 LBS | SYSTOLIC BLOOD PRESSURE: 132 MMHG | BODY MASS INDEX: 34.6 KG/M2 | HEART RATE: 90 BPM | TEMPERATURE: 97 F | OXYGEN SATURATION: 95 % | HEIGHT: 62 IN | DIASTOLIC BLOOD PRESSURE: 80 MMHG

## 2022-11-05 PROCEDURE — 99214 OFFICE O/P EST MOD 30 MIN: CPT

## 2022-11-07 LAB
ANION GAP SERPL CALC-SCNC: 17 MMOL/L
BUN SERPL-MCNC: 38 MG/DL
CALCIUM SERPL-MCNC: 9.7 MG/DL
CHLORIDE SERPL-SCNC: 104 MMOL/L
CO2 SERPL-SCNC: 25 MMOL/L
CREAT SERPL-MCNC: 2.39 MG/DL
DEPRECATED D DIMER PPP IA-ACNC: <150 NG/ML DDU
EGFR: 21 ML/MIN/1.73M2
GLUCOSE SERPL-MCNC: 78 MG/DL
NT-PROBNP SERPL-MCNC: 6798 PG/ML
POTASSIUM SERPL-SCNC: 4.3 MMOL/L
SODIUM SERPL-SCNC: 146 MMOL/L

## 2022-11-07 NOTE — HISTORY OF PRESENT ILLNESS
[FreeTextEntry8] : Pt c/o increased swelling of b/l LE. Pt has hx of CHF and sees cardio Dr. Meraz. Torsemide was increased to 20mg q day recently. Pt also elevating legs daily but does not use compression stockings. Pt has hx of CKD and follows with nephrology.

## 2022-11-07 NOTE — ASSESSMENT
[FreeTextEntry1] : CHF, b/l LE edema, hx CKD - check bmp, ddimer, bnp. temporarily inc torsemide to 40mg q day x 2-3 days to help w/ edema, then return to 20mg q day. pt given rx for compression stockings.

## 2022-11-07 NOTE — PHYSICAL EXAM
[Normal] : no acute distress, well nourished, well developed and well-appearing [No Respiratory Distress] : no respiratory distress  [No Accessory Muscle Use] : no accessory muscle use [Clear to Auscultation] : lungs were clear to auscultation bilaterally [Normal Rate] : normal rate  [Regular Rhythm] : with a regular rhythm [Normal S1, S2] : normal S1 and S2

## 2022-11-15 ENCOUNTER — APPOINTMENT (OUTPATIENT)
Dept: FAMILY MEDICINE | Facility: CLINIC | Age: 72
End: 2022-11-15

## 2022-11-15 VITALS
SYSTOLIC BLOOD PRESSURE: 132 MMHG | HEART RATE: 89 BPM | OXYGEN SATURATION: 91 % | DIASTOLIC BLOOD PRESSURE: 64 MMHG | HEIGHT: 62 IN | TEMPERATURE: 96.8 F | BODY MASS INDEX: 36.07 KG/M2 | RESPIRATION RATE: 16 BRPM | WEIGHT: 196 LBS

## 2022-11-15 VITALS — DIASTOLIC BLOOD PRESSURE: 70 MMHG | RESPIRATION RATE: 16 BRPM | HEART RATE: 72 BPM | SYSTOLIC BLOOD PRESSURE: 130 MMHG

## 2022-11-15 PROCEDURE — 29580 STRAPPING UNNA BOOT: CPT

## 2022-11-15 PROCEDURE — 99214 OFFICE O/P EST MOD 30 MIN: CPT | Mod: 25

## 2022-11-15 PROCEDURE — 29540 STRAPPING ANKLE &/FOOT: CPT | Mod: 59

## 2022-11-15 NOTE — ASSESSMENT
[FreeTextEntry1] : EDEMA FROM  CHF AND CKD AND CONTINUE TORSEMIDE 2TOTAL TIME  40 MIN MIN 0 MG  BOTH LEGS WRAPPED WITH UNABOOT  AND ACE BOTH LEGS with 2 ace bandages  up to knees

## 2022-11-15 NOTE — HISTORY OF PRESENT ILLNESS
[FreeTextEntry1] : edema both legs with weeping not responding to increase dose of torsemid  [de-identified] : both legs weeping with 4 + edema

## 2022-11-15 NOTE — PHYSICAL EXAM
[No Acute Distress] : no acute distress [Clear to Auscultation] : lungs were clear to auscultation bilaterally [Regular Rhythm] : with a regular rhythm [Normal] : soft, non-tender, non-distended, no masses palpated, no HSM and normal bowel sounds [de-identified] : 4 = EDEMA

## 2022-11-21 ENCOUNTER — APPOINTMENT (OUTPATIENT)
Dept: FAMILY MEDICINE | Facility: CLINIC | Age: 72
End: 2022-11-21

## 2022-11-21 VITALS
BODY MASS INDEX: 35.35 KG/M2 | HEART RATE: 96 BPM | OXYGEN SATURATION: 92 % | DIASTOLIC BLOOD PRESSURE: 72 MMHG | SYSTOLIC BLOOD PRESSURE: 140 MMHG | TEMPERATURE: 96.9 F | WEIGHT: 197 LBS | HEIGHT: 62.5 IN

## 2022-11-21 PROCEDURE — 99213 OFFICE O/P EST LOW 20 MIN: CPT | Mod: 25

## 2022-11-21 PROCEDURE — 29540 STRAPPING ANKLE &/FOOT: CPT

## 2022-11-21 NOTE — PHYSICAL EXAM
[de-identified] : edema lower    legs  much better srill has some leakage and redness less however  both thighs are  swollen

## 2022-11-21 NOTE — ASSESSMENT
[FreeTextEntry1] : edema somewhat better but now has  edema of thighs will increase torsemide  to 40 mg daily  legs re wrapped with only ace  bandages  rtc  1 wk

## 2022-11-21 NOTE — HISTORY OF PRESENT ILLNESS
[Spouse] : spouse [FreeTextEntry1] : f/u  edema  [de-identified] : lower  legs  much better  but suleiman has  swelling of  thighs

## 2022-11-28 ENCOUNTER — RESULT REVIEW (OUTPATIENT)
Age: 72
End: 2022-11-28

## 2022-11-28 ENCOUNTER — APPOINTMENT (OUTPATIENT)
Dept: FAMILY MEDICINE | Facility: CLINIC | Age: 72
End: 2022-11-28

## 2022-11-28 VITALS
WEIGHT: 200 LBS | TEMPERATURE: 96.7 F | HEIGHT: 62 IN | DIASTOLIC BLOOD PRESSURE: 76 MMHG | SYSTOLIC BLOOD PRESSURE: 126 MMHG | BODY MASS INDEX: 36.8 KG/M2 | HEART RATE: 97 BPM | OXYGEN SATURATION: 91 %

## 2022-11-28 VITALS — DIASTOLIC BLOOD PRESSURE: 80 MMHG | RESPIRATION RATE: 16 BRPM | SYSTOLIC BLOOD PRESSURE: 124 MMHG | HEART RATE: 78 BPM

## 2022-11-28 PROCEDURE — 99214 OFFICE O/P EST MOD 30 MIN: CPT

## 2022-11-28 NOTE — HISTORY OF PRESENT ILLNESS
[FreeTextEntry1] : pt here for management of edema chf ckd  [de-identified] : taking increased  dose of toremide and wrapping legs with ace bandages continues  to gain wt  breathing ok

## 2022-11-28 NOTE — ASSESSMENT
[FreeTextEntry1] : no real change in edema with increase  saurav of torsemide will add zaroxylyn check bmp and bnp  venous dopplers

## 2022-11-28 NOTE — PHYSICAL EXAM
[No Acute Distress] : no acute distress [PERRL] : pupils equal round and reactive to light [Normal TMs] : both tympanic membranes were normal [Supple] : supple [Clear to Auscultation] : lungs were clear to auscultation bilaterally [Regular Rhythm] : with a regular rhythm [Normal] : no posterior cervical lymphadenopathy and no anterior cervical lymphadenopathy [de-identified] : systolic murmur  [de-identified] : 4+ edema up to thighs skin intact no weeping  [de-identified] : stasis  changes  le no weeping

## 2022-11-29 ENCOUNTER — OUTPATIENT (OUTPATIENT)
Dept: OUTPATIENT SERVICES | Facility: HOSPITAL | Age: 72
LOS: 1 days | End: 2022-11-29
Payer: MEDICARE

## 2022-11-29 ENCOUNTER — APPOINTMENT (OUTPATIENT)
Dept: ULTRASOUND IMAGING | Facility: CLINIC | Age: 72
End: 2022-11-29

## 2022-11-29 DIAGNOSIS — R60.0 LOCALIZED EDEMA: ICD-10-CM

## 2022-11-29 DIAGNOSIS — N18.4 CHRONIC KIDNEY DISEASE, STAGE 4 (SEVERE): ICD-10-CM

## 2022-11-29 PROCEDURE — 93970 EXTREMITY STUDY: CPT | Mod: 26

## 2022-11-29 PROCEDURE — 93970 EXTREMITY STUDY: CPT

## 2022-11-30 LAB
ANION GAP SERPL CALC-SCNC: 11 MMOL/L
BUN SERPL-MCNC: 39 MG/DL
CALCIUM SERPL-MCNC: 8.8 MG/DL
CHLORIDE SERPL-SCNC: 107 MMOL/L
CO2 SERPL-SCNC: 28 MMOL/L
CREAT SERPL-MCNC: 2.47 MG/DL
EGFR: 20 ML/MIN/1.73M2
GLUCOSE SERPL-MCNC: 145 MG/DL
NT-PROBNP SERPL-MCNC: 9567 PG/ML
POTASSIUM SERPL-SCNC: 4.6 MMOL/L
SODIUM SERPL-SCNC: 146 MMOL/L

## 2022-12-01 LAB
25(OH)D3 SERPL-MCNC: 35.2 NG/ML
ALBUMIN SERPL ELPH-MCNC: 3.5 G/DL
ALP BLD-CCNC: 227 U/L
ALT SERPL-CCNC: 11 U/L
ANION GAP SERPL CALC-SCNC: 13 MMOL/L
AST SERPL-CCNC: 17 U/L
BASOPHILS # BLD AUTO: 0.03 K/UL
BASOPHILS NFR BLD AUTO: 0.6 %
BILIRUB SERPL-MCNC: 0.3 MG/DL
BUN SERPL-MCNC: 37 MG/DL
CALCIUM SERPL-MCNC: 8.8 MG/DL
CHLORIDE SERPL-SCNC: 105 MMOL/L
CHOLEST SERPL-MCNC: 105 MG/DL
CO2 SERPL-SCNC: 27 MMOL/L
CREAT SERPL-MCNC: 2.48 MG/DL
EGFR: 20 ML/MIN/1.73M2
EOSINOPHIL # BLD AUTO: 0.21 K/UL
EOSINOPHIL NFR BLD AUTO: 4.5 %
ESTIMATED AVERAGE GLUCOSE: 123 MG/DL
FOLATE SERPL-MCNC: 8 NG/ML
GLUCOSE SERPL-MCNC: 145 MG/DL
HBA1C MFR BLD HPLC: 5.9 %
HCT VFR BLD CALC: 31.2 %
HDLC SERPL-MCNC: 30 MG/DL
HGB BLD-MCNC: 9.4 G/DL
IMM GRANULOCYTES NFR BLD AUTO: 0.2 %
LDLC SERPL CALC-MCNC: 38 MG/DL
LYMPHOCYTES # BLD AUTO: 0.7 K/UL
LYMPHOCYTES NFR BLD AUTO: 15.2 %
MAN DIFF?: NORMAL
MCHC RBC-ENTMCNC: 30.1 GM/DL
MCHC RBC-ENTMCNC: 31.2 PG
MCV RBC AUTO: 103.7 FL
MONOCYTES # BLD AUTO: 0.3 K/UL
MONOCYTES NFR BLD AUTO: 6.5 %
NEUTROPHILS # BLD AUTO: 3.37 K/UL
NEUTROPHILS NFR BLD AUTO: 73 %
NONHDLC SERPL-MCNC: 76 MG/DL
PLATELET # BLD AUTO: 125 K/UL
POTASSIUM SERPL-SCNC: 4.4 MMOL/L
PROT SERPL-MCNC: 5.8 G/DL
RBC # BLD: 3.01 M/UL
RBC # FLD: 17 %
SODIUM SERPL-SCNC: 146 MMOL/L
TRIGL SERPL-MCNC: 189 MG/DL
TSH SERPL-ACNC: 1.73 UIU/ML
VIT B12 SERPL-MCNC: >2000 PG/ML
WBC # FLD AUTO: 4.62 K/UL

## 2022-12-19 NOTE — ASSESSMENT
[FreeTextEntry1] : ckd  egfr 20 bp acceptable  hld continue atorvastatin edema lost  12 lbs with metolazone check renal function dm wwell controlled a1c 5.9

## 2022-12-19 NOTE — HISTORY OF PRESENT ILLNESS
[Congestive Heart Failure] : Congestive Heart Failure [Diabetes Mellitus] : Diabetes Mellitus [Hypertension] : Hypertension [Other: ___] : [unfilled] [Check glucose ___ x/week] : Patient checks blood glucose [unfilled]  times a week [Does not check BP] : The patient is not checking blood pressure [<130/90] : Target blood pressure is  <130/90 [Target goal met] : BP target goal met [FreeTextEntry6] : pt lost 12 lbs edema less  has  had several falls using walker

## 2022-12-19 NOTE — PHYSICAL EXAM
[No Acute Distress] : no acute distress [PERRL] : pupils equal round and reactive to light [Normal TMs] : both tympanic membranes were normal [Supple] : supple [Clear to Auscultation] : lungs were clear to auscultation bilaterally [Regular Rhythm] : with a regular rhythm [Normal] : no posterior cervical lymphadenopathy and no anterior cervical lymphadenopathy [de-identified] : systolic murmur  [de-identified] :   2+ edema up to thighs skin intact no weeping  [de-identified] : stasis  changes  le no weeping

## 2022-12-27 NOTE — PHYSICAL EXAM

## 2022-12-27 NOTE — DISCUSSION/SUMMARY
[FreeTextEntry1] : 71 y/o F PMH HLD, HTN, LBBB, CAD (cardiac cath 2021), mod AS, DM, CKD, renal cancer s/p partial nephrectomy.   She presents for f/u acute on chronic HF exacerbation\par \par CAD:\par medical management\par c/w ASA 81mg \par c/w statin - goal LDL <70\par c/w metoprolol\par no ACEi/ARB sec to CKD\par \par HF:\par overloaded today\par c/w torsemide to 20mg qd and metolazone\par c/w metoprolol succinate\par No ACEi/ARB sec to CKD\par check labs in 4 weeks, recent labs reviewed\par check CXR\par refer to renal\par \par AS:\par asymptomatic - monitor closely\par serial TTEs\par \par CKD:\par avoid nephrotoxins\par \par Discussed smoking cessation in great detail >3min \par Pt will return in 2 mnths or sooner as needed but I encouraged communication via phone or portal if necessary.\par The described plan was discussed with the pt and .  All questions and concerns were addressed to the best of my knowledge.

## 2022-12-27 NOTE — HISTORY OF PRESENT ILLNESS
[FreeTextEntry1] : Pt is a 71 y/o F smoker (started again 2 mnths ago), PMH HLD, HTN, LBBB, CAD (cardiac cath 2021), mod AS, DM, CKD, renal cancer s/p partial nephrectomy.  Hospitalized 06/2021 for fall and was found to be in heart failure - she  had cardiac cath which showed blockage but they decided medical management sec to renal function. \par Her swelling has improved after Dr Goel added metolazone 2x/week.  She lost 12 lbs, still with GE\par She currently takes torsemide 20mg qd\par Ambulates with walker\par Her  mentions that she has fallen recently\par BNP 6347 -> 5345\par She was following with renal and is getting procrit.  She has required transfusion from epistaxis in the past\par Home -120's/70-80's\par Cr 2.25 - 2.07 - 2.34 - 2.69\par GFR 22 - 18\par \par cardiac cath 06/2021 [facility]BU pLAD 70%, D1 70%, mRCA 50%\par TTE 03/2021 mod AS, mod MR\par TTE 06/2022 EF 58%, mild AS MG 21, mild MS, mod-sev MR, sev LAE, mod TR\par \par PMH: HLD, HTN, LBBB, CAD, renal cancer s/p partial nephrectomy 2017.\par Smoking status: 5-6 cigarettes \par social ETOH \par no drug use\par Current exercise: none\par Daily water intake: 1 quart\par Daily caffeine intake: 1 cup tea\par OTC medications: iron, B12, ASA\par Previous hospitalizations: knee replacements, partial nephrectomy - no problems with anesthesia\par

## 2023-01-01 ENCOUNTER — APPOINTMENT (OUTPATIENT)
Dept: NEPHROLOGY | Facility: CLINIC | Age: 73
End: 2023-01-01
Payer: MEDICARE

## 2023-01-01 ENCOUNTER — APPOINTMENT (OUTPATIENT)
Dept: FAMILY MEDICINE | Facility: CLINIC | Age: 73
End: 2023-01-01
Payer: MEDICARE

## 2023-01-01 ENCOUNTER — LABORATORY RESULT (OUTPATIENT)
Age: 73
End: 2023-01-01

## 2023-01-01 ENCOUNTER — TRANSCRIPTION ENCOUNTER (OUTPATIENT)
Age: 73
End: 2023-01-01

## 2023-01-01 ENCOUNTER — APPOINTMENT (OUTPATIENT)
Dept: VASCULAR SURGERY | Facility: CLINIC | Age: 73
End: 2023-01-01
Payer: MEDICARE

## 2023-01-01 ENCOUNTER — APPOINTMENT (OUTPATIENT)
Dept: VASCULAR SURGERY | Facility: CLINIC | Age: 73
End: 2023-01-01

## 2023-01-01 ENCOUNTER — FORM ENCOUNTER (OUTPATIENT)
Age: 73
End: 2023-01-01

## 2023-01-01 ENCOUNTER — APPOINTMENT (OUTPATIENT)
Dept: CARDIOLOGY | Facility: CLINIC | Age: 73
End: 2023-01-01
Payer: MEDICARE

## 2023-01-01 ENCOUNTER — APPOINTMENT (OUTPATIENT)
Dept: MRI IMAGING | Facility: CLINIC | Age: 73
End: 2023-01-01
Payer: MEDICARE

## 2023-01-01 ENCOUNTER — APPOINTMENT (OUTPATIENT)
Dept: VASCULAR SURGERY | Facility: HOSPITAL | Age: 73
End: 2023-01-01

## 2023-01-01 ENCOUNTER — NON-APPOINTMENT (OUTPATIENT)
Age: 73
End: 2023-01-01

## 2023-01-01 ENCOUNTER — RX CHANGE (OUTPATIENT)
Age: 73
End: 2023-01-01

## 2023-01-01 ENCOUNTER — OUTPATIENT (OUTPATIENT)
Dept: OUTPATIENT SERVICES | Facility: HOSPITAL | Age: 73
LOS: 1 days | End: 2023-01-01
Payer: MEDICARE

## 2023-01-01 ENCOUNTER — APPOINTMENT (OUTPATIENT)
Dept: INTERNAL MEDICINE | Facility: CLINIC | Age: 73
End: 2023-01-01

## 2023-01-01 ENCOUNTER — APPOINTMENT (OUTPATIENT)
Dept: NEPHROLOGY | Facility: CLINIC | Age: 73
End: 2023-01-01

## 2023-01-01 ENCOUNTER — APPOINTMENT (OUTPATIENT)
Dept: NEUROLOGY | Facility: CLINIC | Age: 73
End: 2023-01-01

## 2023-01-01 ENCOUNTER — INPATIENT (INPATIENT)
Facility: HOSPITAL | Age: 73
LOS: 12 days | Discharge: NURSING, REHAB OR LONG-TERM CARE | DRG: 981 | End: 2023-09-27
Attending: STUDENT IN AN ORGANIZED HEALTH CARE EDUCATION/TRAINING PROGRAM | Admitting: HOSPITALIST
Payer: MEDICARE

## 2023-01-01 ENCOUNTER — INPATIENT (INPATIENT)
Facility: HOSPITAL | Age: 73
LOS: 1 days | Discharge: HOME | DRG: 264 | End: 2023-06-02
Attending: SURGERY | Admitting: SURGERY
Payer: MEDICARE

## 2023-01-01 ENCOUNTER — OUTPATIENT (OUTPATIENT)
Dept: OUTPATIENT SERVICES | Facility: HOSPITAL | Age: 73
LOS: 1 days | Discharge: HOME | End: 2023-01-01
Payer: MEDICARE

## 2023-01-01 ENCOUNTER — APPOINTMENT (OUTPATIENT)
Dept: FAMILY MEDICINE | Facility: CLINIC | Age: 73
End: 2023-01-01

## 2023-01-01 ENCOUNTER — APPOINTMENT (OUTPATIENT)
Dept: ULTRASOUND IMAGING | Facility: CLINIC | Age: 73
End: 2023-01-01

## 2023-01-01 ENCOUNTER — APPOINTMENT (OUTPATIENT)
Dept: ULTRASOUND IMAGING | Facility: CLINIC | Age: 73
End: 2023-01-01
Payer: MEDICARE

## 2023-01-01 ENCOUNTER — APPOINTMENT (OUTPATIENT)
Dept: NEUROLOGY | Facility: CLINIC | Age: 73
End: 2023-01-01
Payer: MEDICARE

## 2023-01-01 VITALS
OXYGEN SATURATION: 91 % | RESPIRATION RATE: 16 BRPM | SYSTOLIC BLOOD PRESSURE: 109 MMHG | TEMPERATURE: 97.9 F | HEART RATE: 87 BPM | DIASTOLIC BLOOD PRESSURE: 64 MMHG

## 2023-01-01 VITALS
SYSTOLIC BLOOD PRESSURE: 108 MMHG | HEART RATE: 80 BPM | OXYGEN SATURATION: 96 % | RESPIRATION RATE: 17 BRPM | TEMPERATURE: 97.6 F | DIASTOLIC BLOOD PRESSURE: 59 MMHG

## 2023-01-01 VITALS — DIASTOLIC BLOOD PRESSURE: 70 MMHG | HEART RATE: 72 BPM | SYSTOLIC BLOOD PRESSURE: 120 MMHG | RESPIRATION RATE: 16 BRPM

## 2023-01-01 VITALS
RESPIRATION RATE: 16 BRPM | WEIGHT: 189 LBS | DIASTOLIC BLOOD PRESSURE: 61 MMHG | BODY MASS INDEX: 38.1 KG/M2 | OXYGEN SATURATION: 95 % | SYSTOLIC BLOOD PRESSURE: 106 MMHG | TEMPERATURE: 97.5 F | HEART RATE: 92 BPM | HEIGHT: 59 IN

## 2023-01-01 VITALS
OXYGEN SATURATION: 98 % | HEART RATE: 65 BPM | WEIGHT: 165 LBS | SYSTOLIC BLOOD PRESSURE: 110 MMHG | BODY MASS INDEX: 30.36 KG/M2 | HEIGHT: 62 IN | DIASTOLIC BLOOD PRESSURE: 60 MMHG

## 2023-01-01 VITALS — RESPIRATION RATE: 16 BRPM | SYSTOLIC BLOOD PRESSURE: 130 MMHG | DIASTOLIC BLOOD PRESSURE: 80 MMHG | HEART RATE: 76 BPM

## 2023-01-01 VITALS
OXYGEN SATURATION: 90 % | DIASTOLIC BLOOD PRESSURE: 58 MMHG | SYSTOLIC BLOOD PRESSURE: 110 MMHG | RESPIRATION RATE: 16 BRPM | TEMPERATURE: 98.2 F | HEART RATE: 83 BPM

## 2023-01-01 VITALS
HEART RATE: 98 BPM | TEMPERATURE: 97.5 F | SYSTOLIC BLOOD PRESSURE: 130 MMHG | OXYGEN SATURATION: 87 % | BODY MASS INDEX: 34.41 KG/M2 | WEIGHT: 187 LBS | DIASTOLIC BLOOD PRESSURE: 60 MMHG | HEIGHT: 62 IN

## 2023-01-01 VITALS
BODY MASS INDEX: 38.1 KG/M2 | DIASTOLIC BLOOD PRESSURE: 42 MMHG | OXYGEN SATURATION: 94 % | WEIGHT: 189 LBS | HEIGHT: 59 IN | HEART RATE: 69 BPM | SYSTOLIC BLOOD PRESSURE: 110 MMHG

## 2023-01-01 VITALS — HEART RATE: 76 BPM | SYSTOLIC BLOOD PRESSURE: 126 MMHG | DIASTOLIC BLOOD PRESSURE: 70 MMHG | RESPIRATION RATE: 16 BRPM

## 2023-01-01 VITALS
HEIGHT: 62 IN | RESPIRATION RATE: 17 BRPM | SYSTOLIC BLOOD PRESSURE: 131 MMHG | OXYGEN SATURATION: 95 % | WEIGHT: 173.06 LBS | TEMPERATURE: 98 F | DIASTOLIC BLOOD PRESSURE: 54 MMHG | HEART RATE: 86 BPM

## 2023-01-01 VITALS
OXYGEN SATURATION: 97 % | HEIGHT: 62 IN | HEIGHT: 62 IN | HEART RATE: 74 BPM | OXYGEN SATURATION: 97 % | WEIGHT: 186 LBS | SYSTOLIC BLOOD PRESSURE: 115 MMHG | SYSTOLIC BLOOD PRESSURE: 117 MMHG | WEIGHT: 165 LBS | DIASTOLIC BLOOD PRESSURE: 78 MMHG | BODY MASS INDEX: 34.23 KG/M2 | TEMPERATURE: 98 F | DIASTOLIC BLOOD PRESSURE: 71 MMHG | BODY MASS INDEX: 30.36 KG/M2 | HEART RATE: 80 BPM

## 2023-01-01 VITALS
DIASTOLIC BLOOD PRESSURE: 58 MMHG | HEIGHT: 62 IN | WEIGHT: 165 LBS | HEART RATE: 78 BPM | SYSTOLIC BLOOD PRESSURE: 120 MMHG | SYSTOLIC BLOOD PRESSURE: 114 MMHG | HEART RATE: 80 BPM | RESPIRATION RATE: 16 BRPM | DIASTOLIC BLOOD PRESSURE: 76 MMHG | BODY MASS INDEX: 30.36 KG/M2

## 2023-01-01 VITALS
DIASTOLIC BLOOD PRESSURE: 66 MMHG | WEIGHT: 190 LBS | BODY MASS INDEX: 34.96 KG/M2 | TEMPERATURE: 97.6 F | HEIGHT: 62 IN | OXYGEN SATURATION: 92 % | SYSTOLIC BLOOD PRESSURE: 132 MMHG | HEART RATE: 93 BPM

## 2023-01-01 VITALS — RESPIRATION RATE: 16 BRPM | HEART RATE: 72 BPM | DIASTOLIC BLOOD PRESSURE: 70 MMHG | SYSTOLIC BLOOD PRESSURE: 100 MMHG

## 2023-01-01 VITALS
HEART RATE: 84 BPM | DIASTOLIC BLOOD PRESSURE: 49 MMHG | SYSTOLIC BLOOD PRESSURE: 107 MMHG | DIASTOLIC BLOOD PRESSURE: 66 MMHG | RESPIRATION RATE: 16 BRPM | HEART RATE: 80 BPM | SYSTOLIC BLOOD PRESSURE: 106 MMHG | RESPIRATION RATE: 16 BRPM | TEMPERATURE: 97.9 F | OXYGEN SATURATION: 95 % | OXYGEN SATURATION: 90 %

## 2023-01-01 VITALS
WEIGHT: 189 LBS | RESPIRATION RATE: 16 BRPM | HEIGHT: 59 IN | TEMPERATURE: 97.4 F | HEART RATE: 78 BPM | SYSTOLIC BLOOD PRESSURE: 123 MMHG | OXYGEN SATURATION: 92 % | BODY MASS INDEX: 38.1 KG/M2 | DIASTOLIC BLOOD PRESSURE: 66 MMHG

## 2023-01-01 VITALS
TEMPERATURE: 98 F | SYSTOLIC BLOOD PRESSURE: 128 MMHG | BODY MASS INDEX: 38.1 KG/M2 | HEART RATE: 96 BPM | RESPIRATION RATE: 16 BRPM | HEIGHT: 59 IN | WEIGHT: 189 LBS | OXYGEN SATURATION: 90 % | DIASTOLIC BLOOD PRESSURE: 66 MMHG

## 2023-01-01 VITALS
HEART RATE: 89 BPM | OXYGEN SATURATION: 95 % | DIASTOLIC BLOOD PRESSURE: 60 MMHG | SYSTOLIC BLOOD PRESSURE: 134 MMHG | RESPIRATION RATE: 19 BRPM

## 2023-01-01 VITALS
TEMPERATURE: 99 F | SYSTOLIC BLOOD PRESSURE: 115 MMHG | DIASTOLIC BLOOD PRESSURE: 70 MMHG | RESPIRATION RATE: 16 BRPM | HEIGHT: 62 IN | WEIGHT: 185.19 LBS | OXYGEN SATURATION: 96 % | HEART RATE: 83 BPM

## 2023-01-01 VITALS
DIASTOLIC BLOOD PRESSURE: 67 MMHG | SYSTOLIC BLOOD PRESSURE: 109 MMHG | OXYGEN SATURATION: 95 % | WEIGHT: 182.1 LBS | HEART RATE: 86 BPM | RESPIRATION RATE: 18 BRPM | TEMPERATURE: 98 F | HEIGHT: 62 IN

## 2023-01-01 VITALS
HEIGHT: 59 IN | WEIGHT: 185 LBS | OXYGEN SATURATION: 94 % | BODY MASS INDEX: 37.29 KG/M2 | HEART RATE: 74 BPM | SYSTOLIC BLOOD PRESSURE: 124 MMHG | DIASTOLIC BLOOD PRESSURE: 72 MMHG

## 2023-01-01 VITALS
OXYGEN SATURATION: 95 % | HEART RATE: 84 BPM | SYSTOLIC BLOOD PRESSURE: 113 MMHG | DIASTOLIC BLOOD PRESSURE: 65 MMHG | RESPIRATION RATE: 16 BRPM | TEMPERATURE: 97.7 F

## 2023-01-01 VITALS
HEART RATE: 79 BPM | DIASTOLIC BLOOD PRESSURE: 64 MMHG | HEIGHT: 59 IN | TEMPERATURE: 97.3 F | BODY MASS INDEX: 37.29 KG/M2 | OXYGEN SATURATION: 90 % | WEIGHT: 185 LBS | SYSTOLIC BLOOD PRESSURE: 124 MMHG | RESPIRATION RATE: 16 BRPM

## 2023-01-01 VITALS
WEIGHT: 178 LBS | HEIGHT: 59 IN | BODY MASS INDEX: 35.88 KG/M2 | HEART RATE: 76 BPM | SYSTOLIC BLOOD PRESSURE: 102 MMHG | DIASTOLIC BLOOD PRESSURE: 52 MMHG

## 2023-01-01 VITALS
RESPIRATION RATE: 18 BRPM | SYSTOLIC BLOOD PRESSURE: 105 MMHG | OXYGEN SATURATION: 98 % | DIASTOLIC BLOOD PRESSURE: 71 MMHG | HEART RATE: 67 BPM | TEMPERATURE: 98 F

## 2023-01-01 VITALS
TEMPERATURE: 98 F | DIASTOLIC BLOOD PRESSURE: 56 MMHG | HEART RATE: 77 BPM | RESPIRATION RATE: 18 BRPM | OXYGEN SATURATION: 99 % | SYSTOLIC BLOOD PRESSURE: 95 MMHG

## 2023-01-01 VITALS
HEART RATE: 72 BPM | HEIGHT: 59 IN | HEART RATE: 81 BPM | SYSTOLIC BLOOD PRESSURE: 118 MMHG | RESPIRATION RATE: 16 BRPM | WEIGHT: 180 LBS | DIASTOLIC BLOOD PRESSURE: 50 MMHG | OXYGEN SATURATION: 93 % | SYSTOLIC BLOOD PRESSURE: 110 MMHG | BODY MASS INDEX: 36.29 KG/M2 | DIASTOLIC BLOOD PRESSURE: 70 MMHG

## 2023-01-01 VITALS — HEART RATE: 72 BPM | SYSTOLIC BLOOD PRESSURE: 110 MMHG | RESPIRATION RATE: 16 BRPM | DIASTOLIC BLOOD PRESSURE: 70 MMHG

## 2023-01-01 VITALS
HEIGHT: 59 IN | TEMPERATURE: 98 F | WEIGHT: 180 LBS | OXYGEN SATURATION: 97 % | RESPIRATION RATE: 16 BRPM | DIASTOLIC BLOOD PRESSURE: 55 MMHG | SYSTOLIC BLOOD PRESSURE: 106 MMHG | BODY MASS INDEX: 36.29 KG/M2 | HEART RATE: 85 BPM

## 2023-01-01 VITALS
WEIGHT: 183 LBS | BODY MASS INDEX: 33.68 KG/M2 | DIASTOLIC BLOOD PRESSURE: 74 MMHG | HEIGHT: 62 IN | SYSTOLIC BLOOD PRESSURE: 134 MMHG

## 2023-01-01 VITALS
HEIGHT: 59 IN | DIASTOLIC BLOOD PRESSURE: 60 MMHG | RESPIRATION RATE: 16 BRPM | HEART RATE: 73 BPM | OXYGEN SATURATION: 95 % | WEIGHT: 184 LBS | SYSTOLIC BLOOD PRESSURE: 128 MMHG | BODY MASS INDEX: 37.09 KG/M2 | TEMPERATURE: 97.7 F

## 2023-01-01 VITALS
SYSTOLIC BLOOD PRESSURE: 120 MMHG | HEART RATE: 70 BPM | BODY MASS INDEX: 34.96 KG/M2 | OXYGEN SATURATION: 96 % | WEIGHT: 190 LBS | DIASTOLIC BLOOD PRESSURE: 77 MMHG | HEIGHT: 62 IN | TEMPERATURE: 97.3 F

## 2023-01-01 VITALS
DIASTOLIC BLOOD PRESSURE: 64 MMHG | OXYGEN SATURATION: 95 % | HEIGHT: 59 IN | TEMPERATURE: 98 F | SYSTOLIC BLOOD PRESSURE: 106 MMHG | RESPIRATION RATE: 16 BRPM | HEART RATE: 91 BPM

## 2023-01-01 VITALS
TEMPERATURE: 98.4 F | DIASTOLIC BLOOD PRESSURE: 60 MMHG | WEIGHT: 181 LBS | HEIGHT: 62 IN | BODY MASS INDEX: 33.31 KG/M2 | SYSTOLIC BLOOD PRESSURE: 124 MMHG

## 2023-01-01 VITALS
HEART RATE: 103 BPM | OXYGEN SATURATION: 90 % | WEIGHT: 181 LBS | TEMPERATURE: 97.3 F | SYSTOLIC BLOOD PRESSURE: 140 MMHG | RESPIRATION RATE: 16 BRPM | DIASTOLIC BLOOD PRESSURE: 70 MMHG | HEIGHT: 62 IN | BODY MASS INDEX: 33.31 KG/M2

## 2023-01-01 VITALS
HEART RATE: 73 BPM | DIASTOLIC BLOOD PRESSURE: 67 MMHG | TEMPERATURE: 97.2 F | OXYGEN SATURATION: 98 % | SYSTOLIC BLOOD PRESSURE: 125 MMHG | RESPIRATION RATE: 15 BRPM

## 2023-01-01 VITALS
BODY MASS INDEX: 34.41 KG/M2 | TEMPERATURE: 98.2 F | WEIGHT: 187 LBS | HEART RATE: 64 BPM | SYSTOLIC BLOOD PRESSURE: 110 MMHG | OXYGEN SATURATION: 94 % | DIASTOLIC BLOOD PRESSURE: 48 MMHG | HEIGHT: 62 IN

## 2023-01-01 VITALS — RESPIRATION RATE: 16 BRPM | HEART RATE: 72 BPM | DIASTOLIC BLOOD PRESSURE: 80 MMHG | SYSTOLIC BLOOD PRESSURE: 110 MMHG

## 2023-01-01 VITALS
HEART RATE: 72 BPM | WEIGHT: 178.13 LBS | DIASTOLIC BLOOD PRESSURE: 58 MMHG | TEMPERATURE: 97 F | BODY MASS INDEX: 35.91 KG/M2 | SYSTOLIC BLOOD PRESSURE: 106 MMHG | HEIGHT: 59 IN | OXYGEN SATURATION: 93 %

## 2023-01-01 VITALS
HEART RATE: 72 BPM | DIASTOLIC BLOOD PRESSURE: 52 MMHG | BODY MASS INDEX: 38.1 KG/M2 | HEIGHT: 59 IN | SYSTOLIC BLOOD PRESSURE: 108 MMHG | WEIGHT: 189 LBS

## 2023-01-01 VITALS
HEIGHT: 62 IN | DIASTOLIC BLOOD PRESSURE: 55 MMHG | OXYGEN SATURATION: 95 % | TEMPERATURE: 98 F | SYSTOLIC BLOOD PRESSURE: 125 MMHG | HEART RATE: 88 BPM | WEIGHT: 185.19 LBS | RESPIRATION RATE: 18 BRPM

## 2023-01-01 VITALS
WEIGHT: 165 LBS | DIASTOLIC BLOOD PRESSURE: 70 MMHG | BODY MASS INDEX: 30.36 KG/M2 | HEIGHT: 62 IN | SYSTOLIC BLOOD PRESSURE: 118 MMHG | OXYGEN SATURATION: 94 % | HEART RATE: 74 BPM

## 2023-01-01 VITALS
HEART RATE: 92 BPM | DIASTOLIC BLOOD PRESSURE: 68 MMHG | SYSTOLIC BLOOD PRESSURE: 121 MMHG | OXYGEN SATURATION: 95 % | RESPIRATION RATE: 16 BRPM | TEMPERATURE: 97.5 F

## 2023-01-01 VITALS
HEART RATE: 81 BPM | SYSTOLIC BLOOD PRESSURE: 122 MMHG | BODY MASS INDEX: 37.17 KG/M2 | WEIGHT: 184.38 LBS | DIASTOLIC BLOOD PRESSURE: 60 MMHG | TEMPERATURE: 97.5 F | HEIGHT: 59 IN | OXYGEN SATURATION: 94 %

## 2023-01-01 VITALS
HEART RATE: 99 BPM | DIASTOLIC BLOOD PRESSURE: 42 MMHG | WEIGHT: 182 LBS | BODY MASS INDEX: 36.69 KG/M2 | HEIGHT: 59 IN | SYSTOLIC BLOOD PRESSURE: 98 MMHG | OXYGEN SATURATION: 94 %

## 2023-01-01 DIAGNOSIS — Z96.659 PRESENCE OF UNSPECIFIED ARTIFICIAL KNEE JOINT: Chronic | ICD-10-CM

## 2023-01-01 DIAGNOSIS — N18.4 CHRONIC KIDNEY DISEASE, STAGE 4 (SEVERE): ICD-10-CM

## 2023-01-01 DIAGNOSIS — I89.0 LYMPHEDEMA, NOT ELSEWHERE CLASSIFIED: ICD-10-CM

## 2023-01-01 DIAGNOSIS — N18.30 CHRONIC KIDNEY DISEASE, STAGE 3 UNSPECIFIED: ICD-10-CM

## 2023-01-01 DIAGNOSIS — N18.6 END STAGE RENAL DISEASE: ICD-10-CM

## 2023-01-01 DIAGNOSIS — I83.029 VARICOSE VEINS OF LEFT LOWER EXTREMITY WITH ULCER OF UNSPECIFIED SITE: ICD-10-CM

## 2023-01-01 DIAGNOSIS — M10.9 GOUT, UNSPECIFIED: ICD-10-CM

## 2023-01-01 DIAGNOSIS — I25.10 ATHEROSCLEROTIC HEART DISEASE OF NATIVE CORONARY ARTERY WITHOUT ANGINA PECTORIS: ICD-10-CM

## 2023-01-01 DIAGNOSIS — I87.2 VENOUS INSUFFICIENCY (CHRONIC) (PERIPHERAL): ICD-10-CM

## 2023-01-01 DIAGNOSIS — Z20.828 CONTACT WITH AND (SUSPECTED) EXPOSURE TO OTHER VIRAL COMMUNICABLE DISEASES: ICD-10-CM

## 2023-01-01 DIAGNOSIS — D64.9 ANEMIA, UNSPECIFIED: ICD-10-CM

## 2023-01-01 DIAGNOSIS — L03.114 CELLULITIS OF LEFT UPPER LIMB: ICD-10-CM

## 2023-01-01 DIAGNOSIS — J44.9 CHRONIC OBSTRUCTIVE PULMONARY DISEASE, UNSPECIFIED: ICD-10-CM

## 2023-01-01 DIAGNOSIS — L03.115 CELLULITIS OF RIGHT LOWER LIMB: ICD-10-CM

## 2023-01-01 DIAGNOSIS — I10 ESSENTIAL (PRIMARY) HYPERTENSION: ICD-10-CM

## 2023-01-01 DIAGNOSIS — F17.210 NICOTINE DEPENDENCE, CIGARETTES, UNCOMPLICATED: ICD-10-CM

## 2023-01-01 DIAGNOSIS — N18.2 CHRONIC KIDNEY DISEASE, STAGE 2 (MILD): ICD-10-CM

## 2023-01-01 DIAGNOSIS — N28.1 CYST OF KIDNEY, ACQUIRED: ICD-10-CM

## 2023-01-01 DIAGNOSIS — Z90.5 ACQUIRED ABSENCE OF KIDNEY: Chronic | ICD-10-CM

## 2023-01-01 DIAGNOSIS — F17.200 NICOTINE DEPENDENCE, UNSPECIFIED, UNCOMPLICATED: ICD-10-CM

## 2023-01-01 DIAGNOSIS — E11.9 TYPE 2 DIABETES MELLITUS W/OUT COMPLICATIONS: ICD-10-CM

## 2023-01-01 DIAGNOSIS — R27.0 ATAXIA, UNSPECIFIED: ICD-10-CM

## 2023-01-01 DIAGNOSIS — Z01.810 ENCOUNTER FOR PREPROCEDURAL CARDIOVASCULAR EXAMINATION: ICD-10-CM

## 2023-01-01 DIAGNOSIS — R74.8 ABNORMAL LEVELS OF OTHER SERUM ENZYMES: ICD-10-CM

## 2023-01-01 DIAGNOSIS — J96.02 ACUTE RESPIRATORY FAILURE WITH HYPERCAPNIA: ICD-10-CM

## 2023-01-01 DIAGNOSIS — F41.9 ANXIETY DISORDER, UNSPECIFIED: ICD-10-CM

## 2023-01-01 DIAGNOSIS — R29.6 REPEATED FALLS: ICD-10-CM

## 2023-01-01 DIAGNOSIS — I35.0 NONRHEUMATIC AORTIC (VALVE) STENOSIS: ICD-10-CM

## 2023-01-01 DIAGNOSIS — R11.2 NAUSEA WITH VOMITING, UNSPECIFIED: ICD-10-CM

## 2023-01-01 DIAGNOSIS — Z98.890 OTHER SPECIFIED POSTPROCEDURAL STATES: Chronic | ICD-10-CM

## 2023-01-01 DIAGNOSIS — Z86.39 PERSONAL HISTORY OF OTHER ENDOCRINE, NUTRITIONAL AND METABOLIC DISEASE: Chronic | ICD-10-CM

## 2023-01-01 DIAGNOSIS — I74.2 EMBOLISM AND THROMBOSIS OF ARTERIES OF THE UPPER EXTREMITIES: ICD-10-CM

## 2023-01-01 DIAGNOSIS — R60.0 LOCALIZED EDEMA: ICD-10-CM

## 2023-01-01 DIAGNOSIS — L97.929 VARICOSE VEINS OF LEFT LOWER EXTREMITY WITH ULCER OF UNSPECIFIED SITE: ICD-10-CM

## 2023-01-01 DIAGNOSIS — E87.2 ACIDOSIS: ICD-10-CM

## 2023-01-01 DIAGNOSIS — D63.8 ANEMIA IN OTHER CHRONIC DISEASES CLASSIFIED ELSEWHERE: ICD-10-CM
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
## 2023-01-01 DIAGNOSIS — N39.0 URINARY TRACT INFECTION, SITE NOT SPECIFIED: ICD-10-CM

## 2023-01-01 DIAGNOSIS — Z86.79 PERSONAL HISTORY OF OTHER DISEASES OF THE CIRCULATORY SYSTEM: ICD-10-CM

## 2023-01-01 DIAGNOSIS — Z29.9 ENCOUNTER FOR PROPHYLACTIC MEASURES, UNSPECIFIED: ICD-10-CM

## 2023-01-01 DIAGNOSIS — Z01.818 ENCOUNTER FOR OTHER PREPROCEDURAL EXAMINATION: ICD-10-CM

## 2023-01-01 DIAGNOSIS — T82.858A STENOSIS OF OTHER VASCULAR PROSTHETIC DEVICES, IMPLANTS AND GRAFTS, INITIAL ENCOUNTER: ICD-10-CM

## 2023-01-01 DIAGNOSIS — E87.5 HYPERKALEMIA: ICD-10-CM
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
## 2023-01-01 DIAGNOSIS — I25.10 ATHEROSCLEROTIC HEART DISEASE OF NATIVE CORONARY ARTERY W/OUT ANGINA PECTORIS: ICD-10-CM

## 2023-01-01 DIAGNOSIS — I73.9 PERIPHERAL VASCULAR DISEASE, UNSPECIFIED: ICD-10-CM

## 2023-01-01 DIAGNOSIS — N25.81 SECONDARY HYPERPARATHYROIDISM OF RENAL ORIGIN: ICD-10-CM

## 2023-01-01 DIAGNOSIS — E55.9 VITAMIN D DEFICIENCY, UNSPECIFIED: ICD-10-CM

## 2023-01-01 DIAGNOSIS — I50.9 HEART FAILURE, UNSPECIFIED: ICD-10-CM

## 2023-01-01 DIAGNOSIS — E78.5 HYPERLIPIDEMIA, UNSPECIFIED: ICD-10-CM

## 2023-01-01 DIAGNOSIS — L03.116 CELLULITIS OF LEFT LOWER LIMB: ICD-10-CM

## 2023-01-01 DIAGNOSIS — D56.9 THALASSEMIA, UNSPECIFIED: ICD-10-CM

## 2023-01-01 LAB
A1C WITH ESTIMATED AVERAGE GLUCOSE RESULT: 5.4 % — SIGNIFICANT CHANGE UP (ref 4–5.6)
A1C WITH ESTIMATED AVERAGE GLUCOSE RESULT: 5.5 % — SIGNIFICANT CHANGE UP (ref 4–5.6)
ABO RH CONFIRMATION: SIGNIFICANT CHANGE UP
ALBUMIN SERPL ELPH-MCNC: 2.8 G/DL — LOW (ref 3.3–5.2)
ALBUMIN SERPL ELPH-MCNC: 3 G/DL — LOW (ref 3.3–5.2)
ALBUMIN SERPL ELPH-MCNC: 3.4 G/DL — SIGNIFICANT CHANGE UP (ref 3.3–5.2)
ALBUMIN SERPL ELPH-MCNC: 3.5 G/DL — SIGNIFICANT CHANGE UP (ref 3.3–5.2)
ALBUMIN SERPL ELPH-MCNC: 3.8 G/DL
ALBUMIN SERPL ELPH-MCNC: 3.8 G/DL
ALBUMIN SERPL ELPH-MCNC: 3.9 G/DL
ALBUMIN SERPL ELPH-MCNC: 4.1 G/DL
ALP BLD-CCNC: 175 U/L
ALP BLD-CCNC: 216 U/L
ALP BLD-CCNC: 254 U/L
ALP BLD-CCNC: 273 U/L
ALP SERPL-CCNC: 153 U/L — HIGH (ref 40–120)
ALP SERPL-CCNC: 158 U/L — HIGH (ref 40–120)
ALP SERPL-CCNC: 160 U/L — HIGH (ref 40–120)
ALP SERPL-CCNC: 169 U/L — HIGH (ref 40–120)
ALP SERPL-CCNC: 169 U/L — HIGH (ref 40–120)
ALT FLD-CCNC: 10 U/L — SIGNIFICANT CHANGE UP
ALT FLD-CCNC: 15 U/L — SIGNIFICANT CHANGE UP
ALT FLD-CCNC: 8 U/L — SIGNIFICANT CHANGE UP
ALT FLD-CCNC: 9 U/L — SIGNIFICANT CHANGE UP
ALT FLD-CCNC: <5 U/L — SIGNIFICANT CHANGE UP
ALT SERPL-CCNC: 5 U/L
ALT SERPL-CCNC: 6 U/L
ALT SERPL-CCNC: 9 U/L
ALT SERPL-CCNC: 9 U/L
AMMONIA BLD-MCNC: 29 UMOL/L — SIGNIFICANT CHANGE UP (ref 11–55)
ANION GAP SERPL CALC-SCNC: 11 MMOL/L — SIGNIFICANT CHANGE UP (ref 5–17)
ANION GAP SERPL CALC-SCNC: 11 MMOL/L — SIGNIFICANT CHANGE UP (ref 5–17)
ANION GAP SERPL CALC-SCNC: 12 MMOL/L — SIGNIFICANT CHANGE UP (ref 5–17)
ANION GAP SERPL CALC-SCNC: 12 MMOL/L — SIGNIFICANT CHANGE UP (ref 5–17)
ANION GAP SERPL CALC-SCNC: 13 MMOL/L — SIGNIFICANT CHANGE UP (ref 5–17)
ANION GAP SERPL CALC-SCNC: 13 MMOL/L — SIGNIFICANT CHANGE UP (ref 5–17)
ANION GAP SERPL CALC-SCNC: 14 MMOL/L
ANION GAP SERPL CALC-SCNC: 15 MMOL/L — SIGNIFICANT CHANGE UP (ref 5–17)
ANION GAP SERPL CALC-SCNC: 15 MMOL/L — SIGNIFICANT CHANGE UP (ref 5–17)
ANION GAP SERPL CALC-SCNC: 16 MMOL/L — SIGNIFICANT CHANGE UP (ref 5–17)
ANION GAP SERPL CALC-SCNC: 17 MMOL/L
ANION GAP SERPL CALC-SCNC: 17 MMOL/L — SIGNIFICANT CHANGE UP (ref 5–17)
ANION GAP SERPL CALC-SCNC: 18 MMOL/L — HIGH (ref 5–17)
ANION GAP SERPL CALC-SCNC: 19 MMOL/L
ANION GAP SERPL CALC-SCNC: 19 MMOL/L — HIGH (ref 5–17)
ANION GAP SERPL CALC-SCNC: 19 MMOL/L — HIGH (ref 5–17)
ANION GAP SERPL CALC-SCNC: 21 MMOL/L — HIGH (ref 5–17)
ANION GAP SERPL CALC-SCNC: 21 MMOL/L — HIGH (ref 5–17)
ANION GAP SERPL CALC-SCNC: 24 MMOL/L — HIGH (ref 5–17)
ANISOCYTOSIS BLD QL: SIGNIFICANT CHANGE UP
ANISOCYTOSIS BLD QL: SIGNIFICANT CHANGE UP
APPEARANCE UR: CLEAR — SIGNIFICANT CHANGE UP
APPEARANCE: CLEAR
APTT BLD: 28.9 SEC — SIGNIFICANT CHANGE UP (ref 24.5–35.6)
APTT BLD: 29.1 SEC — SIGNIFICANT CHANGE UP (ref 24.5–35.6)
APTT BLD: 29.7 SEC — SIGNIFICANT CHANGE UP (ref 24.5–35.6)
APTT BLD: 31 SEC — SIGNIFICANT CHANGE UP (ref 27.5–35.5)
APTT BLD: 31.6 SEC — SIGNIFICANT CHANGE UP (ref 27.5–35.5)
AST SERPL-CCNC: 11 U/L
AST SERPL-CCNC: 13 U/L
AST SERPL-CCNC: 13 U/L
AST SERPL-CCNC: 15 U/L
AST SERPL-CCNC: 21 U/L — SIGNIFICANT CHANGE UP
AST SERPL-CCNC: 22 U/L — SIGNIFICANT CHANGE UP
AST SERPL-CCNC: 23 U/L — SIGNIFICANT CHANGE UP
AST SERPL-CCNC: 25 U/L — SIGNIFICANT CHANGE UP
AST SERPL-CCNC: 37 U/L — HIGH
BACTERIA: NEGATIVE /HPF
BACTERIA: NEGATIVE /HPF
BASE EXCESS BLDA CALC-SCNC: 1.3 MMOL/L — SIGNIFICANT CHANGE UP (ref -2–3)
BASE EXCESS BLDA CALC-SCNC: 6.2 MMOL/L — HIGH (ref -2–3)
BASE EXCESS BLDA CALC-SCNC: 7.9 MMOL/L — HIGH (ref -2–3)
BASE EXCESS BLDA CALC-SCNC: 8.9 MMOL/L — HIGH (ref -2–3)
BASE EXCESS BLDA CALC-SCNC: 9.2 MMOL/L — HIGH (ref -2–3)
BASE EXCESS BLDA CALC-SCNC: 9.7 MMOL/L — HIGH (ref -2–3)
BASE EXCESS BLDV CALC-SCNC: 0.3 MMOL/L — SIGNIFICANT CHANGE UP (ref -2–3)
BASO STIPL BLD QL SMEAR: PRESENT — SIGNIFICANT CHANGE UP
BASOPHILS # BLD AUTO: 0 K/UL — SIGNIFICANT CHANGE UP (ref 0–0.2)
BASOPHILS # BLD AUTO: 0 K/UL — SIGNIFICANT CHANGE UP (ref 0–0.2)
BASOPHILS # BLD AUTO: 0.01 K/UL
BASOPHILS # BLD AUTO: 0.01 K/UL
BASOPHILS # BLD AUTO: 0.01 K/UL — SIGNIFICANT CHANGE UP (ref 0–0.2)
BASOPHILS # BLD AUTO: 0.02 K/UL
BASOPHILS # BLD AUTO: 0.02 K/UL
BASOPHILS # BLD AUTO: 0.02 K/UL — SIGNIFICANT CHANGE UP (ref 0–0.2)
BASOPHILS # BLD AUTO: 0.02 K/UL — SIGNIFICANT CHANGE UP (ref 0–0.2)
BASOPHILS # BLD AUTO: 0.03 K/UL — SIGNIFICANT CHANGE UP (ref 0–0.2)
BASOPHILS NFR BLD AUTO: 0 % — SIGNIFICANT CHANGE UP (ref 0–2)
BASOPHILS NFR BLD AUTO: 0 % — SIGNIFICANT CHANGE UP (ref 0–2)
BASOPHILS NFR BLD AUTO: 0.1 % — SIGNIFICANT CHANGE UP (ref 0–2)
BASOPHILS NFR BLD AUTO: 0.1 % — SIGNIFICANT CHANGE UP (ref 0–2)
BASOPHILS NFR BLD AUTO: 0.2 %
BASOPHILS NFR BLD AUTO: 0.2 %
BASOPHILS NFR BLD AUTO: 0.2 % — SIGNIFICANT CHANGE UP (ref 0–2)
BASOPHILS NFR BLD AUTO: 0.4 %
BASOPHILS NFR BLD AUTO: 0.4 %
BASOPHILS NFR BLD AUTO: 0.4 % — SIGNIFICANT CHANGE UP (ref 0–2)
BASOPHILS NFR BLD AUTO: 0.5 % — SIGNIFICANT CHANGE UP (ref 0–2)
BILIRUB SERPL-MCNC: 0.3 MG/DL
BILIRUB SERPL-MCNC: 0.4 MG/DL
BILIRUB SERPL-MCNC: 0.4 MG/DL — SIGNIFICANT CHANGE UP (ref 0.4–2)
BILIRUB SERPL-MCNC: 0.4 MG/DL — SIGNIFICANT CHANGE UP (ref 0.4–2)
BILIRUB SERPL-MCNC: 0.6 MG/DL — SIGNIFICANT CHANGE UP (ref 0.4–2)
BILIRUB SERPL-MCNC: 0.6 MG/DL — SIGNIFICANT CHANGE UP (ref 0.4–2)
BILIRUB SERPL-MCNC: 0.7 MG/DL — SIGNIFICANT CHANGE UP (ref 0.4–2)
BILIRUB UR-MCNC: NEGATIVE — SIGNIFICANT CHANGE UP
BILIRUBIN URINE: NEGATIVE
BLD GP AB SCN SERPL QL: SIGNIFICANT CHANGE UP
BLOOD GAS COMMENTS ARTERIAL: SIGNIFICANT CHANGE UP
BLOOD URINE: NEGATIVE
BUN SERPL-MCNC: 102.6 MG/DL — HIGH (ref 8–20)
BUN SERPL-MCNC: 104 MG/DL
BUN SERPL-MCNC: 16.9 MG/DL — SIGNIFICANT CHANGE UP (ref 8–20)
BUN SERPL-MCNC: 26.8 MG/DL — HIGH (ref 8–20)
BUN SERPL-MCNC: 33.5 MG/DL — HIGH (ref 8–20)
BUN SERPL-MCNC: 34.8 MG/DL — HIGH (ref 8–20)
BUN SERPL-MCNC: 42.6 MG/DL — HIGH (ref 8–20)
BUN SERPL-MCNC: 43 MG/DL
BUN SERPL-MCNC: 47.7 MG/DL — HIGH (ref 8–20)
BUN SERPL-MCNC: 49 MG/DL
BUN SERPL-MCNC: 51.6 MG/DL — HIGH (ref 8–20)
BUN SERPL-MCNC: 56.1 MG/DL — HIGH (ref 8–20)
BUN SERPL-MCNC: 57.3 MG/DL — HIGH (ref 8–20)
BUN SERPL-MCNC: 59 MG/DL
BUN SERPL-MCNC: 60 MG/DL
BUN SERPL-MCNC: 69.9 MG/DL — HIGH (ref 8–20)
BUN SERPL-MCNC: 76.8 MG/DL — HIGH (ref 8–20)
BUN SERPL-MCNC: 78.1 MG/DL — HIGH (ref 8–20)
BUN SERPL-MCNC: 79.4 MG/DL — HIGH (ref 8–20)
BUN SERPL-MCNC: 81 MG/DL — HIGH (ref 8–20)
BUN SERPL-MCNC: 82 MG/DL — HIGH (ref 8–20)
BUN SERPL-MCNC: 95 MG/DL — HIGH (ref 8–20)
BUN SERPL-MCNC: 96 MG/DL — HIGH (ref 8–20)
CA-I SERPL-SCNC: 1.07 MMOL/L — LOW (ref 1.15–1.33)
CALCIUM SERPL-MCNC: 10 MG/DL — SIGNIFICANT CHANGE UP (ref 8.4–10.5)
CALCIUM SERPL-MCNC: 8.5 MG/DL — SIGNIFICANT CHANGE UP (ref 8.4–10.5)
CALCIUM SERPL-MCNC: 8.6 MG/DL — SIGNIFICANT CHANGE UP (ref 8.4–10.5)
CALCIUM SERPL-MCNC: 8.6 MG/DL — SIGNIFICANT CHANGE UP (ref 8.4–10.5)
CALCIUM SERPL-MCNC: 8.7 MG/DL — SIGNIFICANT CHANGE UP (ref 8.4–10.5)
CALCIUM SERPL-MCNC: 8.8 MG/DL — SIGNIFICANT CHANGE UP (ref 8.4–10.5)
CALCIUM SERPL-MCNC: 9 MG/DL — SIGNIFICANT CHANGE UP (ref 8.4–10.5)
CALCIUM SERPL-MCNC: 9.5 MG/DL
CALCIUM SERPL-MCNC: 9.5 MG/DL — SIGNIFICANT CHANGE UP (ref 8.4–10.5)
CALCIUM SERPL-MCNC: 9.5 MG/DL — SIGNIFICANT CHANGE UP (ref 8.4–10.5)
CALCIUM SERPL-MCNC: 9.6 MG/DL
CALCIUM SERPL-MCNC: 9.6 MG/DL
CALCIUM SERPL-MCNC: 9.7 MG/DL
CALCIUM SERPL-MCNC: 9.8 MG/DL
CAST: 3 /LPF
CAST: NORMAL /LPF
CHLORIDE BLDV-SCNC: 100 MMOL/L — SIGNIFICANT CHANGE UP (ref 96–108)
CHLORIDE SERPL-SCNC: 100 MMOL/L — SIGNIFICANT CHANGE UP (ref 96–108)
CHLORIDE SERPL-SCNC: 101 MMOL/L — SIGNIFICANT CHANGE UP (ref 96–108)
CHLORIDE SERPL-SCNC: 102 MMOL/L
CHLORIDE SERPL-SCNC: 102 MMOL/L
CHLORIDE SERPL-SCNC: 102 MMOL/L — SIGNIFICANT CHANGE UP (ref 96–108)
CHLORIDE SERPL-SCNC: 103 MMOL/L — SIGNIFICANT CHANGE UP (ref 96–108)
CHLORIDE SERPL-SCNC: 103 MMOL/L — SIGNIFICANT CHANGE UP (ref 96–108)
CHLORIDE SERPL-SCNC: 104 MMOL/L — SIGNIFICANT CHANGE UP (ref 96–108)
CHLORIDE SERPL-SCNC: 106 MMOL/L
CHLORIDE SERPL-SCNC: 107 MMOL/L
CHLORIDE SERPL-SCNC: 107 MMOL/L — SIGNIFICANT CHANGE UP (ref 96–108)
CHLORIDE SERPL-SCNC: 93 MMOL/L — LOW (ref 96–108)
CHLORIDE SERPL-SCNC: 93 MMOL/L — LOW (ref 96–108)
CHLORIDE SERPL-SCNC: 94 MMOL/L — LOW (ref 96–108)
CHLORIDE SERPL-SCNC: 95 MMOL/L
CHLORIDE SERPL-SCNC: 95 MMOL/L — LOW (ref 96–108)
CHLORIDE SERPL-SCNC: 95 MMOL/L — LOW (ref 96–108)
CHLORIDE SERPL-SCNC: 96 MMOL/L — SIGNIFICANT CHANGE UP (ref 96–108)
CHLORIDE SERPL-SCNC: 97 MMOL/L — SIGNIFICANT CHANGE UP (ref 96–108)
CHLORIDE SERPL-SCNC: 99 MMOL/L — SIGNIFICANT CHANGE UP (ref 96–108)
CHOLEST SERPL-MCNC: 107 MG/DL
CHOLEST SERPL-MCNC: 92 MG/DL
CHOLEST SERPL-MCNC: 97 MG/DL
CO2 SERPL-SCNC: 16 MMOL/L — LOW (ref 22–29)
CO2 SERPL-SCNC: 18 MMOL/L — LOW (ref 22–29)
CO2 SERPL-SCNC: 21 MMOL/L — LOW (ref 22–29)
CO2 SERPL-SCNC: 22 MMOL/L
CO2 SERPL-SCNC: 22 MMOL/L — SIGNIFICANT CHANGE UP (ref 22–29)
CO2 SERPL-SCNC: 22 MMOL/L — SIGNIFICANT CHANGE UP (ref 22–29)
CO2 SERPL-SCNC: 23 MMOL/L — SIGNIFICANT CHANGE UP (ref 22–29)
CO2 SERPL-SCNC: 24 MMOL/L
CO2 SERPL-SCNC: 24 MMOL/L
CO2 SERPL-SCNC: 24 MMOL/L — SIGNIFICANT CHANGE UP (ref 22–29)
CO2 SERPL-SCNC: 24 MMOL/L — SIGNIFICANT CHANGE UP (ref 22–29)
CO2 SERPL-SCNC: 25 MMOL/L
CO2 SERPL-SCNC: 26 MMOL/L — SIGNIFICANT CHANGE UP (ref 22–29)
CO2 SERPL-SCNC: 27 MMOL/L — SIGNIFICANT CHANGE UP (ref 22–29)
CO2 SERPL-SCNC: 28 MMOL/L — SIGNIFICANT CHANGE UP (ref 22–29)
CO2 SERPL-SCNC: 29 MMOL/L — SIGNIFICANT CHANGE UP (ref 22–29)
CO2 SERPL-SCNC: 29 MMOL/L — SIGNIFICANT CHANGE UP (ref 22–29)
CO2 SERPL-SCNC: 31 MMOL/L
COLOR SPEC: YELLOW — SIGNIFICANT CHANGE UP
COLOR: YELLOW
CREAT SERPL-MCNC: 2.01 MG/DL — HIGH (ref 0.5–1.3)
CREAT SERPL-MCNC: 2.42 MG/DL
CREAT SERPL-MCNC: 2.53 MG/DL — HIGH (ref 0.5–1.3)
CREAT SERPL-MCNC: 2.68 MG/DL
CREAT SERPL-MCNC: 2.75 MG/DL — HIGH (ref 0.5–1.3)
CREAT SERPL-MCNC: 2.76 MG/DL — HIGH (ref 0.5–1.3)
CREAT SERPL-MCNC: 2.8 MG/DL — HIGH (ref 0.5–1.3)
CREAT SERPL-MCNC: 2.94 MG/DL
CREAT SERPL-MCNC: 2.99 MG/DL
CREAT SERPL-MCNC: 3.23 MG/DL — HIGH (ref 0.5–1.3)
CREAT SERPL-MCNC: 3.26 MG/DL — HIGH (ref 0.5–1.3)
CREAT SERPL-MCNC: 3.31 MG/DL — HIGH (ref 0.5–1.3)
CREAT SERPL-MCNC: 3.38 MG/DL — HIGH (ref 0.5–1.3)
CREAT SERPL-MCNC: 3.58 MG/DL — HIGH (ref 0.5–1.3)
CREAT SERPL-MCNC: 3.78 MG/DL — HIGH (ref 0.5–1.3)
CREAT SERPL-MCNC: 3.83 MG/DL — HIGH (ref 0.5–1.3)
CREAT SERPL-MCNC: 3.92 MG/DL
CREAT SERPL-MCNC: 4.01 MG/DL — HIGH (ref 0.5–1.3)
CREAT SERPL-MCNC: 4.15 MG/DL — HIGH (ref 0.5–1.3)
CREAT SERPL-MCNC: 4.37 MG/DL — HIGH (ref 0.5–1.3)
CREAT SERPL-MCNC: 4.47 MG/DL — HIGH (ref 0.5–1.3)
CREAT SERPL-MCNC: 4.94 MG/DL — HIGH (ref 0.5–1.3)
CREAT SERPL-MCNC: 5.16 MG/DL — HIGH (ref 0.5–1.3)
CREAT SPEC-SCNC: 43 MG/DL
CREAT SPEC-SCNC: 49 MG/DL
CREAT SPEC-SCNC: 53 MG/DL
CULTURE RESULTS: SIGNIFICANT CHANGE UP
DACRYOCYTES BLD QL SMEAR: SLIGHT — SIGNIFICANT CHANGE UP
DIFF PNL FLD: NEGATIVE — SIGNIFICANT CHANGE UP
EGFR: 10 ML/MIN/1.73M2 — LOW
EGFR: 10 ML/MIN/1.73M2 — LOW
EGFR: 11 ML/MIN/1.73M2 — LOW
EGFR: 11 ML/MIN/1.73M2 — LOW
EGFR: 12 ML/MIN/1.73M2
EGFR: 12 ML/MIN/1.73M2 — LOW
EGFR: 12 ML/MIN/1.73M2 — LOW
EGFR: 13 ML/MIN/1.73M2 — LOW
EGFR: 14 ML/MIN/1.73M2 — LOW
EGFR: 15 ML/MIN/1.73M2 — LOW
EGFR: 16 ML/MIN/1.73M2
EGFR: 16 ML/MIN/1.73M2
EGFR: 17 ML/MIN/1.73M2 — LOW
EGFR: 18 ML/MIN/1.73M2
EGFR: 18 ML/MIN/1.73M2 — LOW
EGFR: 18 ML/MIN/1.73M2 — LOW
EGFR: 20 ML/MIN/1.73M2 — LOW
EGFR: 21 ML/MIN/1.73M2
EGFR: 26 ML/MIN/1.73M2 — LOW
EGFR: 8 ML/MIN/1.73M2 — LOW
EGFR: 9 ML/MIN/1.73M2 — LOW
ELLIPTOCYTES BLD QL SMEAR: SLIGHT — SIGNIFICANT CHANGE UP
EOSINOPHIL # BLD AUTO: 0 K/UL — SIGNIFICANT CHANGE UP (ref 0–0.5)
EOSINOPHIL # BLD AUTO: 0.03 K/UL — SIGNIFICANT CHANGE UP (ref 0–0.5)
EOSINOPHIL # BLD AUTO: 0.06 K/UL — SIGNIFICANT CHANGE UP (ref 0–0.5)
EOSINOPHIL # BLD AUTO: 0.07 K/UL — SIGNIFICANT CHANGE UP (ref 0–0.5)
EOSINOPHIL # BLD AUTO: 0.08 K/UL
EOSINOPHIL # BLD AUTO: 0.08 K/UL — SIGNIFICANT CHANGE UP (ref 0–0.5)
EOSINOPHIL # BLD AUTO: 0.09 K/UL
EOSINOPHIL # BLD AUTO: 0.09 K/UL — SIGNIFICANT CHANGE UP (ref 0–0.5)
EOSINOPHIL # BLD AUTO: 0.1 K/UL
EOSINOPHIL # BLD AUTO: 0.11 K/UL
EOSINOPHIL NFR BLD AUTO: 0 % — SIGNIFICANT CHANGE UP (ref 0–6)
EOSINOPHIL NFR BLD AUTO: 0.2 % — SIGNIFICANT CHANGE UP (ref 0–6)
EOSINOPHIL NFR BLD AUTO: 1 % — SIGNIFICANT CHANGE UP (ref 0–6)
EOSINOPHIL NFR BLD AUTO: 1.4 %
EOSINOPHIL NFR BLD AUTO: 1.4 % — SIGNIFICANT CHANGE UP (ref 0–6)
EOSINOPHIL NFR BLD AUTO: 2 %
EOSINOPHIL NFR BLD AUTO: 2 % — SIGNIFICANT CHANGE UP (ref 0–6)
EOSINOPHIL NFR BLD AUTO: 2.1 % — SIGNIFICANT CHANGE UP (ref 0–6)
EOSINOPHIL NFR BLD AUTO: 2.2 %
EOSINOPHIL NFR BLD AUTO: 2.4 %
EPI CELLS # UR: SIGNIFICANT CHANGE UP
EPITHELIAL CELLS: 2 /HPF
EPITHELIAL CELLS: 2 /HPF
EPO SERPL-MCNC: 62.3 MIU/ML
ESTIMATED AVERAGE GLUCOSE: 108 MG/DL
ESTIMATED AVERAGE GLUCOSE: 108 MG/DL — SIGNIFICANT CHANGE UP (ref 68–114)
ESTIMATED AVERAGE GLUCOSE: 111 MG/DL — SIGNIFICANT CHANGE UP (ref 68–114)
ESTIMATED AVERAGE GLUCOSE: 117 MG/DL
ESTIMATED AVERAGE GLUCOSE: 120 MG/DL
FERRITIN SERPL-MCNC: 109 NG/ML
FERRITIN SERPL-MCNC: 243 NG/ML — SIGNIFICANT CHANGE UP (ref 13–330)
FLUAV AG NPH QL: SIGNIFICANT CHANGE UP
FLUBV AG NPH QL: SIGNIFICANT CHANGE UP
FOLATE SERPL-MCNC: 7.9 NG/ML
GAS PNL BLDA: SIGNIFICANT CHANGE UP
GAS PNL BLDV: 133 MMOL/L — LOW (ref 136–145)
GAS PNL BLDV: SIGNIFICANT CHANGE UP
GIANT PLATELETS BLD QL SMEAR: PRESENT — SIGNIFICANT CHANGE UP
GLUCOSE BLDC GLUCOMTR-MCNC: 107 MG/DL — HIGH (ref 70–99)
GLUCOSE BLDC GLUCOMTR-MCNC: 122 MG/DL — HIGH (ref 70–99)
GLUCOSE BLDC GLUCOMTR-MCNC: 124 MG/DL — HIGH (ref 70–99)
GLUCOSE BLDC GLUCOMTR-MCNC: 127 MG/DL — HIGH (ref 70–99)
GLUCOSE BLDC GLUCOMTR-MCNC: 141 MG/DL — HIGH (ref 70–99)
GLUCOSE BLDC GLUCOMTR-MCNC: 144 MG/DL — HIGH (ref 70–99)
GLUCOSE BLDC GLUCOMTR-MCNC: 149 MG/DL — HIGH (ref 70–99)
GLUCOSE BLDC GLUCOMTR-MCNC: 163 MG/DL — HIGH (ref 70–99)
GLUCOSE BLDC GLUCOMTR-MCNC: 96 MG/DL — SIGNIFICANT CHANGE UP (ref 70–99)
GLUCOSE BLDV-MCNC: 110 MG/DL — HIGH (ref 70–99)
GLUCOSE QUALITATIVE U: NEGATIVE MG/DL
GLUCOSE SERPL-MCNC: 102 MG/DL — HIGH (ref 70–99)
GLUCOSE SERPL-MCNC: 103 MG/DL — HIGH (ref 70–99)
GLUCOSE SERPL-MCNC: 108 MG/DL
GLUCOSE SERPL-MCNC: 110 MG/DL — HIGH (ref 70–99)
GLUCOSE SERPL-MCNC: 118 MG/DL — HIGH (ref 70–99)
GLUCOSE SERPL-MCNC: 130 MG/DL — HIGH (ref 70–99)
GLUCOSE SERPL-MCNC: 145 MG/DL — HIGH (ref 70–99)
GLUCOSE SERPL-MCNC: 147 MG/DL
GLUCOSE SERPL-MCNC: 152 MG/DL — HIGH (ref 70–99)
GLUCOSE SERPL-MCNC: 163 MG/DL — HIGH (ref 70–99)
GLUCOSE SERPL-MCNC: 79 MG/DL — SIGNIFICANT CHANGE UP (ref 70–99)
GLUCOSE SERPL-MCNC: 80 MG/DL — SIGNIFICANT CHANGE UP (ref 70–99)
GLUCOSE SERPL-MCNC: 81 MG/DL
GLUCOSE SERPL-MCNC: 82 MG/DL
GLUCOSE SERPL-MCNC: 85 MG/DL — SIGNIFICANT CHANGE UP (ref 70–99)
GLUCOSE SERPL-MCNC: 86 MG/DL — SIGNIFICANT CHANGE UP (ref 70–99)
GLUCOSE SERPL-MCNC: 87 MG/DL — SIGNIFICANT CHANGE UP (ref 70–99)
GLUCOSE SERPL-MCNC: 87 MG/DL — SIGNIFICANT CHANGE UP (ref 70–99)
GLUCOSE SERPL-MCNC: 91 MG/DL — SIGNIFICANT CHANGE UP (ref 70–99)
GLUCOSE SERPL-MCNC: 92 MG/DL
GLUCOSE SERPL-MCNC: 95 MG/DL — SIGNIFICANT CHANGE UP (ref 70–99)
GLUCOSE SERPL-MCNC: 97 MG/DL — SIGNIFICANT CHANGE UP (ref 70–99)
GLUCOSE SERPL-MCNC: 97 MG/DL — SIGNIFICANT CHANGE UP (ref 70–99)
GLUCOSE UR QL: NEGATIVE MG/DL — SIGNIFICANT CHANGE UP
GRAM STN FLD: SIGNIFICANT CHANGE UP
GRAM STN FLD: SIGNIFICANT CHANGE UP
HBA1C MFR BLD HPLC: 5.4 %
HBA1C MFR BLD HPLC: 5.7 %
HBA1C MFR BLD HPLC: 5.8 %
HBV CORE AB SER-ACNC: SIGNIFICANT CHANGE UP
HBV SURFACE AB SER-ACNC: >1000 MIU/ML — SIGNIFICANT CHANGE UP
HBV SURFACE AB SER-ACNC: REACTIVE
HBV SURFACE AG SER-ACNC: SIGNIFICANT CHANGE UP
HCO3 BLDA-SCNC: 27 MMOL/L — SIGNIFICANT CHANGE UP (ref 21–28)
HCO3 BLDA-SCNC: 32 MMOL/L — HIGH (ref 21–28)
HCO3 BLDA-SCNC: 33 MMOL/L — HIGH (ref 21–28)
HCO3 BLDA-SCNC: 34 MMOL/L — HIGH (ref 21–28)
HCO3 BLDA-SCNC: 34 MMOL/L — HIGH (ref 21–28)
HCO3 BLDA-SCNC: 35 MMOL/L — HIGH (ref 21–28)
HCO3 BLDV-SCNC: 25 MMOL/L — SIGNIFICANT CHANGE UP (ref 22–29)
HCT VFR BLD CALC: 24.4 % — LOW (ref 34.5–45)
HCT VFR BLD CALC: 24.8 % — LOW (ref 34.5–45)
HCT VFR BLD CALC: 25 % — LOW (ref 34.5–45)
HCT VFR BLD CALC: 25.7 % — LOW (ref 34.5–45)
HCT VFR BLD CALC: 26.9 % — LOW (ref 34.5–45)
HCT VFR BLD CALC: 27.2 %
HCT VFR BLD CALC: 27.5 %
HCT VFR BLD CALC: 27.5 % — LOW (ref 34.5–45)
HCT VFR BLD CALC: 27.7 % — LOW (ref 34.5–45)
HCT VFR BLD CALC: 28.1 % — LOW (ref 34.5–45)
HCT VFR BLD CALC: 28.3 % — LOW (ref 34.5–45)
HCT VFR BLD CALC: 28.3 % — LOW (ref 34.5–45)
HCT VFR BLD CALC: 28.8 % — LOW (ref 34.5–45)
HCT VFR BLD CALC: 28.8 % — LOW (ref 34.5–45)
HCT VFR BLD CALC: 29.1 % — LOW (ref 34.5–45)
HCT VFR BLD CALC: 29.1 % — LOW (ref 34.5–45)
HCT VFR BLD CALC: 30.6 %
HCT VFR BLD CALC: 30.6 % — LOW (ref 34.5–45)
HCT VFR BLD CALC: 31 % — LOW (ref 34.5–45)
HCT VFR BLD CALC: 31.3 % — LOW (ref 34.5–45)
HCT VFR BLD CALC: 31.7 %
HCT VFR BLD CALC: 32.3 % — LOW (ref 34.5–45)
HCT VFR BLD CALC: 32.8 % — LOW (ref 34.5–45)
HCT VFR BLD CALC: 33.3 %
HCT VFR BLD CALC: 33.3 % — LOW (ref 34.5–45)
HCT VFR BLDA CALC: 26 % — SIGNIFICANT CHANGE UP
HCV AB S/CO SERPL IA: 0.05 S/CO — SIGNIFICANT CHANGE UP (ref 0–0.99)
HCV AB S/CO SERPL IA: 0.06 S/CO — SIGNIFICANT CHANGE UP (ref 0–0.99)
HCV AB SERPL-IMP: SIGNIFICANT CHANGE UP
HCV AB SERPL-IMP: SIGNIFICANT CHANGE UP
HDLC SERPL-MCNC: 28 MG/DL
HDLC SERPL-MCNC: 33 MG/DL
HDLC SERPL-MCNC: 39 MG/DL
HEPARIN-PF4 AB RESULT: <0.6 U/ML — SIGNIFICANT CHANGE UP (ref 0–0.9)
HGB BLD CALC-MCNC: 8.7 G/DL — LOW (ref 11.7–16.1)
HGB BLD-MCNC: 10.1 G/DL — LOW (ref 11.5–15.5)
HGB BLD-MCNC: 10.3 G/DL
HGB BLD-MCNC: 7.3 G/DL — LOW (ref 11.5–15.5)
HGB BLD-MCNC: 7.5 G/DL — LOW (ref 11.5–15.5)
HGB BLD-MCNC: 7.7 G/DL — LOW (ref 11.5–15.5)
HGB BLD-MCNC: 7.8 G/DL — LOW (ref 11.5–15.5)
HGB BLD-MCNC: 8.2 G/DL
HGB BLD-MCNC: 8.2 G/DL
HGB BLD-MCNC: 8.2 G/DL — LOW (ref 11.5–15.5)
HGB BLD-MCNC: 8.2 G/DL — LOW (ref 11.5–15.5)
HGB BLD-MCNC: 8.3 G/DL — LOW (ref 11.5–15.5)
HGB BLD-MCNC: 8.3 G/DL — LOW (ref 11.5–15.5)
HGB BLD-MCNC: 8.4 G/DL — LOW (ref 11.5–15.5)
HGB BLD-MCNC: 8.4 G/DL — LOW (ref 11.5–15.5)
HGB BLD-MCNC: 8.5 G/DL — LOW (ref 11.5–15.5)
HGB BLD-MCNC: 8.6 G/DL — LOW (ref 11.5–15.5)
HGB BLD-MCNC: 8.9 G/DL — LOW (ref 11.5–15.5)
HGB BLD-MCNC: 9 G/DL — LOW (ref 11.5–15.5)
HGB BLD-MCNC: 9.1 G/DL — LOW (ref 11.5–15.5)
HGB BLD-MCNC: 9.2 G/DL — LOW (ref 11.5–15.5)
HGB BLD-MCNC: 9.3 G/DL — LOW (ref 11.5–15.5)
HGB BLD-MCNC: 9.4 G/DL
HGB BLD-MCNC: 9.4 G/DL
HGB BLD-MCNC: 9.6 G/DL — LOW (ref 11.5–15.5)
HGB BLD-MCNC: 9.6 G/DL — LOW (ref 11.5–15.5)
HOROWITZ INDEX BLDA+IHG-RTO: 2 — SIGNIFICANT CHANGE UP
HOROWITZ INDEX BLDA+IHG-RTO: 30 — SIGNIFICANT CHANGE UP
HOROWITZ INDEX BLDA+IHG-RTO: 35 — SIGNIFICANT CHANGE UP
HOROWITZ INDEX BLDA+IHG-RTO: 40 — SIGNIFICANT CHANGE UP
HOROWITZ INDEX BLDA+IHG-RTO: SIGNIFICANT CHANGE UP
HYPOCHROMIA BLD QL: SLIGHT — SIGNIFICANT CHANGE UP
HYPOCHROMIA BLD QL: SLIGHT — SIGNIFICANT CHANGE UP
IMM GRANULOCYTES NFR BLD AUTO: 0.2 %
IMM GRANULOCYTES NFR BLD AUTO: 0.2 % — SIGNIFICANT CHANGE UP (ref 0–0.9)
IMM GRANULOCYTES NFR BLD AUTO: 0.2 % — SIGNIFICANT CHANGE UP (ref 0–0.9)
IMM GRANULOCYTES NFR BLD AUTO: 0.4 %
IMM GRANULOCYTES NFR BLD AUTO: 0.7 % — SIGNIFICANT CHANGE UP (ref 0–0.9)
IMM GRANULOCYTES NFR BLD AUTO: 0.7 % — SIGNIFICANT CHANGE UP (ref 0–0.9)
IMM GRANULOCYTES NFR BLD AUTO: 0.8 % — SIGNIFICANT CHANGE UP (ref 0–0.9)
IMM GRANULOCYTES NFR BLD AUTO: 1.3 % — HIGH (ref 0–0.9)
IMM GRANULOCYTES NFR BLD AUTO: 1.6 % — HIGH (ref 0–0.9)
INR BLD: 1.13 RATIO — SIGNIFICANT CHANGE UP (ref 0.88–1.16)
INR BLD: 1.19 RATIO — HIGH (ref 0.88–1.16)
INR BLD: 1.36 RATIO — HIGH (ref 0.85–1.18)
INR BLD: 1.42 RATIO — HIGH (ref 0.85–1.18)
IRON SATN MFR SERPL: 20 %
IRON SATN MFR SERPL: 29 % — SIGNIFICANT CHANGE UP (ref 14–50)
IRON SATN MFR SERPL: 88 UG/DL — SIGNIFICANT CHANGE UP (ref 37–145)
IRON SERPL-MCNC: 55 UG/DL
KETONES UR-MCNC: NEGATIVE — SIGNIFICANT CHANGE UP
KETONES URINE: NEGATIVE MG/DL
LACTATE BLDV-MCNC: 1.6 MMOL/L — SIGNIFICANT CHANGE UP (ref 0.5–2)
LDLC SERPL CALC-MCNC: 36 MG/DL
LDLC SERPL CALC-MCNC: 41 MG/DL
LDLC SERPL CALC-MCNC: 46 MG/DL
LEUKOCYTE ESTERASE UR-ACNC: ABNORMAL
LEUKOCYTE ESTERASE URINE: ABNORMAL
LEUKOCYTE ESTERASE URINE: ABNORMAL
LEUKOCYTE ESTERASE URINE: NEGATIVE
LYMPHOCYTES # BLD AUTO: 0.26 K/UL — LOW (ref 1–3.3)
LYMPHOCYTES # BLD AUTO: 0.67 K/UL — LOW (ref 1–3.3)
LYMPHOCYTES # BLD AUTO: 0.71 K/UL — LOW (ref 1–3.3)
LYMPHOCYTES # BLD AUTO: 0.72 K/UL — LOW (ref 1–3.3)
LYMPHOCYTES # BLD AUTO: 0.73 K/UL — LOW (ref 1–3.3)
LYMPHOCYTES # BLD AUTO: 0.75 K/UL — LOW (ref 1–3.3)
LYMPHOCYTES # BLD AUTO: 0.77 K/UL
LYMPHOCYTES # BLD AUTO: 0.82 K/UL
LYMPHOCYTES # BLD AUTO: 0.84 K/UL — LOW (ref 1–3.3)
LYMPHOCYTES # BLD AUTO: 0.85 K/UL
LYMPHOCYTES # BLD AUTO: 0.9 K/UL
LYMPHOCYTES # BLD AUTO: 0.92 K/UL — LOW (ref 1–3.3)
LYMPHOCYTES # BLD AUTO: 1.07 K/UL — SIGNIFICANT CHANGE UP (ref 1–3.3)
LYMPHOCYTES # BLD AUTO: 14.8 % — SIGNIFICANT CHANGE UP (ref 13–44)
LYMPHOCYTES # BLD AUTO: 15.4 % — SIGNIFICANT CHANGE UP (ref 13–44)
LYMPHOCYTES # BLD AUTO: 17.3 % — SIGNIFICANT CHANGE UP (ref 13–44)
LYMPHOCYTES # BLD AUTO: 18.5 % — SIGNIFICANT CHANGE UP (ref 13–44)
LYMPHOCYTES # BLD AUTO: 5.4 % — LOW (ref 13–44)
LYMPHOCYTES # BLD AUTO: 6.1 % — LOW (ref 13–44)
LYMPHOCYTES # BLD AUTO: 6.8 % — LOW (ref 13–44)
LYMPHOCYTES # BLD AUTO: 7.2 % — LOW (ref 13–44)
LYMPHOCYTES # BLD AUTO: 8.6 % — LOW (ref 13–44)
LYMPHOCYTES NFR BLD AUTO: 15 %
LYMPHOCYTES NFR BLD AUTO: 16.8 %
LYMPHOCYTES NFR BLD AUTO: 18.2 %
LYMPHOCYTES NFR BLD AUTO: 19.4 %
MACROCYTES BLD QL: SIGNIFICANT CHANGE UP
MACROCYTES BLD QL: SIGNIFICANT CHANGE UP
MAGNESIUM SERPL-MCNC: 1.6 MG/DL — LOW (ref 1.8–2.6)
MAGNESIUM SERPL-MCNC: 1.7 MG/DL — SIGNIFICANT CHANGE UP (ref 1.6–2.6)
MAGNESIUM SERPL-MCNC: 1.8 MG/DL — SIGNIFICANT CHANGE UP (ref 1.8–2.6)
MAGNESIUM SERPL-MCNC: 1.9 MG/DL — SIGNIFICANT CHANGE UP (ref 1.6–2.6)
MAGNESIUM SERPL-MCNC: 1.9 MG/DL — SIGNIFICANT CHANGE UP (ref 1.6–2.6)
MAGNESIUM SERPL-MCNC: 2 MG/DL — SIGNIFICANT CHANGE UP (ref 1.6–2.6)
MAGNESIUM SERPL-MCNC: 2 MG/DL — SIGNIFICANT CHANGE UP (ref 1.8–2.6)
MAN DIFF?: NORMAL
MANUAL SMEAR VERIFICATION: SIGNIFICANT CHANGE UP
MANUAL SMEAR VERIFICATION: SIGNIFICANT CHANGE UP
MCHC RBC-ENTMCNC: 28.8 GM/DL — LOW (ref 32–36)
MCHC RBC-ENTMCNC: 29 GM/DL — LOW (ref 32–36)
MCHC RBC-ENTMCNC: 29.3 GM/DL — LOW (ref 32–36)
MCHC RBC-ENTMCNC: 29.5 GM/DL — LOW (ref 32–36)
MCHC RBC-ENTMCNC: 29.6 GM/DL — LOW (ref 32–36)
MCHC RBC-ENTMCNC: 29.7 GM/DL
MCHC RBC-ENTMCNC: 29.7 GM/DL — LOW (ref 32–36)
MCHC RBC-ENTMCNC: 29.8 GM/DL
MCHC RBC-ENTMCNC: 29.8 PG — SIGNIFICANT CHANGE UP (ref 27–34)
MCHC RBC-ENTMCNC: 29.9 GM/DL — LOW (ref 32–36)
MCHC RBC-ENTMCNC: 30 GM/DL — LOW (ref 32–36)
MCHC RBC-ENTMCNC: 30 PG — SIGNIFICANT CHANGE UP (ref 27–34)
MCHC RBC-ENTMCNC: 30.1 GM/DL
MCHC RBC-ENTMCNC: 30.2 GM/DL — LOW (ref 32–36)
MCHC RBC-ENTMCNC: 30.2 PG — SIGNIFICANT CHANGE UP (ref 27–34)
MCHC RBC-ENTMCNC: 30.2 PG — SIGNIFICANT CHANGE UP (ref 27–34)
MCHC RBC-ENTMCNC: 30.3 GM/DL — LOW (ref 32–36)
MCHC RBC-ENTMCNC: 30.3 GM/DL — LOW (ref 32–36)
MCHC RBC-ENTMCNC: 30.4 GM/DL — LOW (ref 32–36)
MCHC RBC-ENTMCNC: 30.5 GM/DL — LOW (ref 32–36)
MCHC RBC-ENTMCNC: 30.5 GM/DL — LOW (ref 32–36)
MCHC RBC-ENTMCNC: 30.5 PG — SIGNIFICANT CHANGE UP (ref 27–34)
MCHC RBC-ENTMCNC: 30.5 PG — SIGNIFICANT CHANGE UP (ref 27–34)
MCHC RBC-ENTMCNC: 30.7 GM/DL
MCHC RBC-ENTMCNC: 30.7 PG
MCHC RBC-ENTMCNC: 30.7 PG — SIGNIFICANT CHANGE UP (ref 27–34)
MCHC RBC-ENTMCNC: 30.8 GM/DL — LOW (ref 32–36)
MCHC RBC-ENTMCNC: 30.8 PG
MCHC RBC-ENTMCNC: 30.8 PG — SIGNIFICANT CHANGE UP (ref 27–34)
MCHC RBC-ENTMCNC: 30.9 GM/DL
MCHC RBC-ENTMCNC: 30.9 GM/DL — LOW (ref 32–36)
MCHC RBC-ENTMCNC: 30.9 GM/DL — LOW (ref 32–36)
MCHC RBC-ENTMCNC: 30.9 PG — SIGNIFICANT CHANGE UP (ref 27–34)
MCHC RBC-ENTMCNC: 31.1 PG
MCHC RBC-ENTMCNC: 31.1 PG — SIGNIFICANT CHANGE UP (ref 27–34)
MCHC RBC-ENTMCNC: 31.3 PG
MCHC RBC-ENTMCNC: 31.3 PG
MCHC RBC-ENTMCNC: 31.3 PG — SIGNIFICANT CHANGE UP (ref 27–34)
MCHC RBC-ENTMCNC: 31.4 PG — SIGNIFICANT CHANGE UP (ref 27–34)
MCHC RBC-ENTMCNC: 31.4 PG — SIGNIFICANT CHANGE UP (ref 27–34)
MCHC RBC-ENTMCNC: 31.5 PG — SIGNIFICANT CHANGE UP (ref 27–34)
MCHC RBC-ENTMCNC: 31.7 PG — SIGNIFICANT CHANGE UP (ref 27–34)
MCHC RBC-ENTMCNC: 31.9 PG — SIGNIFICANT CHANGE UP (ref 27–34)
MCHC RBC-ENTMCNC: 32.1 PG — SIGNIFICANT CHANGE UP (ref 27–34)
MCHC RBC-ENTMCNC: 32.1 PG — SIGNIFICANT CHANGE UP (ref 27–34)
MCV RBC AUTO: 100.3 FL
MCV RBC AUTO: 100.3 FL — HIGH (ref 80–100)
MCV RBC AUTO: 100.6 FL
MCV RBC AUTO: 100.7 FL — HIGH (ref 80–100)
MCV RBC AUTO: 101 FL — HIGH (ref 80–100)
MCV RBC AUTO: 102.6 FL — HIGH (ref 80–100)
MCV RBC AUTO: 103.1 FL — HIGH (ref 80–100)
MCV RBC AUTO: 103.2 FL — HIGH (ref 80–100)
MCV RBC AUTO: 103.2 FL — HIGH (ref 80–100)
MCV RBC AUTO: 103.4 FL — HIGH (ref 80–100)
MCV RBC AUTO: 103.6 FL
MCV RBC AUTO: 103.8 FL
MCV RBC AUTO: 104.7 FL — HIGH (ref 80–100)
MCV RBC AUTO: 105 FL
MCV RBC AUTO: 105 FL — HIGH (ref 80–100)
MCV RBC AUTO: 106 FL — HIGH (ref 80–100)
MCV RBC AUTO: 106.1 FL — HIGH (ref 80–100)
MCV RBC AUTO: 106.2 FL — HIGH (ref 80–100)
MCV RBC AUTO: 106.4 FL — HIGH (ref 80–100)
MCV RBC AUTO: 106.7 FL — HIGH (ref 80–100)
MCV RBC AUTO: 107.2 FL — HIGH (ref 80–100)
MCV RBC AUTO: 99.6 FL — SIGNIFICANT CHANGE UP (ref 80–100)
MCV RBC AUTO: 99.7 FL — SIGNIFICANT CHANGE UP (ref 80–100)
MICROALBUMIN 24H UR DL<=1MG/L-MCNC: 1.3 MG/DL
MICROALBUMIN 24H UR DL<=1MG/L-MCNC: 1.3 MG/DL
MICROALBUMIN 24H UR DL<=1MG/L-MCNC: 3.1 MG/DL
MICROALBUMIN/CREAT 24H UR-RTO: 27 MG/G
MICROALBUMIN/CREAT 24H UR-RTO: 31 MG/G
MICROALBUMIN/CREAT 24H UR-RTO: 58 MG/G
MICROCYTES BLD QL: SLIGHT — SIGNIFICANT CHANGE UP
MICROCYTES BLD QL: SLIGHT — SIGNIFICANT CHANGE UP
MICROSCOPIC-UA: NORMAL
MICROSCOPIC-UA: NORMAL
MONOCYTES # BLD AUTO: 0.04 K/UL — SIGNIFICANT CHANGE UP (ref 0–0.9)
MONOCYTES # BLD AUTO: 0.27 K/UL
MONOCYTES # BLD AUTO: 0.27 K/UL — SIGNIFICANT CHANGE UP (ref 0–0.9)
MONOCYTES # BLD AUTO: 0.35 K/UL
MONOCYTES # BLD AUTO: 0.36 K/UL
MONOCYTES # BLD AUTO: 0.37 K/UL
MONOCYTES # BLD AUTO: 0.38 K/UL — SIGNIFICANT CHANGE UP (ref 0–0.9)
MONOCYTES # BLD AUTO: 0.39 K/UL — SIGNIFICANT CHANGE UP (ref 0–0.9)
MONOCYTES # BLD AUTO: 0.39 K/UL — SIGNIFICANT CHANGE UP (ref 0–0.9)
MONOCYTES # BLD AUTO: 0.41 K/UL — SIGNIFICANT CHANGE UP (ref 0–0.9)
MONOCYTES # BLD AUTO: 0.52 K/UL — SIGNIFICANT CHANGE UP (ref 0–0.9)
MONOCYTES # BLD AUTO: 0.55 K/UL — SIGNIFICANT CHANGE UP (ref 0–0.9)
MONOCYTES # BLD AUTO: 1 K/UL — HIGH (ref 0–0.9)
MONOCYTES NFR BLD AUTO: 0.9 % — LOW (ref 2–14)
MONOCYTES NFR BLD AUTO: 3.5 % — SIGNIFICANT CHANGE UP (ref 2–14)
MONOCYTES NFR BLD AUTO: 5 % — SIGNIFICANT CHANGE UP (ref 2–14)
MONOCYTES NFR BLD AUTO: 5.4 % — SIGNIFICANT CHANGE UP (ref 2–14)
MONOCYTES NFR BLD AUTO: 5.9 %
MONOCYTES NFR BLD AUTO: 6.2 %
MONOCYTES NFR BLD AUTO: 6.3 % — SIGNIFICANT CHANGE UP (ref 2–14)
MONOCYTES NFR BLD AUTO: 6.7 % — SIGNIFICANT CHANGE UP (ref 2–14)
MONOCYTES NFR BLD AUTO: 7.7 %
MONOCYTES NFR BLD AUTO: 8 % — SIGNIFICANT CHANGE UP (ref 2–14)
MONOCYTES NFR BLD AUTO: 8.1 % — SIGNIFICANT CHANGE UP (ref 2–14)
MONOCYTES NFR BLD AUTO: 8.2 %
MONOCYTES NFR BLD AUTO: 8.7 % — SIGNIFICANT CHANGE UP (ref 2–14)
MRSA PCR RESULT.: DETECTED
MRSA PCR RESULT.: SIGNIFICANT CHANGE UP
NEUTROPHILS # BLD AUTO: 10.04 K/UL — HIGH (ref 1.8–7.4)
NEUTROPHILS # BLD AUTO: 10.63 K/UL — HIGH (ref 1.8–7.4)
NEUTROPHILS # BLD AUTO: 2.92 K/UL — SIGNIFICANT CHANGE UP (ref 1.8–7.4)
NEUTROPHILS # BLD AUTO: 3.18 K/UL
NEUTROPHILS # BLD AUTO: 3.18 K/UL — SIGNIFICANT CHANGE UP (ref 1.8–7.4)
NEUTROPHILS # BLD AUTO: 3.25 K/UL
NEUTROPHILS # BLD AUTO: 3.43 K/UL
NEUTROPHILS # BLD AUTO: 3.52 K/UL — SIGNIFICANT CHANGE UP (ref 1.8–7.4)
NEUTROPHILS # BLD AUTO: 4.37 K/UL
NEUTROPHILS # BLD AUTO: 4.57 K/UL — SIGNIFICANT CHANGE UP (ref 1.8–7.4)
NEUTROPHILS # BLD AUTO: 4.78 K/UL — SIGNIFICANT CHANGE UP (ref 1.8–7.4)
NEUTROPHILS # BLD AUTO: 8.83 K/UL — HIGH (ref 1.8–7.4)
NEUTROPHILS # BLD AUTO: 9 K/UL — HIGH (ref 1.8–7.4)
NEUTROPHILS NFR BLD AUTO: 69.9 %
NEUTROPHILS NFR BLD AUTO: 70.8 %
NEUTROPHILS NFR BLD AUTO: 71.9 % — SIGNIFICANT CHANGE UP (ref 43–77)
NEUTROPHILS NFR BLD AUTO: 72.7 % — SIGNIFICANT CHANGE UP (ref 43–77)
NEUTROPHILS NFR BLD AUTO: 73.1 % — SIGNIFICANT CHANGE UP (ref 43–77)
NEUTROPHILS NFR BLD AUTO: 74.7 %
NEUTROPHILS NFR BLD AUTO: 76.9 % — SIGNIFICANT CHANGE UP (ref 43–77)
NEUTROPHILS NFR BLD AUTO: 77 %
NEUTROPHILS NFR BLD AUTO: 81.3 % — HIGH (ref 43–77)
NEUTROPHILS NFR BLD AUTO: 86.6 % — HIGH (ref 43–77)
NEUTROPHILS NFR BLD AUTO: 86.8 % — HIGH (ref 43–77)
NEUTROPHILS NFR BLD AUTO: 90.4 % — HIGH (ref 43–77)
NEUTROPHILS NFR BLD AUTO: 93.7 % — HIGH (ref 43–77)
NITRITE UR-MCNC: NEGATIVE — SIGNIFICANT CHANGE UP
NITRITE URINE: NEGATIVE
NONHDLC SERPL-MCNC: 58 MG/DL
NONHDLC SERPL-MCNC: 64 MG/DL
NONHDLC SERPL-MCNC: 74 MG/DL
NRBC # BLD: 1 /100 WBCS — HIGH (ref 0–0)
NRBC # BLD: 2 /100 WBCS — HIGH (ref 0–0)
OVALOCYTES BLD QL SMEAR: SIGNIFICANT CHANGE UP
OVALOCYTES BLD QL SMEAR: SIGNIFICANT CHANGE UP
PCO2 BLDA: 46 MMHG — HIGH (ref 32–45)
PCO2 BLDA: 50 MMHG — HIGH (ref 32–45)
PCO2 BLDA: 56 MMHG — HIGH (ref 32–45)
PCO2 BLDA: 58 MMHG — HIGH (ref 32–45)
PCO2 BLDA: 64 MMHG — HIGH (ref 32–45)
PCO2 BLDA: 66 MMHG — HIGH (ref 32–45)
PCO2 BLDV: 43 MMHG — HIGH (ref 39–42)
PF4 HEPARIN CMPLX AB SER-ACNC: NEGATIVE — SIGNIFICANT CHANGE UP
PH BLDA: 7.32 — LOW (ref 7.35–7.45)
PH BLDA: 7.35 — SIGNIFICANT CHANGE UP (ref 7.35–7.45)
PH BLDA: 7.36 — SIGNIFICANT CHANGE UP (ref 7.35–7.45)
PH BLDA: 7.37 — SIGNIFICANT CHANGE UP (ref 7.35–7.45)
PH BLDA: 7.38 — SIGNIFICANT CHANGE UP (ref 7.35–7.45)
PH BLDA: 7.43 — SIGNIFICANT CHANGE UP (ref 7.35–7.45)
PH BLDV: 7.38 — SIGNIFICANT CHANGE UP (ref 7.32–7.43)
PH UR: 5 — SIGNIFICANT CHANGE UP (ref 5–8)
PH URINE: 5.5
PH URINE: 5.5
PH URINE: 6
PHOSPHATE SERPL-MCNC: 3.6 MG/DL — SIGNIFICANT CHANGE UP (ref 2.4–4.7)
PHOSPHATE SERPL-MCNC: 4.5 MG/DL — SIGNIFICANT CHANGE UP (ref 2.4–4.7)
PHOSPHATE SERPL-MCNC: 5.1 MG/DL — HIGH (ref 2.4–4.7)
PHOSPHATE SERPL-MCNC: 6.6 MG/DL — HIGH (ref 2.4–4.7)
PHOSPHATE SERPL-MCNC: 6.7 MG/DL — HIGH (ref 2.4–4.7)
PHOSPHATE SERPL-MCNC: 6.7 MG/DL — HIGH (ref 2.4–4.7)
PLAT MORPH BLD: NORMAL — SIGNIFICANT CHANGE UP
PLAT MORPH BLD: NORMAL — SIGNIFICANT CHANGE UP
PLATELET # BLD AUTO: 102 K/UL — LOW (ref 150–400)
PLATELET # BLD AUTO: 105 K/UL — LOW (ref 150–400)
PLATELET # BLD AUTO: 107 K/UL — LOW (ref 150–400)
PLATELET # BLD AUTO: 109 K/UL — LOW (ref 150–400)
PLATELET # BLD AUTO: 109 K/UL — LOW (ref 150–400)
PLATELET # BLD AUTO: 110 K/UL — LOW (ref 150–400)
PLATELET # BLD AUTO: 112 K/UL — LOW (ref 150–400)
PLATELET # BLD AUTO: 114 K/UL — LOW (ref 150–400)
PLATELET # BLD AUTO: 114 K/UL — LOW (ref 150–400)
PLATELET # BLD AUTO: 115 K/UL — LOW (ref 150–400)
PLATELET # BLD AUTO: 115 K/UL — LOW (ref 150–400)
PLATELET # BLD AUTO: 116 K/UL — LOW (ref 150–400)
PLATELET # BLD AUTO: 120 K/UL
PLATELET # BLD AUTO: 120 K/UL
PLATELET # BLD AUTO: 122 K/UL
PLATELET # BLD AUTO: 123 K/UL — LOW (ref 150–400)
PLATELET # BLD AUTO: 124 K/UL
PLATELET # BLD AUTO: 124 K/UL — LOW (ref 150–400)
PLATELET # BLD AUTO: 132 K/UL — LOW (ref 150–400)
PLATELET # BLD AUTO: 152 K/UL
PLATELET # BLD AUTO: 71 K/UL — LOW (ref 150–400)
PLATELET # BLD AUTO: 73 K/UL — LOW (ref 150–400)
PLATELET # BLD AUTO: 80 K/UL — LOW (ref 150–400)
PLATELET # BLD AUTO: 86 K/UL — LOW (ref 150–400)
PLATELET # BLD AUTO: 90 K/UL — LOW (ref 150–400)
PO2 BLDA: 110 MMHG — HIGH (ref 83–108)
PO2 BLDA: 155 MMHG — HIGH (ref 83–108)
PO2 BLDA: 62 MMHG — LOW (ref 83–108)
PO2 BLDA: 68 MMHG — LOW (ref 83–108)
PO2 BLDA: 76 MMHG — LOW (ref 83–108)
PO2 BLDA: 89 MMHG — SIGNIFICANT CHANGE UP (ref 83–108)
PO2 BLDV: 72 MMHG — HIGH (ref 25–45)
POIKILOCYTOSIS BLD QL AUTO: SIGNIFICANT CHANGE UP
POIKILOCYTOSIS BLD QL AUTO: SIGNIFICANT CHANGE UP
POLYCHROMASIA BLD QL SMEAR: SIGNIFICANT CHANGE UP
POLYCHROMASIA BLD QL SMEAR: SIGNIFICANT CHANGE UP
POTASSIUM BLDV-SCNC: 3.3 MMOL/L — LOW (ref 3.5–5.1)
POTASSIUM SERPL-MCNC: 3.3 MMOL/L — LOW (ref 3.5–5.3)
POTASSIUM SERPL-MCNC: 3.6 MMOL/L — SIGNIFICANT CHANGE UP (ref 3.5–5.3)
POTASSIUM SERPL-MCNC: 3.7 MMOL/L — SIGNIFICANT CHANGE UP (ref 3.5–5.3)
POTASSIUM SERPL-MCNC: 3.7 MMOL/L — SIGNIFICANT CHANGE UP (ref 3.5–5.3)
POTASSIUM SERPL-MCNC: 3.8 MMOL/L — SIGNIFICANT CHANGE UP (ref 3.5–5.3)
POTASSIUM SERPL-MCNC: 3.9 MMOL/L — SIGNIFICANT CHANGE UP (ref 3.5–5.3)
POTASSIUM SERPL-MCNC: 4 MMOL/L — SIGNIFICANT CHANGE UP (ref 3.5–5.3)
POTASSIUM SERPL-MCNC: 4 MMOL/L — SIGNIFICANT CHANGE UP (ref 3.5–5.3)
POTASSIUM SERPL-MCNC: 4.1 MMOL/L — SIGNIFICANT CHANGE UP (ref 3.5–5.3)
POTASSIUM SERPL-MCNC: 4.1 MMOL/L — SIGNIFICANT CHANGE UP (ref 3.5–5.3)
POTASSIUM SERPL-MCNC: 4.2 MMOL/L — SIGNIFICANT CHANGE UP (ref 3.5–5.3)
POTASSIUM SERPL-MCNC: 4.4 MMOL/L — SIGNIFICANT CHANGE UP (ref 3.5–5.3)
POTASSIUM SERPL-MCNC: 4.5 MMOL/L — SIGNIFICANT CHANGE UP (ref 3.5–5.3)
POTASSIUM SERPL-MCNC: 4.7 MMOL/L — SIGNIFICANT CHANGE UP (ref 3.5–5.3)
POTASSIUM SERPL-MCNC: 4.9 MMOL/L — SIGNIFICANT CHANGE UP (ref 3.5–5.3)
POTASSIUM SERPL-MCNC: 5.2 MMOL/L — SIGNIFICANT CHANGE UP (ref 3.5–5.3)
POTASSIUM SERPL-SCNC: 3.3 MMOL/L — LOW (ref 3.5–5.3)
POTASSIUM SERPL-SCNC: 3.6 MMOL/L — SIGNIFICANT CHANGE UP (ref 3.5–5.3)
POTASSIUM SERPL-SCNC: 3.7 MMOL/L — SIGNIFICANT CHANGE UP (ref 3.5–5.3)
POTASSIUM SERPL-SCNC: 3.7 MMOL/L — SIGNIFICANT CHANGE UP (ref 3.5–5.3)
POTASSIUM SERPL-SCNC: 3.8 MMOL/L
POTASSIUM SERPL-SCNC: 3.8 MMOL/L
POTASSIUM SERPL-SCNC: 3.8 MMOL/L — SIGNIFICANT CHANGE UP (ref 3.5–5.3)
POTASSIUM SERPL-SCNC: 3.9 MMOL/L — SIGNIFICANT CHANGE UP (ref 3.5–5.3)
POTASSIUM SERPL-SCNC: 4 MMOL/L — SIGNIFICANT CHANGE UP (ref 3.5–5.3)
POTASSIUM SERPL-SCNC: 4 MMOL/L — SIGNIFICANT CHANGE UP (ref 3.5–5.3)
POTASSIUM SERPL-SCNC: 4.1 MMOL/L — SIGNIFICANT CHANGE UP (ref 3.5–5.3)
POTASSIUM SERPL-SCNC: 4.1 MMOL/L — SIGNIFICANT CHANGE UP (ref 3.5–5.3)
POTASSIUM SERPL-SCNC: 4.2 MMOL/L
POTASSIUM SERPL-SCNC: 4.2 MMOL/L — SIGNIFICANT CHANGE UP (ref 3.5–5.3)
POTASSIUM SERPL-SCNC: 4.3 MMOL/L
POTASSIUM SERPL-SCNC: 4.4 MMOL/L — SIGNIFICANT CHANGE UP (ref 3.5–5.3)
POTASSIUM SERPL-SCNC: 4.5 MMOL/L — SIGNIFICANT CHANGE UP (ref 3.5–5.3)
POTASSIUM SERPL-SCNC: 4.7 MMOL/L — SIGNIFICANT CHANGE UP (ref 3.5–5.3)
POTASSIUM SERPL-SCNC: 4.9 MMOL/L — SIGNIFICANT CHANGE UP (ref 3.5–5.3)
POTASSIUM SERPL-SCNC: 5 MMOL/L
POTASSIUM SERPL-SCNC: 5.2 MMOL/L — SIGNIFICANT CHANGE UP (ref 3.5–5.3)
PROT SERPL-MCNC: 4.9 G/DL — LOW (ref 6.6–8.7)
PROT SERPL-MCNC: 5.2 G/DL — LOW (ref 6.6–8.7)
PROT SERPL-MCNC: 5.3 G/DL — LOW (ref 6.6–8.7)
PROT SERPL-MCNC: 5.3 G/DL — LOW (ref 6.6–8.7)
PROT SERPL-MCNC: 5.6 G/DL — LOW (ref 6.6–8.7)
PROT SERPL-MCNC: 5.7 G/DL
PROT SERPL-MCNC: 5.7 G/DL
PROT SERPL-MCNC: 5.8 G/DL
PROT SERPL-MCNC: 6.2 G/DL
PROT UR-MCNC: 30 MG/DL
PROTEIN URINE: NEGATIVE MG/DL
PROTEIN URINE: NORMAL MG/DL
PROTEIN URINE: NORMAL MG/DL
PROTHROM AB SERPL-ACNC: 13.1 SEC — SIGNIFICANT CHANGE UP (ref 10.5–13.4)
PROTHROM AB SERPL-ACNC: 13.8 SEC — HIGH (ref 10.5–13.4)
PROTHROM AB SERPL-ACNC: 15 SEC — HIGH (ref 9.5–13)
PROTHROM AB SERPL-ACNC: 15.6 SEC — HIGH (ref 9.5–13)
PTH-INTACT FLD-MCNC: 548 PG/ML — HIGH (ref 15–65)
RBC # BLD: 2.3 M/UL — LOW (ref 3.8–5.2)
RBC # BLD: 2.34 M/UL — LOW (ref 3.8–5.2)
RBC # BLD: 2.49 M/UL — LOW (ref 3.8–5.2)
RBC # BLD: 2.51 M/UL — LOW (ref 3.8–5.2)
RBC # BLD: 2.62 M/UL
RBC # BLD: 2.62 M/UL — LOW (ref 3.8–5.2)
RBC # BLD: 2.64 M/UL — LOW (ref 3.8–5.2)
RBC # BLD: 2.64 M/UL — LOW (ref 3.8–5.2)
RBC # BLD: 2.67 M/UL — LOW (ref 3.8–5.2)
RBC # BLD: 2.7 M/UL — LOW (ref 3.8–5.2)
RBC # BLD: 2.75 M/UL — LOW (ref 3.8–5.2)
RBC # BLD: 2.78 M/UL — LOW (ref 3.8–5.2)
RBC # BLD: 2.79 M/UL — LOW (ref 3.8–5.2)
RBC # BLD: 2.81 M/UL — LOW (ref 3.8–5.2)
RBC # BLD: 2.92 M/UL — LOW (ref 3.8–5.2)
RBC # BLD: 2.92 M/UL — LOW (ref 3.8–5.2)
RBC # BLD: 3.03 M/UL — LOW (ref 3.8–5.2)
RBC # BLD: 3.05 M/UL
RBC # BLD: 3.05 M/UL — LOW (ref 3.8–5.2)
RBC # BLD: 3.06 M/UL
RBC # BLD: 3.18 M/UL — LOW (ref 3.8–5.2)
RBC # BLD: 3.22 M/UL — LOW (ref 3.8–5.2)
RBC # BLD: 3.22 M/UL — LOW (ref 3.8–5.2)
RBC # BLD: 3.31 M/UL
RBC # FLD: 16.2 %
RBC # FLD: 16.3 %
RBC # FLD: 17.4 % — HIGH (ref 10.3–14.5)
RBC # FLD: 17.4 % — HIGH (ref 10.3–14.5)
RBC # FLD: 17.5 % — HIGH (ref 10.3–14.5)
RBC # FLD: 17.6 % — HIGH (ref 10.3–14.5)
RBC # FLD: 17.6 % — HIGH (ref 10.3–14.5)
RBC # FLD: 17.7 % — HIGH (ref 10.3–14.5)
RBC # FLD: 17.7 % — HIGH (ref 10.3–14.5)
RBC # FLD: 17.8 % — HIGH (ref 10.3–14.5)
RBC # FLD: 17.8 % — HIGH (ref 10.3–14.5)
RBC # FLD: 17.9 % — HIGH (ref 10.3–14.5)
RBC # FLD: 17.9 % — HIGH (ref 10.3–14.5)
RBC # FLD: 18 % — HIGH (ref 10.3–14.5)
RBC # FLD: 18.2 %
RBC # FLD: 18.5 %
RBC # FLD: 18.5 % — HIGH (ref 10.3–14.5)
RBC # FLD: 18.6 %
RBC # FLD: 18.6 % — HIGH (ref 10.3–14.5)
RBC # FLD: 18.7 % — HIGH (ref 10.3–14.5)
RBC # FLD: 18.8 % — HIGH (ref 10.3–14.5)
RBC # FLD: 19.1 % — HIGH (ref 10.3–14.5)
RBC BLD AUTO: ABNORMAL
RBC BLD AUTO: ABNORMAL
RBC CASTS # UR COMP ASSIST: NEGATIVE /HPF — SIGNIFICANT CHANGE UP (ref 0–4)
RED BLOOD CELLS URINE: 1 /HPF
RED BLOOD CELLS URINE: NORMAL /HPF
RETICS # AUTO: 2.9 %
RETICS AGGREG/RBC NFR: 75.7 K/UL
RSV RNA NPH QL NAA+NON-PROBE: SIGNIFICANT CHANGE UP
S AUREUS DNA NOSE QL NAA+PROBE: DETECTED
S AUREUS DNA NOSE QL NAA+PROBE: DETECTED
SAO2 % BLDA: 100 % — HIGH (ref 94–98)
SAO2 % BLDA: 93.1 % — LOW (ref 94–98)
SAO2 % BLDA: 97.4 % — SIGNIFICANT CHANGE UP (ref 94–98)
SAO2 % BLDA: 98.5 % — HIGH (ref 94–98)
SAO2 % BLDA: 98.8 % — HIGH (ref 94–98)
SAO2 % BLDA: 99.6 % — HIGH (ref 94–98)
SAO2 % BLDV: 97.7 % — SIGNIFICANT CHANGE UP
SARS-COV-2 RNA SPEC QL NAA+PROBE: SIGNIFICANT CHANGE UP
SCHISTOCYTES BLD QL AUTO: SLIGHT — SIGNIFICANT CHANGE UP
SODIUM SERPL-SCNC: 133 MMOL/L — LOW (ref 135–145)
SODIUM SERPL-SCNC: 133 MMOL/L — LOW (ref 135–145)
SODIUM SERPL-SCNC: 134 MMOL/L — LOW (ref 135–145)
SODIUM SERPL-SCNC: 135 MMOL/L — SIGNIFICANT CHANGE UP (ref 135–145)
SODIUM SERPL-SCNC: 136 MMOL/L — SIGNIFICANT CHANGE UP (ref 135–145)
SODIUM SERPL-SCNC: 137 MMOL/L — SIGNIFICANT CHANGE UP (ref 135–145)
SODIUM SERPL-SCNC: 137 MMOL/L — SIGNIFICANT CHANGE UP (ref 135–145)
SODIUM SERPL-SCNC: 138 MMOL/L
SODIUM SERPL-SCNC: 138 MMOL/L — SIGNIFICANT CHANGE UP (ref 135–145)
SODIUM SERPL-SCNC: 139 MMOL/L — SIGNIFICANT CHANGE UP (ref 135–145)
SODIUM SERPL-SCNC: 139 MMOL/L — SIGNIFICANT CHANGE UP (ref 135–145)
SODIUM SERPL-SCNC: 140 MMOL/L — SIGNIFICANT CHANGE UP (ref 135–145)
SODIUM SERPL-SCNC: 140 MMOL/L — SIGNIFICANT CHANGE UP (ref 135–145)
SODIUM SERPL-SCNC: 142 MMOL/L
SODIUM SERPL-SCNC: 142 MMOL/L — SIGNIFICANT CHANGE UP (ref 135–145)
SODIUM SERPL-SCNC: 144 MMOL/L — SIGNIFICANT CHANGE UP (ref 135–145)
SODIUM SERPL-SCNC: 144 MMOL/L — SIGNIFICANT CHANGE UP (ref 135–145)
SODIUM SERPL-SCNC: 145 MMOL/L
SODIUM SERPL-SCNC: 145 MMOL/L
SODIUM SERPL-SCNC: 146 MMOL/L
SODIUM SERPL-SCNC: 147 MMOL/L — HIGH (ref 135–145)
SP GR SPEC: 1.01 — SIGNIFICANT CHANGE UP (ref 1.01–1.02)
SPECIFIC GRAVITY URINE: 1.01
SPECIMEN SOURCE: SIGNIFICANT CHANGE UP
T4 SERPL-MCNC: 6.3 UG/DL
T4 SERPL-MCNC: 6.4 UG/DL
TIBC SERPL-MCNC: 274 UG/DL
TIBC SERPL-MCNC: 305 UG/DL — SIGNIFICANT CHANGE UP (ref 220–430)
TRANSFERRIN SERPL-MCNC: 213 MG/DL — SIGNIFICANT CHANGE UP (ref 192–382)
TRIGL SERPL-MCNC: 119 MG/DL
TRIGL SERPL-MCNC: 120 MG/DL
TRIGL SERPL-MCNC: 140 MG/DL
TSH SERPL-ACNC: 2.09 UIU/ML
TSH SERPL-ACNC: 6.01 UIU/ML
TSH SERPL-MCNC: 5.97 UIU/ML — HIGH (ref 0.27–4.2)
UIBC SERPL-MCNC: 220 UG/DL
URATE SERPL-MCNC: 4.9 MG/DL
URATE SERPL-MCNC: 6.6 MG/DL
URATE SERPL-MCNC: 7.2 MG/DL
UROBILINOGEN FLD QL: NEGATIVE MG/DL — SIGNIFICANT CHANGE UP
UROBILINOGEN URINE: 0.2 MG/DL
VANCOMYCIN FLD-MCNC: 22.9 UG/ML — SIGNIFICANT CHANGE UP
VANCOMYCIN TROUGH SERPL-MCNC: 19.1 UG/ML — SIGNIFICANT CHANGE UP (ref 10–20)
VIT B12 SERPL-MCNC: 919 PG/ML
VIT B12 SERPL-MCNC: >2000 PG/ML — HIGH (ref 232–1245)
WBC # BLD: 10.19 K/UL — SIGNIFICANT CHANGE UP (ref 3.8–10.5)
WBC # BLD: 10.19 K/UL — SIGNIFICANT CHANGE UP (ref 3.8–10.5)
WBC # BLD: 10.37 K/UL — SIGNIFICANT CHANGE UP (ref 3.8–10.5)
WBC # BLD: 10.42 K/UL — SIGNIFICANT CHANGE UP (ref 3.8–10.5)
WBC # BLD: 11.76 K/UL — HIGH (ref 3.8–10.5)
WBC # BLD: 12.2 K/UL — HIGH (ref 3.8–10.5)
WBC # BLD: 12.21 K/UL — HIGH (ref 3.8–10.5)
WBC # BLD: 12.37 K/UL — HIGH (ref 3.8–10.5)
WBC # BLD: 4.06 K/UL — SIGNIFICANT CHANGE UP (ref 3.8–10.5)
WBC # BLD: 4.35 K/UL — SIGNIFICANT CHANGE UP (ref 3.8–10.5)
WBC # BLD: 4.85 K/UL — SIGNIFICANT CHANGE UP (ref 3.8–10.5)
WBC # BLD: 4.88 K/UL — SIGNIFICANT CHANGE UP (ref 3.8–10.5)
WBC # BLD: 4.94 K/UL — SIGNIFICANT CHANGE UP (ref 3.8–10.5)
WBC # BLD: 6.21 K/UL — SIGNIFICANT CHANGE UP (ref 3.8–10.5)
WBC # BLD: 6.26 K/UL — SIGNIFICANT CHANGE UP (ref 3.8–10.5)
WBC # BLD: 6.35 K/UL — SIGNIFICANT CHANGE UP (ref 3.8–10.5)
WBC # BLD: 6.75 K/UL — SIGNIFICANT CHANGE UP (ref 3.8–10.5)
WBC # BLD: 6.78 K/UL — SIGNIFICANT CHANGE UP (ref 3.8–10.5)
WBC # BLD: 7.83 K/UL — SIGNIFICANT CHANGE UP (ref 3.8–10.5)
WBC # BLD: 9.87 K/UL — SIGNIFICANT CHANGE UP (ref 3.8–10.5)
WBC # FLD AUTO: 10.19 K/UL — SIGNIFICANT CHANGE UP (ref 3.8–10.5)
WBC # FLD AUTO: 10.19 K/UL — SIGNIFICANT CHANGE UP (ref 3.8–10.5)
WBC # FLD AUTO: 10.37 K/UL — SIGNIFICANT CHANGE UP (ref 3.8–10.5)
WBC # FLD AUTO: 10.42 K/UL — SIGNIFICANT CHANGE UP (ref 3.8–10.5)
WBC # FLD AUTO: 11.76 K/UL — HIGH (ref 3.8–10.5)
WBC # FLD AUTO: 12.2 K/UL — HIGH (ref 3.8–10.5)
WBC # FLD AUTO: 12.21 K/UL — HIGH (ref 3.8–10.5)
WBC # FLD AUTO: 12.37 K/UL — HIGH (ref 3.8–10.5)
WBC # FLD AUTO: 4.06 K/UL — SIGNIFICANT CHANGE UP (ref 3.8–10.5)
WBC # FLD AUTO: 4.35 K/UL — SIGNIFICANT CHANGE UP (ref 3.8–10.5)
WBC # FLD AUTO: 4.5 K/UL
WBC # FLD AUTO: 4.59 K/UL
WBC # FLD AUTO: 4.65 K/UL
WBC # FLD AUTO: 4.85 K/UL — SIGNIFICANT CHANGE UP (ref 3.8–10.5)
WBC # FLD AUTO: 4.88 K/UL — SIGNIFICANT CHANGE UP (ref 3.8–10.5)
WBC # FLD AUTO: 4.94 K/UL — SIGNIFICANT CHANGE UP (ref 3.8–10.5)
WBC # FLD AUTO: 5.67 K/UL
WBC # FLD AUTO: 6.21 K/UL — SIGNIFICANT CHANGE UP (ref 3.8–10.5)
WBC # FLD AUTO: 6.26 K/UL — SIGNIFICANT CHANGE UP (ref 3.8–10.5)
WBC # FLD AUTO: 6.35 K/UL — SIGNIFICANT CHANGE UP (ref 3.8–10.5)
WBC # FLD AUTO: 6.63 K/UL
WBC # FLD AUTO: 6.75 K/UL — SIGNIFICANT CHANGE UP (ref 3.8–10.5)
WBC # FLD AUTO: 6.78 K/UL — SIGNIFICANT CHANGE UP (ref 3.8–10.5)
WBC # FLD AUTO: 7.83 K/UL — SIGNIFICANT CHANGE UP (ref 3.8–10.5)
WBC # FLD AUTO: 9.87 K/UL — SIGNIFICANT CHANGE UP (ref 3.8–10.5)
WBC UR QL: SIGNIFICANT CHANGE UP /HPF (ref 0–5)
WHITE BLOOD CELLS URINE: 1 /HPF
WHITE BLOOD CELLS URINE: 3 /HPF

## 2023-01-01 PROCEDURE — 99214 OFFICE O/P EST MOD 30 MIN: CPT

## 2023-01-01 PROCEDURE — 29580 STRAPPING UNNA BOOT: CPT | Mod: 50,79

## 2023-01-01 PROCEDURE — 87637 SARSCOV2&INF A&B&RSV AMP PRB: CPT

## 2023-01-01 PROCEDURE — XXXXX: CPT | Mod: 1L

## 2023-01-01 PROCEDURE — 71046 X-RAY EXAM CHEST 2 VIEWS: CPT | Mod: 26

## 2023-01-01 PROCEDURE — 99233 SBSQ HOSP IP/OBS HIGH 50: CPT

## 2023-01-01 PROCEDURE — 36415 COLL VENOUS BLD VENIPUNCTURE: CPT

## 2023-01-01 PROCEDURE — 82330 ASSAY OF CALCIUM: CPT

## 2023-01-01 PROCEDURE — C1769: CPT

## 2023-01-01 PROCEDURE — 83036 HEMOGLOBIN GLYCOSYLATED A1C: CPT

## 2023-01-01 PROCEDURE — 99232 SBSQ HOSP IP/OBS MODERATE 35: CPT

## 2023-01-01 PROCEDURE — 93985 DUP-SCAN HEMO COMPL BI STD: CPT

## 2023-01-01 PROCEDURE — 86706 HEP B SURFACE ANTIBODY: CPT

## 2023-01-01 PROCEDURE — 99223 1ST HOSP IP/OBS HIGH 75: CPT

## 2023-01-01 PROCEDURE — 85014 HEMATOCRIT: CPT

## 2023-01-01 PROCEDURE — 84466 ASSAY OF TRANSFERRIN: CPT

## 2023-01-01 PROCEDURE — C1725: CPT

## 2023-01-01 PROCEDURE — 99231 SBSQ HOSP IP/OBS SF/LOW 25: CPT

## 2023-01-01 PROCEDURE — 81001 URINALYSIS AUTO W/SCOPE: CPT

## 2023-01-01 PROCEDURE — 94660 CPAP INITIATION&MGMT: CPT

## 2023-01-01 PROCEDURE — 85025 COMPLETE CBC W/AUTO DIFF WBC: CPT

## 2023-01-01 PROCEDURE — 76775 US EXAM ABDO BACK WALL LIM: CPT

## 2023-01-01 PROCEDURE — 93005 ELECTROCARDIOGRAM TRACING: CPT

## 2023-01-01 PROCEDURE — 36558 INSERT TUNNELED CV CATH: CPT

## 2023-01-01 PROCEDURE — 94640 AIRWAY INHALATION TREATMENT: CPT

## 2023-01-01 PROCEDURE — 99214 OFFICE O/P EST MOD 30 MIN: CPT | Mod: 25

## 2023-01-01 PROCEDURE — 99285 EMERGENCY DEPT VISIT HI MDM: CPT

## 2023-01-01 PROCEDURE — 99232 SBSQ HOSP IP/OBS MODERATE 35: CPT | Mod: FS,GC,24

## 2023-01-01 PROCEDURE — 76775 US EXAM ABDO BACK WALL LIM: CPT | Mod: 26

## 2023-01-01 PROCEDURE — 99215 OFFICE O/P EST HI 40 MIN: CPT

## 2023-01-01 PROCEDURE — 11043 DBRDMT MUSC&/FSCA 1ST 20/<: CPT | Mod: GC

## 2023-01-01 PROCEDURE — C1887: CPT

## 2023-01-01 PROCEDURE — 93990 DOPPLER FLOW TESTING: CPT

## 2023-01-01 PROCEDURE — 93000 ELECTROCARDIOGRAM COMPLETE: CPT

## 2023-01-01 PROCEDURE — 87640 STAPH A DNA AMP PROBE: CPT

## 2023-01-01 PROCEDURE — 86704 HEP B CORE ANTIBODY TOTAL: CPT

## 2023-01-01 PROCEDURE — 83550 IRON BINDING TEST: CPT

## 2023-01-01 PROCEDURE — 82962 GLUCOSE BLOOD TEST: CPT

## 2023-01-01 PROCEDURE — 86901 BLOOD TYPING SEROLOGIC RH(D): CPT

## 2023-01-01 PROCEDURE — 87040 BLOOD CULTURE FOR BACTERIA: CPT

## 2023-01-01 PROCEDURE — 80053 COMPREHEN METABOLIC PANEL: CPT

## 2023-01-01 PROCEDURE — 80069 RENAL FUNCTION PANEL: CPT

## 2023-01-01 PROCEDURE — 83605 ASSAY OF LACTIC ACID: CPT

## 2023-01-01 PROCEDURE — 85018 HEMOGLOBIN: CPT

## 2023-01-01 PROCEDURE — 36902 INTRO CATH DIALYSIS CIRCUIT: CPT | Mod: GC,58

## 2023-01-01 PROCEDURE — 71045 X-RAY EXAM CHEST 1 VIEW: CPT

## 2023-01-01 PROCEDURE — 99285 EMERGENCY DEPT VISIT HI MDM: CPT | Mod: 25

## 2023-01-01 PROCEDURE — 87641 MR-STAPH DNA AMP PROBE: CPT

## 2023-01-01 PROCEDURE — 71045 X-RAY EXAM CHEST 1 VIEW: CPT | Mod: 26

## 2023-01-01 PROCEDURE — 93306 TTE W/DOPPLER COMPLETE: CPT

## 2023-01-01 PROCEDURE — 82607 VITAMIN B-12: CPT

## 2023-01-01 PROCEDURE — 87340 HEPATITIS B SURFACE AG IA: CPT

## 2023-01-01 PROCEDURE — 85610 PROTHROMBIN TIME: CPT

## 2023-01-01 PROCEDURE — 99406 BEHAV CHNG SMOKING 3-10 MIN: CPT

## 2023-01-01 PROCEDURE — 86850 RBC ANTIBODY SCREEN: CPT

## 2023-01-01 PROCEDURE — 84295 ASSAY OF SERUM SODIUM: CPT

## 2023-01-01 PROCEDURE — 93010 ELECTROCARDIOGRAM REPORT: CPT

## 2023-01-01 PROCEDURE — 82310 ASSAY OF CALCIUM: CPT

## 2023-01-01 PROCEDURE — 99496 TRANSJ CARE MGMT HIGH F2F 7D: CPT | Mod: 25

## 2023-01-01 PROCEDURE — 99213 OFFICE O/P EST LOW 20 MIN: CPT | Mod: 25

## 2023-01-01 PROCEDURE — 84443 ASSAY THYROID STIM HORMONE: CPT

## 2023-01-01 PROCEDURE — 85027 COMPLETE CBC AUTOMATED: CPT

## 2023-01-01 PROCEDURE — 86900 BLOOD TYPING SEROLOGIC ABO: CPT

## 2023-01-01 PROCEDURE — 85730 THROMBOPLASTIN TIME PARTIAL: CPT

## 2023-01-01 PROCEDURE — 80048 BASIC METABOLIC PNL TOTAL CA: CPT

## 2023-01-01 PROCEDURE — G0463: CPT

## 2023-01-01 PROCEDURE — 83540 ASSAY OF IRON: CPT

## 2023-01-01 PROCEDURE — 99204 OFFICE O/P NEW MOD 45 MIN: CPT

## 2023-01-01 PROCEDURE — 86803 HEPATITIS C AB TEST: CPT

## 2023-01-01 PROCEDURE — 87070 CULTURE OTHR SPECIMN AEROBIC: CPT

## 2023-01-01 PROCEDURE — 99213 OFFICE O/P EST LOW 20 MIN: CPT

## 2023-01-01 PROCEDURE — 99024 POSTOP FOLLOW-UP VISIT: CPT

## 2023-01-01 PROCEDURE — 96375 TX/PRO/DX INJ NEW DRUG ADDON: CPT

## 2023-01-01 PROCEDURE — 80202 ASSAY OF VANCOMYCIN: CPT

## 2023-01-01 PROCEDURE — 94760 N-INVAS EAR/PLS OXIMETRY 1: CPT

## 2023-01-01 PROCEDURE — 73090 X-RAY EXAM OF FOREARM: CPT

## 2023-01-01 PROCEDURE — 87075 CULTR BACTERIA EXCEPT BLOOD: CPT

## 2023-01-01 PROCEDURE — 87077 CULTURE AEROBIC IDENTIFY: CPT

## 2023-01-01 PROCEDURE — 71046 X-RAY EXAM CHEST 2 VIEWS: CPT

## 2023-01-01 PROCEDURE — 36558 INSERT TUNNELED CV CATH: CPT | Mod: GC,LT

## 2023-01-01 PROCEDURE — 99261: CPT

## 2023-01-01 PROCEDURE — 82803 BLOOD GASES ANY COMBINATION: CPT

## 2023-01-01 PROCEDURE — C1750: CPT

## 2023-01-01 PROCEDURE — 29580 STRAPPING UNNA BOOT: CPT | Mod: 50

## 2023-01-01 PROCEDURE — 29540 STRAPPING ANKLE &/FOOT: CPT | Mod: 59

## 2023-01-01 PROCEDURE — 84100 ASSAY OF PHOSPHORUS: CPT

## 2023-01-01 PROCEDURE — 82435 ASSAY OF BLOOD CHLORIDE: CPT

## 2023-01-01 PROCEDURE — C8929: CPT

## 2023-01-01 PROCEDURE — 99231 SBSQ HOSP IP/OBS SF/LOW 25: CPT | Mod: GC

## 2023-01-01 PROCEDURE — 76937 US GUIDE VASCULAR ACCESS: CPT

## 2023-01-01 PROCEDURE — 29580 STRAPPING UNNA BOOT: CPT

## 2023-01-01 PROCEDURE — 90935 HEMODIALYSIS ONE EVALUATION: CPT

## 2023-01-01 PROCEDURE — 93971 EXTREMITY STUDY: CPT

## 2023-01-01 PROCEDURE — 83735 ASSAY OF MAGNESIUM: CPT

## 2023-01-01 PROCEDURE — 83970 ASSAY OF PARATHORMONE: CPT

## 2023-01-01 PROCEDURE — 99232 SBSQ HOSP IP/OBS MODERATE 35: CPT | Mod: FS,GC

## 2023-01-01 PROCEDURE — 84132 ASSAY OF SERUM POTASSIUM: CPT

## 2023-01-01 PROCEDURE — 93923 UPR/LXTR ART STDY 3+ LVLS: CPT

## 2023-01-01 PROCEDURE — 36821 AV FUSION DIRECT ANY SITE: CPT | Mod: GC

## 2023-01-01 PROCEDURE — 82947 ASSAY GLUCOSE BLOOD QUANT: CPT

## 2023-01-01 PROCEDURE — 99358 PROLONG SERVICE W/O CONTACT: CPT | Mod: NC

## 2023-01-01 PROCEDURE — 99239 HOSP IP/OBS DSCHRG MGMT >30: CPT

## 2023-01-01 PROCEDURE — 72141 MRI NECK SPINE W/O DYE: CPT

## 2023-01-01 PROCEDURE — 99222 1ST HOSP IP/OBS MODERATE 55: CPT

## 2023-01-01 PROCEDURE — 72141 MRI NECK SPINE W/O DYE: CPT | Mod: 26,MH

## 2023-01-01 PROCEDURE — C1894: CPT

## 2023-01-01 PROCEDURE — 93971 EXTREMITY STUDY: CPT | Mod: 26,LT

## 2023-01-01 PROCEDURE — 99203 OFFICE O/P NEW LOW 30 MIN: CPT

## 2023-01-01 PROCEDURE — 77001 FLUOROGUIDE FOR VEIN DEVICE: CPT

## 2023-01-01 PROCEDURE — 86022 PLATELET ANTIBODIES: CPT

## 2023-01-01 PROCEDURE — 82728 ASSAY OF FERRITIN: CPT

## 2023-01-01 PROCEDURE — 82140 ASSAY OF AMMONIA: CPT

## 2023-01-01 PROCEDURE — C1889: CPT

## 2023-01-01 PROCEDURE — 96374 THER/PROPH/DIAG INJ IV PUSH: CPT

## 2023-01-01 PROCEDURE — 76937 US GUIDE VASCULAR ACCESS: CPT | Mod: 26,GC

## 2023-01-01 PROCEDURE — 73090 X-RAY EXAM OF FOREARM: CPT | Mod: 26,LT

## 2023-01-01 PROCEDURE — 36600 WITHDRAWAL OF ARTERIAL BLOOD: CPT

## 2023-01-01 PROCEDURE — 36902 INTRO CATH DIALYSIS CIRCUIT: CPT

## 2023-01-01 DEVICE — SURGIFOAM PAD 8CM X 12.5CM X 10MM (100): Type: IMPLANTABLE DEVICE | Site: LEFT | Status: FUNCTIONAL

## 2023-01-01 RX ORDER — POTASSIUM CHLORIDE 20 MEQ
10 PACKET (EA) ORAL ONCE
Refills: 0 | Status: DISCONTINUED | OUTPATIENT
Start: 2023-01-01 | End: 2023-01-01

## 2023-01-01 RX ORDER — POTASSIUM CHLORIDE 20 MEQ
10 PACKET (EA) ORAL ONCE
Refills: 0 | Status: COMPLETED | OUTPATIENT
Start: 2023-01-01 | End: 2023-01-01

## 2023-01-01 RX ORDER — TORSEMIDE 10 MG/1
10 TABLET ORAL
Qty: 90 | Refills: 1 | Status: COMPLETED | COMMUNITY
Start: 2022-02-11 | End: 2023-01-01

## 2023-01-01 RX ORDER — CHLORHEXIDINE GLUCONATE 213 G/1000ML
1 SOLUTION TOPICAL
Qty: 0 | Refills: 0 | DISCHARGE
Start: 2023-01-01

## 2023-01-01 RX ORDER — SODIUM CHLORIDE 9 MG/ML
250 INJECTION INTRAMUSCULAR; INTRAVENOUS; SUBCUTANEOUS ONCE
Refills: 0 | Status: COMPLETED | OUTPATIENT
Start: 2023-01-01 | End: 2023-01-01

## 2023-01-01 RX ORDER — ALBUTEROL 90 UG/1
1 AEROSOL, METERED ORAL EVERY 6 HOURS
Refills: 0 | Status: DISCONTINUED | OUTPATIENT
Start: 2023-01-01 | End: 2023-01-01

## 2023-01-01 RX ORDER — VANCOMYCIN HCL 1 G
750 VIAL (EA) INTRAVENOUS EVERY 24 HOURS
Refills: 0 | Status: DISCONTINUED | OUTPATIENT
Start: 2023-01-01 | End: 2023-01-01

## 2023-01-01 RX ORDER — CALCITRIOL 0.25 UG/1
0.25 CAPSULE, LIQUID FILLED ORAL
Qty: 90 | Refills: 3 | Status: ACTIVE | COMMUNITY
Start: 2023-01-01 | End: 1900-01-01

## 2023-01-01 RX ORDER — DOXYCYCLINE HYCLATE 100 MG/1
100 CAPSULE ORAL
Qty: 20 | Refills: 0 | Status: COMPLETED | COMMUNITY
Start: 2023-01-01 | End: 2023-01-01

## 2023-01-01 RX ORDER — ASPIRIN 81 MG/1
81 TABLET ORAL DAILY
Refills: 0 | Status: ACTIVE | COMMUNITY
Start: 2023-01-01

## 2023-01-01 RX ORDER — MIDODRINE HYDROCHLORIDE 2.5 MG/1
10 TABLET ORAL THREE TIMES A DAY
Refills: 0 | Status: DISCONTINUED | OUTPATIENT
Start: 2023-01-01 | End: 2023-01-01

## 2023-01-01 RX ORDER — HYDROMORPHONE HYDROCHLORIDE 2 MG/ML
2 INJECTION INTRAMUSCULAR; INTRAVENOUS; SUBCUTANEOUS EVERY 6 HOURS
Refills: 0 | Status: DISCONTINUED | OUTPATIENT
Start: 2023-01-01 | End: 2023-01-01

## 2023-01-01 RX ORDER — EPOETIN ALFA-EPBX 40000 [IU]/ML
40000 INJECTION, SOLUTION INTRAVENOUS; SUBCUTANEOUS
Qty: 3 | Refills: 3 | Status: ACTIVE | COMMUNITY
Start: 2023-01-01 | End: 1900-01-01

## 2023-01-01 RX ORDER — LANOLIN ALCOHOL/MO/W.PET/CERES
3 CREAM (GRAM) TOPICAL AT BEDTIME
Refills: 0 | Status: DISCONTINUED | OUTPATIENT
Start: 2023-01-01 | End: 2023-01-01

## 2023-01-01 RX ORDER — METOPROLOL TARTRATE 50 MG
5 TABLET ORAL EVERY 6 HOURS
Refills: 0 | Status: DISCONTINUED | OUTPATIENT
Start: 2023-01-01 | End: 2023-01-01

## 2023-01-01 RX ORDER — ALPRAZOLAM 0.25 MG
0.5 TABLET ORAL
Refills: 0 | Status: DISCONTINUED | OUTPATIENT
Start: 2023-01-01 | End: 2023-01-01

## 2023-01-01 RX ORDER — SODIUM ZIRCONIUM CYCLOSILICATE 10 G/10G
5 POWDER, FOR SUSPENSION ORAL ONCE
Refills: 0 | Status: COMPLETED | OUTPATIENT
Start: 2023-01-01 | End: 2023-01-01

## 2023-01-01 RX ORDER — PANTOPRAZOLE SODIUM 20 MG/1
40 TABLET, DELAYED RELEASE ORAL DAILY
Refills: 0 | Status: DISCONTINUED | OUTPATIENT
Start: 2023-01-01 | End: 2023-01-01

## 2023-01-01 RX ORDER — ERYTHROPOIETIN 10000 [IU]/ML
2000 INJECTION, SOLUTION INTRAVENOUS; SUBCUTANEOUS ONCE
Refills: 0 | Status: COMPLETED | OUTPATIENT
Start: 2023-01-01 | End: 2023-01-01

## 2023-01-01 RX ORDER — BISMUTH TRIBROMOPH/PETROLATUM 4" X 4"
3 BANDAGE TOPICAL
Qty: 1 | Refills: 1 | Status: COMPLETED | COMMUNITY
Start: 2023-01-01 | End: 2023-01-01

## 2023-01-01 RX ORDER — ALLOPURINOL 300 MG
100 TABLET ORAL DAILY
Refills: 0 | Status: DISCONTINUED | OUTPATIENT
Start: 2023-01-01 | End: 2023-01-01

## 2023-01-01 RX ORDER — CHLORHEXIDINE GLUCONATE 213 G/1000ML
1 SOLUTION TOPICAL ONCE
Refills: 0 | Status: DISCONTINUED | OUTPATIENT
Start: 2023-01-01 | End: 2023-01-01

## 2023-01-01 RX ORDER — VANCOMYCIN HCL 1 G
1250 VIAL (EA) INTRAVENOUS ONCE
Refills: 0 | Status: COMPLETED | OUTPATIENT
Start: 2023-01-01 | End: 2023-01-01

## 2023-01-01 RX ORDER — DIPHENHYDRAMINE HCL 50 MG
50 CAPSULE ORAL DAILY
Refills: 0 | Status: DISCONTINUED | OUTPATIENT
Start: 2023-01-01 | End: 2023-01-01

## 2023-01-01 RX ORDER — ERYTHROPOIETIN 10000 [IU]/ML
10000 INJECTION, SOLUTION INTRAVENOUS; SUBCUTANEOUS
Refills: 0 | Status: DISCONTINUED | OUTPATIENT
Start: 2023-01-01 | End: 2023-01-01

## 2023-01-01 RX ORDER — METOPROLOL TARTRATE 50 MG
12.5 TABLET ORAL DAILY
Refills: 0 | Status: DISCONTINUED | OUTPATIENT
Start: 2023-01-01 | End: 2023-01-01

## 2023-01-01 RX ORDER — PANTOPRAZOLE SODIUM 20 MG/1
40 TABLET, DELAYED RELEASE ORAL
Refills: 0 | Status: DISCONTINUED | OUTPATIENT
Start: 2023-01-01 | End: 2023-01-01

## 2023-01-01 RX ORDER — METOPROLOL TARTRATE 50 MG
12.5 TABLET ORAL EVERY 12 HOURS
Refills: 0 | Status: DISCONTINUED | OUTPATIENT
Start: 2023-01-01 | End: 2023-01-01

## 2023-01-01 RX ORDER — ONDANSETRON 8 MG/1
4 TABLET, FILM COATED ORAL EVERY 8 HOURS
Refills: 0 | Status: DISCONTINUED | OUTPATIENT
Start: 2023-01-01 | End: 2023-01-01

## 2023-01-01 RX ORDER — HEPARIN SODIUM 5000 [USP'U]/ML
5000 INJECTION INTRAVENOUS; SUBCUTANEOUS EVERY 8 HOURS
Refills: 0 | Status: DISCONTINUED | OUTPATIENT
Start: 2023-01-01 | End: 2023-01-01

## 2023-01-01 RX ORDER — ONDANSETRON 8 MG/1
8 TABLET, FILM COATED ORAL ONCE
Refills: 0 | Status: DISCONTINUED | OUTPATIENT
Start: 2023-01-01 | End: 2023-01-01

## 2023-01-01 RX ORDER — CALCITRIOL 0.5 UG/1
0.25 CAPSULE ORAL DAILY
Refills: 0 | Status: DISCONTINUED | OUTPATIENT
Start: 2023-01-01 | End: 2023-01-01

## 2023-01-01 RX ORDER — MEROPENEM 1 G/30ML
1000 INJECTION INTRAVENOUS ONCE
Refills: 0 | Status: DISCONTINUED | OUTPATIENT
Start: 2023-01-01 | End: 2023-01-01

## 2023-01-01 RX ORDER — VANCOMYCIN HCL 1 G
500 VIAL (EA) INTRAVENOUS EVERY 24 HOURS
Refills: 0 | Status: DISCONTINUED | OUTPATIENT
Start: 2023-01-01 | End: 2023-01-01

## 2023-01-01 RX ORDER — CEFEPIME 1 G/1
500 INJECTION, POWDER, FOR SOLUTION INTRAMUSCULAR; INTRAVENOUS EVERY 24 HOURS
Refills: 0 | Status: DISCONTINUED | OUTPATIENT
Start: 2023-01-01 | End: 2023-01-01

## 2023-01-01 RX ORDER — ASPIRIN/CALCIUM CARB/MAGNESIUM 324 MG
81 TABLET ORAL ONCE
Refills: 0 | Status: COMPLETED | OUTPATIENT
Start: 2023-01-01 | End: 2023-01-01

## 2023-01-01 RX ORDER — CALCIUM ACETATE 667 MG
3 TABLET ORAL
Qty: 270 | Refills: 0
Start: 2023-01-01 | End: 2023-01-01

## 2023-01-01 RX ORDER — TRAMADOL HYDROCHLORIDE 50 MG/1
50 TABLET, COATED ORAL
Qty: 30 | Refills: 0 | Status: DISCONTINUED | COMMUNITY
Start: 2022-01-26 | End: 2023-01-01

## 2023-01-01 RX ORDER — MUPIROCIN 20 MG/G
1 OINTMENT TOPICAL
Refills: 0 | Status: DISCONTINUED | OUTPATIENT
Start: 2023-01-01 | End: 2023-01-01

## 2023-01-01 RX ORDER — CALCIUM ACETATE 667 MG
667 TABLET ORAL
Refills: 0 | Status: DISCONTINUED | OUTPATIENT
Start: 2023-01-01 | End: 2023-01-01

## 2023-01-01 RX ORDER — IPRATROPIUM/ALBUTEROL SULFATE 18-103MCG
3 AEROSOL WITH ADAPTER (GRAM) INHALATION EVERY 6 HOURS
Refills: 0 | Status: DISCONTINUED | OUTPATIENT
Start: 2023-01-01 | End: 2023-01-01

## 2023-01-01 RX ORDER — CALCITRIOL 0.5 UG/1
1 CAPSULE ORAL
Refills: 0 | DISCHARGE

## 2023-01-01 RX ORDER — PROCHLORPERAZINE MALEATE 10 MG/1
10 TABLET ORAL
Qty: 40 | Refills: 3 | Status: ACTIVE | COMMUNITY
Start: 2023-01-01 | End: 1900-01-01

## 2023-01-01 RX ORDER — ACETAMINOPHEN 500 MG
650 TABLET ORAL EVERY 6 HOURS
Refills: 0 | Status: DISCONTINUED | OUTPATIENT
Start: 2023-01-01 | End: 2023-01-01

## 2023-01-01 RX ORDER — KETOTIFEN FUMARATE 0.25 MG/ML
0.03 SOLUTION OPHTHALMIC
Qty: 1 | Refills: 2 | Status: COMPLETED | COMMUNITY
Start: 2021-08-17 | End: 2023-01-01

## 2023-01-01 RX ORDER — VANCOMYCIN HCL 1 G
1000 VIAL (EA) INTRAVENOUS EVERY 24 HOURS
Refills: 0 | Status: DISCONTINUED | OUTPATIENT
Start: 2023-01-01 | End: 2023-01-01

## 2023-01-01 RX ORDER — METOLAZONE 5 MG/1
5 TABLET ORAL
Qty: 30 | Refills: 3 | Status: COMPLETED | COMMUNITY
Start: 2023-01-01 | End: 2023-01-01

## 2023-01-01 RX ORDER — MAGNESIUM OXIDE 400 MG ORAL TABLET 241.3 MG
400 TABLET ORAL
Refills: 0 | Status: COMPLETED | OUTPATIENT
Start: 2023-01-01 | End: 2023-01-01

## 2023-01-01 RX ORDER — TORSEMIDE 20 MG/1
20 TABLET ORAL
Qty: 30 | Refills: 3 | Status: ACTIVE | COMMUNITY
Start: 2023-01-01 | End: 1900-01-01

## 2023-01-01 RX ORDER — BUMETANIDE 0.25 MG/ML
1 INJECTION INTRAMUSCULAR; INTRAVENOUS
Refills: 0 | Status: DISCONTINUED | OUTPATIENT
Start: 2023-01-01 | End: 2023-01-01

## 2023-01-01 RX ORDER — ATORVASTATIN CALCIUM 80 MG/1
40 TABLET, FILM COATED ORAL AT BEDTIME
Refills: 0 | Status: DISCONTINUED | OUTPATIENT
Start: 2023-01-01 | End: 2023-01-01

## 2023-01-01 RX ORDER — ATORVASTATIN CALCIUM 80 MG/1
1 TABLET, FILM COATED ORAL
Refills: 0 | DISCHARGE

## 2023-01-01 RX ORDER — PNV NO.95/FERROUS FUM/FOLIC AC 28MG-0.8MG
100 TABLET ORAL
Refills: 0 | Status: COMPLETED | COMMUNITY
Start: 2021-07-07 | End: 2023-01-01

## 2023-01-01 RX ORDER — NITROFURANTOIN (MONOHYDRATE/MACROCRYSTALS) 25; 75 MG/1; MG/1
100 CAPSULE ORAL
Qty: 14 | Refills: 0 | Status: COMPLETED | COMMUNITY
Start: 2023-01-01 | End: 2023-01-01

## 2023-01-01 RX ORDER — VANCOMYCIN HCL 1 G
750 VIAL (EA) INTRAVENOUS
Qty: 0 | Refills: 0 | DISCHARGE
Start: 2023-01-01

## 2023-01-01 RX ORDER — METOPROLOL SUCCINATE 25 MG/1
25 TABLET, EXTENDED RELEASE ORAL DAILY
Qty: 90 | Refills: 3 | Status: ACTIVE | COMMUNITY
Start: 2021-11-05

## 2023-01-01 RX ORDER — CHOLECALCIFEROL (VITAMIN D3) 125 MCG
1 CAPSULE ORAL
Refills: 0 | DISCHARGE

## 2023-01-01 RX ORDER — PROCHLORPERAZINE MALEATE 5 MG
1 TABLET ORAL
Refills: 0 | DISCHARGE

## 2023-01-01 RX ORDER — METOLAZONE 5 MG/1
5 TABLET ORAL
Qty: 30 | Refills: 0 | Status: DISCONTINUED | COMMUNITY
Start: 2022-11-28 | End: 2023-01-01

## 2023-01-01 RX ORDER — EPOETIN ALFA-EPBX 40000 [IU]/ML
40000 INJECTION, SOLUTION INTRAVENOUS; SUBCUTANEOUS
Qty: 0 | Refills: 0 | Status: COMPLETED | OUTPATIENT
Start: 2023-01-01

## 2023-01-01 RX ORDER — MEROPENEM 1 G/30ML
500 INJECTION INTRAVENOUS EVERY 12 HOURS
Refills: 0 | Status: DISCONTINUED | OUTPATIENT
Start: 2023-01-01 | End: 2023-01-01

## 2023-01-01 RX ORDER — PROCHLORPERAZINE MALEATE 5 MG
10 TABLET ORAL
Refills: 0 | Status: DISCONTINUED | OUTPATIENT
Start: 2023-01-01 | End: 2023-01-01

## 2023-01-01 RX ORDER — MEROPENEM 1 G/30ML
1000 INJECTION INTRAVENOUS ONCE
Refills: 0 | Status: COMPLETED | OUTPATIENT
Start: 2023-01-01 | End: 2023-01-01

## 2023-01-01 RX ORDER — FLUMAZENIL 0.1 MG/ML
0.2 VIAL (ML) INTRAVENOUS ONCE
Refills: 0 | Status: COMPLETED | OUTPATIENT
Start: 2023-01-01 | End: 2023-01-01

## 2023-01-01 RX ORDER — SODIUM BICARBONATE 1 MEQ/ML
1300 SYRINGE (ML) INTRAVENOUS THREE TIMES A DAY
Refills: 0 | Status: DISCONTINUED | OUTPATIENT
Start: 2023-01-01 | End: 2023-01-01

## 2023-01-01 RX ORDER — ALLOPURINOL 300 MG
0 TABLET ORAL
Refills: 0 | DISCHARGE

## 2023-01-01 RX ORDER — ACETAMINOPHEN 500 MG
1000 TABLET ORAL EVERY 6 HOURS
Refills: 0 | Status: COMPLETED | OUTPATIENT
Start: 2023-01-01 | End: 2023-01-01

## 2023-01-01 RX ORDER — SODIUM CHLORIDE 9 MG/ML
1000 INJECTION, SOLUTION INTRAVENOUS
Refills: 0 | Status: DISCONTINUED | OUTPATIENT
Start: 2023-01-01 | End: 2023-01-01

## 2023-01-01 RX ORDER — ALBUTEROL SULFATE 90 UG/1
108 (90 BASE) AEROSOL, METERED RESPIRATORY (INHALATION)
Qty: 3 | Refills: 3 | Status: ACTIVE | COMMUNITY
Start: 2018-10-18 | End: 1900-01-01

## 2023-01-01 RX ORDER — FENTANYL CITRATE 50 UG/ML
50 INJECTION INTRAVENOUS
Refills: 0 | Status: DISCONTINUED | OUTPATIENT
Start: 2023-01-01 | End: 2023-01-01

## 2023-01-01 RX ORDER — BUDESONIDE AND FORMOTEROL FUMARATE DIHYDRATE 160; 4.5 UG/1; UG/1
2 AEROSOL RESPIRATORY (INHALATION)
Refills: 0 | Status: DISCONTINUED | OUTPATIENT
Start: 2023-01-01 | End: 2023-01-01

## 2023-01-01 RX ORDER — ERYTHROPOIETIN 20000 [IU]/ML
20000 INJECTION, SOLUTION INTRAVENOUS; SUBCUTANEOUS
Qty: 1 | Refills: 4 | Status: DISCONTINUED | COMMUNITY
Start: 2021-09-14 | End: 2023-01-01

## 2023-01-01 RX ORDER — APIXABAN 2.5 MG/1
2.5 TABLET, FILM COATED ORAL
Qty: 60 | Refills: 4 | Status: COMPLETED | COMMUNITY
Start: 2023-01-01 | End: 2023-01-01

## 2023-01-01 RX ORDER — OMEPRAZOLE 40 MG/1
40 CAPSULE, DELAYED RELEASE ORAL
Qty: 90 | Refills: 3 | Status: ACTIVE | COMMUNITY
Start: 2018-05-01

## 2023-01-01 RX ORDER — ASPIRIN/CALCIUM CARB/MAGNESIUM 324 MG
1 TABLET ORAL
Refills: 0 | DISCHARGE

## 2023-01-01 RX ORDER — ASPIRIN/CALCIUM CARB/MAGNESIUM 324 MG
81 TABLET ORAL DAILY
Refills: 0 | Status: DISCONTINUED | OUTPATIENT
Start: 2023-01-01 | End: 2023-01-01

## 2023-01-01 RX ORDER — SODIUM CHLORIDE 9 MG/ML
3 INJECTION INTRAMUSCULAR; INTRAVENOUS; SUBCUTANEOUS ONCE
Refills: 0 | Status: DISCONTINUED | OUTPATIENT
Start: 2023-01-01 | End: 2023-01-01

## 2023-01-01 RX ORDER — OMEPRAZOLE 10 MG/1
1 CAPSULE, DELAYED RELEASE ORAL
Refills: 0 | DISCHARGE

## 2023-01-01 RX ORDER — VANCOMYCIN HCL 1 G
1000 VIAL (EA) INTRAVENOUS ONCE
Refills: 0 | Status: COMPLETED | OUTPATIENT
Start: 2023-01-01 | End: 2023-01-01

## 2023-01-01 RX ORDER — METOPROLOL TARTRATE 50 MG
0.5 TABLET ORAL
Refills: 0 | DISCHARGE

## 2023-01-01 RX ORDER — FENTANYL CITRATE 50 UG/ML
25 INJECTION INTRAVENOUS
Refills: 0 | Status: DISCONTINUED | OUTPATIENT
Start: 2023-01-01 | End: 2023-01-01

## 2023-01-01 RX ORDER — DOXYCYCLINE 100 MG/1
100 CAPSULE ORAL DAILY
Qty: 7 | Refills: 0 | Status: COMPLETED | COMMUNITY
Start: 2023-01-01 | End: 2023-01-01

## 2023-01-01 RX ORDER — CHLORHEXIDINE GLUCONATE 213 G/1000ML
1 SOLUTION TOPICAL
Refills: 0 | Status: DISCONTINUED | OUTPATIENT
Start: 2023-01-01 | End: 2023-01-01

## 2023-01-01 RX ORDER — MEROPENEM 1 G/30ML
INJECTION INTRAVENOUS
Refills: 0 | Status: DISCONTINUED | OUTPATIENT
Start: 2023-01-01 | End: 2023-01-01

## 2023-01-01 RX ORDER — SODIUM BICARBONATE 1 MEQ/ML
650 SYRINGE (ML) INTRAVENOUS THREE TIMES A DAY
Refills: 0 | Status: DISCONTINUED | OUTPATIENT
Start: 2023-01-01 | End: 2023-01-01

## 2023-01-01 RX ORDER — SEVELAMER HYDROCHLORIDE 800 MG/1
800 TABLET, FILM COATED ORAL AS DIRECTED
Qty: 60 | Refills: 4 | Status: DISCONTINUED | COMMUNITY
Start: 2023-01-01 | End: 2023-01-01

## 2023-01-01 RX ORDER — UBIDECARENONE/VIT E ACET 100MG-5
25 MCG CAPSULE ORAL DAILY
Refills: 0 | Status: ACTIVE | COMMUNITY
Start: 2023-01-01

## 2023-01-01 RX ORDER — SODIUM CHLORIDE 9 MG/ML
10 INJECTION INTRAMUSCULAR; INTRAVENOUS; SUBCUTANEOUS
Refills: 0 | Status: DISCONTINUED | OUTPATIENT
Start: 2023-01-01 | End: 2023-01-01

## 2023-01-01 RX ORDER — EPOETIN ALFA-EPBX 20000 [IU]/ML
20000 INJECTION, SOLUTION INTRAVENOUS; SUBCUTANEOUS
Qty: 1 | Refills: 0 | Status: ACTIVE | COMMUNITY
Start: 2023-01-01

## 2023-01-01 RX ORDER — METOPROLOL TARTRATE 50 MG
1 TABLET ORAL
Refills: 0 | DISCHARGE

## 2023-01-01 RX ORDER — VANCOMYCIN HCL 1 G
750 VIAL (EA) INTRAVENOUS
Refills: 0 | Status: DISCONTINUED | OUTPATIENT
Start: 2023-01-01 | End: 2023-01-01

## 2023-01-01 RX ORDER — MIDODRINE HYDROCHLORIDE 2.5 MG/1
1 TABLET ORAL
Qty: 0 | Refills: 0 | DISCHARGE
Start: 2023-01-01

## 2023-01-01 RX ORDER — IPRATROPIUM/ALBUTEROL SULFATE 18-103MCG
3 AEROSOL WITH ADAPTER (GRAM) INHALATION ONCE
Refills: 0 | Status: COMPLETED | OUTPATIENT
Start: 2023-01-01 | End: 2023-01-01

## 2023-01-01 RX ORDER — HYDROMORPHONE HYDROCHLORIDE 2 MG/ML
1 INJECTION INTRAMUSCULAR; INTRAVENOUS; SUBCUTANEOUS EVERY 6 HOURS
Refills: 0 | Status: DISCONTINUED | OUTPATIENT
Start: 2023-01-01 | End: 2023-01-01

## 2023-01-01 RX ORDER — ALLOPURINOL 100 MG/1
100 TABLET ORAL
Qty: 180 | Refills: 3 | Status: ACTIVE | COMMUNITY
Start: 2019-03-07 | End: 1900-01-01

## 2023-01-01 RX ORDER — ACETAMINOPHEN 500 MG
975 TABLET ORAL EVERY 6 HOURS
Refills: 0 | Status: DISCONTINUED | OUTPATIENT
Start: 2023-01-01 | End: 2023-01-01

## 2023-01-01 RX ORDER — ALPRAZOLAM 0.25 MG
1 TABLET ORAL
Refills: 0 | DISCHARGE

## 2023-01-01 RX ORDER — DOXYCYCLINE 100 MG/1
100 CAPSULE ORAL DAILY
Qty: 7 | Refills: 0 | Status: ACTIVE | COMMUNITY
Start: 2023-01-01 | End: 1900-01-01

## 2023-01-01 RX ORDER — APIXABAN 2.5 MG/1
2.5 TABLET, FILM COATED ORAL
Qty: 60 | Refills: 4 | Status: DISCONTINUED | COMMUNITY
Start: 2023-01-01 | End: 2023-01-01

## 2023-01-01 RX ADMIN — PANTOPRAZOLE SODIUM 40 MILLIGRAM(S): 20 TABLET, DELAYED RELEASE ORAL at 05:04

## 2023-01-01 RX ADMIN — PANTOPRAZOLE SODIUM 40 MILLIGRAM(S): 20 TABLET, DELAYED RELEASE ORAL at 12:58

## 2023-01-01 RX ADMIN — PANTOPRAZOLE SODIUM 40 MILLIGRAM(S): 20 TABLET, DELAYED RELEASE ORAL at 05:38

## 2023-01-01 RX ADMIN — MIDODRINE HYDROCHLORIDE 10 MILLIGRAM(S): 2.5 TABLET ORAL at 05:38

## 2023-01-01 RX ADMIN — MEROPENEM 500 MILLIGRAM(S): 1 INJECTION INTRAVENOUS at 05:38

## 2023-01-01 RX ADMIN — Medication 20 MILLIGRAM(S): at 12:35

## 2023-01-01 RX ADMIN — Medication 12.5 MILLIGRAM(S): at 06:34

## 2023-01-01 RX ADMIN — Medication 975 MILLIGRAM(S): at 17:26

## 2023-01-01 RX ADMIN — BUMETANIDE 1 MILLIGRAM(S): 0.25 INJECTION INTRAMUSCULAR; INTRAVENOUS at 05:21

## 2023-01-01 RX ADMIN — MAGNESIUM OXIDE 400 MG ORAL TABLET 400 MILLIGRAM(S): 241.3 TABLET ORAL at 13:50

## 2023-01-01 RX ADMIN — CALCITRIOL 0.25 MICROGRAM(S): 0.5 CAPSULE ORAL at 13:00

## 2023-01-01 RX ADMIN — Medication 250 MILLIGRAM(S): at 08:38

## 2023-01-01 RX ADMIN — BUMETANIDE 1 MILLIGRAM(S): 0.25 INJECTION INTRAMUSCULAR; INTRAVENOUS at 08:38

## 2023-01-01 RX ADMIN — ERYTHROPOIETIN 10000 UNIT(S): 10000 INJECTION, SOLUTION INTRAVENOUS; SUBCUTANEOUS at 12:07

## 2023-01-01 RX ADMIN — Medication 667 MILLIGRAM(S): at 13:42

## 2023-01-01 RX ADMIN — Medication 0.5 MILLIGRAM(S): at 23:37

## 2023-01-01 RX ADMIN — Medication 400 MILLIGRAM(S): at 17:32

## 2023-01-01 RX ADMIN — BUDESONIDE AND FORMOTEROL FUMARATE DIHYDRATE 2 PUFF(S): 160; 4.5 AEROSOL RESPIRATORY (INHALATION) at 08:43

## 2023-01-01 RX ADMIN — EPOETIN ALFA-EPBX 0 UNIT/ML: 40000 INJECTION, SOLUTION INTRAVENOUS; SUBCUTANEOUS at 00:00

## 2023-01-01 RX ADMIN — HEPARIN SODIUM 5000 UNIT(S): 5000 INJECTION INTRAVENOUS; SUBCUTANEOUS at 05:25

## 2023-01-01 RX ADMIN — MEROPENEM 500 MILLIGRAM(S): 1 INJECTION INTRAVENOUS at 08:38

## 2023-01-01 RX ADMIN — CALCITRIOL 0.25 MICROGRAM(S): 0.5 CAPSULE ORAL at 17:31

## 2023-01-01 RX ADMIN — Medication 3 MILLILITER(S): at 14:41

## 2023-01-01 RX ADMIN — SODIUM CHLORIDE 75 MILLILITER(S): 9 INJECTION, SOLUTION INTRAVENOUS at 20:50

## 2023-01-01 RX ADMIN — HEPARIN SODIUM 5000 UNIT(S): 5000 INJECTION INTRAVENOUS; SUBCUTANEOUS at 05:17

## 2023-01-01 RX ADMIN — Medication 975 MILLIGRAM(S): at 19:30

## 2023-01-01 RX ADMIN — Medication 250 MILLIGRAM(S): at 17:23

## 2023-01-01 RX ADMIN — MIDODRINE HYDROCHLORIDE 10 MILLIGRAM(S): 2.5 TABLET ORAL at 05:26

## 2023-01-01 RX ADMIN — CHLORHEXIDINE GLUCONATE 1 APPLICATION(S): 213 SOLUTION TOPICAL at 05:56

## 2023-01-01 RX ADMIN — MEROPENEM 500 MILLIGRAM(S): 1 INJECTION INTRAVENOUS at 21:43

## 2023-01-01 RX ADMIN — ATORVASTATIN CALCIUM 40 MILLIGRAM(S): 80 TABLET, FILM COATED ORAL at 21:18

## 2023-01-01 RX ADMIN — HEPARIN SODIUM 5000 UNIT(S): 5000 INJECTION INTRAVENOUS; SUBCUTANEOUS at 21:18

## 2023-01-01 RX ADMIN — PANTOPRAZOLE SODIUM 40 MILLIGRAM(S): 20 TABLET, DELAYED RELEASE ORAL at 06:25

## 2023-01-01 RX ADMIN — Medication 12.5 MILLIGRAM(S): at 06:04

## 2023-01-01 RX ADMIN — Medication 667 MILLIGRAM(S): at 14:06

## 2023-01-01 RX ADMIN — Medication 250 MILLIGRAM(S): at 17:48

## 2023-01-01 RX ADMIN — MEROPENEM 500 MILLIGRAM(S): 1 INJECTION INTRAVENOUS at 18:29

## 2023-01-01 RX ADMIN — MAGNESIUM OXIDE 400 MG ORAL TABLET 400 MILLIGRAM(S): 241.3 TABLET ORAL at 18:57

## 2023-01-01 RX ADMIN — HEPARIN SODIUM 5000 UNIT(S): 5000 INJECTION INTRAVENOUS; SUBCUTANEOUS at 12:58

## 2023-01-01 RX ADMIN — Medication 81 MILLIGRAM(S): at 16:43

## 2023-01-01 RX ADMIN — Medication 1000 MILLIGRAM(S): at 12:50

## 2023-01-01 RX ADMIN — Medication 12.5 MILLIGRAM(S): at 06:25

## 2023-01-01 RX ADMIN — MIDODRINE HYDROCHLORIDE 10 MILLIGRAM(S): 2.5 TABLET ORAL at 17:24

## 2023-01-01 RX ADMIN — MIDODRINE HYDROCHLORIDE 10 MILLIGRAM(S): 2.5 TABLET ORAL at 13:43

## 2023-01-01 RX ADMIN — ATORVASTATIN CALCIUM 40 MILLIGRAM(S): 80 TABLET, FILM COATED ORAL at 23:41

## 2023-01-01 RX ADMIN — Medication 20 MILLIGRAM(S): at 12:58

## 2023-01-01 RX ADMIN — Medication 20 MILLIGRAM(S): at 17:31

## 2023-01-01 RX ADMIN — Medication 100 MILLIGRAM(S): at 10:22

## 2023-01-01 RX ADMIN — HEPARIN SODIUM 5000 UNIT(S): 5000 INJECTION INTRAVENOUS; SUBCUTANEOUS at 05:26

## 2023-01-01 RX ADMIN — ATORVASTATIN CALCIUM 40 MILLIGRAM(S): 80 TABLET, FILM COATED ORAL at 21:34

## 2023-01-01 RX ADMIN — PANTOPRAZOLE SODIUM 40 MILLIGRAM(S): 20 TABLET, DELAYED RELEASE ORAL at 05:24

## 2023-01-01 RX ADMIN — MIDODRINE HYDROCHLORIDE 10 MILLIGRAM(S): 2.5 TABLET ORAL at 17:47

## 2023-01-01 RX ADMIN — Medication 50 MILLIGRAM(S): at 13:51

## 2023-01-01 RX ADMIN — Medication 975 MILLIGRAM(S): at 13:00

## 2023-01-01 RX ADMIN — BUMETANIDE 1 MILLIGRAM(S): 0.25 INJECTION INTRAMUSCULAR; INTRAVENOUS at 06:26

## 2023-01-01 RX ADMIN — BUDESONIDE AND FORMOTEROL FUMARATE DIHYDRATE 2 PUFF(S): 160; 4.5 AEROSOL RESPIRATORY (INHALATION) at 08:36

## 2023-01-01 RX ADMIN — Medication 50 MILLIGRAM(S): at 13:59

## 2023-01-01 RX ADMIN — Medication 3 MILLILITER(S): at 20:50

## 2023-01-01 RX ADMIN — Medication 975 MILLIGRAM(S): at 23:54

## 2023-01-01 RX ADMIN — MEROPENEM 500 MILLIGRAM(S): 1 INJECTION INTRAVENOUS at 17:22

## 2023-01-01 RX ADMIN — CHLORHEXIDINE GLUCONATE 1 APPLICATION(S): 213 SOLUTION TOPICAL at 05:25

## 2023-01-01 RX ADMIN — MUPIROCIN 1 APPLICATION(S): 20 OINTMENT TOPICAL at 05:21

## 2023-01-01 RX ADMIN — Medication 667 MILLIGRAM(S): at 09:27

## 2023-01-01 RX ADMIN — Medication 100 MILLIGRAM(S): at 17:46

## 2023-01-01 RX ADMIN — BUMETANIDE 1 MILLIGRAM(S): 0.25 INJECTION INTRAMUSCULAR; INTRAVENOUS at 05:04

## 2023-01-01 RX ADMIN — CHLORHEXIDINE GLUCONATE 1 APPLICATION(S): 213 SOLUTION TOPICAL at 06:07

## 2023-01-01 RX ADMIN — Medication 667 MILLIGRAM(S): at 08:42

## 2023-01-01 RX ADMIN — MAGNESIUM OXIDE 400 MG ORAL TABLET 400 MILLIGRAM(S): 241.3 TABLET ORAL at 12:34

## 2023-01-01 RX ADMIN — Medication 667 MILLIGRAM(S): at 17:26

## 2023-01-01 RX ADMIN — MAGNESIUM OXIDE 400 MG ORAL TABLET 400 MILLIGRAM(S): 241.3 TABLET ORAL at 17:27

## 2023-01-01 RX ADMIN — Medication 81 MILLIGRAM(S): at 13:43

## 2023-01-01 RX ADMIN — MEROPENEM 500 MILLIGRAM(S): 1 INJECTION INTRAVENOUS at 05:22

## 2023-01-01 RX ADMIN — Medication 400 MILLIGRAM(S): at 11:50

## 2023-01-01 RX ADMIN — Medication 975 MILLIGRAM(S): at 17:56

## 2023-01-01 RX ADMIN — Medication 100 MILLIGRAM(S): at 11:01

## 2023-01-01 RX ADMIN — Medication 3 MILLILITER(S): at 14:26

## 2023-01-01 RX ADMIN — Medication 20 MILLIGRAM(S): at 06:03

## 2023-01-01 RX ADMIN — ATORVASTATIN CALCIUM 40 MILLIGRAM(S): 80 TABLET, FILM COATED ORAL at 23:08

## 2023-01-01 RX ADMIN — MIDODRINE HYDROCHLORIDE 10 MILLIGRAM(S): 2.5 TABLET ORAL at 06:00

## 2023-01-01 RX ADMIN — Medication 12.5 MILLIGRAM(S): at 11:43

## 2023-01-01 RX ADMIN — MEROPENEM 500 MILLIGRAM(S): 1 INJECTION INTRAVENOUS at 05:51

## 2023-01-01 RX ADMIN — PANTOPRAZOLE SODIUM 40 MILLIGRAM(S): 20 TABLET, DELAYED RELEASE ORAL at 06:07

## 2023-01-01 RX ADMIN — MEROPENEM 500 MILLIGRAM(S): 1 INJECTION INTRAVENOUS at 06:17

## 2023-01-01 RX ADMIN — HEPARIN SODIUM 5000 UNIT(S): 5000 INJECTION INTRAVENOUS; SUBCUTANEOUS at 06:28

## 2023-01-01 RX ADMIN — Medication 100 MILLIGRAM(S): at 11:41

## 2023-01-01 RX ADMIN — PANTOPRAZOLE SODIUM 40 MILLIGRAM(S): 20 TABLET, DELAYED RELEASE ORAL at 05:21

## 2023-01-01 RX ADMIN — Medication 12.5 MILLIGRAM(S): at 05:21

## 2023-01-01 RX ADMIN — Medication 667 MILLIGRAM(S): at 17:14

## 2023-01-01 RX ADMIN — MEROPENEM 500 MILLIGRAM(S): 1 INJECTION INTRAVENOUS at 17:14

## 2023-01-01 RX ADMIN — Medication 100 MILLIGRAM(S): at 13:51

## 2023-01-01 RX ADMIN — Medication 400 MILLIGRAM(S): at 05:13

## 2023-01-01 RX ADMIN — PANTOPRAZOLE SODIUM 40 MILLIGRAM(S): 20 TABLET, DELAYED RELEASE ORAL at 06:34

## 2023-01-01 RX ADMIN — Medication 100 MILLIEQUIVALENT(S): at 19:17

## 2023-01-01 RX ADMIN — Medication 81 MILLIGRAM(S): at 12:30

## 2023-01-01 RX ADMIN — CHLORHEXIDINE GLUCONATE 1 APPLICATION(S): 213 SOLUTION TOPICAL at 05:24

## 2023-01-01 RX ADMIN — Medication 3 MILLILITER(S): at 09:02

## 2023-01-01 RX ADMIN — CALCITRIOL 0.25 MICROGRAM(S): 0.5 CAPSULE ORAL at 11:01

## 2023-01-01 RX ADMIN — Medication 12.5 MILLIGRAM(S): at 05:04

## 2023-01-01 RX ADMIN — CALCITRIOL 0.25 MICROGRAM(S): 0.5 CAPSULE ORAL at 17:53

## 2023-01-01 RX ADMIN — HEPARIN SODIUM 5000 UNIT(S): 5000 INJECTION INTRAVENOUS; SUBCUTANEOUS at 21:21

## 2023-01-01 RX ADMIN — Medication 20 MILLIGRAM(S): at 13:43

## 2023-01-01 RX ADMIN — MIDODRINE HYDROCHLORIDE 10 MILLIGRAM(S): 2.5 TABLET ORAL at 06:07

## 2023-01-01 RX ADMIN — CALCITRIOL 0.25 MICROGRAM(S): 0.5 CAPSULE ORAL at 12:57

## 2023-01-01 RX ADMIN — Medication 100 MILLIGRAM(S): at 13:01

## 2023-01-01 RX ADMIN — MAGNESIUM OXIDE 400 MG ORAL TABLET 400 MILLIGRAM(S): 241.3 TABLET ORAL at 17:22

## 2023-01-01 RX ADMIN — Medication 667 MILLIGRAM(S): at 17:22

## 2023-01-01 RX ADMIN — CALCITRIOL 0.25 MICROGRAM(S): 0.5 CAPSULE ORAL at 11:41

## 2023-01-01 RX ADMIN — Medication 20 MILLIGRAM(S): at 13:51

## 2023-01-01 RX ADMIN — MEROPENEM 500 MILLIGRAM(S): 1 INJECTION INTRAVENOUS at 18:55

## 2023-01-01 RX ADMIN — Medication 975 MILLIGRAM(S): at 23:41

## 2023-01-01 RX ADMIN — Medication 100 MILLIGRAM(S): at 12:57

## 2023-01-01 RX ADMIN — Medication 100 MILLIGRAM(S): at 13:33

## 2023-01-01 RX ADMIN — MEROPENEM 500 MILLIGRAM(S): 1 INJECTION INTRAVENOUS at 05:25

## 2023-01-01 RX ADMIN — CHLORHEXIDINE GLUCONATE 1 APPLICATION(S): 213 SOLUTION TOPICAL at 05:17

## 2023-01-01 RX ADMIN — HEPARIN SODIUM 5000 UNIT(S): 5000 INJECTION INTRAVENOUS; SUBCUTANEOUS at 13:01

## 2023-01-01 RX ADMIN — CALCITRIOL 0.25 MICROGRAM(S): 0.5 CAPSULE ORAL at 12:35

## 2023-01-01 RX ADMIN — Medication 667 MILLIGRAM(S): at 08:16

## 2023-01-01 RX ADMIN — Medication 20 MILLIGRAM(S): at 10:23

## 2023-01-01 RX ADMIN — MIDODRINE HYDROCHLORIDE 10 MILLIGRAM(S): 2.5 TABLET ORAL at 13:01

## 2023-01-01 RX ADMIN — Medication 975 MILLIGRAM(S): at 00:16

## 2023-01-01 RX ADMIN — BUMETANIDE 1 MILLIGRAM(S): 0.25 INJECTION INTRAMUSCULAR; INTRAVENOUS at 18:56

## 2023-01-01 RX ADMIN — CALCITRIOL 0.25 MICROGRAM(S): 0.5 CAPSULE ORAL at 10:22

## 2023-01-01 RX ADMIN — MEROPENEM 500 MILLIGRAM(S): 1 INJECTION INTRAVENOUS at 17:46

## 2023-01-01 RX ADMIN — Medication 975 MILLIGRAM(S): at 14:22

## 2023-01-01 RX ADMIN — HEPARIN SODIUM 5000 UNIT(S): 5000 INJECTION INTRAVENOUS; SUBCUTANEOUS at 13:34

## 2023-01-01 RX ADMIN — PANTOPRAZOLE SODIUM 40 MILLIGRAM(S): 20 TABLET, DELAYED RELEASE ORAL at 11:41

## 2023-01-01 RX ADMIN — Medication 667 MILLIGRAM(S): at 13:33

## 2023-01-01 RX ADMIN — HEPARIN SODIUM 5000 UNIT(S): 5000 INJECTION INTRAVENOUS; SUBCUTANEOUS at 13:12

## 2023-01-01 RX ADMIN — Medication 10 MILLIGRAM(S): at 05:21

## 2023-01-01 RX ADMIN — Medication 667 MILLIGRAM(S): at 17:41

## 2023-01-01 RX ADMIN — ATORVASTATIN CALCIUM 40 MILLIGRAM(S): 80 TABLET, FILM COATED ORAL at 22:11

## 2023-01-01 RX ADMIN — Medication 81 MILLIGRAM(S): at 09:46

## 2023-01-01 RX ADMIN — HEPARIN SODIUM 5000 UNIT(S): 5000 INJECTION INTRAVENOUS; SUBCUTANEOUS at 21:39

## 2023-01-01 RX ADMIN — Medication 1300 MILLIGRAM(S): at 16:43

## 2023-01-01 RX ADMIN — Medication 50 MILLIGRAM(S): at 12:35

## 2023-01-01 RX ADMIN — HEPARIN SODIUM 5000 UNIT(S): 5000 INJECTION INTRAVENOUS; SUBCUTANEOUS at 21:06

## 2023-01-01 RX ADMIN — Medication 650 MILLIGRAM(S): at 21:56

## 2023-01-01 RX ADMIN — ERYTHROPOIETIN 2000 UNIT(S): 10000 INJECTION, SOLUTION INTRAVENOUS; SUBCUTANEOUS at 18:55

## 2023-01-01 RX ADMIN — Medication 81 MILLIGRAM(S): at 11:41

## 2023-01-01 RX ADMIN — Medication 81 MILLIGRAM(S): at 14:00

## 2023-01-01 RX ADMIN — Medication 81 MILLIGRAM(S): at 12:57

## 2023-01-01 RX ADMIN — Medication 1300 MILLIGRAM(S): at 22:23

## 2023-01-01 RX ADMIN — CHLORHEXIDINE GLUCONATE 1 APPLICATION(S): 213 SOLUTION TOPICAL at 17:30

## 2023-01-01 RX ADMIN — Medication 81 MILLIGRAM(S): at 17:32

## 2023-01-01 RX ADMIN — PANTOPRAZOLE SODIUM 40 MILLIGRAM(S): 20 TABLET, DELAYED RELEASE ORAL at 05:26

## 2023-01-01 RX ADMIN — BUMETANIDE 1 MILLIGRAM(S): 0.25 INJECTION INTRAMUSCULAR; INTRAVENOUS at 17:46

## 2023-01-01 RX ADMIN — ATORVASTATIN CALCIUM 40 MILLIGRAM(S): 80 TABLET, FILM COATED ORAL at 21:38

## 2023-01-01 RX ADMIN — HEPARIN SODIUM 5000 UNIT(S): 5000 INJECTION INTRAVENOUS; SUBCUTANEOUS at 14:00

## 2023-01-01 RX ADMIN — Medication 81 MILLIGRAM(S): at 13:01

## 2023-01-01 RX ADMIN — MIDODRINE HYDROCHLORIDE 10 MILLIGRAM(S): 2.5 TABLET ORAL at 17:40

## 2023-01-01 RX ADMIN — CALCITRIOL 0.25 MICROGRAM(S): 0.5 CAPSULE ORAL at 13:34

## 2023-01-01 RX ADMIN — Medication 12.5 MILLIGRAM(S): at 05:19

## 2023-01-01 RX ADMIN — Medication 81 MILLIGRAM(S): at 13:34

## 2023-01-01 RX ADMIN — HEPARIN SODIUM 5000 UNIT(S): 5000 INJECTION INTRAVENOUS; SUBCUTANEOUS at 21:33

## 2023-01-01 RX ADMIN — MAGNESIUM OXIDE 400 MG ORAL TABLET 400 MILLIGRAM(S): 241.3 TABLET ORAL at 13:34

## 2023-01-01 RX ADMIN — BUMETANIDE 1 MILLIGRAM(S): 0.25 INJECTION INTRAMUSCULAR; INTRAVENOUS at 13:59

## 2023-01-01 RX ADMIN — Medication 20 MILLIGRAM(S): at 11:00

## 2023-01-01 RX ADMIN — Medication 1300 MILLIGRAM(S): at 21:21

## 2023-01-01 RX ADMIN — MEROPENEM 500 MILLIGRAM(S): 1 INJECTION INTRAVENOUS at 17:30

## 2023-01-01 RX ADMIN — Medication 12.5 MILLIGRAM(S): at 05:25

## 2023-01-01 RX ADMIN — HEPARIN SODIUM 5000 UNIT(S): 5000 INJECTION INTRAVENOUS; SUBCUTANEOUS at 05:52

## 2023-01-01 RX ADMIN — Medication 667 MILLIGRAM(S): at 17:27

## 2023-01-01 RX ADMIN — Medication 975 MILLIGRAM(S): at 18:57

## 2023-01-01 RX ADMIN — ONDANSETRON 4 MILLIGRAM(S): 8 TABLET, FILM COATED ORAL at 18:26

## 2023-01-01 RX ADMIN — CHLORHEXIDINE GLUCONATE 1 APPLICATION(S): 213 SOLUTION TOPICAL at 06:19

## 2023-01-01 RX ADMIN — Medication 81 MILLIGRAM(S): at 17:53

## 2023-01-01 RX ADMIN — HEPARIN SODIUM 5000 UNIT(S): 5000 INJECTION INTRAVENOUS; SUBCUTANEOUS at 06:07

## 2023-01-01 RX ADMIN — PANTOPRAZOLE SODIUM 40 MILLIGRAM(S): 20 TABLET, DELAYED RELEASE ORAL at 06:03

## 2023-01-01 RX ADMIN — BUDESONIDE AND FORMOTEROL FUMARATE DIHYDRATE 2 PUFF(S): 160; 4.5 AEROSOL RESPIRATORY (INHALATION) at 20:51

## 2023-01-01 RX ADMIN — Medication 975 MILLIGRAM(S): at 05:04

## 2023-01-01 RX ADMIN — PANTOPRAZOLE SODIUM 40 MILLIGRAM(S): 20 TABLET, DELAYED RELEASE ORAL at 06:01

## 2023-01-01 RX ADMIN — Medication 667 MILLIGRAM(S): at 17:48

## 2023-01-01 RX ADMIN — Medication 100 MILLIGRAM(S): at 17:53

## 2023-01-01 RX ADMIN — Medication 100 MILLIGRAM(S): at 12:34

## 2023-01-01 RX ADMIN — Medication 667 MILLIGRAM(S): at 17:24

## 2023-01-01 RX ADMIN — HEPARIN SODIUM 5000 UNIT(S): 5000 INJECTION INTRAVENOUS; SUBCUTANEOUS at 14:42

## 2023-01-01 RX ADMIN — HEPARIN SODIUM 5000 UNIT(S): 5000 INJECTION INTRAVENOUS; SUBCUTANEOUS at 22:20

## 2023-01-01 RX ADMIN — CALCITRIOL 0.25 MICROGRAM(S): 0.5 CAPSULE ORAL at 14:00

## 2023-01-01 RX ADMIN — Medication 20 MILLIGRAM(S): at 12:30

## 2023-01-01 RX ADMIN — ATORVASTATIN CALCIUM 40 MILLIGRAM(S): 80 TABLET, FILM COATED ORAL at 23:37

## 2023-01-01 RX ADMIN — Medication 667 MILLIGRAM(S): at 12:37

## 2023-01-01 RX ADMIN — Medication 667 MILLIGRAM(S): at 08:30

## 2023-01-01 RX ADMIN — CHLORHEXIDINE GLUCONATE 1 APPLICATION(S): 213 SOLUTION TOPICAL at 06:34

## 2023-01-01 RX ADMIN — MIDODRINE HYDROCHLORIDE 10 MILLIGRAM(S): 2.5 TABLET ORAL at 17:48

## 2023-01-01 RX ADMIN — Medication 667 MILLIGRAM(S): at 11:08

## 2023-01-01 RX ADMIN — Medication 20 MILLIGRAM(S): at 14:00

## 2023-01-01 RX ADMIN — BUDESONIDE AND FORMOTEROL FUMARATE DIHYDRATE 2 PUFF(S): 160; 4.5 AEROSOL RESPIRATORY (INHALATION) at 22:02

## 2023-01-01 RX ADMIN — PANTOPRAZOLE SODIUM 40 MILLIGRAM(S): 20 TABLET, DELAYED RELEASE ORAL at 06:44

## 2023-01-01 RX ADMIN — Medication 650 MILLIGRAM(S): at 22:48

## 2023-01-01 RX ADMIN — MIDODRINE HYDROCHLORIDE 10 MILLIGRAM(S): 2.5 TABLET ORAL at 12:30

## 2023-01-01 RX ADMIN — CHLORHEXIDINE GLUCONATE 1 APPLICATION(S): 213 SOLUTION TOPICAL at 06:01

## 2023-01-01 RX ADMIN — Medication 1000 MILLIGRAM(S): at 05:43

## 2023-01-01 RX ADMIN — SODIUM CHLORIDE 250 MILLILITER(S): 9 INJECTION INTRAMUSCULAR; INTRAVENOUS; SUBCUTANEOUS at 18:04

## 2023-01-01 RX ADMIN — HEPARIN SODIUM 5000 UNIT(S): 5000 INJECTION INTRAVENOUS; SUBCUTANEOUS at 06:26

## 2023-01-01 RX ADMIN — Medication 650 MILLIGRAM(S): at 08:39

## 2023-01-01 RX ADMIN — CHLORHEXIDINE GLUCONATE 1 APPLICATION(S): 213 SOLUTION TOPICAL at 17:41

## 2023-01-01 RX ADMIN — BUDESONIDE AND FORMOTEROL FUMARATE DIHYDRATE 2 PUFF(S): 160; 4.5 AEROSOL RESPIRATORY (INHALATION) at 09:45

## 2023-01-01 RX ADMIN — Medication 667 MILLIGRAM(S): at 16:44

## 2023-01-01 RX ADMIN — Medication 100 MILLIGRAM(S): at 14:00

## 2023-01-01 RX ADMIN — MEROPENEM 500 MILLIGRAM(S): 1 INJECTION INTRAVENOUS at 05:05

## 2023-01-01 RX ADMIN — MEROPENEM 500 MILLIGRAM(S): 1 INJECTION INTRAVENOUS at 06:26

## 2023-01-01 RX ADMIN — Medication 667 MILLIGRAM(S): at 08:39

## 2023-01-01 RX ADMIN — BUMETANIDE 1 MILLIGRAM(S): 0.25 INJECTION INTRAMUSCULAR; INTRAVENOUS at 05:26

## 2023-01-01 RX ADMIN — Medication 667 MILLIGRAM(S): at 08:02

## 2023-01-01 RX ADMIN — MUPIROCIN 1 APPLICATION(S): 20 OINTMENT TOPICAL at 17:32

## 2023-01-01 RX ADMIN — Medication 20 MILLIGRAM(S): at 13:00

## 2023-01-01 RX ADMIN — Medication 1300 MILLIGRAM(S): at 14:00

## 2023-01-01 RX ADMIN — MEROPENEM 500 MILLIGRAM(S): 1 INJECTION INTRAVENOUS at 05:37

## 2023-01-01 RX ADMIN — Medication 1000 MILLIGRAM(S): at 18:32

## 2023-01-01 RX ADMIN — CHLORHEXIDINE GLUCONATE 1 APPLICATION(S): 213 SOLUTION TOPICAL at 05:35

## 2023-01-01 RX ADMIN — HEPARIN SODIUM 5000 UNIT(S): 5000 INJECTION INTRAVENOUS; SUBCUTANEOUS at 05:38

## 2023-01-01 RX ADMIN — PANTOPRAZOLE SODIUM 40 MILLIGRAM(S): 20 TABLET, DELAYED RELEASE ORAL at 05:53

## 2023-01-01 RX ADMIN — Medication 10 MILLIGRAM(S): at 05:18

## 2023-01-01 RX ADMIN — HEPARIN SODIUM 5000 UNIT(S): 5000 INJECTION INTRAVENOUS; SUBCUTANEOUS at 22:11

## 2023-01-01 RX ADMIN — Medication 975 MILLIGRAM(S): at 05:25

## 2023-01-01 RX ADMIN — MEROPENEM 500 MILLIGRAM(S): 1 INJECTION INTRAVENOUS at 17:25

## 2023-01-01 RX ADMIN — BUMETANIDE 1 MILLIGRAM(S): 0.25 INJECTION INTRAMUSCULAR; INTRAVENOUS at 13:51

## 2023-01-01 RX ADMIN — BUMETANIDE 1 MILLIGRAM(S): 0.25 INJECTION INTRAMUSCULAR; INTRAVENOUS at 05:52

## 2023-01-01 RX ADMIN — BUDESONIDE AND FORMOTEROL FUMARATE DIHYDRATE 2 PUFF(S): 160; 4.5 AEROSOL RESPIRATORY (INHALATION) at 20:21

## 2023-01-01 RX ADMIN — Medication 975 MILLIGRAM(S): at 23:08

## 2023-01-01 RX ADMIN — BUDESONIDE AND FORMOTEROL FUMARATE DIHYDRATE 2 PUFF(S): 160; 4.5 AEROSOL RESPIRATORY (INHALATION) at 21:15

## 2023-01-01 RX ADMIN — ATORVASTATIN CALCIUM 40 MILLIGRAM(S): 80 TABLET, FILM COATED ORAL at 21:56

## 2023-01-01 RX ADMIN — Medication 667 MILLIGRAM(S): at 13:00

## 2023-01-01 RX ADMIN — MEROPENEM 500 MILLIGRAM(S): 1 INJECTION INTRAVENOUS at 05:59

## 2023-01-01 RX ADMIN — BUMETANIDE 1 MILLIGRAM(S): 0.25 INJECTION INTRAMUSCULAR; INTRAVENOUS at 12:34

## 2023-01-01 RX ADMIN — MEROPENEM 500 MILLIGRAM(S): 1 INJECTION INTRAVENOUS at 17:26

## 2023-01-01 RX ADMIN — Medication 12.5 MILLIGRAM(S): at 05:26

## 2023-01-01 RX ADMIN — ERYTHROPOIETIN 10000 UNIT(S): 10000 INJECTION, SOLUTION INTRAVENOUS; SUBCUTANEOUS at 10:26

## 2023-01-01 RX ADMIN — MAGNESIUM OXIDE 400 MG ORAL TABLET 400 MILLIGRAM(S): 241.3 TABLET ORAL at 08:31

## 2023-01-01 RX ADMIN — ATORVASTATIN CALCIUM 40 MILLIGRAM(S): 80 TABLET, FILM COATED ORAL at 21:21

## 2023-01-01 RX ADMIN — Medication 100 MILLIGRAM(S): at 13:43

## 2023-01-01 RX ADMIN — SODIUM CHLORIDE 75 MILLILITER(S): 9 INJECTION, SOLUTION INTRAVENOUS at 06:43

## 2023-01-01 RX ADMIN — ATORVASTATIN CALCIUM 40 MILLIGRAM(S): 80 TABLET, FILM COATED ORAL at 22:23

## 2023-01-01 RX ADMIN — Medication 100 MILLIGRAM(S): at 17:31

## 2023-01-01 RX ADMIN — Medication 12.5 MILLIGRAM(S): at 05:51

## 2023-01-01 RX ADMIN — Medication 667 MILLIGRAM(S): at 08:12

## 2023-01-01 RX ADMIN — Medication 667 MILLIGRAM(S): at 08:10

## 2023-01-01 RX ADMIN — CALCITRIOL 0.25 MICROGRAM(S): 0.5 CAPSULE ORAL at 13:50

## 2023-01-01 RX ADMIN — Medication 1250 MILLIGRAM(S): at 10:16

## 2023-01-01 RX ADMIN — CHLORHEXIDINE GLUCONATE 1 APPLICATION(S): 213 SOLUTION TOPICAL at 05:38

## 2023-01-01 RX ADMIN — Medication 20 MILLIGRAM(S): at 13:34

## 2023-01-01 RX ADMIN — HEPARIN SODIUM 5000 UNIT(S): 5000 INJECTION INTRAVENOUS; SUBCUTANEOUS at 21:56

## 2023-01-01 RX ADMIN — Medication 50 MILLIGRAM(S): at 11:01

## 2023-01-01 RX ADMIN — MIDODRINE HYDROCHLORIDE 10 MILLIGRAM(S): 2.5 TABLET ORAL at 13:33

## 2023-01-01 RX ADMIN — MIDODRINE HYDROCHLORIDE 10 MILLIGRAM(S): 2.5 TABLET ORAL at 17:22

## 2023-01-01 RX ADMIN — Medication 1300 MILLIGRAM(S): at 05:24

## 2023-01-01 RX ADMIN — Medication 100 MILLIGRAM(S): at 12:58

## 2023-01-01 RX ADMIN — MEROPENEM 500 MILLIGRAM(S): 1 INJECTION INTRAVENOUS at 17:05

## 2023-01-01 RX ADMIN — Medication 250 MILLIGRAM(S): at 13:50

## 2023-01-01 RX ADMIN — MAGNESIUM OXIDE 400 MG ORAL TABLET 400 MILLIGRAM(S): 241.3 TABLET ORAL at 10:46

## 2023-01-01 RX ADMIN — Medication 100 MILLIGRAM(S): at 12:30

## 2023-01-01 RX ADMIN — Medication 667 MILLIGRAM(S): at 12:30

## 2023-01-01 RX ADMIN — Medication 3 MILLIGRAM(S): at 21:56

## 2023-01-01 RX ADMIN — CALCITRIOL 0.25 MICROGRAM(S): 0.5 CAPSULE ORAL at 12:30

## 2023-01-01 RX ADMIN — BUDESONIDE AND FORMOTEROL FUMARATE DIHYDRATE 2 PUFF(S): 160; 4.5 AEROSOL RESPIRATORY (INHALATION) at 07:27

## 2023-01-01 RX ADMIN — HEPARIN SODIUM 5000 UNIT(S): 5000 INJECTION INTRAVENOUS; SUBCUTANEOUS at 06:00

## 2023-01-01 RX ADMIN — Medication 50 MILLIGRAM(S): at 12:58

## 2023-01-01 RX ADMIN — SODIUM ZIRCONIUM CYCLOSILICATE 5 GRAM(S): 10 POWDER, FOR SUSPENSION ORAL at 19:59

## 2023-01-01 RX ADMIN — Medication 667 MILLIGRAM(S): at 17:31

## 2023-01-01 RX ADMIN — Medication 81 MILLIGRAM(S): at 11:00

## 2023-01-01 RX ADMIN — HEPARIN SODIUM 5000 UNIT(S): 5000 INJECTION INTRAVENOUS; SUBCUTANEOUS at 22:23

## 2023-01-01 RX ADMIN — MEROPENEM 1000 MILLIGRAM(S): 1 INJECTION INTRAVENOUS at 17:19

## 2023-01-01 RX ADMIN — MEROPENEM 500 MILLIGRAM(S): 1 INJECTION INTRAVENOUS at 05:23

## 2023-01-01 RX ADMIN — CALCITRIOL 0.25 MICROGRAM(S): 0.5 CAPSULE ORAL at 12:58

## 2023-01-01 RX ADMIN — ATORVASTATIN CALCIUM 40 MILLIGRAM(S): 80 TABLET, FILM COATED ORAL at 21:06

## 2023-01-01 RX ADMIN — Medication 12.5 MILLIGRAM(S): at 06:08

## 2023-01-01 RX ADMIN — Medication 0.2 MILLIGRAM(S): at 17:43

## 2023-01-01 RX ADMIN — BUDESONIDE AND FORMOTEROL FUMARATE DIHYDRATE 2 PUFF(S): 160; 4.5 AEROSOL RESPIRATORY (INHALATION) at 20:47

## 2023-01-01 RX ADMIN — MAGNESIUM OXIDE 400 MG ORAL TABLET 400 MILLIGRAM(S): 241.3 TABLET ORAL at 08:40

## 2023-01-01 RX ADMIN — ATORVASTATIN CALCIUM 40 MILLIGRAM(S): 80 TABLET, FILM COATED ORAL at 22:20

## 2023-01-01 RX ADMIN — CALCITRIOL 0.25 MICROGRAM(S): 0.5 CAPSULE ORAL at 13:43

## 2023-01-01 RX ADMIN — Medication 12.5 MILLIGRAM(S): at 05:37

## 2023-01-01 RX ADMIN — MAGNESIUM OXIDE 400 MG ORAL TABLET 400 MILLIGRAM(S): 241.3 TABLET ORAL at 17:40

## 2023-01-01 RX ADMIN — MEROPENEM 500 MILLIGRAM(S): 1 INJECTION INTRAVENOUS at 06:28

## 2023-01-01 RX ADMIN — ATORVASTATIN CALCIUM 40 MILLIGRAM(S): 80 TABLET, FILM COATED ORAL at 21:13

## 2023-01-06 PROBLEM — R27.0 ATAXIA: Status: ACTIVE | Noted: 2023-01-01

## 2023-01-06 PROBLEM — R29.6 FREQUENT FALLS: Status: ACTIVE | Noted: 2021-05-26

## 2023-01-06 NOTE — ASSESSMENT
[FreeTextEntry1] : There is no specific neurological deficit appreciated\par In particular strength and sensation and reflexes in the legs appear normal with mildly depressed ankle reflexes, commensurate with age\par I am suggesting some physical therapy to help with gait and balance\par Follow-up in 3 months and by phone prior to that if needed

## 2023-01-06 NOTE — HISTORY OF PRESENT ILLNESS
[FreeTextEntry1] : She is here with her \par Reports she has had episodes of falling going on for about 2 years\par She says her legs will give out and she will fall\par There is no loss of consciousness and she is totally aware that she is falling\par 1 occasion she suffered a head laceration and she was admitted to [facility] where imaging studies of the head and EEG monitoring were normal\par She has been using a walker\par She still falls about once a month\par Denies any significant neck or back pains\par She has had significant swelling in the legs but that is reduced now\par She has had bilateral knee replacements, one in 2009 and the other in 2013\par She has had steroid injections in the hips for hip pain of the hips and they are currently not bothering her much\par \par Medical history significant for coronary artery disease, congestive heart failure, chronic renal failure, COPD, gout, hyperlipidemia, hypertension, renal cancer\par Had diabetes in the past but now off medications after she lost weight\par \par She is a smoker.  No significant alcohol use

## 2023-01-09 NOTE — HISTORY OF PRESENT ILLNESS
[Congestive Heart Failure] : Congestive Heart Failure [Diabetes Mellitus] : Diabetes Mellitus [Hypertension] : Hypertension [Other: ___] : [unfilled] [Check glucose ___ x/week] : Patient checks blood glucose [unfilled]  times a week [Does not check BP] : The patient is not checking blood pressure [<130/90] : Target blood pressure is  <130/90 [Target goal met] : BP target goal met [FreeTextEntry6] : pt has had multiple falls over the last several days legs are  weak saw  neuro who recommend  physical therapy leg  swelling  much better  no longer having drainage

## 2023-01-09 NOTE — PHYSICAL EXAM
[No Acute Distress] : no acute distress [PERRL] : pupils equal round and reactive to light [Normal TMs] : both tympanic membranes were normal [Supple] : supple [Clear to Auscultation] : lungs were clear to auscultation bilaterally [Normal S1, S2] : normal S1 and S2 [No Edema] : there was no peripheral edema [Normal] : soft, non-tender, non-distended, no masses palpated, no HSM and normal bowel sounds [Normal Supraclavicular Nodes] : no supraclavicular lymphadenopathy [Normal Posterior Cervical Nodes] : no posterior cervical lymphadenopathy [Normal Anterior Cervical Nodes] : no anterior cervical lymphadenopathy [No Joint Swelling] : no joint swelling [No Rash] : no rash [No Focal Deficits] : no focal deficits [Normal Insight/Judgement] : insight and judgment were intact

## 2023-01-09 NOTE — ASSESSMENT
[FreeTextEntry1] : multiple falls will order mri of c-spine r/o central  spinal stenosis chf continue torsemide edema less  dc metalozone  bp acceptable continue metoprolol

## 2023-01-18 NOTE — PHYSICAL EXAM
[General Appearance - Alert] : alert [General Appearance - In No Acute Distress] : in no acute distress [General Appearance - Well Developed] : well developed [PERRL With Normal Accommodation] : pupils were equal in size, round, and reactive to light [Extraocular Movements] : extraocular movements were intact [Outer Ear] : the ears and nose were normal in appearance [Neck Appearance] : the appearance of the neck was normal [Jugular Venous Distention Increased] : there was no jugular-venous distention [] : no respiratory distress [Respiration, Rhythm And Depth] : normal respiratory rhythm and effort [Exaggerated Use Of Accessory Muscles For Inspiration] : no accessory muscle use [Auscultation Breath Sounds / Voice Sounds] : lungs were clear to auscultation bilaterally [Heart Sounds] : normal S1 and S2 [Arterial Pulses Carotid] : carotid pulses were normal with no bruits [Bowel Sounds] : normal bowel sounds [Abdomen Soft] : soft [Abdomen Tenderness] : non-tender [No CVA Tenderness] : no ~M costovertebral angle tenderness [Nail Clubbing] : no clubbing  or cyanosis of the fingernails [Skin Color & Pigmentation] : normal skin color and pigmentation [Motor Exam] : the motor exam was normal [Oriented To Time, Place, And Person] : oriented to person, place, and time [Impaired Insight] : insight and judgment were intact [Affect] : the affect was normal [Mood] : the mood was normal [FreeTextEntry1] : murmur at left sternal border and at apex

## 2023-01-18 NOTE — REVIEW OF SYSTEMS
[Feeling Tired] : feeling tired [Lower Ext Edema] : lower extremity edema [SOB on Exertion] : shortness of breath during exertion [Difficulty Walking] : difficulty walking [Negative] : Heme/Lymph

## 2023-01-18 NOTE — ASSESSMENT
[FreeTextEntry1] : 1. CKD stage 4 - creatinine was 2.43 2021, increased to 2.99 with BUN 68 in December 2022 and was 2.68 with BUN of 60 early January 2023.  GFR fluctuating between stage 4 and 5.   Now off of Metolazone.  Would repeat and monitor.  Issue is optimizing intravascular volumes, renal perfusion while addressing edema and overall fluid balance.  She has significant pulmonary hypertension which is contributing to the fluid accumulation.  We discussed the need to continue with compression stockings/wraps of lower extremities, minimizing salt intake, monitoring of daily weights and optimization of the rest of her regimen.  She is very high risk for renal function deterioration.  Avoid all nephrotoxic agents as well as use of intravenous contrast whenever possible.  \par 2. Lower extremity edema - As per above.  For now, continue with Torsemide and daily weights.  The severe pulmonary hypertension may limit the amount of fluid removal she will hemodynamically tolerate.  Further recommendations per course.\par 3. Hypertension - monitor on current regimen. \par 4. Anemia - Procrit with goal hemoglobin 10-11 gm/dl.  She is receiving Procrit per medicine according to the patient.  \par 5. Secondary HPT - last PTH intact was 504 but that was end of 2021.  Repeat prior to next visit.  May need further management. \par 6. Vitamin D deficiency - never started the Ergocalciferol.  Taking 1000 iu per day.  Repeat levels prior to next office visit. \par 7. Renal cysts - Was previously monitored by Dr. Castañeda (Urology).  Will repeat ultrasound prior to next visit.  \par 8. Hyperkalemia - should remain on potassium moderated diet. Most current level is 4.2. \par Reviewed at length with patient and .  She was strongly encouraged to stop all smoking.  \par Follow up March 2023.

## 2023-01-18 NOTE — HISTORY OF PRESENT ILLNESS
[FreeTextEntry1] : Patient with history of CHF, DM for over 20 years, hypertension, CAD, gout (on Allopurinol), anemia and CKD stage 4.  In addition, has a long history of smoking (approximately 50 pack year).  Anemia managed with Procrit.  Has been on diuretics for CHF and lower extremity edema.  ECHO noted for significantly elevated RVSP @ 81 mm Hg, consistent with marked pulmonary hypertension.  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  Sonogram from 2020 showed bilateral increased echogenicity and bilateral renal cysts.  History of hyperkalemia managed with moderated potassium diet intake.  Vitamin D deficiency was to be managed with weekly Ergocalciferol but patient never started on that regime, instead takes 1000 iu per day.  Albuminuria noted for several years.  PTH over 500 in 2021.  Most recently, Metolazone was stopped with continuation of Torsemide.  \par \par Renal ultrasounds\par 6/26/20 - right 10.1 cm, left 9.9 cm, (partial left nephrectomy), increased bilateral echogenicity, bilateral simple cysts.

## 2023-02-21 NOTE — ASSESSMENT
[FreeTextEntry1] : endstage  renal disease  restart metolazone for edema htn continue metoprolol  edema with stasis derm both legs  both legs  wraped with unaboots and ace  still smoking  cad continue asa hld continue atorvastatin

## 2023-02-21 NOTE — HISTORY OF PRESENT ILLNESS
[Congestive Heart Failure] : Congestive Heart Failure [Diabetes Mellitus] : Diabetes Mellitus [Hypertension] : Hypertension [Other: ___] : [unfilled] [Check glucose ___ x/week] : Patient checks blood glucose [unfilled]  times a week [Does not check BP] : The patient is not checking blood pressure [<130/90] : Target blood pressure is  <130/90 [Target goal met] : BP target goal met [FreeTextEntry6] : pt has had multiple falls over the last several days legs are  weak saw  neuro who recommend  physical therapy leg  swelling worse again with weeping recent fall hit head on table end and has echymoysis  left orbit saw  renal  labs  no change  egfr 15  no

## 2023-02-21 NOTE — PHYSICAL EXAM
[No Acute Distress] : no acute distress [PERRL] : pupils equal round and reactive to light [Normal TMs] : both tympanic membranes were normal [Supple] : supple [Clear to Auscultation] : lungs were clear to auscultation bilaterally [Normal S1, S2] : normal S1 and S2 [Normal] : soft, non-tender, non-distended, no masses palpated, no HSM and normal bowel sounds [No Joint Swelling] : no joint swelling [No Rash] : no rash [No Focal Deficits] : no focal deficits [Normal Insight/Judgement] : insight and judgment were intact [de-identified] : pansystolic murmur  [de-identified] : 3 +  edema  [de-identified] : stasis  canges  le with weeping
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
## 2023-02-27 NOTE — ASSESSMENT
[FreeTextEntry1] : left leg wrapped wtin xeroform over open areas and unaboot and ace applied rt leg  wrapped with ace compression

## 2023-02-27 NOTE — HISTORY OF PRESENT ILLNESS
[FreeTextEntry1] : pt here for f/u of stasis  derm  [de-identified] : pt  feel again 2 days  ago went to [facility] but refused admission pt injured  rt forearm pt very unsteady on feet

## 2023-02-27 NOTE — PHYSICAL EXAM
[No Acute Distress] : no acute distress [Clear to Auscultation] : lungs were clear to auscultation bilaterally [Regular Rhythm] : with a regular rhythm [de-identified] : edema  much better  [de-identified] : stasis changes much better  wtill has  2 open and bleding areas left leg

## 2023-03-02 NOTE — HISTORY OF PRESENT ILLNESS
[FreeTextEntry1] : f/u stasis dermatitis  venous  ulcer and edema  [de-identified] : no new complaints

## 2023-03-02 NOTE — PHYSICAL EXAM
[No Edema] : there was no peripheral edema [de-identified] : edema  much better stais  changes  improved  posterioir leg ulcer healed  lateral left ulcewr 1.5 cm x .8 cm better

## 2023-03-09 NOTE — PHYSICAL EXAM
[General Appearance - Alert] : alert [General Appearance - In No Acute Distress] : in no acute distress [General Appearance - Well Developed] : well developed [PERRL With Normal Accommodation] : pupils were equal in size, round, and reactive to light [Extraocular Movements] : extraocular movements were intact [Outer Ear] : the ears and nose were normal in appearance [Neck Appearance] : the appearance of the neck was normal [Jugular Venous Distention Increased] : there was no jugular-venous distention [] : no respiratory distress [Respiration, Rhythm And Depth] : normal respiratory rhythm and effort [Exaggerated Use Of Accessory Muscles For Inspiration] : no accessory muscle use [Auscultation Breath Sounds / Voice Sounds] : lungs were clear to auscultation bilaterally [Heart Sounds] : normal S1 and S2 [Arterial Pulses Carotid] : carotid pulses were normal with no bruits [Bowel Sounds] : normal bowel sounds [Abdomen Soft] : soft [Abdomen Tenderness] : non-tender [No CVA Tenderness] : no ~M costovertebral angle tenderness [Nail Clubbing] : no clubbing  or cyanosis of the fingernails [Skin Color & Pigmentation] : normal skin color and pigmentation [Motor Exam] : the motor exam was normal [Oriented To Time, Place, And Person] : oriented to person, place, and time [Impaired Insight] : insight and judgment were intact [Affect] : the affect was normal [Mood] : the mood was normal [FreeTextEntry1] : 3-4 + bilateral lower extremity edema

## 2023-03-09 NOTE — HISTORY OF PRESENT ILLNESS
[FreeTextEntry1] : Patient with history of CHF, DM for over 20 years, hypertension, CAD, gout (on Allopurinol), anemia and CKD stage 4.  In addition, has a long history of smoking (approximately 50 pack year).  Anemia managed with Procrit.  Has been on diuretics for CHF and lower extremity edema.  ECHO noted for significantly elevated RVSP @ 81 mm Hg, consistent with marked pulmonary hypertension.  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  Sonogram from 2020 showed bilateral increased echogenicity and bilateral renal cysts.  History of hyperkalemia managed with moderated potassium diet intake.  Vitamin D deficiency was to be managed with weekly Ergocalciferol but patient never started on that regime, instead takes 1000 iu per day.  Albuminuria noted for several years.  PTH over 500 in 2021.    \par \par Renal ultrasounds\par 6/26/20 - right 10.1 cm, left 9.9 cm, (partial left nephrectomy), increased bilateral echogenicity, bilateral simple cysts. \par 1/23/23 - right 9.2 cm with multiple simple cysts and a complex cyst, 4.2 cm, with a 12 mm echogenic nodule.\par                left 9.6 cm with multiple simple cysts.

## 2023-03-15 NOTE — ASSESSMENT
[FreeTextEntry1] : endstage  renal disease  restart metolazone for edema htn continue metoprolol  edema with stasis derm both legs  both legs  with ace  ace  still smoking  cad continue asa hld continue atorvastatin  recent increase in torsemide and continue metalazone 2 x a wk  multiple medical issues  refer to visiting nurse services

## 2023-03-15 NOTE — PHYSICAL EXAM
[No Edema] : there was no peripheral edema [de-identified] : edema  much better stais  changes  improved  posterioir leg ulcer healed  lateral left ulcewr same  size 1x2

## 2023-03-15 NOTE — HISTORY OF PRESENT ILLNESS
[Congestive Heart Failure] : Congestive Heart Failure [Diabetes Mellitus] : Diabetes Mellitus [Hypertension] : Hypertension [Other: ___] : [unfilled] [Check glucose ___ x/week] : Patient checks blood glucose [unfilled]  times a week [Does not check BP] : The patient is not checking blood pressure [<130/90] : Target blood pressure is  <130/90 [Target goal met] : BP target goal met [FreeTextEntry6] : pt has had multiple falls over the last several days legs are  weak saw  neuro who recommend  physical therapy leg  swelling worse again with weeping recent fall hit head on table end and has echymoysis  left orbit saw  renal  labs  no change  egfr 15

## 2023-04-05 PROBLEM — R74.8 ALKALINE PHOSPHATASE ELEVATION: Status: ACTIVE | Noted: 2021-01-12

## 2023-04-05 NOTE — HISTORY OF PRESENT ILLNESS
[Congestive Heart Failure] : Congestive Heart Failure [Diabetes Mellitus] : Diabetes Mellitus [Hypertension] : Hypertension [Other: ___] : [unfilled] [Check glucose ___ x/week] : Patient checks blood glucose [unfilled]  times a week [Does not check BP] : The patient is not checking blood pressure [<130/90] : Target blood pressure is  <130/90 [Target goal met] : BP target goal met [FreeTextEntry6] : pt was  in [facility] for epistaxis while there  kidney function was  worse pt got admitted was discharged 3/31/23 pt has no recent falls pt lost 11 lbs all diuretics  were stopped

## 2023-04-05 NOTE — PHYSICAL EXAM
[No Acute Distress] : no acute distress [PERRL] : pupils equal round and reactive to light [Normal TMs] : both tympanic membranes were normal [Supple] : supple [Normal Rate] : normal rate  [No Edema] : there was no peripheral edema [Normal] : soft, non-tender, non-distended, no masses palpated, no HSM and normal bowel sounds [Normal Supraclavicular Nodes] : no supraclavicular lymphadenopathy [Normal Anterior Cervical Nodes] : no anterior cervical lymphadenopathy [No Skin Lesions] : no skin lesions [Normal Insight/Judgement] : insight and judgment were intact [de-identified] : systolic murmur  [de-identified] : tr  edema

## 2023-04-05 NOTE — ASSESSMENT
[FreeTextEntry1] : ckg  repeat renal function cad continue asa bp acceptable continue metoprolol hld continue atorvastatin depression  continue paxil anemia check hgb pt presently no taking any diuretics will continue to monitor

## 2023-04-12 PROBLEM — N28.1 RENAL CYST, ACQUIRED: Status: ACTIVE | Noted: 2023-01-01

## 2023-04-12 PROBLEM — E87.5 HYPERKALEMIA: Status: ACTIVE | Noted: 2019-04-12

## 2023-04-13 NOTE — PHYSICAL EXAM
[General Appearance - Alert] : alert [General Appearance - In No Acute Distress] : in no acute distress [General Appearance - Well Developed] : well developed [PERRL With Normal Accommodation] : pupils were equal in size, round, and reactive to light [Extraocular Movements] : extraocular movements were intact [Outer Ear] : the ears and nose were normal in appearance [Neck Appearance] : the appearance of the neck was normal [Jugular Venous Distention Increased] : there was no jugular-venous distention [] : no respiratory distress [Respiration, Rhythm And Depth] : normal respiratory rhythm and effort [Exaggerated Use Of Accessory Muscles For Inspiration] : no accessory muscle use [Auscultation Breath Sounds / Voice Sounds] : lungs were clear to auscultation bilaterally [Heart Sounds] : normal S1 and S2 [Arterial Pulses Carotid] : carotid pulses were normal with no bruits [Bowel Sounds] : normal bowel sounds [Abdomen Soft] : soft [Abdomen Tenderness] : non-tender [No CVA Tenderness] : no ~M costovertebral angle tenderness [Nail Clubbing] : no clubbing  or cyanosis of the fingernails [Skin Color & Pigmentation] : normal skin color and pigmentation [Motor Exam] : the motor exam was normal [Oriented To Time, Place, And Person] : oriented to person, place, and time [Impaired Insight] : insight and judgment were intact [Affect] : the affect was normal [Mood] : the mood was normal [FreeTextEntry1] : 2+ bilateral lower extremity edema

## 2023-04-13 NOTE — ASSESSMENT
[FreeTextEntry1] : 1. CKD stage 4 - creatinine was 2.43 2021, increased to 2.99 with BUN 68 in December 2022, 2.68 with BUN of 60 early January 2023  2.97 with BUN of 58 and now with BUN over 80 and creatinine 3.27.  GFR fluctuating between stage 4 and 5 (currently at 14).  Issue remains to be of optimizing intravascular volumes, renal perfusion while addressing edema and overall fluid balance.  She has significant pulmonary hypertension which is contributing to the fluid accumulation.  We discussed the need to continue with compression stockings/wraps of lower extremities, minimizing salt intake, monitoring of daily weights and optimization of the rest of her regimen.  She is very high risk for renal function deterioration.  Avoid all nephrotoxic agents as well as use of intravenous contrast whenever possible.  Given early uremic symptoms, we discussed dialysis options.  She will now transfer to Dr. Mcclure for further renal failure management including preparation of dialysis.  Family wishes to dialyze at the [place] unit. \par 2. Lower extremity edema - As per above.  For now she is off of Torsemide and should continue with daily weights.  The severe pulmonary hypertension may limit the amount of fluid removal she will hemodynamically tolerate.  Further recommendations per course.\par 3. Hypertension - monitor on current regimen. \par 4. Anemia - Procrit with goal hemoglobin 10-11 gm/dl.  She is receiving Procrit per medicine according to the patient.  \par 5. Secondary HPT - PTH was 504 in 2021 then 735.  She started on Calcitriol at .25 mcg per day and repeat testing now shows 603.  Phosphorus stable at 5.3 with use of binders.\par 6. Vitamin D deficiency -  monitor\par 7. Renal cysts - Was previously monitored by Dr. Castañeda (Urology).  Given the finding of a complex cyst, I again recommended that she arrange a follow up with Urology for consideration of further testing (i.e. CT or MRI).  She did not make the appointment as recommended at the last visit but  and daughter will arrange follow up. \par 8. Hyperkalemia - should remain on potassium moderated diet. Most current level is 4.1\par She was strongly encouraged to stop all smoking again. \par Follow up with Dr. Mcclure.  Office number provided to family to arrange appointment over the next few weeks.  \par All questions answered.

## 2023-04-13 NOTE — HISTORY OF PRESENT ILLNESS
[FreeTextEntry1] : Patient with history of CHF, DM for over 20 years, hypertension, CAD, gout (on Allopurinol), anemia and CKD stage 4 progressing to stage 5.  In addition, has a long history of smoking (approximately 50 pack year).  Anemia managed with Procrit.  Has been on diuretics for CHF and lower extremity edema.  ECHO noted for significantly elevated RVSP @ 81 mm Hg, consistent with marked pulmonary hypertension.  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  Sonogram from 2020 showed bilateral increased echogenicity and bilateral renal cysts.  History of hyperkalemia managed with moderated potassium diet intake.  Vitamin D deficiency was to be managed with weekly Ergocalciferol but patient never started on that regime, instead takes 1000 iu per day.  Albuminuria noted for several years.  PTH over 500 in 2021 with further increase necessitating start of Calcitriol.  Since her last visit, she was hospitalized at [facility] for nosebleed.  Creatinine increased to 4.2, decreased to 3.92 on 4/5 and to 3.27 on 4/12 with BUN over 80, GFR 14.  She has symptoms of early morning nausea, decreased appetite and some early metallic taste.  Diuretics (Torsemide) were stopped at [facility] given weight losses.  Continues to smoke despite discussions regarding stopping. \par \par Renal ultrasounds\par 6/26/20 - right 10.1 cm, left 9.9 cm, (partial left nephrectomy), increased bilateral echogenicity, bilateral simple cysts. \par 1/23/23 - right 9.2 cm with multiple simple cysts and a complex cyst, 4.2 cm, with a 12 mm echogenic nodule.\par                left 9.6 cm with multiple simple cysts.

## 2023-04-25 NOTE — HISTORY OF PRESENT ILLNESS
[FreeTextEntry1] : 73 year old woman  patient with PMH of  of CHF, DM for over 20 years, hypertension, CAD, gout (on Allopurinol), anemia and CKD stage 4 progressing to stage 5. In addition, has a long history of smoking (approximately 50 pack year). Anemia managed with Procrit. Has been on diuretics for CHF and lower extremity edema. ECHO noted for significantly elevated RVSP @ 81 mm Hg, consistent with marked pulmonary hypertension. 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 Sonogram from 2020 showed bilateral increased echogenicity and bilateral renal cysts. History of hyperkalemia managed with moderated potassium diet intake. Vitamin D deficiency was to be managed with weekly Ergocalciferol but patient never started on that regime, instead takes 1000 iu per day. Albuminuria noted for several years. PTH over 500 in 2021 with further increase necessitating start of Calcitriol. Since her last visit, she was hospitalized at [facility] for nosebleed. Creatinine increased to 4.2, decreased to 3.92 on 4/5 and to 3.27 on 4/12 with BUN over 80, GFR 14. She has symptoms of early morning nausea, decreased appetite and some early metallic taste. Diuretics (Torsemide) were stopped at [facility] given weight losses. Continues to smoke despite discussions regarding stopping. \par \par Renal ultrasounds\par 6/26/20 - right 10.1 cm, left 9.9 cm, (partial left nephrectomy), increased bilateral echogenicity, bilateral simple cysts. \par 1/23/23 - right 9.2 cm with multiple simple cysts and a complex cyst, 4.2 cm, with a 12 mm echogenic nodule.\par  left 9.6 cm with multiple simple cysts. \par

## 2023-04-27 NOTE — PHYSICAL EXAM

## 2023-04-27 NOTE — HISTORY OF PRESENT ILLNESS
[FreeTextEntry1] : Pt is a 72 y/o F smoker PMH HLD, HTN, LBBB, CAD (cardiac cath 2021), mod AS, DM, CKD, renal cancer s/p partial nephrectomy.  Hospitalized 06/2021 for fall and was found to be in heart failure - she  had cardiac cath which showed blockage but they decided medical management sec to renal function.\par \par Hospitalized 03/2023 for epistaxis and worsening renal function - no blood transfusions were required\par She was taken off all diuretics - she has started to notice some swelling yesterday - she is wrapping legs\par She weighs herself daily - avg about 165lbs \par Ambulates with walker\par She is following with renal - HD plans are being made - she has appt with vascular for AV fistula creation\par Cr 2.25 - 2.07 - 2.34 - 2.69 - 3.27\par GFR 22 - 18 - 14\par \par cardiac cath 06/2021 SBU pLAD 70%, D1 70%, mRCA 50%\par TTE 03/2021 mod AS, mod MR\par TTE 06/2022 EF 58%, mild AS MG 21, mild MS, mod-sev MR, sev LAE, mod TR\par \par PMH: HLD, HTN, LBBB, CAD, renal cancer s/p partial nephrectomy 2017.\par Smoking status: 5-6 cigarettes \par social ETOH \par no drug use\par Current exercise: none\par Daily water intake: 1 quart\par Daily caffeine intake: 1 cup tea\par OTC medications: iron, B12, ASA\par Previous hospitalizations: knee replacements, partial nephrectomy - no problems with anesthesia\par

## 2023-04-27 NOTE — DISCUSSION/SUMMARY
[FreeTextEntry1] : 73 y/o F PMH HFpEF, HLD, HTN, LBBB, CAD (cardiac cath 2021), mod AS, DM, CKD, renal cancer s/p partial nephrectomy.   \par \par CAD:\par medical management\par c/w ASA 81mg \par c/w statin - goal LDL <70\par c/w metoprolol\par no ACEi/ARB sec to CKD\par \par HF:\par Now off all diuretics - monitor closely\par c/w metoprolol succinate\par No ACEi/ARB sec to CKD\par check daily weights \par plan for HD\par \par AS:\par asymptomatic - monitor closely\par serial TTEs\par \par CKD:\par avoid nephrotoxins\par \par Discussed smoking cessation in great detail >3min \par Pt will return in 4-6 mnths or sooner as needed but I encouraged communication via phone or portal if necessary.\par The described plan was discussed with the pt and .  All questions and concerns were addressed to the best of my knowledge.

## 2023-05-01 NOTE — PHYSICAL EXAM
[Normal Rate and Rhythm] : normal rate and rhythm [2+] : left 2+ [1+] : left 1+ [Ankle Swelling (On Exam)] : present [Ankle Swelling Bilaterally] : bilaterally  [Varicose Veins Of Lower Extremities] : bilaterally [Ankle Swelling On The Left] : moderate [] : not present [Alert] : alert [Oriented to Person] : oriented to person [Oriented to Place] : oriented to place [Oriented to Time] : oriented to time [Calm] : calm [de-identified] : NAD [de-identified] : supple, no masses [de-identified] : unlabored breathing [de-identified] : FROM of all 4 extremities\par

## 2023-05-01 NOTE — REASON FOR VISIT
[Initial Evaluation] : an initial evaluation [FreeTextEntry1] : pt is here for evaluation of fistula be put in

## 2023-05-01 NOTE — ASSESSMENT
[FreeTextEntry1] : 74 yo F active smoker with history of CHF, CKD stage 4, CAD, GERD, COPD, DM, HTN, hyperlipidemia, pulmonary hypertension, renal cell carcinoma s/p partial nephrectomy, leg edema presenting for evaluation for creation of HD access.\par \par Vein mapping of LUE demonstrates patent L basilic vein with good diameters and patent cephalic vein with small diameters. No DVT or SVT

## 2023-05-01 NOTE — HISTORY OF PRESENT ILLNESS
[FreeTextEntry1] : 74 yo F active smoker with history of CHF, CKD stage 4, CAD, GERD, COPD, DM, HTN, hyperlipidemia, pulmonary hypertension, renal cell carcinoma s/p partial nephrectomy, leg edema presenting for evaluation for creation of HD access. Pt is right hand dominant. Denies complaints of chest pain, SOB, GE, claudication, leg wounds or leg edema at this time. Denies history of central lines, prior catheters, or pacemaker. Patient will require initiation of HD in the next few months, per nephrologist, Dr. Jo Ann Mcclure\par

## 2023-05-02 PROBLEM — Z01.818 PREOPERATIVE CLEARANCE: Status: ACTIVE | Noted: 2017-05-16

## 2023-05-03 NOTE — HISTORY OF PRESENT ILLNESS
[COPD] : COPD [Smoker] : smoker [Chronic Kidney Disease] : chronic kidney disease [Diabetes] : diabetes [Unable to assess] : unable to assess [Aortic Stenosis] : no aortic stenosis [Atrial Fibrillation] : no atrial fibrillation [Coronary Artery Disease] : no coronary artery disease [Recent Myocardial Infarction] : no recent myocardial infarction [Implantable Device/Pacemaker] : no implantable device/pacemaker [Asthma] : no asthma [Sleep Apnea] : no sleep apnea [FreeTextEntry1] : vascular shunt  [FreeTextEntry3] : Dr. Ruiz  [FreeTextEntry4] : pt is a 73 yr old female here for clearance for vascular shunt for dialysis.  Pt active medical problems include CKD CHF COPD CAD DM HTN HLD RENAL CANCER AND AS  [FreeTextEntry6] : NO CP  HAS GE AND SOB  [FreeTextEntry8] : USING WALKER

## 2023-05-03 NOTE — PHYSICAL EXAM
[No Acute Distress] : no acute distress [PERRL] : pupils equal round and reactive to light [Normal TMs] : both tympanic membranes were normal [Supple] : supple [Normal Rate] : normal rate  [No Edema] : there was no peripheral edema [Normal] : soft, non-tender, non-distended, no masses palpated, no HSM and normal bowel sounds [Normal Supraclavicular Nodes] : no supraclavicular lymphadenopathy [Normal Anterior Cervical Nodes] : no anterior cervical lymphadenopathy [No Joint Swelling] : no joint swelling [No Rash] : no rash [No Skin Lesions] : no skin lesions [Normal Insight/Judgement] : insight and judgment were intact [de-identified] : systolic murmur  [de-identified] : ACE  WRAPS ON LEGS

## 2023-05-03 NOTE — REVIEW OF SYSTEMS
[Shortness Of Breath] : shortness of breath [Dyspnea on Exertion] : dyspnea on exertion [Fever] : no fever [Chills] : no chills [Earache] : no earache [Sore Throat] : no sore throat [Chest Pain] : no chest pain [Palpitations] : no palpitations [Cough] : no cough [Abdominal Pain] : no abdominal pain [Constipation] : no constipation [Diarrhea] : diarrhea [Dysuria] : no dysuria [Skin Rash] : no skin rash [Headache] : no headache [Dizziness] : no dizziness [Easy Bleeding] : no easy bleeding [Easy Bruising] : no easy bruising

## 2023-05-03 NOTE — ASSESSMENT
[Patient Optimized for Surgery] : Patient optimized for surgery [No Further Testing Recommended] : no further testing recommended [Continue medications as is] : Continue current medications [FreeTextEntry4] : acceptable medical condition for surgery\par BP ACCEPTABLE CONTINUE METOPROLOL  \par HLD CONTINUE ATORVASTATIN \par PT WITH MULTIPLE MEDICAL PROBLEMS  BUT NO CONTRAINDICATIONS TO SURGERY

## 2023-05-09 PROBLEM — Z01.810 PREOP CARDIOVASCULAR EXAM: Status: ACTIVE | Noted: 2023-01-01

## 2023-05-09 PROBLEM — I35.0 AORTIC STENOSIS: Status: ACTIVE | Noted: 2021-04-26

## 2023-05-09 NOTE — PHYSICAL EXAM
[Well Developed] : well developed [Well Nourished] : well nourished [No Acute Distress] : no acute distress [Normal Conjunctiva] : normal conjunctiva [Normal Venous Pressure] : normal venous pressure [No Carotid Bruit] : no carotid bruit [Normal S1, S2] : normal S1, S2 [No Rub] : no rub [No Gallop] : no gallop [Clear Lung Fields] : clear lung fields [Good Air Entry] : good air entry [No Respiratory Distress] : no respiratory distress  [Soft] : abdomen soft [Non Tender] : non-tender [No Masses/organomegaly] : no masses/organomegaly [Normal Bowel Sounds] : normal bowel sounds [Normal Gait] : normal gait [No Edema] : no edema [No Cyanosis] : no cyanosis [No Clubbing] : no clubbing [No Varicosities] : no varicosities [No Rash] : no rash [No Skin Lesions] : no skin lesions [Moves all extremities] : moves all extremities [No Focal Deficits] : no focal deficits [Normal Speech] : normal speech [Alert and Oriented] : alert and oriented [Normal memory] : normal memory [de-identified] : tight edema b/l legs, + sys murmur

## 2023-05-09 NOTE — DISCUSSION/SUMMARY
[FreeTextEntry1] : 74 y/o F PMH HFpEF, HLD, HTN, LBBB, CAD (cardiac cath 2021), mod AS, DM, CKD, renal cancer s/p partial nephrectomy.   \par She is scheduled for AVF creation on 05/2023 at [facility] with Dr Colon\par \par At this time the pt has no signs or symptoms of active ischemia, heart failure, life-threatening arrhythmias, severe valvular pathology.\par VSS\par There are no cardiac contraindications for planned procedure. \par \par CAD:\par medical management\par c/w ASA 81mg \par c/w statin - goal LDL <70\par c/w metoprolol\par no ACEi/ARB sec to CKD\par \par HF:\par Torsemide 10mg restarted - monitor closely\par c/w metoprolol succinate\par No ACEi/ARB sec to CKD\par check daily weights, low salt diet\par plan for HD\par Plan for AVF 05/31/2023 with Dr Hodge at [facility]\par \par AS:\par asymptomatic - monitor closely\par serial TTEs\par \par CKD:\par avoid nephrotoxins\par \par Discussed smoking cessation in great detail >3min \par Pt will return in 4 mnths or sooner as needed but I encouraged communication via phone or portal if necessary.\par The described plan was discussed with the pt and .  All questions and concerns were addressed to the best of my knowledge.

## 2023-05-09 NOTE — HISTORY OF PRESENT ILLNESS
[FreeTextEntry1] : Pt is a 72 y/o F smoker PMH HLD, HTN, LBBB, CAD (cardiac cath 2021), mod AS, DM, CKD, renal cancer s/p partial nephrectomy.  Hospitalized 06/2021 for fall and was found to be in heart failure - she  had cardiac cath which showed blockage but they decided medical management sec to renal function.\par \par Hospitalized 03/2023 for epistaxis and worsening renal function - no blood transfusions were required\par She called the office after last OV stating that her legs were getting swollen and she had gained 10lbs.  Torsemide 10mg qd daily was started which has helped, swelling has improved and she lost 3lbs. She is wrapping legs\par She weighs herself daily - avg about 176-178lbs (previous was 165lbs)\par Ambulates with walker\par She is following with renal - HD plans are being made - she is scheduled for AV fistula creation on 05/31/2023\par Cr 2.25 - 2.07 - 2.34 - 2.69 - 3.27 - 2.42\par GFR 22 - 18 - 14 - 21\par \par cardiac cath 06/2021 SBU pLAD 70%, D1 70%, mRCA 50%\par TTE 03/2021 mod AS, mod MR\par TTE 06/2022 EF 58%, mild AS MG 21, mild MS, mod-sev MR, sev LAE, mod TR\par \par PMH: HLD, HTN, LBBB, CAD, renal cancer s/p partial nephrectomy 2017.\par Smoking status: 5-6 cigarettes \par social ETOH \par no drug use\par Current exercise: none\par Daily water intake: 1 quart\par Daily caffeine intake: 1 cup tea\par OTC medications: iron, B12, ASA\par Previous hospitalizations: knee replacements, partial nephrectomy - no problems with anesthesia\par

## 2023-05-16 NOTE — H&P PST ADULT - HISTORY OF PRESENT ILLNESS
73 yr old pleasant female presents with history of anxiety/ depression,  CHF , CAD, HTN,  DM > 20 yrs,  Gout anemia ( treated with procrit)  CKD stage 4  , s/p partial left nephrectomy  for papillary renal cell cancer ( 2018)  , arthritis uses walker  COPD  Smoker 50 PAck year .has cut back from 1 ppd to 87 cigarettes daily . Covid 5/15/2022,  Hx nose bleeds requiring blood transfusion in 2022. Homero lower leg swelling / edema weeping at times , left lower leg ulcer  , dressings applied daily , started torsemide  10 mg daily with some improvement  noted. Pt has had renal insufficiency for years  follows with DR. Mcclure , pt is now scheduled for left arm AV fistula on 5/31/23 for Dialysis in near future.

## 2023-05-16 NOTE — H&P PST ADULT - NSICDXPROCEDURE_GEN_ALL_CORE_FT
PROCEDURES:  Creation of arteriovenous fistula for dialysis 16-May-2023 19:00:16 chronic kidney disease D'Amico, Karyn L

## 2023-05-16 NOTE — H&P PST ADULT - I HAVE PERSONALLY SEEN AND EXAMINED THE PATIENT. THERE HAVE NOT BEEN ANY CHANGES IN THE PATIENT'S HISTORY OR EXAM UNLESS COMMENTED BELOW
[facility]  Progress Note    Clare Fleming Patient Status:  Inpatient    11/10/1938 MRN [id number]   [facility] 5NW-A Attending Isidoro Cardoso MD   Hosp Day # 2 PCP Nichola Boast, MD       SUBJECTIVE:  Pleasantly confused, afebrile  D bowel sounds present  [place] with clear urine  MSK: Full range of motion in extremities, no clubbing, no cyanosis  Skin: no rashes or lesions    Data Review:     Labs:   No components found for: BMP   Recent Labs   Lab  03/12/19   1422  03/13/19   0710  03/ ([facility] [address]     Hypervolemia     Hematuria     Renal insufficiency     Memory deficits     Dementia     Gait disorder     B12 deficiency     Pyelonephritis     Acute urinary retention     Urinary tract infection without hematuria     Urinary tract infection with Statement Selected

## 2023-05-16 NOTE — H&P PST ADULT - ASSESSMENT
73 yr old pleasant female presents with history of anxiety/ depression,  CHF , CAD, HTN,  DM > 20 yrs,  Gout anemia ( treated with procrit)  CKD stage 4  , s/p partial left nephrectomy  for papillary renal cell cancer ( 2018)  , arthritis uses walker  COPD  Smoker 50 PAck year .has cut back from 1 ppd to 87 cigarettes daily . Covid 5/15/2022,  Hx nose bleeds requiring blood transfusion in . Homero lower leg swelling / edema weeping at times , left lower leg ulcer  , dressings applied daily , started torsemide  10 mg daily with some improvement  noted. Pt has had renal insufficiency for years  follows with DR. Mcclure , pt is now scheduled for left arm AV fistula on 23 for Dialysis in near future. medical clearance with DR. Goel , cardiac clearance with DR. Meraz .  OPIOID RISK TOOL    FRANCISCA EACH BOX THAT APPLIES AND ADD TOTALS AT THE END    FAMILY HISTORY OF SUBSTANCE ABUSE                 FEMALE         MALE                                                Alcohol                             [  ]1 pt          [  ]3pts                                               Illegal Durgs                     [  ]2 pts        [  ]3pts                                               Rx Drugs                           [  ]4 pts        [  ]4 pts    PERSONAL HISTORY OF SUBSTANCE ABUSE                                                                                          Alcohol                             [  ]3 pts       [  ]3 pts                                               Illegal Durgs                     [  ]4 pts        [  ]4 pts                                               Rx Drugs                           [  ]5 pts        [  ]5 pts    AGE BETWEEN 16-45 YEARS                                      [  ]1 pt         [  ]1 pt    HISTORY OF PREADOLESCENT   SEXUAL ABUSE                                                             [  ]3 pts        [  ]0pts    PSYCHOLOGICAL DISEASE                     ADD, OCD, Bipolar, Schizophrenia        [  ]2 pts         [  ]2 pts                      Depression                                               [ X ]1 pt           [  ]1 pt           SCORING TOTAL   (add numbers and type here)              (1*)                                     A score of 3 or lower indicated LOW risk for future opiod abuse  A score of 4 to 7 indicated moderate risk for future opiod abuse  A score of 8 or higher indicates a high risk for opiod abuse  CAPRINI VTE 2.0 SCORE [CLOT updated 2019]    AGE RELATED RISK FACTORS                                                       MOBILITY RELATED FACTORS  [ ] Age 41-60 years                                            (1 Point)                    [ ] Bed rest                                                        (1 Point)  X[ ] Age: 61-74 years                                           (2 Points)                  [ ] Plaster cast                                                   (2 Points)  [ ] Age= 75 years                                              (3 Points)                    [ ] Bed bound for more than 72 hours                 (2 Points)    DISEASE RELATED RISK FACTORS                                               GENDER SPECIFIC FACTORS  [ x] Edema in the lower extremities                       (1 Point)              [ ] Pregnancy                                                     (1 Point)  [ ] Varicose veins                                               (1 Point)                     [ ] Post-partum < 6 weeks                                   (1 Point)             [ X] BMI > 25 Kg/m2                                            (1 Point)                     [ ] Hormonal therapy  or oral contraception          (1 Point)                 [ ] Sepsis (in the previous month)                        (1 Point)               [ ] History of pregnancy complications                 (1 point)  [ ] Pneumonia or serious lung disease                                               [ ] Unexplained or recurrent                     (1 Point)           (in the previous month)                               (1 Point)  [ ] Abnormal pulmonary function test                     (1 Point)                 SURGERY RELATED RISK FACTORS  [ ] Acute myocardial infarction                              (1 Point)               [ ]  Section                                             (1 Point)  [ ] Congestive heart failure (in the previous month)  (1 Point)      [ ] Minor surgery                                                  (1 Point)   [ ] Inflammatory bowel disease                             (1 Point)               [ ] Arthroscopic surgery                                        (2 Points)  [ ] Central venous access                                      (2 Points)                [ X] General surgery lasting more than 45 minutes (2 points)  [ x] Malignancy- Present or previous                   (2 Points)                [ ] Elective arthroplasty                                         (5 points)    [ ] Stroke (in the previous month)                          (5 Points)                                                                                                                                                           HEMATOLOGY RELATED FACTORS                                                 TRAUMA RELATED RISK FACTORS  [ ] Prior episodes of VTE                                     (3 Points)                [ ] Fracture of the hip, pelvis, or leg                       (5 Points)  [ ] Positive family history for VTE                         (3 Points)             [ ] Acute spinal cord injury (in the previous month)  (5 Points)  [ ] Prothrombin 14767 A                                     (3 Points)               [ ] Paralysis  (less than 1 month)                             (5 Points)  [ ] Factor V Leiden                                             (3 Points)                  [ ] Multiple Trauma within 1 month                        (5 Points)  [ ] Lupus anticoagulants                                     (3 Points)                                                           [ ] Anticardiolipin antibodies                               (3 Points)                                                       [ ] High homocysteine in the blood                      (3 Points)                                             [ ] Other congenital or acquired thrombophilia      (3 Points)                                                [ ] Heparin induced thrombocytopenia                  (3 Points)                                     Total Score [     8     ]

## 2023-05-16 NOTE — H&P PST ADULT - NSICDXPASTMEDICALHX_GEN_ALL_CORE_FT
PAST MEDICAL HISTORY:  2019 novel coronavirus disease (COVID-19)      PAST MEDICAL HISTORY:  2019 novel coronavirus disease (COVID-19)     Anemia     CAD (coronary artery disease)     COPD, moderate     Diabetes mellitus     HTN (hypertension)

## 2023-05-16 NOTE — H&P PST ADULT - NSICDXFAMILYHX_GEN_ALL_CORE_FT
FAMILY HISTORY:  Father  Still living? Unknown  Family history of renal failure, Age at diagnosis: Age Unknown  FH: COPD (chronic obstructive pulmonary disease), Age at diagnosis: Age Unknown  FH: diabetes mellitus, Age at diagnosis: Age Unknown    Mother  Still living? Unknown  Family history of renal failure, Age at diagnosis: Age Unknown

## 2023-05-24 PROBLEM — D64.9 ANEMIA, UNSPECIFIED: Chronic | Status: ACTIVE | Noted: 2023-01-01

## 2023-05-24 PROBLEM — J44.9 CHRONIC OBSTRUCTIVE PULMONARY DISEASE, UNSPECIFIED: Chronic | Status: ACTIVE | Noted: 2023-01-01

## 2023-05-24 PROBLEM — I25.10 ATHEROSCLEROTIC HEART DISEASE OF NATIVE CORONARY ARTERY WITHOUT ANGINA PECTORIS: Chronic | Status: ACTIVE | Noted: 2023-01-01

## 2023-05-24 PROBLEM — I10 ESSENTIAL (PRIMARY) HYPERTENSION: Chronic | Status: ACTIVE | Noted: 2023-01-01

## 2023-05-24 PROBLEM — E11.9 TYPE 2 DIABETES MELLITUS WITHOUT COMPLICATIONS: Chronic | Status: ACTIVE | Noted: 2023-01-01

## 2023-05-24 PROBLEM — U07.1 COVID-19: Chronic | Status: ACTIVE | Noted: 2023-01-01

## 2023-05-30 NOTE — ASU PATIENT PROFILE, ADULT - NSICDXPASTSURGICALHX_GEN_ALL_CORE_FT
PAST SURGICAL HISTORY:  H/O lumpectomy     H/O total knee replacement     History of partial nephrectomy

## 2023-05-30 NOTE — ASU PATIENT PROFILE, ADULT - NSICDXPASTMEDICALHX_GEN_ALL_CORE_FT
PAST MEDICAL HISTORY:  2019 novel coronavirus disease (COVID-19)     Anemia     CAD (coronary artery disease)     COPD, moderate     Diabetes mellitus     HTN (hypertension)

## 2023-05-30 NOTE — ASSESSMENT
[FreeTextEntry1] :  1. CKD stage IV\par baseline SCr ~ 2.7- kidney function worsened in setting of diuretics use and peaked at 3.9\par REpeat labs with SCr improving to 2.4 (GFR 21 ml/min)\par Vascular surgery appt upcoming for dialysis access creation\par \par 2. Anemia of CKD\par Hb low\par WIll start YEISON to keep Hb ~10-11\par \par 3. HTN\par Bp soft\par WIll decrease Metoprolol to 12.5 mg daily\par off diuretics at this time\par \par \par 4. metabolic acidosis\par serum co2 at goal\par \par 5. CKD- MBD\par Continue Calcitriol \par Continue Vitamin D supplements\par Maintain low phos diet\par \par rtc in 1 month\par

## 2023-05-30 NOTE — PHYSICAL EXAM
[General Appearance - In No Acute Distress] : in no acute distress [Sclera] : the sclera and conjunctiva were normal [Hearing Threshold Finger Rub Not Dewey] : hearing was normal [Auscultation Breath Sounds / Voice Sounds] : lungs were clear to auscultation bilaterally [Heart Sounds] : normal S1 and S2 [Edema] : there was no peripheral edema [Abdomen Soft] : soft [No CVA Tenderness] : no ~M costovertebral angle tenderness [] : no rash [No Focal Deficits] : no focal deficits [Oriented To Time, Place, And Person] : oriented to person, place, and time [FreeTextEntry1] : walks with a walker

## 2023-05-30 NOTE — HISTORY OF PRESENT ILLNESS
[FreeTextEntry1] : 73 year old woman  patient with PMH of  of CHF, DM for over 20 years, hypertension, CAD, gout (on Allopurinol),  partial left nephrectomy for papillary renal cell cancer, pulmonary HTN, .anemia and advanced CKD stage presenting for follow up appt.'\par Pt is accompanied by \par pt seen and examined; feels tired.\par  is concerned about pt having recent falls.\par She was previously receiving YEISON at PCP's office but lately has not been able to get medication.\par She is scheduled to see Dr. Colon on 05/31  for access creation.\par \par \par

## 2023-05-31 NOTE — PROGRESS NOTE ADULT - SUBJECTIVE AND OBJECTIVE BOX
72 y/o woman with PmHx of CKD stage 4, CAD, CHF, HTN, COPD (current smoker), s/p Left arm AVF creation today under sedation with 1mg of midazolam.  Pt. was noted with pre-op othopnea, dyspnea and grey dusky appearance.  Pt. noted to have Sp02 94% on R/A pre-op.  In PACU after the procedure Pt. with lethargy, arousable to stimulation, pt. stating she did not sleep well last night.  ABG drawn showing respiratory acidosis and placed on BiPAP.  Case d/w Dr. Cid who discussed the patient with Dr. Colon.

## 2023-05-31 NOTE — PATIENT PROFILE ADULT - FALL HARM RISK - HARM RISK INTERVENTIONS
Assistance with ambulation/Assistance OOB with selected safe patient handling equipment/Communicate Risk of Fall with Harm to all staff/Discuss with provider need for PT consult/Monitor gait and stability/Provide patient with walking aids - walker, cane, crutches/Reinforce activity limits and safety measures with patient and family/Sit up slowly, dangle for a short time, stand at bedside before walking/Tailored Fall Risk Interventions/Use of alarms - bed, chair and/or voice tab/Visual Cue: Yellow wristband and red socks/Bed in lowest position, wheels locked, appropriate side rails in place/Call bell, personal items and telephone in reach/Instruct patient to call for assistance before getting out of bed or chair/Non-slip footwear when patient is out of bed/[place] to call system/Physically safe environment - no spills, clutter or unnecessary equipment/Purposeful Proactive Rounding/Room/bathroom lighting operational, light cord in reach

## 2023-05-31 NOTE — ASU DISCHARGE PLAN (ADULT/PEDIATRIC) - NS MD DC FALL RISK RISK
For information on Fall & Injury Prevention, visit: [email or web address] OR  [email or web address] OR  https://www.cdc.gov/steadi/patient.html

## 2023-05-31 NOTE — CONSULT NOTE ADULT - ASSESSMENT
73F with hypercapnic respiratory failure after AV fistula creation    -Admit to SICU  -protecting airway currently, continue BiPAP  -BiPAP settings increased, IPAP to 15 and EPAP remains at 5 to improve tidal volume and delta for improved ventilation  -CXR reviewed, not grossly overloaded, avoid IVF given h/o CHF  -will repeat ABG shortly  -resume home medications  -may require burst of steroids given COPD hx if hypercapnia persists.

## 2023-05-31 NOTE — CONSULT NOTE ADULT - SUBJECTIVE AND OBJECTIVE BOX
This is a 78F that presented today for AV fistula, procedure was done with conscious sedation, patient received one mg of versed for procedure and was taken to the PACU where she was noted to be obtunded. ABG was done and shows a hypercapnic respiratory acidosis with pH of 7.2 and pCO2 of 76, BiPAP was started, however patient remains hypercapnic and acidemic. Flumazenil given by anesthesia, patient has a RASS of negative 1, able to tell me her name and follow simple commands, family states she has baseline dementia A&O x1. IPAP increased at the time of my exam with a 3L increase in minute ventilation from 5 to 8L    PAST MEDICAL & SURGICAL HISTORY:  2019 novel coronavirus disease (COVID-19)  HTN (hypertension)  COPD, moderate  Diabetes mellitus  CAD (coronary artery disease)  Anemia  H/O total knee replacement  History of partial nephrectomy  H/O lumpectomy    Home Medications:  allopurinol 100 mg oral tablet: orally once a day (31 May 2023 10:04)  aspirin 81 mg oral capsule: 1 orally once a day (31 May 2023 10:04)  atorvastatin 40 mg oral tablet: 1 orally once a day (31 May 2023 10:04)  calcitriol 0.25 mcg oral capsule: 1 orally once a day (31 May 2023 10:04)  metoprolol succinate 25 mg oral tablet, extended release: 1 orally once a day (31 May 2023 10:04)  omeprazole 40 mg oral delayed release capsule: 1 orally once a day (31 May 2023 10:24)  PARoxetine 20 mg oral tablet: 1 orally once a day (31 May 2023 10:24)  torsemide 10 mg oral tablet: 1 orally once a day (31 May 2023 10:24)  Vitamin D3 25 mcg (1000 intl units) oral tablet: 1 orally (31 May 2023 10:24)    Vital Signs Last 24 Hrs  T(C): 37.4 (31 May 2023 19:49), Max: 37.4 (31 May 2023 19:49)  T(F): 99.4 (31 May 2023 19:49), Max: 99.4 (31 May 2023 19:49)  HR: 97 (31 May 2023 19:10) (78 - 100)  BP: 129/80 (31 May 2023 19:10) (105/43 - 138/58)  BP(mean): 93 (31 May 2023 19:10) (58 - 93)  RR: 23 (31 May 2023 19:10) (18 - 25)  SpO2: 100% (31 May 2023 19:10) (92% - 100%)    Parameters below as of 31 May 2023 19:10  Patient On (Oxygen Delivery Method): BiPAP/CPAP    O2 Concentration (%): 60    PHYSICAL EXAM:      Constitutional: ill appearing, appears older than stated age, arousal to voice    Eyes: EOMI, sclera non icteric    Respiratory: BS clear B/L, no ronchi    Cardiovascular: s1s2 sinus    Gastrointestinal: soft NT ND    Extremities: no edema    Vascular: distal extremities warm to touch    Neurological: alert and oriented x1, arouses to voice and follows simple commands     Skin: warm, and dry    ABG - ( 31 May 2023 17:21 )  pH, Arterial: 7.190 pH, Blood: x     /  pCO2: 79    /  pO2: 136   / HCO3: 30    / Base Excess: 2.0   /  SaO2: 99.6          CBC Full  -  ( 31 May 2023 09:55 )  WBC Count : 4.06 K/uL  RBC Count : 2.79 M/uL  Hemoglobin : 8.9 g/dL  Hematocrit : 28.8 %  Platelet Count - Automated : 124 K/uL  Mean Cell Volume : 103.2 fl  Mean Cell Hemoglobin : 31.9 pg  Mean Cell Hemoglobin Concentration : 30.9 gm/dL  Auto Neutrophil # : 2.92 K/uL  Auto Lymphocyte # : 0.75 K/uL  Auto Monocyte # : 0.27 K/uL  Auto Eosinophil # : 0.08 K/uL  Auto Basophil # : 0.01 K/uL  Auto Neutrophil % : 71.9 %  Auto Lymphocyte % : 18.5 %  Auto Monocyte % : 6.7 %  Auto Eosinophil % : 2.0 %  Auto Basophil % : 0.2 %      05-31    147<H>  |  104  |  57.3<H>  ----------------------------<  97  3.9   |  27.0  |  2.76<H>    Ca    10.0      31 May 2023 09:55

## 2023-05-31 NOTE — CONSULT NOTE ADULT - NS ATTEND AMEND GEN_ALL_CORE FT
Agree with above.  78F s/p AVF creation presents with hypercarbia.  Will start on BiPAP and re-assess with repeat ABG.  Will admit to SICU.

## 2023-05-31 NOTE — ASU DISCHARGE PLAN (ADULT/PEDIATRIC) - CARE PROVIDER_API CALL
Manvar-Singh, Pallavi Buddhadev  Vascular Surgery  [address] [facility] [address], [address] [place] [address]  Phone: [phone]  Fax: [phone]  Follow Up Time: 2 weeks

## 2023-05-31 NOTE — BRIEF OPERATIVE NOTE - NSICDXBRIEFPROCEDURE_GEN_ALL_CORE_FT
PROCEDURES:  Creation of brachiocephalic arteriovenous fistula 31-May-2023 13:10:48  Danie Coleman

## 2023-06-01 NOTE — DISCHARGE NOTE PROVIDER - CARE PROVIDER_API CALL
Manvar-Singh, Pallavi Buddhadev  Vascular Surgery  [address] [place] [address]  Phone: [phone]  Fax: [phone]  Follow Up Time: 2 weeks

## 2023-06-01 NOTE — DISCHARGE NOTE NURSING/CASE MANAGEMENT/SOCIAL WORK - NSDCPEFALRISK_GEN_ALL_CORE
For information on Fall & Injury Prevention, visit: [email or web address] OR  [email or web address] OR  https://www.cdc.gov/steadi/patient.html

## 2023-06-01 NOTE — DISCHARGE NOTE PROVIDER - NSDCCPCAREPLAN_GEN_ALL_CORE_FT
PRINCIPAL DISCHARGE DIAGNOSIS  Diagnosis: Chronic kidney disease, unspecified CKD stage  Assessment and Plan of Treatment:

## 2023-06-01 NOTE — DISCHARGE NOTE PROVIDER - NSDCCPTREATMENT_GEN_ALL_CORE_FT
PRINCIPAL PROCEDURE  Procedure: Creation of brachiocephalic arteriovenous fistula  Findings and Treatment:

## 2023-06-01 NOTE — CHART NOTE - NSCHARTNOTEFT_GEN_A_CORE
SICU TRANSFER NOTE  -----------------------------  ICU Admission Date: 5/31  Transfer Date: 06-01-23 @ 16:50    Admission Diagnosis: Creation of AV fistula, hypercapnic respiratory failure    Active Problems/injuries: hypoxia    Procedures: Creation of AV fistula 5/31    Consultants:  [ ] Cardiology  [ ] Endocrine  [ ] Infectious Disease  [ ] Medicine  [ ]Neurosurgery  [ ] Ortho       [ ] Weight Bearing Status:  [ ] Palliative       [ ] Advanced Directives:    [ ] Physical Medicine and Rehab       [ ] Disposition :   [ ] Plastics  [ ] Pulmonary    Medications  acetaminophen   IVPB .. 1000 milliGRAM(s) IV Intermittent every 6 hours  albuterol/ipratropium for Nebulization 3 milliLiter(s) Nebulizer every 6 hours  allopurinol 100 milliGRAM(s) Oral daily  aspirin  chewable 81 milliGRAM(s) Oral daily  atorvastatin 40 milliGRAM(s) Oral at bedtime  calcitriol   Capsule 0.25 MICROGram(s) Oral daily  chlorhexidine 2% Cloths 1 Application(s) Topical <User Schedule>  heparin   Injectable 5000 Unit(s) SubCutaneous every 8 hours  metoprolol tartrate 12.5 milliGRAM(s) Oral every 12 hours  mupirocin 2% Nasal 1 Application(s) Both Nostrils two times a day  pantoprazole  Injectable 40 milliGRAM(s) IV Push daily  torsemide 10 milliGRAM(s) Oral daily      [x ] I attest I have reviewed and reconciled all medications prior to transfer      I have discussed this case with Vascular Surgery team upon transfer and all questions regarding ICU course were answered.  The following items are to be followed up:    -initially stable for D/c but required supplemental O2 through out the day, she has improved, currently off O2 at time of transfer  -d/c planning, likely d/c tomorrow

## 2023-06-01 NOTE — DISCHARGE NOTE PROVIDER - HOSPITAL COURSE
73 yr old female presents with history of anxiety/ depression,  CHF , CAD, HTN,  DM > 20 yrs,  Gout anemia ( treated with procrit)  CKD stage 4  , s/p partial left nephrectomy  for papillary renal cell cancer ( 2018)  , arthritis, COPD  Smoker 50 pack year. Pt has had renal insufficiency for years  follows with Dr. Mcclure and is planning to start HD in the near future. Pt is now s/p LUE brachiocephalic AV fistula creation on 5/31/23. Post op course c/b hypercapnic respiratory acidosis and difficulty weaning patient off oxygen. Patient required ICU admission but is now stable at her baseline respiratory status. Pt is stable for discharge home.

## 2023-06-01 NOTE — CHART NOTE - NSCHARTNOTEFT_GEN_A_CORE
DOWNGRADE NOTE   -----------------------------  ICU Admission Date: 5/31/23  Transfer Date: 06-01-23 @ 17:16    Admission Diagnosis: Hypercapnia respiratory acidosis (2/2 to versed) s/p brachiocephalic AVF    Procedures:   5/31: L brachiocephalic AVF     Consultants:  Vascular (primary)   Medications    acetaminophen   IVPB .. 1000 milliGRAM(s) IV Intermittent every 6 hours  albuterol/ipratropium for Nebulization 3 milliLiter(s) Nebulizer every 6 hours  allopurinol 100 milliGRAM(s) Oral daily  aspirin  chewable 81 milliGRAM(s) Oral daily  atorvastatin 40 milliGRAM(s) Oral at bedtime  calcitriol   Capsule 0.25 MICROGram(s) Oral daily  chlorhexidine 2% Cloths 1 Application(s) Topical <User Schedule>  heparin   Injectable 5000 Unit(s) SubCutaneous every 8 hours  metoprolol tartrate 12.5 milliGRAM(s) Oral every 12 hours  mupirocin 2% Nasal 1 Application(s) Both Nostrils two times a day  pantoprazole  Injectable 40 milliGRAM(s) IV Push daily  torsemide 10 milliGRAM(s) Oral daily    Vascular PE:   L AVF palpable thrill, L radial artery palp pulses, NVI         Plan:   - Downgrade from SICU  - Respiratory monitoring   - Wean off O2 , 1L   - Once weaned, plan for discharge on 6/2

## 2023-06-01 NOTE — PROGRESS NOTE ADULT - SUBJECTIVE AND OBJECTIVE BOX
HPI/Overnight Events:  Patient seen and assessed this morning in the ICU, patient is resting in bed comfortably on 2LNC, NAD noted. No acute events overnight. Patient denies any pain, N/V, fevers or chills. LUE AVF with +thrill, no evidence of steal to let hand     PAST MEDICAL HISTORY:  2019 novel coronavirus disease (COVID-19)  HTN (hypertension)  COPD, moderate  Diabetes mellitus  CAD (coronary artery disease)  Anemia        PAST SURGICAL HISTORY:  H/O total knee replacement  History of partial nephrectomy  H/O lumpectomy  H/O diabetes mellitus        MEDICATIONS:  allopurinol 100 milliGRAM(s) Oral daily  aspirin  chewable 81 milliGRAM(s) Oral daily  atorvastatin 40 milliGRAM(s) Oral at bedtime  calcitriol   Capsule 0.25 MICROGram(s) Oral daily  chlorhexidine 2% Cloths 1 Application(s) Topical <User Schedule>  heparin   Injectable 5000 Unit(s) SubCutaneous every 8 hours  metoprolol tartrate Injectable 5 milliGRAM(s) IV Push every 6 hours  pantoprazole  Injectable 40 milliGRAM(s) IV Push daily  torsemide 10 milliGRAM(s) Oral daily      ALLERGIES:  Orange (Rash)  Pineapple (Rash)  Mushrooms (Rash)  Cefzil (Rash)        VITALS & I/Os:  Vital Signs Last 24 Hrs  T(C): 37.2 (2023 08:10), Max: 37.4 (31 May 2023 19:49)  T(F): 98.9 (2023 08:10), Max: 99.4 (31 May 2023 19:49)  HR: 95 (2023 07:00) (73 - 100)  BP: 110/50 (2023 07:00) (102/50 - 138/58)  BP(mean): 67 (2023 07:00) (58 - 93)  RR: 25 (2023 07:00) (16 - 29)  SpO2: 99% (2023 07:00) (92% - 100%)    Parameters below as of 2023 05:00  Patient On (Oxygen Delivery Method): nasal cannula    O2 Concentration (%): 4    I&O's Summary    31 May 2023 07:01  -  2023 07:00  --------------------------------------------------------  IN: 0 mL / OUT: 500 mL / NET: -500 mL          PHYSICAL EXAM:  Constitutional: no acute distress  HEENT: EOMI, no active drainage or redness, no scleral icterus   Neck: Full ROM without pain  Respiratory: respirations are unlabored, no accessory muscle use, no conversational dyspnea  Cardiovascular: regular rate & rhythm  Gastrointestinal: Abdomen soft, non-tender, non-distended, No rebound or guarding. No organomegaly, no palpable mass.  Neurological: A&O x 3; no gross sensory / motor / coordination deficits  Musculoskeletal: TORREZ, LUE AVF + thrill   Vascular: Extremities warm and well perfused, radial pulse intact          LABS:                        8.4    4.88  )-----------( 115      ( 2023 03:28 )             27.5     06-01    144  |  103  |  56.1<H>  ----------------------------<  145<H>  5.2   |  24.0  |  2.80<H>    Ca    8.7      2023 03:28  Phos  6.7     06-01  Mg     2.0     06-01      Lactate:    PT/INR - ( 31 May 2023 22:25 )   PT: 13.1 sec;   INR: 1.13 ratio         PTT - ( 31 May 2023 22:25 )  PTT:31.0 sec  ABG - ( 31 May 2023 20:55 )  pH, Arterial: 7.460 pH, Blood: x     /  pCO2: 40    /  pO2: 147   / HCO3: 28    / Base Excess: 4.6   /  SaO2: 100.0                   Urinalysis Basic - ( 2023 05:20 )    Color: Yellow / Appearance: Clear / S.015 / pH: x  Gluc: x / Ketone: Negative  / Bili: Negative / Urobili: Negative mg/dL   Blood: x / Protein: 30 mg/dL / Nitrite: Negative   Leuk Esterase: Trace / RBC: Negative /HPF / WBC 3-5 /HPF   Sq Epi: x / Non Sq Epi: x / Bacteria: x

## 2023-06-01 NOTE — PROGRESS NOTE ADULT - CRITICAL CARE ATTENDING COMMENT
x x    -Breathing comfortably on NC  -Repeat K Feeling better today.  Off BIPAP    GEN:  Awake, alert, comfortable, conversant  CVS:  RRR  PUL:  Scattered wheezes on R>L  ABD:  Soft, non tender, non distended  EXT:  LUE fistula w/mild ecchymosis, +thrill and +bruit, multiple lower extremities dressings    Chronic renal insufficiency  COPD exacerbation w/oversedation/narcosis    -Breathing comfortably on RA, will add inhaler (patient reports taking inhalers at home)  -HD stable  -Repeat K  -SCDs, [facility]      No acute critical care issues.  Will transfer from critical care setting. Feeling better today.  Off BIPAP    GEN:  Awake, alert, comfortable, conversant  CVS:  RRR  PUL:  Scattered wheezes on R>L  ABD:  Soft, non tender, non distended  EXT:  LUE fistula w/mild ecchymosis, +thrill and +bruit, multiple lower extremities dressings    Chronic renal insufficiency  COPD exacerbation w/oversedation/narcosis    -Breathing comfortably on RA, will add inhaler (patient reports taking inhalers at home)  -HD stable  -Repeat K  -SCDs, sqh  -Diet     Discussed w/patient and .  Both were in agreement.    No acute critical care issues.  Will transfer from critical care setting.

## 2023-06-01 NOTE — DISCHARGE NOTE NURSING/CASE MANAGEMENT/SOCIAL WORK - PATIENT PORTAL LINK FT
You can access the FollowMyHealth Patient Portal offered by [facility] by registering at the following website: [email or web address]. By joining [facility]’s FollowMyHealth portal, you will also be able to view your health information using other applications (apps) compatible with our system.

## 2023-06-01 NOTE — PROGRESS NOTE ADULT - ASSESSMENT
73y Female s/p LUE brachiocephalic AVF creation, post op c/b Acute Hypercarbic respiratory failure AMS from this and toxic encephalopathy likely from oversedation in setting of ESRD and CKD. Patient recovering well    Plan:  Patient is stable for discharge home today   Follow up with Dr. Colon outpatient in 2 week   No heavy lifting with left arm for 4 weeks

## 2023-06-01 NOTE — DISCHARGE NOTE PROVIDER - NSDCMRMEDTOKEN_GEN_ALL_CORE_FT
allopurinol 100 mg oral tablet: orally once a day  aspirin 81 mg oral capsule: 1 orally once a day  atorvastatin 40 mg oral tablet: 1 orally once a day  calcitriol 0.25 mcg oral capsule: 1 orally once a day  metoprolol succinate 25 mg oral tablet, extended release: 1 orally once a day  omeprazole 40 mg oral delayed release capsule: 1 orally once a day  PARoxetine 20 mg oral tablet: 1 orally once a day  torsemide 10 mg oral tablet: 1 orally once a day  Vitamin D3 25 mcg (1000 intl units) oral tablet: 1 orally

## 2023-06-01 NOTE — DISCHARGE NOTE PROVIDER - NSDCFUSCHEDAPPT_GEN_ALL_CORE_FT
Jo Ann Mcclure  [facility]  NEPHRO [address]  Scheduled Appointment: 06/28/2023    Mauro Goel  [facility]  [facility] [address] [facility]  Scheduled Appointment: 07/05/2023    [facility]  [facility]  Scheduled Appointment: 08/29/2023    Nancy Meraz  [facility]  [facility] [address] [facility]  Scheduled Appointment: 08/29/2023

## 2023-06-01 NOTE — PROGRESS NOTE ADULT - SUBJECTIVE AND OBJECTIVE BOX
INTERVAL HPI/OVERNIGHT EVENTS/SUBJECTIVE: Pt admitted to SICU Overnight.  Pt has limited ROS due to baseline dementia of A&OX1.  Nods her head to questions seemingly appropriately     ICU Vital Signs Last 24 Hrs  T(C): 37.4 (31 May 2023 23:00), Max: 37.4 (31 May 2023 19:49)  T(F): 99.4 (31 May 2023 23:00), Max: 99.4 (31 May 2023 19:49)  HR: 80 (01 Jun 2023 00:45) (74 - 100)  BP: 135/61 (01 Jun 2023 00:00) (105/43 - 138/58)  BP(mean): 81 (01 Jun 2023 00:00) (58 - 93)  ABP: --  ABP(mean): --  RR: 22 (01 Jun 2023 00:00) (16 - 25)  SpO2: 100% (01 Jun 2023 00:45) (92% - 100%)    O2 Parameters below as of 01 Jun 2023 00:00  Patient On (Oxygen Delivery Method): BiPAP/CPAP    O2 Concentration (%): 60        I&O's Detail        ABG - ( 31 May 2023 20:55 )  pH, Arterial: 7.460 pH, Blood: x     /  pCO2: 40    /  pO2: 147   / HCO3: 28    / Base Excess: 4.6   /  SaO2: 100.0               MEDICATIONS  (STANDING):  allopurinol 100 milliGRAM(s) Oral daily  aspirin  chewable 81 milliGRAM(s) Oral daily  atorvastatin 40 milliGRAM(s) Oral at bedtime  calcitriol   Capsule 0.25 MICROGram(s) Oral daily  chlorhexidine 2% Cloths 1 Application(s) Topical <User Schedule>  metoprolol tartrate Injectable 5 milliGRAM(s) IV Push every 6 hours  pantoprazole  Injectable 40 milliGRAM(s) IV Push daily  torsemide 10 milliGRAM(s) Oral daily    MEDICATIONS  (PRN):      NUTRITION/IVF:     CENTRAL LINE:  LOCATION:   DATE INSERTED:  CVP:  SCVO2:    MARINELLI:   DATE INSERTED:    A-LINE:    LOCATION:   DATE INSERTED:   SVV:  CO/CI:     CHEST TUBE:  LOCATION:  DATE INSERTED: OUTPUT/24 HRS:  SUCTION/WATER SEAL:     NG/OG TUBE:  DATE INSERTED:  OUTPUT/24 HRS:    MISC:     PHYSICAL EXAM:     Gen: Frail appearing on BiPAP mask. Ill appearing, No cyanosis, Pallor.    Eyes: PERRL ~ 3mm, EOMI,     Neurological: A&Ox person only. GCS 3/4/6, No focal defficit.     ENMT: Clear canals, clear throat.      Neck: Supple. NT AT, FROM no pain.  No JVD. No meningeal signs    Pulmonary: NAD, CTA, = BL .      Cardiovascular: RRR, S1, S2, No Murmurs, rubs or gallops noted.    Gastrointestinal:ND, Soft, NT.    Extremities: NT, AT, no edema, erythema or palpable cord noted.  FROM, = 2+ pulses throughout.    LABS:  CBC Full  -  ( 31 May 2023 22:25 )  WBC Count : 4.94 K/uL  RBC Count : 2.64 M/uL  Hemoglobin : 8.3 g/dL  Hematocrit : 28.1 %  Platelet Count - Automated : 116 K/uL  Mean Cell Volume : 106.4 fl  Mean Cell Hemoglobin : 31.4 pg  Mean Cell Hemoglobin Concentration : 29.5 gm/dL  Auto Neutrophil # : x  Auto Lymphocyte # : x  Auto Monocyte # : x  Auto Eosinophil # : x  Auto Basophil # : x  Auto Neutrophil % : x  Auto Lymphocyte % : x  Auto Monocyte % : x  Auto Eosinophil % : x  Auto Basophil % : x    05-31    147<H>  |  104  |  57.3<H>  ----------------------------<  97  3.9   |  27.0  |  2.76<H>    Ca    10.0      31 May 2023 09:55      PT/INR - ( 31 May 2023 22:25 )   PT: 13.1 sec;   INR: 1.13 ratio         PTT - ( 31 May 2023 22:25 )  PTT:31.0 sec    RECENT CULTURES:            CAPILLARY BLOOD GLUCOSE      RADIOLOGY & ADDITIONAL STUDIES:    ASSESSMENT/PLAN:  73yFemale presenting with: Acute Hypercarbic respiratory failure AMS from this and toxic encephalopathy likely from oversedation in setting of ESRD on HD. and CKD.    Neurological: I will avoid further sedatives and give Tylenol only for analgesics.    Pulmonary: ABG Seems to have improved.  Now with Alkalemia and corrected CO2.  I have decreased inspiratory pressure now.  In AM, will DC BiPAP or if pt can not tolerate    Cardiovascular: Metoprolol and Lipitor.    Gastrointestinal: I have ordered Renal diet.    Genitourinary: HD As preferred by Renal.  CW Demadex.     Heme: CW ASA.  I have ordered Prophylactic SQH.    ID: None    Lines/ Tubes: No new invasive lines or tubes.    Dispo: If pt can successfully remove BiPAP, she can be downgraded.             CRITICAL CARE TIME SPENT: 55 minutes   INTERVAL HPI/OVERNIGHT EVENTS/SUBJECTIVE: Pt admitted to SICU Overnight.  Pt has limited ROS due to baseline dementia of A&OX1.  Nods her head to questions seemingly appropriately     ICU Vital Signs Last 24 Hrs  T(C): 37.4 (31 May 2023 23:00), Max: 37.4 (31 May 2023 19:49)  T(F): 99.4 (31 May 2023 23:00), Max: 99.4 (31 May 2023 19:49)  HR: 80 (01 Jun 2023 00:45) (74 - 100)  BP: 135/61 (01 Jun 2023 00:00) (105/43 - 138/58)  BP(mean): 81 (01 Jun 2023 00:00) (58 - 93)  ABP: --  ABP(mean): --  RR: 22 (01 Jun 2023 00:00) (16 - 25)  SpO2: 100% (01 Jun 2023 00:45) (92% - 100%)    O2 Parameters below as of 01 Jun 2023 00:00  Patient On (Oxygen Delivery Method): BiPAP/CPAP    O2 Concentration (%): 60    I&O's Detail    ABG - ( 31 May 2023 20:55 )  pH, Arterial: 7.460 pH, Blood: x     /  pCO2: 40    /  pO2: 147   / HCO3: 28    / Base Excess: 4.6   /  SaO2: 100.0     (STANDING):  allopurinol 100 milliGRAM(s) Oral daily  aspirin  chewable 81 milliGRAM(s) Oral daily  atorvastatin 40 milliGRAM(s) Oral at bedtime  calcitriol   Capsule 0.25 MICROGram(s) Oral daily  chlorhexidine 2% Cloths 1 Application(s) Topical <User Schedule>  metoprolol tartrate Injectable 5 milliGRAM(s) IV Push every 6 hours  pantoprazole  Injectable 40 milliGRAM(s) IV Push daily  torsemide 10 milliGRAM(s) Oral daily    MEDICATIONS  (PRN):      NUTRITION/IVF:     CENTRAL LINE:  LOCATION:   DATE INSERTED:  CVP:  SCVO2:    MARINELLI:   DATE INSERTED:    A-LINE:    LOCATION:   DATE INSERTED:   SVV:  CO/CI:     CHEST TUBE:  LOCATION:  DATE INSERTED: OUTPUT/24 HRS:  SUCTION/WATER SEAL:     NG/OG TUBE:  DATE INSERTED:  OUTPUT/24 HRS:    MISC:     PHYSICAL EXAM:     Gen: Frail appearing on BiPAP mask. Ill appearing, No cyanosis, Pallor.    Eyes: PERRL ~ 3mm, EOMI,     Neurological: A&Ox person only. GCS 3/4/6, No focal defficit.     ENMT: Clear canals, clear throat.      Neck: Supple. NT AT, FROM no pain.  No JVD. No meningeal signs    Pulmonary: NAD, CTA, = BL .      Cardiovascular: RRR, S1, S2, No Murmurs, rubs or gallops noted.    Gastrointestinal:ND, Soft, NT.    Extremities: NT, AT, no edema, erythema or palpable cord noted.  FROM, = 2+ pulses throughout.    LABS:  CBC Full  -  ( 31 May 2023 22:25 )  WBC Count : 4.94 K/uL  RBC Count : 2.64 M/uL  Hemoglobin : 8.3 g/dL  Hematocrit : 28.1 %  Platelet Count - Automated : 116 K/uL  Mean Cell Volume : 106.4 fl  Mean Cell Hemoglobin : 31.4 pg  Mean Cell Hemoglobin Concentration : 29.5 gm/dL  Auto Neutrophil # : x  Auto Lymphocyte # : x  Auto Monocyte # : x  Auto Eosinophil # : x  Auto Basophil # : x  Auto Neutrophil % : x  Auto Lymphocyte % : x  Auto Monocyte % : x  Auto Eosinophil % : x  Auto Basophil % : x    05-31    147<H>  |  104  |  57.3<H>  ----------------------------<  97  3.9   |  27.0  |  2.76<H>    Ca    10.0      31 May 2023 09:55      PT/INR - ( 31 May 2023 22:25 )   PT: 13.1 sec;   INR: 1.13 ratio         PTT - ( 31 May 2023 22:25 )  PTT:31.0 sec    RECENT CULTURES:            CAPILLARY BLOOD GLUCOSE      RADIOLOGY & ADDITIONAL STUDIES:    ASSESSMENT/PLAN:  73yFemale presenting with: Acute Hypercarbic respiratory failure AMS from this and toxic encephalopathy likely from oversedation in setting of ESRD on HD. and CKD.    Neurological: I will avoid further sedatives and give Tylenol only for analgesics.    Pulmonary: ABG Seems to have improved.  Now with Alkalemia and corrected CO2.  I have decreased inspiratory pressure now.  In AM, will DC BiPAP or if pt can not tolerate    Cardiovascular: Metoprolol and Lipitor.    Gastrointestinal: I have ordered Renal diet.    Genitourinary: HD As preferred by Renal.  CW Demadex.     Heme: CW ASA.  I have ordered Prophylactic SQH.    ID: None    Lines/ Tubes: No new invasive lines or tubes.    Dispo: If pt can successfully remove BiPAP, she can be downgraded.             CRITICAL CARE TIME SPENT: 55 minutes

## 2023-06-01 NOTE — DISCHARGE NOTE PROVIDER - NSDCFUADDINST_GEN_ALL_CORE_FT
Do not use fistula for dialysis access. If HD is needed will need to place a temporary catheter. Allow fistula to mature for 6-8 weeks. Shower only, no driving, no heavy lifting over 5 lbs for 4 weeks.  After discharge, if you have any signs of bleeding, increased incisional pain, swelling, redness, purulent drainage, pain/discoloration in the hand or numbness to the fingers please return to the ED immediately.

## 2023-06-01 NOTE — DISCHARGE NOTE NURSING/CASE MANAGEMENT/SOCIAL WORK - NSDCFUADDAPPT_GEN_ALL_CORE_FT
Please follow up w/ your primary care physician, your cardiologist, nephrologist and vascular surgeon.

## 2023-06-02 NOTE — PROGRESS NOTE ADULT - SUBJECTIVE AND OBJECTIVE BOX
INTERVAL HPI/OVERNIGHT EVENTS:    Patient evaluated at bedside. NAOE. Patient denies N/V/CP/SOB, no subjective fevers or chills. Pain well controlled. Was placed on O2 overnight, prophylactically for sleep apnea. O2 sat 100% on room air this AM.    MEDICATIONS  (STANDING):  albuterol/ipratropium for Nebulization 3 milliLiter(s) Nebulizer every 6 hours  allopurinol 100 milliGRAM(s) Oral daily  aspirin  chewable 81 milliGRAM(s) Oral daily  atorvastatin 40 milliGRAM(s) Oral at bedtime  calcitriol   Capsule 0.25 MICROGram(s) Oral daily  chlorhexidine 2% Cloths 1 Application(s) Topical <User Schedule>  heparin   Injectable 5000 Unit(s) SubCutaneous every 8 hours  metoprolol tartrate 12.5 milliGRAM(s) Oral every 12 hours  mupirocin 2% Nasal 1 Application(s) Both Nostrils two times a day  pantoprazole  Injectable 40 milliGRAM(s) IV Push daily  torsemide 10 milliGRAM(s) Oral daily    MEDICATIONS  (PRN):      Vital Signs Last 24 Hrs  T(C): 36.6 (2023 04:18), Max: 37.2 (2023 08:10)  T(F): 97.8 (2023 04:18), Max: 98.9 (2023 08:10)  HR: 85 (2023 05:20) (80 - 111)  BP: 107/68 (2023 05:20) (93/40 - 122/52)  BP(mean): 67 (2023 22:32) (57 - 73)  RR: 20 (2023 04:18) (19 - 31)  SpO2: 98% (2023 04:18) (90% - 99%)    Parameters below as of 2023 04:18  Patient On (Oxygen Delivery Method): nasal cannula  O2 Flow (L/min): 3    Constitutional: NAD  HEENT: PERRLA, EOMI, no drainage or redness  Respiratory: respirations even, unlabored on room air  Cardiovascular: RRR  Gastrointestinal: Soft, non-tender, non-distended  Extremities: No peripheral edema, No cyanosis, clubbing   Vascular: Equal and normal pulses: 2+ peripheral pulses throughout; AVF with palpable thrill  Neurological: A&O x 3; no sensory, motor or coordination deficits, normal reflexes  Psychiatric: Normal mood, normal affect  Musculoskeletal: No joint pain, swelling or deformity; no limitation of movement  Skin: No rashes      I&O's Detail    2023 07:01  -  2023 07:00  --------------------------------------------------------  IN:    IV PiggyBack: 200 mL    Oral Fluid: 740 mL  Total IN: 940 mL    OUT:    Voided (mL): 650 mL  Total OUT: 650 mL    Total NET: 290 mL          LABS:                        7.5    6.21  )-----------( 110      ( 2023 06:10 )             24.8     06-02    142  |  102  |  69.9<H>  ----------------------------<  95  4.4   |  29.0  |  3.23<H>    Ca    8.7      2023 06:10  Phos  5.1     06-02  Mg     2.0     06-02      PT/INR - ( 31 May 2023 22:25 )   PT: 13.1 sec;   INR: 1.13 ratio         PTT - ( 31 May 2023 22:25 )  PTT:31.0 sec  Urinalysis Basic - ( 2023 05:20 )    Color: Yellow / Appearance: Clear / S.015 / pH: x  Gluc: x / Ketone: Negative  / Bili: Negative / Urobili: Negative mg/dL   Blood: x / Protein: 30 mg/dL / Nitrite: Negative   Leuk Esterase: Trace / RBC: Negative /HPF / WBC 3-5 /HPF   Sq Epi: x / Non Sq Epi: x / Bacteria: x

## 2023-06-02 NOTE — PROGRESS NOTE ADULT - ASSESSMENT
73y Female s/p LUE brachiocephalic AVF creation, post op c/b Acute Hypercarbic respiratory failure, now resolved, satting well on room air. Patient recovering well, appropriate for discharge home.    Plan:  Patient is stable for discharge home today   Follow up with Dr. Colon outpatient in 2 week   No heavy lifting with left arm for 4 weeks  73y Female s/p LUE brachiocephalic AVF creation, post op c/b Acute Hypercarbic respiratory failure, now resolved, satting well on room air. Patient recovering well.    Plan:  Hgb 7.5 from 8.4 - f/u repeat CBC this AM  continue to monitor respiratory status  Possible dc home today  Follow up with Dr. Colon outpatient in 2 week   No heavy lifting with left arm for 4 weeks

## 2023-06-29 PROBLEM — E55.9 VITAMIN D DEFICIENCY: Status: ACTIVE | Noted: 2021-12-10

## 2023-06-29 PROBLEM — N25.81 SECONDARY HYPERPARATHYROIDISM OF RENAL ORIGIN: Status: ACTIVE | Noted: 2023-01-01

## 2023-06-29 PROBLEM — E87.2 ACIDOSIS, METABOLIC: Status: ACTIVE | Noted: 2021-09-14

## 2023-06-29 NOTE — ASSESSMENT
[FreeTextEntry1] :  1. CKD stage IV/ V\par baseline SCr ~ 2.7- kidney function worsened in setting of diuretics use and peaked at 3.9\par REpeat labs with SCr 3.2\par Pt is now s/p LUE AVF creation by Dr Colon\par No indication for RRT at this time\par \par 2. Anemia of CKD\par Hb 7.3\par Will give Epogen 20,000 IU SC today lot # [id number], exp 10/25\par (rt arm SC)\par \par 3. HTN\par Bp stable\par Continue Metoprolol to 12.5 mg daily and Torsemide 10\par \par 4. metabolic acidosis\par serum co2 at goal\par \par 5. CKD- MBD\par Continue Calcitriol \par Continue Vitamin D supplements\par Maintain low phos diet\par \par rtc in 1 month\par

## 2023-06-29 NOTE — PHYSICAL EXAM
[General Appearance - In No Acute Distress] : in no acute distress [Sclera] : the sclera and conjunctiva were normal [Hearing Threshold Finger Rub Not [place]] : hearing was normal [Auscultation Breath Sounds / Voice Sounds] : lungs were clear to auscultation bilaterally [Heart Sounds] : normal S1 and S2 [Edema] : there was no peripheral edema [Abdomen Soft] : soft [No CVA Tenderness] : no ~M costovertebral angle tenderness [] : no rash [No Focal Deficits] : no focal deficits [Oriented To Time, Place, And Person] : oriented to person, place, and time [FreeTextEntry1] : walks with a walker [___ (cm) Fistula] : [unfilled] (cm) fistula [Thrill] : a thrill was present

## 2023-07-05 NOTE — ASSESSMENT
[FreeTextEntry1] : hld  continue atorvastatin ckd  fistula maturing cad  continue asa mood  stable with paroxetine

## 2023-07-05 NOTE — PHYSICAL EXAM
[No Acute Distress] : no acute distress [PERRL] : pupils equal round and reactive to light [Normal TMs] : both tympanic membranes were normal [Supple] : supple [Normal Rate] : normal rate  [No Edema] : there was no peripheral edema [Normal] : soft, non-tender, non-distended, no masses palpated, no HSM and normal bowel sounds [Normal Supraclavicular Nodes] : no supraclavicular lymphadenopathy [Normal Anterior Cervical Nodes] : no anterior cervical lymphadenopathy [No Joint Swelling] : no joint swelling [Normal Insight/Judgement] : insight and judgment were intact [de-identified] : systolic murmur  [de-identified] : stasis  changes  le  [de-identified] : walker

## 2023-07-05 NOTE — HISTORY OF PRESENT ILLNESS
[Congestive Heart Failure] : Congestive Heart Failure [Diabetes Mellitus] : Diabetes Mellitus [Hypertension] : Hypertension [Other: ___] : [unfilled] [Spouse] : spouse [Check glucose ___ x/week] : Patient checks blood glucose [unfilled]  times a week [Most Recent A1C: ___] : Most recent A1C was [unfilled] [Does not check BP] : The patient is not checking blood pressure [<130/90] : Target blood pressure is  <130/90 [Target goal met] : BP target goal met [FreeTextEntry6] : pt had  av fistular put in 5/31  was in hospital for 4 days because of low  bp  pt has  had  several falls still smoking breathing ok uses  ventolin daily \par \par \par \par \par \par \par \par \par \par \par \par \par \par \par \par

## 2023-07-08 PROBLEM — N39.0 UTI (URINARY TRACT INFECTION): Status: RESOLVED | Noted: 2023-01-01 | Resolved: 2023-01-01

## 2023-07-17 NOTE — PHYSICAL EXAM
[Normal Rate and Rhythm] : normal rate and rhythm [2+] : left 2+ [1+] : left 1+ [Ankle Swelling (On Exam)] : present [Ankle Swelling Bilaterally] : bilaterally  [Varicose Veins Of Lower Extremities] : bilaterally [Ankle Swelling On The Left] : moderate [Alert] : alert [Oriented to Person] : oriented to person [Oriented to Place] : oriented to place [Oriented to Time] : oriented to time [Calm] : calm [] : not present [de-identified] : NAD [de-identified] : supple, no masses [de-identified] : unlabored breathing [FreeTextEntry1] : ++thrill over LUE brachiocephalic AVF [de-identified] : FROM of all 4 extremities\par

## 2023-07-17 NOTE — HISTORY OF PRESENT ILLNESS
[FreeTextEntry1] : 74 yo F active smoker with history of CHF, CKD stage 4, CAD, GERD, COPD, DM, HTN, hyperlipidemia, pulmonary hypertension, renal cell carcinoma s/p partial nephrectomy, leg edema presenting for evaluation for creation of HD access. Pt is right hand dominant. Denies complaints of chest pain, SOB, GE, claudication, leg wounds or leg edema at this time. Denies history of central lines, prior catheters, or pacemaker. Patient will require initiation of HD in the next few months, per nephrologist, Dr. Jo Ann Mcclure\par  [de-identified] : s/p creation of LUE braciocephalic AVF on 5/31/23\par Doing well\par No pain or parasthesia in left hand\par

## 2023-07-17 NOTE — ASSESSMENT
[FreeTextEntry1] : 74 yo F active smoker with history of CHF, CKD stage 4, CAD, GERD, COPD, DM, HTN, hyperlipidemia, pulmonary hypertension, renal cell carcinoma s/p partial nephrectomy, leg edema presenting for evaluation for creation of HD access s/p LUE brachiocephalic AVF creation on 5/31/23. Here for maturity study\par \par Duplex of AVF demonstrates patent and immature fisula with volume flows < 600mL/min\par

## 2023-07-19 NOTE — DISCHARGE NOTE PROVIDER - NSDCCPTREATMENT_GEN_ALL_CORE_FT
PRINCIPAL PROCEDURE  Procedure: Dialysis fistulagram  Findings and Treatment: Restricted use with no heavy lifting of affected arm for 48 hours.  No submerging the arm in water for 48 hours.  You may start showering today.  Call your doctor for any bleeding, swelling, loss of sensation in the hand or fingers, or fingers turning blue.  If heavy bleeding or large lumps form, hold pressure at the spot and come to the Emergency Room.

## 2023-07-19 NOTE — ASU PATIENT PROFILE, ADULT - NS PRO PT RIGHT SUPPORT PERSON
Patient brought back from [place] [id number]. Patient appears to be stable with no signs of distress. IV placed and abx started. Patient denies current needs. Will continue to monitor.       Nneka Romero RN  09/20/21 [id number]
same name as above

## 2023-07-19 NOTE — DISCHARGE NOTE PROVIDER - NSDCCPCAREPLAN_GEN_ALL_CORE_FT
PRINCIPAL DISCHARGE DIAGNOSIS  Diagnosis: S/P arteriovenous (AV) fistula repair  Assessment and Plan of Treatment: Restricted use with no heavy lifting of affected arm for 48 hours.  No submerging the arm in water for 48 hours.  You may start showering today.  Call your doctor for any bleeding, swelling, loss of sensation in the hand or fingers, or fingers turning blue.  If heavy bleeding or large lumps form, hold pressure at the spot and come to the Emergency Room.

## 2023-07-19 NOTE — H&P PST ADULT - HISTORY OF PRESENT ILLNESS
73 yr old pleasant female presents with history of anxiety/ depression,  CHF , CAD, HTN,  DM > 20 yrs,  Gout anemia ( treated with procrit)  CKD stage 4  , s/p partial left nephrectomy  for papillary renal cell cancer ( 2018)  , arthritis uses walker  COPD  Smoker 50 PAck year .has cut back from 1 ppd to 87 cigarettes daily . Covid 5/15/2022,  Hx nose bleeds requiring blood transfusion in 2022. Homero lower leg swelling / edema weeping at times , left lower leg ulcer  , dressings applied daily , started torsemide  10 mg daily with some improvement  noted. Pt has had renal insufficiency for years  follows with DR. Mcclure , pt had a  left arm AV fistula on 5/31/23 for Dialysis in near future. Today she presents for AV fistulogram.     No Complaints today

## 2023-07-19 NOTE — DISCHARGE NOTE PROVIDER - NSDCFUSCHEDAPPT_GEN_ALL_CORE_FT
Jo Ann Mcclure  [facility]  NEPHRO [address]  Scheduled Appointment: 08/03/2023    [facility]  [facility] [address]  Scheduled Appointment: 08/29/2023    Nancy Meraz  [facility]  [facility] [address] [facility]  Scheduled Appointment: 08/29/2023    Mauro Goel  [facility]  FAMILYMED [address] [facility] [address]  Scheduled Appointment: 09/06/2023     Manvar-Singh, Pallavi  [facility]  VASCULAR [address]  Scheduled Appointment: 07/27/2023    Jo Ann Mcclure  [facility]  NEPHRO [address]  Scheduled Appointment: 08/03/2023    [facility]  CARDIOLOGY [address] [facility] [address]  Scheduled Appointment: 08/29/2023    Nancy Meraz  [facility]  CARDIOLOGY [address] [facility] [address]  Scheduled Appointment: 08/29/2023    Mauro Goel  [facility]  FAMILYMED [address] [facility]  Scheduled Appointment: 09/06/2023

## 2023-07-19 NOTE — ASU PATIENT PROFILE, ADULT - TOBACCO USE
Airway patent, Nasal mucosa clear. Mouth with normal mucosa. Throat has no vesicles, no oropharyngeal exudates and uvula is midline. R TM clear. L TM with erythema and effusion. EOM with swelling and clear discharge. movement of the tragus worsens pain. no mastoid TTP/erythema/swelling.   no cervical adenopathy. oropharynx clear Current every day smoker

## 2023-07-19 NOTE — H&P PST ADULT - NSICDXPASTMEDICALHX_GEN_ALL_CORE_FT
PAST MEDICAL HISTORY:  2019 novel coronavirus disease (COVID-19)     Anemia     CAD (coronary artery disease)     COPD, moderate     Diabetes mellitus     HTN (hypertension)     S/P arteriovenous (AV) fistula repair

## 2023-07-19 NOTE — DISCHARGE NOTE NURSING/CASE MANAGEMENT/SOCIAL WORK - NSDCPEFALRISK_GEN_ALL_CORE
For information on Fall & Injury Prevention, visit: [email or web address] OR  [email or web address] OR  https://www.cdc.gov/steadi/patient.html

## 2023-07-19 NOTE — H&P PST ADULT - ASSESSMENT
ASSESSMENT:   74 yo female with anxiety/ depression,  CHF , CAD, HTN,  DM > 20 yrs,  Gout anemia ( treated with procrit)  CKD stage 4  , s/p partial left nephrectomy  for papillary renal cell cancer ( 2018)  , arthritis uses walker  COPD  Smoker 50 PAck year .has cut back from 1 ppd to 87 cigarettes daily recent AV Fistula in anticipation of having HD presents for AV fistulogram.       Plan: PRE-PROCEDURE ASSESSMENT    -Patient seen and examined  -Labs reviewed  -Pre-procedure teaching completed with patient   -Questions answered about patients concerns     -instructed to NPO after midnight.   - Pt instructed to have escort to and from procedure   -Baby aspirin prior to procedure.

## 2023-07-19 NOTE — ASU PATIENT PROFILE, ADULT - FALL HARM RISK - CONCLUSION
airway patent/breath sounds equal/good air movement/respirations non-labored/clear to auscultation bilaterally/no chest wall tenderness/no intercostal retractions/no rales/no rhonchi/no subcutaneous emphysema/no wheezes Fall with Harm Risk

## 2023-07-19 NOTE — BRIEF OPERATIVE NOTE - OPERATION/FINDINGS
Fistulogram, balloon assisted maturation with angioplasty on LUE w/ basilic vein outflow, good thrill . Fistulogram, balloon assisted maturation with angioplasty on LUE w/ basilic vein outflow, good post interventional thrill .

## 2023-07-19 NOTE — DISCHARGE NOTE NURSING/CASE MANAGEMENT/SOCIAL WORK - PATIENT PORTAL LINK FT
You can access the FollowMyHealth Patient Portal offered by [facility] by registering at the following website: [email or web address]. By joining [employer]s FollowMyHealth portal, you will also be able to view your health information using other applications (apps) compatible with our system.

## 2023-07-19 NOTE — DISCHARGE NOTE PROVIDER - CARE PROVIDER_API CALL
Manvar-Singh, Pallavi Buddhadev  Vascular Surgery  [address], [address] [place] [address]  Phone: [phone]  Fax: [phone]  Follow Up Time:

## 2023-07-19 NOTE — DISCHARGE NOTE PROVIDER - HOSPITAL COURSE
Pt doing well s/p successful POB to basilic artery of Left AV fistula . Post procedure radial band removed with out occurrence. Finger tips discolored but apparently its her baseline. Denies complaint.     Exam:   VSS, NAD, A&O x 3  Card: S1/S2, RRR, II systolic murmur.   Resp: lungs CTA b/l  Abd: S/NT/ND  Ext: no edema, distal pulses intact finger tips blue   Right Radial site benign after radial band remove.     Assessment:   73y Female h/o HTN, COPD, HLD, CRI recent AV fistula now status post ballooning of the Left Basilic artery of newly created AVf.     Plan:   Observation on telemetry per post op protocol.    Resume PO intake.   If LRA and VSS site stable Ambulate with assistance and DC at 1300   Resume  home medications.   Anticipate d/c home once all criteria met, with outpt f/up in 5 days .

## 2023-07-26 NOTE — PATIENT PROFILE ADULT - CAREGIVER RELATION TO PATIENT
Slit Excision Additional Text (Leave Blank If You Do Not Want): A linear line was drawn on the skin overlying the lesion. An incision was made slowly until the lesion was visualized.  Once visualized, the lesion was removed with blunt dissection. spouse

## 2023-07-27 PROBLEM — Z98.890 OTHER SPECIFIED POSTPROCEDURAL STATES: Chronic | Status: ACTIVE | Noted: 2023-01-01

## 2023-07-27 NOTE — HISTORY OF PRESENT ILLNESS
[FreeTextEntry1] : 74 yo F active smoker with history of CHF, CKD stage 4, CAD, GERD, COPD, DM, HTN, hyperlipidemia, pulmonary hypertension, renal cell carcinoma s/p partial nephrectomy, leg edema presenting for evaluation for creation of HD access. Pt is right hand dominant. Denies complaints of chest pain, SOB, GE, claudication, leg wounds or leg edema at this time. Denies history of central lines, prior catheters, or pacemaker. Patient will require initiation of HD in the next few months, per nephrologist, Dr. Jo Ann Mcclure\par  [de-identified] : s/p creation of LUE braciocephalic AVF on 5/31/23\par Doing well\par No pain or parasthesia in left hand\par

## 2023-07-27 NOTE — PHYSICAL EXAM
[Normal Rate and Rhythm] : normal rate and rhythm [2+] : left 2+ [1+] : left 1+ [Ankle Swelling (On Exam)] : present [Ankle Swelling Bilaterally] : bilaterally  [Varicose Veins Of Lower Extremities] : bilaterally [] : bilaterally [Ankle Swelling On The Left] : moderate [Alert] : alert [Oriented to Person] : oriented to person [Oriented to Place] : oriented to place [Oriented to Time] : oriented to time [Calm] : calm [Abdomen Tenderness] : ~T ~M No abdominal tenderness [de-identified] : NAD [de-identified] : supple, no masses [de-identified] : unlabored breathing [FreeTextEntry1] : ++thrill over LUE brachiocephalic AVF [de-identified] : FROM of all 4 extremities\par

## 2023-08-03 NOTE — ASSESSMENT
[FreeTextEntry1] : 1. CKD stage IV/ V baseline SCr ~ 2.7- kidney function worsened in setting of diuretics use and peaked at 3.9 REpeat labs with SCr 2.9 Pt is now s/p LUE AVF creation by Dr Colon- awaiting maturation. Pt has early uremic symptoms; will need RRT once access is ready for use  2. Anemia of CKD Hb 7.3-> 8.6 Will give Epogen 40,000 IU SC today  3. HTN Bp stable Worsening leg edema Continue Metoprolol to 12.5 mg daily and increase Torsemide to 10 mg bid   4. metabolic acidosis serum co2 at goal  5. CKD- MBD Continue Calcitriol  Continue Vitamin D supplements Maintain low phos diet  rtc in 1 month

## 2023-08-03 NOTE — PHYSICAL EXAM
[General Appearance - In No Acute Distress] : in no acute distress [Sclera] : the sclera and conjunctiva were normal [Hearing Threshold Finger Rub Not [place]] : hearing was normal [Auscultation Breath Sounds / Voice Sounds] : lungs were clear to auscultation bilaterally [Heart Sounds] : normal S1 and S2 [Abdomen Soft] : soft [No CVA Tenderness] : no ~M costovertebral angle tenderness [___ (cm) Fistula] : [unfilled] (cm) fistula [Thrill] : a thrill was present [] : no rash [No Focal Deficits] : no focal deficits [Oriented To Time, Place, And Person] : oriented to person, place, and time [FreeTextEntry1] : walks with a walker

## 2023-08-03 NOTE — HISTORY OF PRESENT ILLNESS
[FreeTextEntry1] : 73-year-old woman patient with PMH of  of CHF, DM for over 20 years, hypertension, CAD, gout (on Allopurinol), partial left nephrectomy for papillary renal cell cancer, pulmonary HTN, anemia and advanced CKD stage presenting for follow up appt.' Pt is accompanied by . pt seen and examined; feels tired. Appetite is fair but reports nausea.  Also reports worsening leg edema s/p LUE AVF by Dr. Colon on 05/31

## 2023-08-14 PROBLEM — I74.2 RADIAL ARTERY THROMBOSIS: Status: ACTIVE | Noted: 2023-01-01

## 2023-08-14 PROBLEM — I87.2 STASIS DERMATITIS OF BOTH LEGS: Status: ACTIVE | Noted: 2023-01-01

## 2023-08-14 NOTE — HISTORY OF PRESENT ILLNESS
[FreeTextEntry1] : 72 yo F active smoker with history of CHF, CKD stage 4, CAD, GERD, COPD, DM, HTN, hyperlipidemia, pulmonary hypertension, renal cell carcinoma s/p partial nephrectomy, leg edema presenting for evaluation for creation of HD access. Pt is right hand dominant. Denies complaints of chest pain, SOB, GE, claudication, leg wounds or leg edema at this time. Denies history of central lines, prior catheters, or pacemaker. Patient will require initiation of HD in the next few months, per nephrologist, Dr. 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

## 2023-08-14 NOTE — PHYSICAL EXAM
[Normal Rate and Rhythm] : normal rate and rhythm [2+] : left 2+ [1+] : left 1+ [Ankle Swelling (On Exam)] : present [Ankle Swelling Bilaterally] : bilaterally  [Varicose Veins Of Lower Extremities] : bilaterally [] : bilaterally [Ankle Swelling On The Left] : moderate [Abdomen Tenderness] : ~T ~M No abdominal tenderness [Alert] : alert [Oriented to Person] : oriented to person [Oriented to Place] : oriented to place [Oriented to Time] : oriented to time [Calm] : calm [de-identified] : NAD [de-identified] : supple, no masses [de-identified] : unlabored breathing [FreeTextEntry1] : ++thrill over LUE brachiocephalic AVF [de-identified] : FROM of all 4 extremities\par

## 2023-08-14 NOTE — ASSESSMENT
[FreeTextEntry1] : 74 yo F active smoker with history of CHF, CKD stage 4, CAD, GERD, COPD, DM, HTN, hyperlipidemia, pulmonary hypertension, renal cell carcinoma s/p partial nephrectomy, leg edema presenting for evaluation for creation of HD access s/p LUE brachiocephalic AVF creation on 5/31/23. Tolerating Unna boots to bilateral LE weekly  Duplex of AVF demonstrates patent and immature fistula with volume flows >600mL/min throughout basilic vein; low flows through cephalic vein; left radial artery thrombosed  Plan   Patient started on eliquis 2.5mg BID for radial artery thrombosis s/p fistulogram; no evidence of left hand or digit ischemia-- given a 30-day free-trial card Bilateral LE Unna boots applied to manage lymphorrhea and edema Leg elevation Follow up in 1 week for Unna boot change   [Arterial/Venous Disease] : arterial/venous disease [Medication Management] : medication management [Foot care/Footwear] : foot care/footwear

## 2023-08-18 PROBLEM — N18.30 STAGE 3 CHRONIC KIDNEY DISEASE, UNSPECIFIED WHETHER STAGE 3A OR 3B CKD: Status: ACTIVE | Noted: 2022-01-26

## 2023-08-18 PROBLEM — I73.9 PERIPHERAL VASCULAR DISEASE: Status: ACTIVE | Noted: 2019-06-11

## 2023-08-18 NOTE — PHYSICAL EXAM
[Normal Rate and Rhythm] : normal rate and rhythm [2+] : left 2+ [1+] : left 1+ [Ankle Swelling (On Exam)] : present [Ankle Swelling Bilaterally] : bilaterally  [Varicose Veins Of Lower Extremities] : bilaterally [] : bilaterally [Ankle Swelling On The Left] : moderate [Alert] : alert [Oriented to Person] : oriented to person [Oriented to Place] : oriented to place [Oriented to Time] : oriented to time [Calm] : calm [Abdomen Tenderness] : ~T ~M No abdominal tenderness [de-identified] : NAD [de-identified] : unlabored breathing [de-identified] : supple, no masses [FreeTextEntry1] : ++thrill over LUE brachiocephalic AVF [de-identified] : FROM of all 4 extremities\par

## 2023-08-18 NOTE — VITALS
[Tender] : tender [Occasional] : occasional [FreeTextEntry3] : b/l lower extremities [FreeTextEntry1] : pt unsure [FreeTextEntry2] : pt unsure

## 2023-08-18 NOTE — PHYSICAL EXAM
[Normal Rate and Rhythm] : normal rate and rhythm [2+] : left 2+ [1+] : left 1+ [Ankle Swelling (On Exam)] : present [Ankle Swelling Bilaterally] : bilaterally  [Varicose Veins Of Lower Extremities] : bilaterally [] : bilaterally [Ankle Swelling On The Left] : moderate [Alert] : alert [Oriented to Person] : oriented to person [Oriented to Place] : oriented to place [Oriented to Time] : oriented to time [Calm] : calm [Abdomen Tenderness] : ~T ~M No abdominal tenderness [Petechiae] : no petechiae [Skin Induration] : no induration [de-identified] : wdwn female in nad [de-identified] : supple, no masses [de-identified] : unlabored breathing [FreeTextEntry1] : ++thrill over LUE brachiocephalic AVF [de-identified] : FROM of all 4 extremities\par   [de-identified] : right leg   - lateral aspect  superficial skin breakdown    left leg-medial/anterior aspect of left lower leg superficial skin breakdown  with  slight bleeding

## 2023-08-18 NOTE — PROCEDURE
[FreeTextEntry1] : b/l application of unna boots  to b/l lower extremities - strapping unna boot apllication
None known

## 2023-08-18 NOTE — HISTORY OF PRESENT ILLNESS
[FreeTextEntry1] : 72 yo F active smoker with history of CHF, CKD stage 4, CAD, GERD, COPD, DM, HTN, hyperlipidemia, pulmonary hypertension, renal cell carcinoma s/p partial nephrectomy, leg edema presenting for evaluation for creation of HD access. Pt is right hand dominant. Denies complaints of chest pain, SOB, GE, claudication, leg wounds or leg edema at this time. Denies history of central lines, prior catheters, or pacemaker. Patient will require initiation of HD in the next few months, per nephrologist, Dr. Jo Ann Mcclure\par   [de-identified] : s/p creation of LUE brachiocephalic AVF on 5/31/23 Doing well No pain or parasthesia in left hand  s/p LUE fistulogram and balloon angioplasty of basilic vein (BAM); could not across anastomosis into cephalic vein Tolerating Unna boots to bilateral LE for lymphedema and lymphorrhea On Eliquis for radial artery thrombosis; discomfort over right forearm

## 2023-08-18 NOTE — ASSESSMENT
[Arterial/Venous Disease] : arterial/venous disease [Medication Management] : medication management [Foot care/Footwear] : foot care/footwear [FreeTextEntry1] : 74 yo F active smoker with history of CHF, CKD stage 4, CAD, GERD, COPD, DM, HTN, hyperlipidemia, pulmonary hypertension, renal cell carcinoma s/p partial nephrectomy, leg edema presenting for evaluation for creation of HD access s/p LUE brachiocephalic AVF creation on 5/31/23. Tolerating Unna boots to bilateral LE weekly  Duplex of AVF demonstrates patent and immature fistula with volume flows >600mL/min throughout basilic vein; low flows through cephalic vein; left radial artery thrombosed  Plan   Patient started on eliquis 2.5mg BID for radial artery thrombosis s/p fistulogram; no evidence of left hand or digit ischemia-- given a 30-day free-trial card Leg elevation Follow up in 2-3 weeks to assess AVF maturity with duplex Patient will be prescribed circ-aid for compression, as she cannot apply compression stockings due to deconditioning

## 2023-08-18 NOTE — ASSESSMENT
[FreeTextEntry1] : Continue use of  b/l  unna boot  F/u Dr Medina next week  for  recheck Discussion with patien/husbandt   All questions answered  Any acute symptoms and or concerns patient/ understands  to call back office  and  or  go  directly to [facility] ED

## 2023-08-18 NOTE — HISTORY OF PRESENT ILLNESS
[FreeTextEntry1] : Patient presents  to Vascular clinic  for   recheck of lower extremities  and  possible  Unna boot change  Presently patient wearing  Unna boots  for   with  no  consequences  patient  deneis  any  fevers  no  chills

## 2023-08-29 PROBLEM — I10 HTN (HYPERTENSION): Status: ACTIVE | Noted: 2017-10-31

## 2023-08-29 NOTE — HISTORY OF PRESENT ILLNESS
[FreeTextEntry1] : Pt is a 74 y/o F smoker PMH HLD, HTN, LBBB, CAD (cardiac cath 2021), mod-sev AS, sev TR, pHTN, DM, CKD, renal cancer s/p partial nephrectomy.  Hospitalized 06/2021 for fall and was found to be in heart failure - she  had cardiac cath which showed blockage but they decided medical management sec to renal function. Hospitalized 03/2023 for epistaxis and worsening renal function - no blood transfusions were required  She fell a few weeks ago, slipped off rocking chair and needed sutures. She has been taking torsemide 20mg qd daily.  She is also seeing vascular for wound care, swelling and wounds are slowly improving She weighs herself daily - avg about 182lbs (previous was 176-178lbs) Ambulates with walker She is following with renal - HD plans are being made - she is s/p AV fistula creation on 05/31/2023 Cr 2.25 - 2.07 - 2.34 - 2.69 - 3.27 - 2.42 - 2.99 GFR 22 - 18 - 14 - 21 - 16  cardiac cath 06/2021 SBU pLAD 70%, D1 70%, mRCA 50% TTE 03/2021 mod AS, mod MR TTE 06/2022 EF 58%, mild AS MG 21, mild MS, mod-sev MR, sev LAE, mod TR TTE 08/2023 EF 50%, mod-sev AS, mod-sev MR, sev TR, pHTN  PMH: HLD, HTN, LBBB, CAD, renal cancer s/p partial nephrectomy 2017. Smoking status: 8 cigarettes  social ETOH  no drug use Current exercise: none Daily water intake: 1 quart Daily caffeine intake: 1 cup tea OTC medications: iron, B12, ASA Previous hospitalizations: knee replacements, partial nephrectomy - no problems with anesthesia

## 2023-08-29 NOTE — REASON FOR VISIT
[Cardiac Failure] : cardiac failure [Coronary Artery Disease] : coronary artery disease [Structural Heart and Valve Disease] : structural heart and valve disease

## 2023-08-29 NOTE — DISCUSSION/SUMMARY
[EKG obtained to assist in diagnosis and management of assessed problem(s)] : EKG obtained to assist in diagnosis and management of assessed problem(s) [FreeTextEntry1] : 74 y/o F PMH HFpEF, HLD, HTN, LBBB, CAD (cardiac cath 2021), mod-sev AS, sev TR, pHTN, DM, CKD, renal cancer s/p partial nephrectomy.     CAD: medical management c/w ASA 81mg  c/w statin - goal LDL <70 c/w metoprolol no ACEi/ARB sec to CKD  HF: Torsemide 20mg - monitor closely c/w metoprolol succinate No ACEi/ARB sec to CKD check daily weights, low salt diet plan for HD s/p AVF 05/31/2023 with Dr Hodge at [facility]  AS, sev TR, pHTN: monitor closely serial TTEs careful diuresis  CKD: avoid nephrotoxins  Discussed smoking cessation in great detail >3min  Pt will return in 4-6 mnths or sooner as needed but I encouraged communication via phone or portal if necessary. The described plan was discussed with the pt and .  All questions and concerns were addressed to the best of my knowledge.

## 2023-08-29 NOTE — REVIEW OF SYSTEMS
[Dyspnea on exertion] : dyspnea during exertion [Negative] : Heme/Lymph [SOB] : no shortness of breath [Lower Ext Edema] : lower extremity edema [Leg Claudication] : no intermittent leg claudication

## 2023-09-06 PROBLEM — E78.5 HLD (HYPERLIPIDEMIA): Status: ACTIVE | Noted: 2019-04-11

## 2023-09-06 PROBLEM — I50.9 CHF (CONGESTIVE HEART FAILURE): Status: ACTIVE | Noted: 2021-07-07

## 2023-09-06 PROBLEM — F17.210 CIGARETTE SMOKER: Status: ACTIVE | Noted: 2022-07-01

## 2023-09-06 PROBLEM — N18.4 CKD (CHRONIC KIDNEY DISEASE), STAGE IV: Status: ACTIVE | Noted: 2021-05-27

## 2023-09-06 NOTE — HISTORY OF PRESENT ILLNESS
[Congestive Heart Failure] : Congestive Heart Failure [Diabetes Mellitus] : Diabetes Mellitus [Hypertension] : Hypertension [Other: ___] : [unfilled] [Spouse] : spouse [FreeTextEntry6] : fell 1 mo ago lacerated scalp had staples put in [facility] breathing ok going to wound care for edma both  legs  has  unaboot 2 x a wk              [Check glucose ___ x/week] : Patient checks blood glucose [unfilled]  times a week [Most Recent A1C: ___] : Most recent A1C was [unfilled] [Does not check BP] : The patient is not checking blood pressure [<130/90] : Target blood pressure is  <130/90 [Target goal met] : BP target goal met [Doing Well] : Patient is doing well [High Intensity therapy] : Patient is currently on high intensity statin therapy [No New Symptoms] : Patient denies any new symptoms [Stable] : Overall status has been stable

## 2023-09-06 NOTE — PHYSICAL EXAM
[No Acute Distress] : no acute distress [PERRL] : pupils equal round and reactive to light [Normal TMs] : both tympanic membranes were normal [Supple] : supple [Normal Rate] : normal rate  [Normal] : soft, non-tender, non-distended, no masses palpated, no HSM and normal bowel sounds [Normal Supraclavicular Nodes] : no supraclavicular lymphadenopathy [Normal Anterior Cervical Nodes] : no anterior cervical lymphadenopathy [No Joint Swelling] : no joint swelling [Normal Insight/Judgement] : insight and judgment were intact [de-identified] : systolic murmur  [de-identified] : legs wrap with unaboot  [de-identified] : stasis  changes  le  [de-identified] : walker

## 2023-09-06 NOTE — ASSESSMENT
[FreeTextEntry1] : ckd bmp ordered hld continue atorvastatin bp acceptable continue asa  copd renew  ventolin

## 2023-09-08 PROBLEM — I83.029 VENOUS STASIS ULCER OF LEFT LOWER EXTREMITY: Status: ACTIVE | Noted: 2023-01-01

## 2023-09-08 NOTE — ASSESSMENT
[FreeTextEntry1] : Continue use of  b/l  unna boot  F/u Dr Medina next week  for  recheck Discussion with patien/husbandt   All questions answered  Any acute symptoms and or concerns patient/ understands  to call back office  and  or  go  directly to [facility] ED

## 2023-09-08 NOTE — PHYSICAL EXAM
[Normal Rate and Rhythm] : normal rate and rhythm [2+] : left 2+ [1+] : left 1+ [Ankle Swelling (On Exam)] : present [Ankle Swelling Bilaterally] : bilaterally  [Varicose Veins Of Lower Extremities] : bilaterally [] : bilaterally [Ankle Swelling On The Left] : moderate [Abdomen Tenderness] : ~T ~M No abdominal tenderness [Petechiae] : no petechiae [Skin Induration] : no induration [Alert] : alert [Oriented to Person] : oriented to person [Oriented to Place] : oriented to place [Oriented to Time] : oriented to time [Calm] : calm [de-identified] : wdwn female in nad [de-identified] : supple, no masses [de-identified] : unlabored breathing [FreeTextEntry1] : ++thrill over LUE brachiocephalic AVF [de-identified] : FROM of all 4 extremities\par   [de-identified] : right leg   - lateral aspect  superficial skin breakdown    left leg-medial/anterior     aspect of left lower leg superficial skin breakdown  with  slight bleeding   skin breakdown are superficial  with no extension of  wounds      slight  bleeding  due  to  removal of  bandages  no  signs  of  cellulitis

## 2023-09-08 NOTE — PROCEDURE
[FreeTextEntry1] : b/l application of Unna boots  to b/l lower extremities  Kerlix and  ace wrap  to  each lower extremity - strapping Unna boot application RTC next  week  Maintain unna boot until next vascular appointment

## 2023-09-11 PROBLEM — L03.114 CELLULITIS OF LEFT UPPER ARM: Status: ACTIVE | Noted: 2023-01-01

## 2023-09-14 NOTE — ED PROVIDER NOTE - PHYSICAL EXAMINATION
Gen: NAD, AOx3  Head: NCAT  HEENT: EOMI, oral mucosa moist, normal conjunctiva, neck supple  Lung: CTAB, no respiratory distress  CV: rrr, no murmur, Normal perfusion  Abd: soft, NTND  MSK: +swelling to left UE with erytham and warmth to proximal forearm and upper arm no crepitus, ulceration in AC ~2x1cm with pusstular drainage with visible pulsatile artery without bleeding, no thrill over fistula  Neuro: No focal neurologic deficits  Skin: see msk   Psych: normal affect

## 2023-09-14 NOTE — ED ADULT TRIAGE NOTE - CHIEF COMPLAINT QUOTE
Pt A&Ox4, NAD. Pt sent in from MD for eval of left AV fistula. Swelling noted to left hand. Breathing even and unlabored.

## 2023-09-14 NOTE — ED ADULT NURSE REASSESSMENT NOTE - NS ED NURSE REASSESS COMMENT FT1
pt received from Mary PRATHER pt a&o x4 able to make  needs known vss on RA NAD RR even unlabored pending sx consult at this time CBWR

## 2023-09-14 NOTE — PATIENT PROFILE ADULT - FALL HARM RISK - HARM RISK INTERVENTIONS

## 2023-09-14 NOTE — ED ADULT NURSE NOTE - NSFALLRISKINTERV_ED_ALL_ED

## 2023-09-14 NOTE — ED ADULT NURSE NOTE - ED STAT RN HANDOFF DETAILS
pt stable NAD hand off report given to Yue INFANTE RN pt denies complaints at this time pt admitted to any bed fall and safety precautions in place

## 2023-09-14 NOTE — H&P ADULT - NSHPPHYSICALEXAM_GEN_ALL_CORE
T(C): 36.7 (09-14-23 @ 22:19), Max: 36.9 (09-14-23 @ 14:11)  HR: 93 (09-14-23 @ 22:19) (86 - 93)  BP: 102/63 (09-14-23 @ 22:19) (102/63 - 109/67)  RR: 17 (09-14-23 @ 22:19) (17 - 18)  SpO2: 94% (09-14-23 @ 22:19) (94% - 95%)    GENERAL: patient appears well, no acute distress, appropriate, pleasant  EYES: sclera clear, no exudates  ENMT: oropharynx clear without erythema, no exudates, moist mucous membranes  NECK: supple, soft, no thyromegaly noted  LUNGS: good air entry bilaterally, clear to auscultation, symmetric breath sounds, no wheezing or rhonchi appreciated  HEART: soft S1/S2, regular rate and rhythm, no murmurs noted, no lower extremity edema  GASTROINTESTINAL: abdomen is soft, nontender, nondistended, normoactive bowel sounds, no palpable masses  INTEGUMENT: good skin turgor, warm skin, appears well perfused, left proximal forearm, bandaged. ulcerated wound with packing, surrounding area warm with erythema.  MUSCULOSKELETAL: no clubbing or cyanosis, no obvious deformity  NEUROLOGIC: awake, alert, oriented x3, good muscle tone in 4 extremities, no obvious sensory deficits  PSYCHIATRIC: mood is good, affect is congruent, linear and logical thought process  HEME/LYMPH: no palpable supraclavicular nodules, no obvious ecchymosis or petechiae

## 2023-09-14 NOTE — CONSULT NOTE ADULT - SUBJECTIVE AND OBJECTIVE BOX
HPI: 73y Female with PMHx of HTN, COPD, HLD, CRI, LUE brachiobasilic fistula on May31 , 2023 followed by balloon assisted angioplasty on July , 2023 presented to ED of LUE open wound with murky fluid drainage , patient reports her LUE has been swollen for last 2 weeks , developed blister , ruptured today. on presentation patient complaining of LUE open wound with drainage of murky fluid , afebrile , no tachycardia , BP stable. denies lightheadedness/dizziness, SOB/chest pain, nausea/vomiting.    ROS: 10-system review is otherwise negative except HPI above.      PAST MEDICAL & SURGICAL HISTORY:  2019 novel coronavirus disease (COVID-19)      HTN (hypertension)      COPD, moderate      Diabetes mellitus      CAD (coronary artery disease)      Anemia      S/P arteriovenous (AV) fistula repair      H/O total knee replacement      History of partial nephrectomy      H/O lumpectomy        FAMILY HISTORY:  FH: COPD (chronic obstructive pulmonary disease) (Father)    FH: diabetes mellitus (Father)    Family history of renal failure (Father, Mother)      Family history not pertinent as reviewed with the patient.    SOCIAL HISTORY:  Denies any toxic habits    ALLERGIES: NKA Mushrooms (Rash)  Orange (Rash)  Cefzil (Rash)  Pineapple (Rash)      HOME MEDICATIONS:  Home Medications:  allopurinol 100 mg oral tablet: orally once a day (19 Jul 2023 08:53)  aspirin 81 mg oral capsule: 1 orally once a day (19 Jul 2023 08:53)  atorvastatin 40 mg oral tablet: 1 orally once a day (19 Jul 2023 08:53)  calcitriol 0.25 mcg oral capsule: 1 orally once a day (19 Jul 2023 08:53)  metoprolol succinate 25 mg oral tablet, extended release: 1 orally once a day (19 Jul 2023 08:53)  omeprazole 40 mg oral delayed release capsule: 1 orally once a day (19 Jul 2023 08:53)  PARoxetine 20 mg oral tablet: 1 orally once a day (19 Jul 2023 08:53)  torsemide 10 mg oral tablet: 1 orally once a day (19 Jul 2023 08:53)  Vitamin D3 25 mcg (1000 intl units) oral tablet: 1 orally (19 Jul 2023 08:53)      --------------------------------------------------------------------------------------------    PHYSICAL EXAM:   General: NAD, Lying in bed comfortably  Neuro: A+Ox3  HEENT: EOMI, PERRLA, MMM  Cardio: RRR  Resp: Non labored breathing on RA  GI/Abd: Soft, NT/ND, no rebound/guarding, no masses palpated  Vascular: All 4 extremities warm and well perfused.   Pelvis: stable  Musculoskeletal: LUE open wound with murky fluid drainage below AC   --------------------------------------------------------------------------------------------    LABS                 8.2    10.19  )----------(  107       ( 14 Sep 2023 16:33 )               26.9              PT/INR -  15.6 sec / 1.42 ratio   ( 14 Sep 2023 16:33 )       PTT -  29.1 sec   ( 14 Sep 2023 16:33 )  CAPILLARY BLOOD GLUCOSE            16:33 - VBG - pH: 7.380 | pCO2: 43    | pO2: 72    | Lactate: 1.60     --------------------------------------------------------------------------------------------  IMAGING

## 2023-09-14 NOTE — ED ADULT NURSE NOTE - CHIEF COMPLAINT QUOTE
done Pt A&Ox4, NAD. Pt sent in from MD for eval of left AV fistula. Swelling noted to left hand. Breathing even and unlabored.

## 2023-09-14 NOTE — H&P ADULT - HISTORY OF PRESENT ILLNESS
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 On May 31, pt had a left brachiocephalic ateriovenous fistula placed by Dr Hodge and Dr Coleman with apparent difficulty in accessing the site with difficulty with the conscious sedation at that time requiring close monitoring in SICU and NIV respiratory support. Pt now reports LUE open wound, initially presenting as LUE swelling for the last 2 weeks, from which a blister developed at the area of hte fistula which ruptured today with drainage of murky fluid. Pt endorsed 2 episodes of fever one two days ago of 101.7 and another yesterday with 101.3, also endorsed decreased range of motion of the LUE as well has chills, No nausea/vomiting, no SOB/chest pain, no palpitaitions.     In the ED vitals stable, Labs stable. Bcx collected. Medicine consulted for admission to medicine service. 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 On May 31, pt had a left brachiocephalic ateriovenous fistula placed by Dr Hodge and Dr Coleman with apparent difficulty in accessing the site with difficulty with the conscious sedation at that time requiring close monitoring in SICU and NIV respiratory support. Pt now reports LUE open wound, initially presenting as LUE swelling for the last 2 weeks, from which a blister developed at the area of hte fistula which ruptured today with drainage of murky fluid. Pt endorsed 2 episodes of fever one two days ago of 101.7 and another yesterday with 101.3, also endorsed decreased range of motion of the LUE as well has chills, No nausea/vomiting, no SOB/chest pain, no palpitaitions.     Lives with . Uses a walker.    In the ED vitals stable, Labs stable. Bcx collected. Medicine consulted for admission to medicine service.

## 2023-09-14 NOTE — ED PROVIDER NOTE - CLINICAL SUMMARY MEDICAL DECISION MAKING FREE TEXT BOX
72yo F with infeciton near left fistula site with ulceration and pustular drainge. not septic at this time. broad spec abx given. vascualr consulted. patient to go to OR for washout. tba to medicine

## 2023-09-14 NOTE — H&P ADULT - ASSESSMENT
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    #LUE cellulitis and LUE open wound  #LLE chronic wounds  -vancomycin and cefepime (premedicate with benaedryl, rash with prior cephalosporin)  -pt was on eliquis for concern that she had a clot in the LUE; however was discontiued in the OP after DVT was ruled out  -MRSA PCR  -follow up blood cultures  -follow up   -seen by vascular in the ED  -NPO for washout/debridment on 9/15/2023  -Vascular to also attend to chronic LLE wounds  -Wound care consulted  -follow up XRAY LUE and US duplex of LUE    #HTN  #CAD (cardiac cath 2021)  #hx of LBBB  #CHF   #AS, severe TR, pHTN  -c/w metoprolol, statin  -hold ASA 81   -c/w torsemide 20 mg daily    #COPD (not on home O2)  -c/w albuterol inhaler PRN    #CKD4/5  avoid nephrotoxic medicaitons  c/w calicitriol 0.25 mcg daily    #hypokalemia  #AG metabolic acidosis (likely 2/2 CKD status  -supplemented 10 mEq in the ED  -monitor electrolytes in the setting of torsemide and CHF    #Gout  c/w allopurinol    #hx of DM  no longer on medication, controlled per patient  order A1c    #Active smoker   educated on smoking cessation  offered nicotine patch, pt declined    DVT ppx: SCDs  Diet: NPO PM       74 yo F PMHx anxiety/depression, CHF, CAD, HTN, DM (now off DM meds), gout, anemia of chronic disease, COPD (not on home O2), 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    #LUE cellulitis and LUE open wound  #LLE chronic wounds  -vancomycin and meropenem (pt has allergy to cephalosporin) to cover for pseudomonas and MRSA  -pt was on eliquis for concern that she had a clot in the LUE; however was discontiued in the OP after DVT was ruled out  -MRSA PCR  -follow up blood cultures  -follow up   -seen by vascular in the ED  -NPO for washout/debridment on 9/15/2023  -Vascular to also attend to chronic LLE wounds  -Wound care consulted  -follow up XRAY LUE and US duplex of LUE    #HTN  #CAD (cardiac cath 2021)  #hx of LBBB  #CHF   #AS, severe TR, pHTN  -c/w metoprolol, statin  -hold ASA 81   -c/w torsemide 20 mg daily    #COPD (not on home O2)  -c/w albuterol inhaler PRN    #CKD4/5  avoid nephrotoxic medicaitons  c/w calicitriol 0.25 mcg daily    #hypokalemia  #AG metabolic acidosis (likely 2/2 CKD status  -supplemented 10 mEq in the ED  -monitor electrolytes in the setting of torsemide and CHF    #Gout  c/w allopurinol    #hx of DM  no longer on medication, controlled per patient  order A1c    #Active smoker   educated on smoking cessation  offered nicotine patch, pt declined    DVT ppx: SCDs  Diet: NPO PM

## 2023-09-14 NOTE — ED ADULT NURSE NOTE - OBJECTIVE STATEMENT
pt a&o x4, RR even and unlabored, skin warm and dry with noted swelling, redness and wound to left upper arm. pt states she had AV fistula procedure done May 31st. last night pt noticed the area of the fistual was starting to ooze and today when her  unwrapped the bandage the wound was there. pts arm swollen, red, and pts reports pain upon palpation. sepsis work up sent.

## 2023-09-14 NOTE — ED PROVIDER NOTE - OBJECTIVE STATEMENT
73-year-old female with CKD.  Approximately 6 to 8 weeks ago had a left fistula placed by Dr. Tyrone sanders.  Since has had some complications requiring repeat surgery per patient.  Last week started with swelling to the arm.  Which was wrapped by Dr. Hodge.  Today noted a blister that burst and beneath was an ulcerated wound.  No   bleeding, today complained of worsening pain to the site.  With increased redness and swelling no fevers.  Positive chills.  No vomiting no diarrhea.  No cough no congestion.  No falls no traumas

## 2023-09-15 NOTE — CONSULT NOTE ADULT - SUBJECTIVE AND OBJECTIVE BOX
[facility]                                              CARDIOLOGY AT [address], [place] [address]                                             Telephone: [phone]. 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 Presents with infected L AVF. Cardiology consulted for RCRI      CARDIAC TESTING   ECHO:  TTE 08/2023 EF 50%, mod-severe AS, mod-sev MR/MS, severe TR, mild pHTN  STRESS:    CATH:   [facility] 2021 SBU pLAD 70%, D1 70%, mRCA 50%. No intervention at that time 2/2 CKD  ELECTROPHYSIOLOGY:     PAST MEDICAL HISTORY  2019 novel coronavirus disease (COVID-19)    HTN (hypertension)    COPD, moderate    Diabetes mellitus    CAD (coronary artery disease)    Anemia    S/P arteriovenous (AV) fistula repair        PAST SURGICAL HISTORY  H/O total knee replacement    History of partial nephrectomy    H/O lumpectomy    H/O diabetes mellitus        SOCIAL HISTORY:  Denies smoking/alcohol/drugs  CIGARETTES:     ALCOHOL:  DRUGS:    FAMILY HISTORY:  FH: COPD (chronic obstructive pulmonary disease) (Father)    FH: diabetes mellitus (Father)    Family history of renal failure (Father, Mother)      Family History of Cardiovascular Disease:  Yes [  ] No [x  ]  Coronary Artery Disease in first degree relative: Yes [  ] No [x  ]  Sudden Cardiac Death in First degree relative: Yes [  ] No [x  ]    HOME MEDICATIONS:  allopurinol 100 mg oral tablet: orally once a day (19 Jul 2023 08:53)  ALPRAZolam 0.5 mg oral tablet: 1 orally 2 times a day as needed for  anxiety (14 Sep 2023 21:53)  aspirin 81 mg oral capsule: 1 orally once a day (19 Jul 2023 08:53)  atorvastatin 40 mg oral tablet: 1 orally once a day (19 Jul 2023 08:53)  calcitriol 0.25 mcg oral capsule: 1 orally once a day (19 Jul 2023 08:53)  metoprolol succinate 25 mg oral capsule, extended release: 0.5 orally once a day (14 Sep 2023 21:53)  omeprazole 40 mg oral delayed release capsule: 1 orally once a day (19 Jul 2023 08:53)  PARoxetine 20 mg oral tablet: 1 orally once a day (19 Jul 2023 08:53)  prochlorperazine 10 mg oral tablet: 1 orally 4 times a day as needed for  nausea (14 Sep 2023 21:53)  torsemide 20 mg oral tablet: 1 orally once a day (14 Sep 2023 21:53)  Vitamin D3 25 mcg (1000 intl units) oral tablet: 1 orally (19 Jul 2023 08:53)      CURRENT CARDIAC MEDICATIONS:  metoprolol tartrate 12.5 milliGRAM(s) Oral daily  torsemide 20 milliGRAM(s) Oral daily      CURRENT OTHER MEDICATIONS:  albuterol    90 MICROgram(s) HFA Inhaler 1 Puff(s) Inhalation every 6 hours PRN Shortness of Breath and/or Wheezing  acetaminophen     Tablet .. 650 milliGRAM(s) Oral every 6 hours PRN Temp greater or equal to 38C (100.4F), Mild Pain (1 - 3)  ALPRAZolam 0.5 milliGRAM(s) Oral two times a day PRN for anxiety  diphenhydrAMINE 50 milliGRAM(s) Oral daily  melatonin 3 milliGRAM(s) Oral at bedtime PRN Insomnia  ondansetron Injectable 4 milliGRAM(s) IV Push every 8 hours PRN Nausea and/or Vomiting  PARoxetine 20 milliGRAM(s) Oral daily  prochlorperazine   Tablet 10 milliGRAM(s) Oral four times a day PRN for nausea  aluminum hydroxide/magnesium hydroxide/simethicone Suspension 30 milliLiter(s) Oral every 4 hours PRN Dyspepsia  pantoprazole    Tablet 40 milliGRAM(s) Oral before breakfast  allopurinol 100 milliGRAM(s) Oral daily  atorvastatin 40 milliGRAM(s) Oral at bedtime  calcitriol   Capsule 0.25 MICROGram(s) Oral daily  meropenem Injectable 500 milliGRAM(s) IV Push every 12 hours, Stop order after: 7 Days  vancomycin  IVPB 500 milliGRAM(s) IV Intermittent every 24 hours, Stop order after: 7 Doses      ALLERGIES:   Cefzil (Rash; Angioedema)  Mushrooms (Rash)  Orange (Rash)  Pineapple (Rash)      REVIEW OF SYMPTOMS:   CONSTITUTIONAL: No fever, no chills, no weight loss, no weight gain, no fatigue   ENMT:  No vertigo; No sinus or throat pain  NECK: No pain or stiffness  CARDIOVASCULAR: No chest pain, no dyspnea, no syncope/presyncope, no palpitations, no dizziness, no Orthopnea, no Paroxsymal nocturnal dyspnea  RESPIRATORY: no Shortness of breath, no cough, no wheezing  : No dysuria, no hematuria   GI: No dark color stool, no nausea, no diarrhea, no constipation, no abdominal pain   NEURO: No headache, no slurred speech   MUSCULOSKELETAL: No joint pain or swelling; No muscle, back, or extremity pain  PSYCH: No agitation, no anxiety.    + L AVF pain  ALL OTHER REVIEW OF SYSTEMS ARE NEGATIVE.    VITAL SIGNS:  T(C): 36.6 (09-15-23 @ 12:13), Max: 36.9 (09-14-23 @ 14:11)  T(F): 97.9 (09-15-23 @ 12:13), Max: 98.5 (09-15-23 @ 04:22)  HR: 85 (09-15-23 @ 12:13) (85 - 98)  BP: 124/66 (09-15-23 @ 12:13) (100/62 - 124/66)  RR: 20 (09-15-23 @ 12:13) (17 - 20)  SpO2: 93% (09-15-23 @ 12:13) (92% - 95%)    INTAKE AND OUTPUT:       PHYSICAL EXAM:  Constitutional: Comfortable . No acute distress.   HEENT: Atraumatic and normocephalic , neck is supple . no JVD. No carotid bruit.  CNS: A&Ox3. No focal deficits.   Respiratory: CTAB, unlabored   Cardiovascular: RRR normal s1 s2. No murmur. No rubs or gallop.  Gastrointestinal: Soft, non-tender. +Bowel sounds.   Extremities: 2+ Peripheral Pulses, No clubbing, cyanosis, or edema  Psychiatric: Calm . no agitation.   Skin: + L AVF edema     LABS:                            8.5    10.19 )-----------( 115      ( 15 Sep 2023 07:37 )             28.3     09-15    138  |  96  |  76.8<H>  ----------------------------<  118<H>  3.6   |  23.0  |  3.58<H>    Ca    8.8      15 Sep 2023 07:37    TPro  5.3<L>  /  Alb  3.0<L>  /  TBili  0.4  /  DBili  x   /  AST  22  /  ALT  10  /  AlkPhos  169<H>  09-15    PT/INR - ( 14 Sep 2023 16:33 )   PT: 15.6 sec;   INR: 1.42 ratio         PTT - ( 15 Sep 2023 07:37 )  PTT:29.7 sec  Urinalysis Basic - ( 15 Sep 2023 07:37 )    Color: x / Appearance: x / SG: x / pH: x  Gluc: 118 mg/dL / Ketone: x  / Bili: x / Urobili: x   Blood: x / Protein: x / Nitrite: x   Leuk Esterase: x / RBC: x / WBC x   Sq Epi: x / Non Sq Epi: x / Bacteria: x              INTERPRETATION OF TELEMETRY: not on tele    ECG: SR  Prior ECG: Yes [x  ] No [  ]                                                  [facility]                                              CARDIOLOGY AT [address], [place] [address]                                             Telephone: [phone]. 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 Presents with infected L AVF. Cardiology consulted for RCRI      CARDIAC TESTING   ECHO:  TTE 08/2023 EF 50%, mod-severe AS, mod-sev MR/MS, severe TR, mild pHTN  STRESS:    CATH:   [facility] 2021 SBU pLAD 70%, D1 70%, mRCA 50%. No intervention at that time 2/2 CKD  ELECTROPHYSIOLOGY:     PAST MEDICAL HISTORY  2019 novel coronavirus disease (COVID-19)    HTN (hypertension)    COPD, moderate    Diabetes mellitus    CAD (coronary artery disease)    Anemia    S/P arteriovenous (AV) fistula repair        PAST SURGICAL HISTORY  H/O total knee replacement    History of partial nephrectomy    H/O lumpectomy    H/O diabetes mellitus        SOCIAL HISTORY:  Denies smoking/alcohol/drugs  CIGARETTES:     ALCOHOL:  DRUGS:    FAMILY HISTORY:  FH: COPD (chronic obstructive pulmonary disease) (Father)    FH: diabetes mellitus (Father)    Family history of renal failure (Father, Mother)      Family History of Cardiovascular Disease:  Yes [  ] No [x  ]  Coronary Artery Disease in first degree relative: Yes [  ] No [x  ]  Sudden Cardiac Death in First degree relative: Yes [  ] No [x  ]    HOME MEDICATIONS:  allopurinol 100 mg oral tablet: orally once a day (19 Jul 2023 08:53)  ALPRAZolam 0.5 mg oral tablet: 1 orally 2 times a day as needed for  anxiety (14 Sep 2023 21:53)  aspirin 81 mg oral capsule: 1 orally once a day (19 Jul 2023 08:53)  atorvastatin 40 mg oral tablet: 1 orally once a day (19 Jul 2023 08:53)  calcitriol 0.25 mcg oral capsule: 1 orally once a day (19 Jul 2023 08:53)  metoprolol succinate 25 mg oral capsule, extended release: 0.5 orally once a day (14 Sep 2023 21:53)  omeprazole 40 mg oral delayed release capsule: 1 orally once a day (19 Jul 2023 08:53)  PARoxetine 20 mg oral tablet: 1 orally once a day (19 Jul 2023 08:53)  prochlorperazine 10 mg oral tablet: 1 orally 4 times a day as needed for  nausea (14 Sep 2023 21:53)  torsemide 20 mg oral tablet: 1 orally once a day (14 Sep 2023 21:53)  Vitamin D3 25 mcg (1000 intl units) oral tablet: 1 orally (19 Jul 2023 08:53)      CURRENT CARDIAC MEDICATIONS:  metoprolol tartrate 12.5 milliGRAM(s) Oral daily  torsemide 20 milliGRAM(s) Oral daily      CURRENT OTHER MEDICATIONS:  albuterol    90 MICROgram(s) HFA Inhaler 1 Puff(s) Inhalation every 6 hours PRN Shortness of Breath and/or Wheezing  acetaminophen     Tablet .. 650 milliGRAM(s) Oral every 6 hours PRN Temp greater or equal to 38C (100.4F), Mild Pain (1 - 3)  ALPRAZolam 0.5 milliGRAM(s) Oral two times a day PRN for anxiety  diphenhydrAMINE 50 milliGRAM(s) Oral daily  melatonin 3 milliGRAM(s) Oral at bedtime PRN Insomnia  ondansetron Injectable 4 milliGRAM(s) IV Push every 8 hours PRN Nausea and/or Vomiting  PARoxetine 20 milliGRAM(s) Oral daily  prochlorperazine   Tablet 10 milliGRAM(s) Oral four times a day PRN for nausea  aluminum hydroxide/magnesium hydroxide/simethicone Suspension 30 milliLiter(s) Oral every 4 hours PRN Dyspepsia  pantoprazole    Tablet 40 milliGRAM(s) Oral before breakfast  allopurinol 100 milliGRAM(s) Oral daily  atorvastatin 40 milliGRAM(s) Oral at bedtime  calcitriol   Capsule 0.25 MICROGram(s) Oral daily  meropenem Injectable 500 milliGRAM(s) IV Push every 12 hours, Stop order after: 7 Days  vancomycin  IVPB 500 milliGRAM(s) IV Intermittent every 24 hours, Stop order after: 7 Doses      ALLERGIES:   Cefzil (Rash; Angioedema)  Mushrooms (Rash)  Orange (Rash)  Pineapple (Rash)      REVIEW OF SYMPTOMS:   CONSTITUTIONAL: No fever, no chills, no weight loss, no weight gain, no fatigue   ENMT:  No vertigo; No sinus or throat pain  NECK: No pain or stiffness  CARDIOVASCULAR: No chest pain, no dyspnea, no syncope/presyncope, no palpitations, no dizziness, no Orthopnea, no Paroxsymal nocturnal dyspnea  RESPIRATORY: no Shortness of breath, no cough, no wheezing  : No dysuria, no hematuria   GI: No dark color stool, no nausea, no diarrhea, no constipation, no abdominal pain   NEURO: No headache, no slurred speech   MUSCULOSKELETAL: No joint pain or swelling; No muscle, back, or extremity pain  PSYCH: No agitation, no anxiety.    + L AVF pain  ALL OTHER REVIEW OF SYSTEMS ARE NEGATIVE.    VITAL SIGNS:  T(C): 36.6 (09-15-23 @ 12:13), Max: 36.9 (09-14-23 @ 14:11)  T(F): 97.9 (09-15-23 @ 12:13), Max: 98.5 (09-15-23 @ 04:22)  HR: 85 (09-15-23 @ 12:13) (85 - 98)  BP: 124/66 (09-15-23 @ 12:13) (100/62 - 124/66)  RR: 20 (09-15-23 @ 12:13) (17 - 20)  SpO2: 93% (09-15-23 @ 12:13) (92% - 95%)    INTAKE AND OUTPUT:       PHYSICAL EXAM:  Constitutional: Comfortable . No acute distress.   HEENT: Atraumatic and normocephalic , neck is supple . no JVD. No carotid bruit.  CNS: A&Ox3. No focal deficits.   Respiratory: CTAB, unlabored   Cardiovascular: RRR normal s1 s2. + III systolic murmur. No rubs or gallop.  Gastrointestinal: Soft, non-tender. +Bowel sounds.   Extremities: 2+ Peripheral Pulses, No clubbing, cyanosis, or edema  Psychiatric: Calm . no agitation.   Skin: + L AVF edema     LABS:                            8.5    10.19 )-----------( 115      ( 15 Sep 2023 07:37 )             28.3     09-15    138  |  96  |  76.8<H>  ----------------------------<  118<H>  3.6   |  23.0  |  3.58<H>    Ca    8.8      15 Sep 2023 07:37    TPro  5.3<L>  /  Alb  3.0<L>  /  TBili  0.4  /  DBili  x   /  AST  22  /  ALT  10  /  AlkPhos  169<H>  09-15    PT/INR - ( 14 Sep 2023 16:33 )   PT: 15.6 sec;   INR: 1.42 ratio         PTT - ( 15 Sep 2023 07:37 )  PTT:29.7 sec  Urinalysis Basic - ( 15 Sep 2023 07:37 )    Color: x / Appearance: x / SG: x / pH: x  Gluc: 118 mg/dL / Ketone: x  / Bili: x / Urobili: x   Blood: x / Protein: x / Nitrite: x   Leuk Esterase: x / RBC: x / WBC x   Sq Epi: x / Non Sq Epi: x / Bacteria: x              INTERPRETATION OF TELEMETRY: not on tele    ECG: SR  Prior ECG: Yes [x  ] No [  ]

## 2023-09-15 NOTE — PROGRESS NOTE ADULT - ATTENDING COMMENTS
74 YO F with CKD 4 not yet on HD with left arm brachiobasilic fistula creation in may of 2023. She presented to the ED with dehiscence of her incision and murky fluid drainage. She currently is afebrile with stable WBC count at 10. ID is following and she is on broad spectrum IV antibiotics and her blood cultures thus far are negative. However the fistula is exposed in this wound and she is at high risk for fistula rupture which would be catastrophic. We will plan for washout and fistula ligation today.

## 2023-09-15 NOTE — PROGRESS NOTE ADULT - ASSESSMENT
73y Female with PMHx of HTN, COPD, HLD, CRI, LUE brachiobasilic fistula on May31 , 2023 followed by balloon assisted angioplasty on July , 2023 presented to ED of LUE open wound with murky fluid drainage. afebrile , HR and BP WNL.     PLAN:    - Tentative plan for OR for washout debridement   - keep NPO/IVF  - Broad spectrum IV abx   - pain control  - strict I/Os  - B/L LE wound compression dressing

## 2023-09-15 NOTE — CONSULT NOTE ADULT - PROBLEM SELECTOR RECOMMENDATION 9
Cardiac Risk Stratification for Procedure  - METS <4  - RCRI Risk Stratification: Class 3  Risk, 15%   Risk of Major Cardiac Event    -Not high risk for major adverse cardiologic events  -Will obtain TTE for anesthesia planning. Has known significant valvulopathy   -EKG no active ischemia     No active chest pain, evidence of ischemia, decompensated CHF, significant valvular abnormality, or unstable arrhythmia then therefore no absolute cardiac contraindication to the planned surgical procedure.  No further cardiac work up is needed.     Further cardiac work up will not change risk of the patient. Proceed for surgery as indicated.

## 2023-09-15 NOTE — CONSULT NOTE ADULT - NS ATTEND AMEND GEN_ALL_CORE FT
Appears mildly congested, has h/o sig valvulopathy documented by recent echo.  Planned for urgent non-high risk surgery.  Not at an increased risk of MACE.  Will get recent echo today.   Will also start IV diuretics ( she still makes urine and takes daily PO torsemide).

## 2023-09-15 NOTE — PROGRESS NOTE ADULT - ASSESSMENT
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    #LUE cellulitis and LUE open wound concerning for infection  #LLE chronic wounds  -vancomycin and meropenem (pt has allergy to cephalosporin) to cover for pseudomonas and MRSA  -pt was on eliquis for concern that she had a clot in the LUE; however was discontiued in the OP after DVT was ruled out  -MRSA PCR  -follow up blood cultures  - Vascular following NPO for washout/debridment on 9/15/2023, patient is medically optimized for procedure pending cards eval  -Vascular to also attend to chronic LLE wounds  -Wound care consulted  - ID consulted    HTN  CAD (cardiac cath 2021)  hx of LBBB  CHF   AS, severe TR, pHTN  -c/w metoprolol, statin  -hold ASA 81   -c/w torsemide 20 mg daily  - Card consulted for OR clearance, TTE  - Diuretics per Cards  - tele ordered    COPD (not on home O2)  -c/w albuterol inhaler PRN    CKD4/5  avoid nephrotoxic medications  c/w calicitriol 0.25 mcg daily    hypokalemia  AG metabolic acidosis (likely 2/2 CKD status  -monitor electrolytes in the setting of torsemide and CHF    Gout  c/w allopurinol    hx of DM  no longer on medication, controlled per patient  order A1c      DVT ppx: SCDs  Diet: NPO for procedure

## 2023-09-15 NOTE — PROGRESS NOTE ADULT - SUBJECTIVE AND OBJECTIVE BOX
[facility] Division of Hospital Medicine    Chief Complaint:  Fistula infection    SUBJECTIVE / OVERNIGHT EVENTS: Patient seen and examined. No overnight events. Plan for OR wash out possibly today. No chest pain or shortness of breath    Patient denies chest pain, SOB, abd pain, N/V, fever, chills, dysuria or any other complaints. All remainder ROS negative.     MEDICATIONS  (STANDING):  allopurinol 100 milliGRAM(s) Oral daily  atorvastatin 40 milliGRAM(s) Oral at bedtime  calcitriol   Capsule 0.25 MICROGram(s) Oral daily  diphenhydrAMINE 50 milliGRAM(s) Oral daily  meropenem Injectable 500 milliGRAM(s) IV Push every 12 hours  metoprolol tartrate 12.5 milliGRAM(s) Oral daily  pantoprazole    Tablet 40 milliGRAM(s) Oral before breakfast  PARoxetine 20 milliGRAM(s) Oral daily  torsemide 20 milliGRAM(s) Oral daily  vancomycin  IVPB 500 milliGRAM(s) IV Intermittent every 24 hours    MEDICATIONS  (PRN):  acetaminophen     Tablet .. 650 milliGRAM(s) Oral every 6 hours PRN Temp greater or equal to 38C (100.4F), Mild Pain (1 - 3)  albuterol    90 MICROgram(s) HFA Inhaler 1 Puff(s) Inhalation every 6 hours PRN Shortness of Breath and/or Wheezing  ALPRAZolam 0.5 milliGRAM(s) Oral two times a day PRN for anxiety  aluminum hydroxide/magnesium hydroxide/simethicone Suspension 30 milliLiter(s) Oral every 4 hours PRN Dyspepsia  melatonin 3 milliGRAM(s) Oral at bedtime PRN Insomnia  ondansetron Injectable 4 milliGRAM(s) IV Push every 8 hours PRN Nausea and/or Vomiting  prochlorperazine   Tablet 10 milliGRAM(s) Oral four times a day PRN for nausea        I&O's Summary      PHYSICAL EXAM:  Vital Signs Last 24 Hrs  T(C): 36.6 (15 Sep 2023 12:13), Max: 36.9 (14 Sep 2023 19:50)  T(F): 97.9 (15 Sep 2023 12:13), Max: 98.5 (15 Sep 2023 04:22)  HR: 85 (15 Sep 2023 12:13) (85 - 98)  BP: 124/66 (15 Sep 2023 12:13) (100/62 - 124/66)  BP(mean): 85 (15 Sep 2023 12:13) (74 - 85)  RR: 20 (15 Sep 2023 12:13) (17 - 20)  SpO2: 93% (15 Sep 2023 12:13) (92% - 95%)    Parameters below as of 15 Sep 2023 12:13  Patient On (Oxygen Delivery Method): room air            CONSTITUTIONAL: NAD  ENMT: Moist oral mucosa, no pharyngeal injection or exudates  RESPIRATORY: Normal respiratory effort; lungs are clear to auscultation bilaterally  CARDIOVASCULAR: Regular rate and rhythm, normal S1 and S2,   ABDOMEN: Nontender to palpation, normoactive bowel sounds, no rebound/guarding;   MUSCLOSKELETAL:  BL legs wrapped in Gauze and left arm swelling also wrapped  PSYCH: A+O to person, place, and time; affect appropriate  NEUROLOGY: CN 2-12 are intact and symmetric; no gross sensory deficits;   SKIN: No rashes; no palpable lesions    LABS:                        8.5    10.19 )-----------( 115      ( 15 Sep 2023 07:37 )             28.3     09-15    138  |  96  |  76.8<H>  ----------------------------<  118<H>  3.6   |  23.0  |  3.58<H>    Ca    8.8      15 Sep 2023 07:37    TPro  5.3<L>  /  Alb  3.0<L>  /  TBili  0.4  /  DBili  x   /  AST  22  /  ALT  10  /  AlkPhos  169<H>  09-15    PT/INR - ( 14 Sep 2023 16:33 )   PT: 15.6 sec;   INR: 1.42 ratio         PTT - ( 15 Sep 2023 07:37 )  PTT:29.7 sec      Urinalysis Basic - ( 15 Sep 2023 07:37 )    Color: x / Appearance: x / SG: x / pH: x  Gluc: 118 mg/dL / Ketone: x  / Bili: x / Urobili: x   Blood: x / Protein: x / Nitrite: x   Leuk Esterase: x / RBC: x / WBC x   Sq Epi: x / Non Sq Epi: x / Bacteria: x        CAPILLARY BLOOD GLUCOSE            RADIOLOGY & ADDITIONAL TESTS:  Results Reviewed:   Imaging Personally Reviewed:  Electrocardiogram Personally Reviewed:

## 2023-09-15 NOTE — PROGRESS NOTE ADULT - SUBJECTIVE AND OBJECTIVE BOX
HPI/OVERNIGHT EVENTS: Patient seen and examined at bedside this AM. afebrile , No overnight events. No complaints. dressing minimally saturated     Vital Signs Last 24 Hrs  T(C): 36.9 (15 Sep 2023 04:22), Max: 36.9 (14 Sep 2023 14:11)  T(F): 98.5 (15 Sep 2023 04:22), Max: 98.5 (15 Sep 2023 04:22)  HR: 97 (15 Sep 2023 04:22) (86 - 97)  BP: 101/61 (15 Sep 2023 04:22) (101/61 - 109/67)  BP(mean): --  RR: 18 (15 Sep 2023 04:22) (17 - 18)  SpO2: 93% (15 Sep 2023 04:22) (93% - 95%)    Parameters below as of 15 Sep 2023 04:22  Patient On (Oxygen Delivery Method): room air        I&O's Detail      Constitutional: patient resting comfortably in bed, in no acute distress  HEENT: EOMI, PERRLA, MMM.  Respiratory: Non labored breathing on RA  Cardiovascular: RRR  Gastrointestinal: Abdomen soft, non-tender, non-distended, no rebound tenderness / guarding  Musculoskeletal: LUE open wound with minimal purulent output and surrounding erythema  Vascular: Extremities warm and well perfused.     LABS:                        8.2    10.19 )-----------( 107      ( 14 Sep 2023 16:33 )             26.9     09-14    137  |  96  |  79.4<H>  ----------------------------<  110<H>  3.3<L>   |  22.0  |  3.78<H>    Ca    8.8      14 Sep 2023 16:33    TPro  5.3<L>  /  Alb  2.8<L>  /  TBili  0.4  /  DBili  x   /  AST  25  /  ALT  8   /  AlkPhos  160<H>  09-14    PT/INR - ( 14 Sep 2023 16:33 )   PT: 15.6 sec;   INR: 1.42 ratio         PTT - ( 14 Sep 2023 16:33 )  PTT:29.1 sec  Urinalysis Basic - ( 14 Sep 2023 16:33 )    Color: x / Appearance: x / SG: x / pH: x  Gluc: 110 mg/dL / Ketone: x  / Bili: x / Urobili: x   Blood: x / Protein: x / Nitrite: x   Leuk Esterase: x / RBC: x / WBC x   Sq Epi: x / Non Sq Epi: x / Bacteria: x        MEDICATIONS  (STANDING):  allopurinol 100 milliGRAM(s) Oral daily  atorvastatin 40 milliGRAM(s) Oral at bedtime  calcitriol   Capsule 0.25 MICROGram(s) Oral daily  diphenhydrAMINE 50 milliGRAM(s) Oral daily  meropenem Injectable 500 milliGRAM(s) IV Push every 12 hours  metoprolol tartrate 12.5 milliGRAM(s) Oral daily  pantoprazole    Tablet 40 milliGRAM(s) Oral before breakfast  PARoxetine 20 milliGRAM(s) Oral daily  torsemide 20 milliGRAM(s) Oral daily  vancomycin  IVPB 500 milliGRAM(s) IV Intermittent every 24 hours    MEDICATIONS  (PRN):  acetaminophen     Tablet .. 650 milliGRAM(s) Oral every 6 hours PRN Temp greater or equal to 38C (100.4F), Mild Pain (1 - 3)  albuterol    90 MICROgram(s) HFA Inhaler 1 Puff(s) Inhalation every 6 hours PRN Shortness of Breath and/or Wheezing  ALPRAZolam 0.5 milliGRAM(s) Oral two times a day PRN for anxiety  aluminum hydroxide/magnesium hydroxide/simethicone Suspension 30 milliLiter(s) Oral every 4 hours PRN Dyspepsia  melatonin 3 milliGRAM(s) Oral at bedtime PRN Insomnia  ondansetron Injectable 4 milliGRAM(s) IV Push every 8 hours PRN Nausea and/or Vomiting  prochlorperazine   Tablet 10 milliGRAM(s) Oral four times a day PRN for nausea      MICRO:   Cultures     STUDIES:   EKG, CXR, U/S, CT, MRI

## 2023-09-16 NOTE — PROGRESS NOTE ADULT - ASSESSMENT
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 On May 31, pt had a left brachiocephalic ateriovenous fistula placed by Dr Hodge and Dr Coleman with apparent difficulty in accessing the site with difficulty with the conscious sedation at that time requiring close monitoring in SICU and NIV respiratory support. Pt now reports LUE open wound, initially presenting as LUE swelling for the last 2 weeks, from which a blister developed at the area of the fistula which ruptured today with drainage of murky fluid. Pt endorsed 2 episodes of fever one two days ago of 101.7 and another yesterday with 101.3, also endorsed decreased range of motion of the LUE as well has chills, pt reportedly on duricef at home prescribed by Dr Hodge    In the ED vitals stable, Labs stable. Bcx collected. Medicine consulted for admission to medicine service. (14 Sep 2023 21:24)    LUE cellulitis r/o AVF infection  Fever  CKD    - f/u BCx  - f/u wound CX grp b strep  - mrsa screen (-) for mrsa  - Continue empiric meropenem and vancomycin adjusted for renal function  - monitor vanco trough by level  - s/p 9/15 left AVF washout  - f/u Or cx 9/15 GNR gpc p/c  - Trend Fever  - Trend Leukocytosis      Will Follow

## 2023-09-16 NOTE — PROGRESS NOTE ADULT - ASSESSMENT
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    #LUE cellulitis and LUE open wound concerning for infection  #LLE chronic wounds  -vancomycin and meropenem (pt has allergy to cephalosporin) to cover for pseudomonas and MRSA  -pt was on eliquis for concern that she had a clot in the LUE; however was discontiued in the OP after DVT was ruled out  -MRSA PCR negative   -Wound culture with Group B Strep  -follow up blood cultures, negative so far   -s/p AVF washout on 9/15/2023  -Follow OR cultures  -Continue Meropenem and Vanco  -Vascular Surgery / ID following   -Vascular Surgery wrapped chronic LLE wounds    HTN  CAD (cardiac cath 2021)  hx of LBBB  Chronic Diastolic Heart Failure  AS, severe TR, pHTN  -c/w metoprolol, statin  -hold ASA 81   -c/w Bumex   -Cardio consult noted     COPD (not on home O2)  -c/w albuterol inhaler PRN    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

## 2023-09-16 NOTE — PROGRESS NOTE ADULT - SUBJECTIVE AND OBJECTIVE BOX
[facility]                                                INFECTIOUS DISEASES  =======================================================                   Tevin Caballero#   Juan F Thornton MD#    Ok Zeng MD*                           Janis Gunn MD*   Jessie Mccurdy MD*           Diplomates American Board of Internal Medicine & Infectious Diseases                  # [place] Office - Appt - Tel  [phone] Fax [phone]                * [place] [facility] - Appt - Tel [phone] Fax [phone]                                  Hospital Consult line:  [phone]  =======================================================      [id number]  GARRETT BARCENAS   follow up for: avf infection  s/p washout of left avf  just returned from [place]  denies complaints  patient seen and examined.       I have personally reviewed the labs and data; pertinent labs and data are listed in this note; please see below.   ===================================================  REVIEW OF SYSTEMS:  CONSTITUTIONAL:  No Fever or chills  HEENT:  No diplopia or blurred vision.  No earache, sore throat or runny nose.  CARDIOVASCULAR:  No pressure, squeezing, strangling, tightness, heaviness or aching about the chest, neck, axilla or epigastrium.  RESPIRATORY:  No cough, shortness of breath  GASTROINTESTINAL:  No nausea, vomiting or diarrhea.  GENITOURINARY:  No dysuria, frequency or urgency. No Blood in urine  MUSCULOSKELETAL:  no joint aches, no muscle pain  SKIN:  No change in skin, hair or nails.  NEUROLOGIC:  No Headaches, seizures or weakness.  PSYCHIATRIC:  No disorder of thought or mood.  ENDOCRINE:  No heat or cold intolerance  HEMATOLOGICAL:  No easy bruising or bleeding.    =======================================================  Allergies    Cefzil (Rash; Angioedema)  Mushrooms (Rash)  Orange (Rash)  Pineapple (Rash)    Intolerances    Antibiotics:  meropenem Injectable 500 milliGRAM(s) IV Push every 12 hours    Other medications:  acetaminophen     Tablet .. 975 milliGRAM(s) Oral every 6 hours  allopurinol 100 milliGRAM(s) Oral daily  atorvastatin 40 milliGRAM(s) Oral at bedtime  buMETAnide 1 milliGRAM(s) Oral two times a day  calcitriol   Capsule 0.25 MICROGram(s) Oral daily  diphenhydrAMINE 50 milliGRAM(s) Oral daily  metoprolol tartrate 12.5 milliGRAM(s) Oral daily  pantoprazole    Tablet 40 milliGRAM(s) Oral before breakfast  PARoxetine 20 milliGRAM(s) Oral daily    ======================================================  Physical Exam:  ============  T(F): 97.9 (16 Sep 2023 04:17), Max: 97.9 (16 Sep 2023 04:17)  HR: 94 (16 Sep 2023 10:47)  BP: 104/66 (16 Sep 2023 04:17)  RR: 17 (16 Sep 2023 04:17)  SpO2: 96% (16 Sep 2023 04:17) (93% - 100%)  temp max in last 48H T(F): , Max: 98.5 (09-15-23 @ 04:22)    General:  No acute distress.  Eye: no conjunctival pallor, no scleral icterus  Neck: Supple, No lymphadenopathy.  Respiratory: Lungs are clear to auscultation, Respirations are non-labored.  Cardiovascular: Normal rate, Regular rhythm,  s1+s2  Gastrointestinal: Soft, Non-tender, Non-distended, Normal bowel sounds.  Musculoskeletal: b/l knee scars well healed, lue ace wrap in place. b/l Le ace wraps  Integumentary: No rash.    =======================================================  Labs:                        9.1    6.75  )-----------( 114      ( 16 Sep 2023 07:28 )             30.6     09-16    137  |  97  |  82.0<H>  ----------------------------<  152<H>  3.9   |  23.0  |  4.01<H>    Ca    8.5      16 Sep 2023 07:28  Mg     1.6     09-16    TPro  5.3<L>  /  Alb  3.0<L>  /  TBili  0.4  /  DBili  x   /  AST  22  /  ALT  10  /  AlkPhos  169<H>  09-15      Culture - Tissue with Gram Stain (collected 09-15-23 @ 20:01)  Source: .Tissue Left Upper Extremity Wound  Gram Stain (09-16-23 @ 03:14):    No polymorphonuclear leukocytes seen per low power field    Moderate Gram positive cocci in pairs seen per oil power field    Rare Gram Negative Rods seen per oil power field    Culture - Blood (collected 09-14-23 @ 16:33)  Source: .Blood Blood-Peripheral    Culture - Blood (collected 09-14-23 @ 16:27)  Source: .Blood Blood-Peripheral    Culture - Other (collected 09-14-23 @ 16:15)  Source: .Other left fistula site

## 2023-09-16 NOTE — PROGRESS NOTE ADULT - SUBJECTIVE AND OBJECTIVE BOX
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 On May 31, pt had a left brachiocephalic ateriovenous fistula placed by Dr Hodge and Dr Coleman with apparent difficulty in accessing the site with difficulty with the conscious sedation at that time requiring close monitoring in SICU and NIV respiratory support. Pt now reports LUE open wound, initially presenting as LUE swelling for the last 2 weeks, from which a blister developed at the area of hte fistula which ruptured today with drainage of murky fluid. Pt endorsed 2 episodes of fever one two days ago of 101.7 and another yesterday with 101.3, also endorsed decreased range of motion of the LUE as well has chills, No nausea/vomiting, no SOB/chest pain, no palpitaitions.     Lives with . Uses a walker.    In the ED vitals stable, Labs stable. Bcx collected. Medicine consulted for admission to medicine service. (14 Sep 2023 21:24)    Interval History:  Patient was seen and examined at bedside around 9:30 am.  Feels OK.  Has chronic exertional dyspnea, denies any change in it.   Denies chest pain, palpitations, headache, dizziness, visual symptoms, nausea, vomiting or abdominal pain.  Denies pain or paresthesia in leg arm / hand.     ROS:  As per interval history otherwise unremarkable.    PHYSICAL EXAM:  Vital Signs   T(C): 36.6 (16 Sep 2023 04:17), Max: 36.6 (16 Sep 2023 04:17)  T(F): 97.9 (16 Sep 2023 04:17), Max: 97.9 (16 Sep 2023 04:17)  HR: 94 (16 Sep 2023 10:47) (94 - 106)  BP: 104/66 (16 Sep 2023 04:17) (103/90 - 134/60)  BP(mean): 80 (16 Sep 2023 00:30) (73 - 103)  RR: 17 (16 Sep 2023 04:17) (17 - 22)  SpO2: 96% (16 Sep 2023 04:17) (93% - 100%)  Parameters below as of 16 Sep 2023 04:17  Patient On (Oxygen Delivery Method): nasal cannula  O2 Flow (L/min): 2  General: Elderly female sitting in chair comfortably. No acute distress  HEENT: EOMI. Clear conjunctivae. Moist mucus membrane  Neck: Supple.   Chest: Good air entry. No wheezing, rales or rhonchi.   Heart: Normal S1 & S2. RRR.   Abdomen: Non distended. Soft. Non-tender. + BS  Ext: No pedal edema. No calf tenderness. Dressing on LUE. Left hand slightly swollen -- neurovascular intact. Ace wraps on both legs.   Neuro: Active and alert. No focal deficit. No speech disorder  Skin: Warm and Dry  Psychiatry: Normal mood and affect    I&O's Summary    15 Sep 2023 07:01  -  16 Sep 2023 07:00  --------------------------------------------------------  IN: 600 mL / OUT: 0 mL / NET: 600 mL    16 Sep 2023 07:01  -  16 Sep 2023 14:04  --------------------------------------------------------  IN: 250 mL / OUT: 0 mL / NET: 250 mL    LABS:  CAPILLARY BLOOD GLUCOSE  POCT Blood Glucose.: 124 mg/dL (15 Sep 2023 20:49)                        9.1    6.75  )-----------( 114      ( 16 Sep 2023 07:28 )             30.6     09-16    137  |  97  |  82.0<H>  ----------------------------<  152<H>  3.9   |  23.0  |  4.01<H>    Ca    8.5      16 Sep 2023 07:28  Mg     1.6     09-16    TPro  5.3<L>  /  Alb  3.0<L>  /  TBili  0.4  /  DBili  x   /  AST  22  /  ALT  10  /  AlkPhos  169<H>  09-15    PT/INR - ( 14 Sep 2023 16:33 )   PT: 15.6 sec;   INR: 1.42 ratio       PTT - ( 15 Sep 2023 07:37 )  PTT:29.7 sec  Urinalysis Basic - ( 16 Sep 2023 07:28 )    Color: x / Appearance: x / SG: x / pH: x  Gluc: 152 mg/dL / Ketone: x  / Bili: x / Urobili: x   Blood: x / Protein: x / Nitrite: x   Leuk Esterase: x / RBC: x / WBC x   Sq Epi: x / Non Sq Epi: x / Bacteria: x    RADIOLOGY & ADDITIONAL STUDIES:  Reviewed     MEDICATIONS  (STANDING):  acetaminophen     Tablet .. 975 milliGRAM(s) Oral every 6 hours  allopurinol 100 milliGRAM(s) Oral daily  atorvastatin 40 milliGRAM(s) Oral at bedtime  buMETAnide 1 milliGRAM(s) Oral two times a day  calcitriol   Capsule 0.25 MICROGram(s) Oral daily  diphenhydrAMINE 50 milliGRAM(s) Oral daily  meropenem Injectable 500 milliGRAM(s) IV Push every 12 hours  metoprolol tartrate 12.5 milliGRAM(s) Oral daily  pantoprazole    Tablet 40 milliGRAM(s) Oral before breakfast  PARoxetine 20 milliGRAM(s) Oral daily    MEDICATIONS  (PRN):  albuterol    90 MICROgram(s) HFA Inhaler 1 Puff(s) Inhalation every 6 hours PRN Shortness of Breath and/or Wheezing  ALPRAZolam 0.5 milliGRAM(s) Oral two times a day PRN for anxiety  aluminum hydroxide/magnesium hydroxide/simethicone Suspension 30 milliLiter(s) Oral every 4 hours PRN Dyspepsia  melatonin 3 milliGRAM(s) Oral at bedtime PRN Insomnia  ondansetron Injectable 4 milliGRAM(s) IV Push every 8 hours PRN Nausea and/or Vomiting  prochlorperazine   Tablet 10 milliGRAM(s) Oral four times a day PRN for nausea       Cellulitis of left upper extremity    HPI:  72 yo F PMHx anxiety/depression, CHF, CAD, HTN, DM (now off DM meds), gout, anemia of chronic disease, COPD (not on home O2), 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 On May 31, pt had a left brachiocephalic ateriovenous fistula placed by Dr Hodge and Dr Coleman with apparent difficulty in accessing the site with difficulty with the conscious sedation at that time requiring close monitoring in SICU and NIV respiratory support. Pt now reports LUE open wound, initially presenting as LUE swelling for the last 2 weeks, from which a blister developed at the area of hte fistula which ruptured today with drainage of murky fluid. Pt endorsed 2 episodes of fever one two days ago of 101.7 and another yesterday with 101.3, also endorsed decreased range of motion of the LUE as well has chills, No nausea/vomiting, no SOB/chest pain, no palpitaitions.     Lives with . Uses a walker.    In the ED vitals stable, Labs stable. Bcx collected. Medicine consulted for admission to medicine service. (14 Sep 2023 21:24)    Interval History:  Patient was seen and examined at bedside around 9:30 am.  Feels OK.  Has chronic exertional dyspnea, denies any change in it.   Denies chest pain, palpitations, headache, dizziness, visual symptoms, nausea, vomiting or abdominal pain.  Denies pain or paresthesia in left arm / hand.     ROS:  As per interval history otherwise unremarkable.    PHYSICAL EXAM:  Vital Signs   T(C): 36.6 (16 Sep 2023 04:17), Max: 36.6 (16 Sep 2023 04:17)  T(F): 97.9 (16 Sep 2023 04:17), Max: 97.9 (16 Sep 2023 04:17)  HR: 94 (16 Sep 2023 10:47) (94 - 106)  BP: 104/66 (16 Sep 2023 04:17) (103/90 - 134/60)  BP(mean): 80 (16 Sep 2023 00:30) (73 - 103)  RR: 17 (16 Sep 2023 04:17) (17 - 22)  SpO2: 96% (16 Sep 2023 04:17) (93% - 100%)  Parameters below as of 16 Sep 2023 04:17  Patient On (Oxygen Delivery Method): nasal cannula  O2 Flow (L/min): 2  General: Elderly female sitting in chair comfortably. No acute distress  HEENT: EOMI. Clear conjunctivae. Moist mucus membrane  Neck: Supple.   Chest: Good air entry. No wheezing, rales or rhonchi.   Heart: Normal S1 & S2. RRR.   Abdomen: Non distended. Soft. Non-tender. + BS  Ext: No calf tenderness. Dressing on LUE. Left hand slightly swollen -- neurovascular intact. Ace wraps on both legs.   Neuro: Active and alert. No focal deficit. No speech disorder  Skin: Warm and Dry  Psychiatry: Normal mood and affect    I&O's Summary    15 Sep 2023 07:01  -  16 Sep 2023 07:00  --------------------------------------------------------  IN: 600 mL / OUT: 0 mL / NET: 600 mL    16 Sep 2023 07:01  -  16 Sep 2023 14:04  --------------------------------------------------------  IN: 250 mL / OUT: 0 mL / NET: 250 mL    LABS:  CAPILLARY BLOOD GLUCOSE  POCT Blood Glucose.: 124 mg/dL (15 Sep 2023 20:49)                        9.1    6.75  )-----------( 114      ( 16 Sep 2023 07:28 )             30.6     09-16    137  |  97  |  82.0<H>  ----------------------------<  152<H>  3.9   |  23.0  |  4.01<H>    Ca    8.5      16 Sep 2023 07:28  Mg     1.6     09-16    TPro  5.3<L>  /  Alb  3.0<L>  /  TBili  0.4  /  DBili  x   /  AST  22  /  ALT  10  /  AlkPhos  169<H>  09-15    PT/INR - ( 14 Sep 2023 16:33 )   PT: 15.6 sec;   INR: 1.42 ratio       PTT - ( 15 Sep 2023 07:37 )  PTT:29.7 sec  Urinalysis Basic - ( 16 Sep 2023 07:28 )    Color: x / Appearance: x / SG: x / pH: x  Gluc: 152 mg/dL / Ketone: x  / Bili: x / Urobili: x   Blood: x / Protein: x / Nitrite: x   Leuk Esterase: x / RBC: x / WBC x   Sq Epi: x / Non Sq Epi: x / Bacteria: x    RADIOLOGY & ADDITIONAL STUDIES:  Reviewed     MEDICATIONS  (STANDING):  acetaminophen     Tablet .. 975 milliGRAM(s) Oral every 6 hours  allopurinol 100 milliGRAM(s) Oral daily  atorvastatin 40 milliGRAM(s) Oral at bedtime  buMETAnide 1 milliGRAM(s) Oral two times a day  calcitriol   Capsule 0.25 MICROGram(s) Oral daily  diphenhydrAMINE 50 milliGRAM(s) Oral daily  meropenem Injectable 500 milliGRAM(s) IV Push every 12 hours  metoprolol tartrate 12.5 milliGRAM(s) Oral daily  pantoprazole    Tablet 40 milliGRAM(s) Oral before breakfast  PARoxetine 20 milliGRAM(s) Oral daily    MEDICATIONS  (PRN):  albuterol    90 MICROgram(s) HFA Inhaler 1 Puff(s) Inhalation every 6 hours PRN Shortness of Breath and/or Wheezing  ALPRAZolam 0.5 milliGRAM(s) Oral two times a day PRN for anxiety  aluminum hydroxide/magnesium hydroxide/simethicone Suspension 30 milliLiter(s) Oral every 4 hours PRN Dyspepsia  melatonin 3 milliGRAM(s) Oral at bedtime PRN Insomnia  ondansetron Injectable 4 milliGRAM(s) IV Push every 8 hours PRN Nausea and/or Vomiting  prochlorperazine   Tablet 10 milliGRAM(s) Oral four times a day PRN for nausea

## 2023-09-16 NOTE — CONSULT NOTE ADULT - SUBJECTIVE AND OBJECTIVE BOX
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 On May 31, pt had a left brachiocephalic arteriovenous fistula placed.  Pt now reports LUE open wound with drainage of murky fluid. Pt reported fever and chills. She was taken to OR by vascular Sx yesterday and had washout w/ ligation done yesterday. feeling better today, no overnight fever. She denied increased urination frequency, dysuria, gross hematuria, nausea, vomiting, diarrhea. Review of other systems was negative.    PAST MEDICAL & SURGICAL HISTORY:  2019 novel coronavirus disease (COVID-19)  HTN (hypertension)  COPD, moderate  Diabetes mellitus  CAD (coronary artery disease)  Anemia  S/P arteriovenous (AV) fistula repair  H/O total knee replacement  History of partial nephrectomy  H/O lumpectomy    FAMILY HISTORY:  FH: COPD (chronic obstructive pulmonary disease) (Father)  FH: diabetes mellitus (Father)  Family history of renal failure (Father, Mother)    SOCIAL HISTORY: chr smoking+  Denies alcohol, drug abuse.     MEDICATIONS  (STANDING):  acetaminophen     Tablet .. 975 milliGRAM(s) Oral every 6 hours  allopurinol 100 milliGRAM(s) Oral daily  atorvastatin 40 milliGRAM(s) Oral at bedtime  buMETAnide 1 milliGRAM(s) Oral two times a day  calcitriol   Capsule 0.25 MICROGram(s) Oral daily  diphenhydrAMINE 50 milliGRAM(s) Oral daily  meropenem Injectable 500 milliGRAM(s) IV Push every 12 hours  metoprolol tartrate 12.5 milliGRAM(s) Oral daily  pantoprazole    Tablet 40 milliGRAM(s) Oral before breakfast  PARoxetine 20 milliGRAM(s) Oral daily    MEDICATIONS  (PRN):  albuterol    90 MICROgram(s) HFA Inhaler 1 Puff(s) Inhalation every 6 hours PRN Shortness of Breath and/or Wheezing  ALPRAZolam 0.5 milliGRAM(s) Oral two times a day PRN for anxiety  aluminum hydroxide/magnesium hydroxide/simethicone Suspension 30 milliLiter(s) Oral every 4 hours PRN Dyspepsia  melatonin 3 milliGRAM(s) Oral at bedtime PRN Insomnia  ondansetron Injectable 4 milliGRAM(s) IV Push every 8 hours PRN Nausea and/or Vomiting  prochlorperazine   Tablet 10 milliGRAM(s) Oral four times a day PRN for nausea    ALLERGIES:  Cefzil (Rash; Angioedema)  Mushrooms (Rash)  Orange (Rash)  Pineapple (Rash)    REVIEW OF SYSTEMS: All systems were reviewed in detail. Pertinent positive and negative have been detailed in HPI, otherwise negative.     Vital Signs Last 24 Hrs  T(C): 36.6 (16 Sep 2023 04:17), Max: 36.6 (15 Sep 2023 12:13)  T(F): 97.9 (16 Sep 2023 04:17), Max: 97.9 (15 Sep 2023 12:13)  HR: 94 (16 Sep 2023 10:47) (85 - 106)  BP: 104/66 (16 Sep 2023 04:17) (103/90 - 134/60)  BP(mean): 80 (16 Sep 2023 00:30) (73 - 103)  RR: 17 (16 Sep 2023 04:17) (17 - 22)  SpO2: 96% (16 Sep 2023 04:17) (93% - 100%)    Parameters below as of 16 Sep 2023 04:17  Patient On (Oxygen Delivery Method): nasal cannula  O2 Flow (L/min): 2    PHYSICAL EXAM:  Gen: no acute distress  MS: alert, conversing normally  Eyes: EOMI, no icterus  HENT: NCAT, MMM  CV: rhythm reg reg, rate normal, no m/g/r, +LE edema  Chest: CTAB, no w/r/r,  Abd: soft, NT, ND  Neuro: moving all 4 limbs spontaneously, no tremor  MSK: LUE wrapped, swelling hand+  Skin: no rash or jaundice    LABS:                        9.1    6.75  )-----------( 114      ( 16 Sep 2023 07:28 )             30.6     09-16    137  |  97  |  82.0<H>  ----------------------------<  152<H>  3.9   |  23.0  |  4.01<H>    Ca    8.5      16 Sep 2023 07:28  Mg     1.6     09-16    TPro  5.3<L>  /  Alb  3.0<L>  /  TBili  0.4  /  DBili  x   /  AST  22  /  ALT  10  /  AlkPhos  169<H>  09-15  PT/INR - ( 14 Sep 2023 16:33 )   PT: 15.6 sec;   INR: 1.42 ratio     PTT - ( 15 Sep 2023 07:37 )  PTT:29.7 sec    Urinalysis Basic - ( 16 Sep 2023 07:28 )    Color: x / Appearance: x / SG: x / pH: x  Gluc: 152 mg/dL / Ketone: x  / Bili: x / Urobili: x   Blood: x / Protein: x / Nitrite: x   Leuk Esterase: x / RBC: x / WBC x   Sq Epi: x / Non Sq Epi: x / Bacteria: x    RADIOLOGY & ADDITIONAL TESTS: Reviewed

## 2023-09-16 NOTE — PROGRESS NOTE ADULT - SUBJECTIVE AND OBJECTIVE BOX
HPI/Overnight Events:   Patient seen and examined at bedside this AM. afebrile , No overnight events. No complaints. dressing clean    Vital Signs Last 24 Hrs  T(C): 36.6 (16 Sep 2023 04:17), Max: 36.8 (15 Sep 2023 07:52)  T(F): 97.9 (16 Sep 2023 04:17), Max: 98.2 (15 Sep 2023 07:52)  HR: 102 (16 Sep 2023 04:17) (85 - 106)  BP: 104/66 (16 Sep 2023 04:17) (100/62 - 134/60)  BP(mean): 80 (16 Sep 2023 00:30) (73 - 103)  RR: 17 (16 Sep 2023 04:17) (17 - 22)  SpO2: 96% (16 Sep 2023 04:17) (92% - 100%)    Parameters below as of 16 Sep 2023 04:17  Patient On (Oxygen Delivery Method): nasal cannula  O2 Flow (L/min): 2      I&O's Detail    15 Sep 2023 07:01  -  16 Sep 2023 04:50  --------------------------------------------------------  IN:    Lactated Ringers: 150 mL  Total IN: 150 mL    OUT:  Total OUT: 0 mL    Total NET: 150 mL          MEDICATIONS  (STANDING):  lactated ringers. 1000 milliLiter(s) (75 mL/Hr) IV Continuous <Continuous>    MEDICATIONS  (PRN):  fentaNYL    Injectable 25 MICROGram(s) IV Push every 5 minutes PRN Moderate Pain (4 - 6)  fentaNYL    Injectable 50 MICROGram(s) IV Push every 5 minutes PRN Severe Pain (7 - 10)  ondansetron Injectable 8 milliGRAM(s) IV Push once PRN Nausea and/or Vomiting      Physical Exam  Constitutional: patient resting comfortably in bed, in no acute distress  HEENT: EOMI, PERRLA, MMM.  Respiratory: Non labored breathing on RA  Cardiovascular: RRR  Gastrointestinal: Abdomen soft, non-tender, non-distended, no rebound tenderness / guarding  Musculoskeletal: LUE open wound with minimal purulent output and surrounding erythema  Vascular: Extremities warm and well perfused.     LABS:                        8.5    10.19 )-----------( 115      ( 15 Sep 2023 07:37 )             28.3     09-15    138  |  96  |  76.8<H>  ----------------------------<  118<H>  3.6   |  23.0  |  3.58<H>    Ca    8.8      15 Sep 2023 07:37    TPro  5.3<L>  /  Alb  3.0<L>  /  TBili  0.4  /  DBili  x   /  AST  22  /  ALT  10  /  AlkPhos  169<H>  09-15    PT/INR - ( 14 Sep 2023 16:33 )   PT: 15.6 sec;   INR: 1.42 ratio         PTT - ( 15 Sep 2023 07:37 )  PTT:29.7 sec  Urinalysis Basic - ( 15 Sep 2023 07:37 )    Color: x / Appearance: x / SG: x / pH: x  Gluc: 118 mg/dL / Ketone: x  / Bili: x / Urobili: x   Blood: x / Protein: x / Nitrite: x   Leuk Esterase: x / RBC: x / WBC x   Sq Epi: x / Non Sq Epi: x / Bacteria: x    Assessment:  73y Female with PMHx of HTN, COPD, HLD, CRI, LUE brachiobasilic fistula on May31 , 2023 followed by balloon assisted angioplasty on July , 2023 presented to ED of LUE open wound with murky fluid drainage. POD1 from washout.     PLAN:    - daily dressing changes  - ADAT  - Broad spectrum IV abx   - pain control  - strict I/Os  - B/L LE wound compression dressing     Chon Zhu, MS4

## 2023-09-17 NOTE — PROGRESS NOTE ADULT - ASSESSMENT
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    #LUE cellulitis and LUE open wound   #LLE chronic wounds  -vancomycin and meropenem (pt has allergy to cephalosporin) to cover for pseudomonas and MRSA  -pt was on eliquis for concern that she had a clot in the LUE; however was discontinued in the OP after DVT was ruled out  -MRSA PCR negative   -Wound culture with Group B Strep  -follow up blood cultures, negative so far   -s/p AVF washout on 9/15/2023  -OR cultures with Group B Strep (prelim)   -Continue Meropenem and Vanco  -Vascular Surgery / ID following   -Vascular Surgery wrapped chronic LLE wounds    HTN  CAD (cardiac cath 2021)  hx of LBBB  Chronic Diastolic Heart Failure  AS, severe TR, pHTN  -c/w metoprolol, statin  -hold ASA 81   -c/w Bumex   -Cardio consult noted     COPD (not on home O2)  -c/w albuterol inhaler PRN    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

## 2023-09-17 NOTE — PROGRESS NOTE ADULT - SUBJECTIVE AND OBJECTIVE BOX
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 On May 31, pt had a left brachiocephalic ateriovenous fistula placed by Dr Hodge and Dr Coleman with apparent difficulty in accessing the site with difficulty with the conscious sedation at that time requiring close monitoring in SICU and NIV respiratory support. Pt now reports LUE open wound, initially presenting as LUE swelling for the last 2 weeks, from which a blister developed at the area of hte fistula which ruptured today with drainage of murky fluid. Pt endorsed 2 episodes of fever one two days ago of 101.7 and another yesterday with 101.3, also endorsed decreased range of motion of the LUE as well has chills, No nausea/vomiting, no SOB/chest pain, no palpitaitions.     Lives with . Uses a walker.    In the ED vitals stable, Labs stable. Bcx collected. Medicine consulted for admission to medicine service. (14 Sep 2023 21:24)    Interval History:  Patient was seen and examined at bedside around 9:15 am.  Doing well.   Denies pain or paresthesia in left arm / hand.   Denies chest pain, palpitations, headache, dizziness, visual symptoms, nausea, vomiting or abdominal pain.    ROS:  As per interval history otherwise unremarkable.    PHYSICAL EXAM:  Vital Signs   T(C): 36.4 (17 Sep 2023 04:24), Max: 36.9 (16 Sep 2023 21:00)  T(F): 97.5 (17 Sep 2023 04:24), Max: 98.4 (16 Sep 2023 21:00)  HR: 96 (17 Sep 2023 04:24) (87 - 100)  BP: 102/60 (17 Sep 2023 04:24) (101/57 - 120/69)  RR: 18 (17 Sep 2023 04:24) (18 - 18)  SpO2: 98% (17 Sep 2023 04:24) (96% - 98%)  Parameters below as of 17 Sep 2023 04:24  Patient On (Oxygen Delivery Method): nasal cannula  O2 Flow (L/min): 2  General: Elderly female sitting in bed comfortably. No acute distress  HEENT: EOMI. Clear conjunctivae. Moist mucus membrane  Neck: Supple.   Chest: Good air entry. No wheezing, rales or rhonchi.   Heart: Normal S1 & S2. RRR.   Abdomen: Non distended. Soft. Non-tender. + BS  Ext: No calf tenderness. Dressing on LUE. Left hand swelling improving -- neurovascular intact. Ace wraps on both legs.   Neuro: Active and alert. No focal deficit. No speech disorder  Skin: Warm and Dry  Psychiatry: Normal mood and affect    I&O's Summary    16 Sep 2023 07:01  -  17 Sep 2023 07:00  --------------------------------------------------------  IN: 250 mL / OUT: 0 mL / NET: 250 mL    LABS:                      9.3    10.37 )-----------( 132      ( 17 Sep 2023 06:26 )             31.3     09-17    136  |  96  |  95.0<H>  ----------------------------<  130<H>  4.0   |  23.0  |  4.37<H>    Ca    8.7      17 Sep 2023 06:26  Phos  6.7     09-17  Mg     1.7     09-17    Blood Culture: 09-15 @ 20:01  Organism --  Gram Stain Blood -- Gram Stain   No polymorphonuclear leukocytes seen per low power field  Moderate Gram positive cocci in pairs seen per oil power field  Rare Gram Negative Rods seen per oil power field  Specimen Source .Surgical Swab Deep Left Forearm Wound  Culture-Blood --    09-14 @ 16:33  Organism --  Gram Stain Blood -- Gram Stain --  Specimen Source .Blood Blood-Peripheral  Culture-Blood --    09-14 @ 16:27  Organism --  Gram Stain Blood -- Gram Stain --  Specimen Source .Blood Blood-Peripheral  Culture-Blood --    09-14 @ 16:15  Organism --  Gram Stain Blood -- Gram Stain --  Specimen Source .Other left fistula site  Culture-Blood --    RADIOLOGY & ADDITIONAL STUDIES:  Reviewed     MEDICATIONS  (STANDING):  acetaminophen     Tablet .. 975 milliGRAM(s) Oral every 6 hours  allopurinol 100 milliGRAM(s) Oral daily  atorvastatin 40 milliGRAM(s) Oral at bedtime  buMETAnide 1 milliGRAM(s) Oral two times a day  calcitriol   Capsule 0.25 MICROGram(s) Oral daily  calcium acetate 667 milliGRAM(s) Oral three times a day with meals  diphenhydrAMINE 50 milliGRAM(s) Oral daily  magnesium oxide 400 milliGRAM(s) Oral three times a day with meals  meropenem Injectable 500 milliGRAM(s) IV Push every 12 hours  metoprolol tartrate 12.5 milliGRAM(s) Oral daily  pantoprazole    Tablet 40 milliGRAM(s) Oral before breakfast  PARoxetine 20 milliGRAM(s) Oral daily  vancomycin  IVPB 750 milliGRAM(s) IV Intermittent every 24 hours    MEDICATIONS  (PRN):  albuterol    90 MICROgram(s) HFA Inhaler 1 Puff(s) Inhalation every 6 hours PRN Shortness of Breath and/or Wheezing  ALPRAZolam 0.5 milliGRAM(s) Oral two times a day PRN for anxiety  aluminum hydroxide/magnesium hydroxide/simethicone Suspension 30 milliLiter(s) Oral every 4 hours PRN Dyspepsia  melatonin 3 milliGRAM(s) Oral at bedtime PRN Insomnia  ondansetron Injectable 4 milliGRAM(s) IV Push every 8 hours PRN Nausea and/or Vomiting  prochlorperazine   Tablet 10 milliGRAM(s) Oral four times a day PRN for nausea

## 2023-09-17 NOTE — PROGRESS NOTE ADULT - ASSESSMENT
73y Female with PMHx of HTN, COPD, HLD, CRI, LUE brachiobasilic fistula on May31 , 2023 followed by balloon assisted angioplasty on July , 2023 presented to ED of LUE open wound with murky fluid drainage. POD2 from washout.     PLAN:    - daily dressing changes  - Broad spectrum IV abx per ID   - pain control  - strict I/Os  - B/L LE wound compression dressing

## 2023-09-17 NOTE — PROGRESS NOTE ADULT - SUBJECTIVE AND OBJECTIVE BOX
HPI/OVERNIGHT EVENTS: Patient seen and examined at bedside this AM. No overnight events. No complaints. dressing changed yesterday by vascular team     Vital Signs Last 24 Hrs  T(C): 36.4 (17 Sep 2023 04:24), Max: 36.9 (16 Sep 2023 21:00)  T(F): 97.5 (17 Sep 2023 04:24), Max: 98.4 (16 Sep 2023 21:00)  HR: 96 (17 Sep 2023 04:24) (87 - 100)  BP: 102/60 (17 Sep 2023 04:24) (101/57 - 120/69)  BP(mean): --  RR: 18 (17 Sep 2023 04:24) (18 - 18)  SpO2: 98% (17 Sep 2023 04:24) (96% - 98%)    Parameters below as of 17 Sep 2023 04:24  Patient On (Oxygen Delivery Method): nasal cannula  O2 Flow (L/min): 2      I&O's Detail    15 Sep 2023 07:01  -  16 Sep 2023 07:00  --------------------------------------------------------  IN:    Lactated Ringers: 600 mL  Total IN: 600 mL    OUT:  Total OUT: 0 mL    Total NET: 600 mL      16 Sep 2023 07:01  -  17 Sep 2023 06:04  --------------------------------------------------------  IN:    IV PiggyBack: 250 mL  Total IN: 250 mL    OUT:  Total OUT: 0 mL    Total NET: 250 mL          Constitutional: patient resting comfortably in bed, in no acute distress  HEENT: EOMI, PERRLA, MMM.  Respiratory: Non labored breathing on RA  Cardiovascular: RRR  Gastrointestinal: Abdomen soft, non-tender, non-distended, no rebound tenderness / guarding  Musculoskeletal: LUE dressing C/D/I   Vascular: Extremities warm and well perfused.     LABS:                        9.1    6.75  )-----------( 114      ( 16 Sep 2023 07:28 )             30.6     09-16    137  |  97  |  82.0<H>  ----------------------------<  152<H>  3.9   |  23.0  |  4.01<H>    Ca    8.5      16 Sep 2023 07:28  Mg     1.6     09-16    TPro  5.3<L>  /  Alb  3.0<L>  /  TBili  0.4  /  DBili  x   /  AST  22  /  ALT  10  /  AlkPhos  169<H>  09-15    PTT - ( 15 Sep 2023 07:37 )  PTT:29.7 sec  Urinalysis Basic - ( 16 Sep 2023 07:28 )    Color: x / Appearance: x / SG: x / pH: x  Gluc: 152 mg/dL / Ketone: x  / Bili: x / Urobili: x   Blood: x / Protein: x / Nitrite: x   Leuk Esterase: x / RBC: x / WBC x   Sq Epi: x / Non Sq Epi: x / Bacteria: x        MEDICATIONS  (STANDING):  acetaminophen     Tablet .. 975 milliGRAM(s) Oral every 6 hours  allopurinol 100 milliGRAM(s) Oral daily  atorvastatin 40 milliGRAM(s) Oral at bedtime  buMETAnide 1 milliGRAM(s) Oral two times a day  calcitriol   Capsule 0.25 MICROGram(s) Oral daily  diphenhydrAMINE 50 milliGRAM(s) Oral daily  magnesium oxide 400 milliGRAM(s) Oral three times a day with meals  meropenem Injectable 500 milliGRAM(s) IV Push every 12 hours  metoprolol tartrate 12.5 milliGRAM(s) Oral daily  pantoprazole    Tablet 40 milliGRAM(s) Oral before breakfast  PARoxetine 20 milliGRAM(s) Oral daily  vancomycin  IVPB 1000 milliGRAM(s) IV Intermittent every 24 hours    MEDICATIONS  (PRN):  albuterol    90 MICROgram(s) HFA Inhaler 1 Puff(s) Inhalation every 6 hours PRN Shortness of Breath and/or Wheezing  ALPRAZolam 0.5 milliGRAM(s) Oral two times a day PRN for anxiety  aluminum hydroxide/magnesium hydroxide/simethicone Suspension 30 milliLiter(s) Oral every 4 hours PRN Dyspepsia  melatonin 3 milliGRAM(s) Oral at bedtime PRN Insomnia  ondansetron Injectable 4 milliGRAM(s) IV Push every 8 hours PRN Nausea and/or Vomiting  prochlorperazine   Tablet 10 milliGRAM(s) Oral four times a day PRN for nausea      MICRO:   Cultures     STUDIES:   EKG, CXR, U/S, CT, MRI

## 2023-09-18 NOTE — PROGRESS NOTE ADULT - SUBJECTIVE AND OBJECTIVE BOX
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 On May 31, pt had a left brachiocephalic ateriovenous fistula placed by Dr Hodge and Dr Coleman with apparent difficulty in accessing the site with difficulty with the conscious sedation at that time requiring close monitoring in SICU and NIV respiratory support. Pt now reports LUE open wound, initially presenting as LUE swelling for the last 2 weeks, from which a blister developed at the area of hte fistula which ruptured today with drainage of murky fluid. Pt endorsed 2 episodes of fever one two days ago of 101.7 and another yesterday with 101.3, also endorsed decreased range of motion of the LUE as well has chills, No nausea/vomiting, no SOB/chest pain, no palpitaitions.     Lives with . Uses a walker.    In the ED vitals stable, Labs stable. Bcx collected. Medicine consulted for admission to medicine service. (14 Sep 2023 21:24)    Interval History:  Patient was seen and examined at bedside around 9 am.  Feels better.  Eating breakfast.    Denies pain or paresthesia in left arm / hand.   Denies chest pain, palpitations, nausea, vomiting, abdominal pain or urinary symptoms.     ROS:  As per interval history otherwise unremarkable.    PHYSICAL EXAM:  Vital Signs   T(C): 36.8 (18 Sep 2023 08:12), Max: 36.8 (18 Sep 2023 08:12)  T(F): 98.2 (18 Sep 2023 08:12), Max: 98.2 (18 Sep 2023 08:12)  HR: 67 (18 Sep 2023 08:12) (67 - 92)  BP: 106/67 (18 Sep 2023 08:12) (94/58 - 128/76)  RR: 20 (18 Sep 2023 08:12) (18 - 20)  SpO2: 94% (18 Sep 2023 08:12) (94% - 96%)  Parameters below as of 18 Sep 2023 08:12  Patient On (Oxygen Delivery Method): room air  General: Elderly female sitting in chair comfortably. No acute distress  HEENT: EOMI. Clear conjunctivae. Moist mucus membrane  Neck: Supple.   Chest: Good air entry. No wheezing, rales or rhonchi.   Heart: Normal S1 & S2. RRR.   Abdomen: Non distended. Soft. Non-tender. + BS  Ext: No calf tenderness. Dressing on LUE. Left hand swelling improving -- neurovascular intact. Ace wraps on both legs.   Neuro: Active and alert. No focal deficit. No speech disorder  Skin: Warm and Dry  Psychiatry: Normal mood and affect    I&O's Summary    18 Sep 2023 07:01  -  18 Sep 2023 15:24  --------------------------------------------------------  IN: 500 mL / OUT: 0 mL / NET: 500 mL    LABS:                        10.1   12.20 )-----------( 123      ( 18 Sep 2023 06:15 )             33.3     09-18    133<L>  |  93<L>  |  96.0<H>  ----------------------------<  102<H>  4.2   |  16.0<L>  |  4.94<H>    Ca    8.8      18 Sep 2023 06:15  Phos  6.7     09-17  Mg     1.9     09-18    Blood Culture: 09-15 @ 20:01  Organism --  Gram Stain Blood -- Gram Stain   No polymorphonuclear leukocytes seen per low power field  Moderate Gram positive cocci in pairs seen per oil power field  Rare Gram Negative Rods seen per oil power field  Specimen Source .Surgical Swab Deep Left Forearm Wound  Culture-Blood --    09-14 @ 16:33  Organism --  Gram Stain Blood -- Gram Stain --  Specimen Source .Blood Blood-Peripheral  Culture-Blood --    09-14 @ 16:27  Organism --  Gram Stain Blood -- Gram Stain --  Specimen Source .Blood Blood-Peripheral  Culture-Blood --    09-14 @ 16:15  Organism --  Gram Stain Blood -- Gram Stain --  Specimen Source .Other left fistula site  Culture-Blood --    RADIOLOGY & ADDITIONAL STUDIES:  Reviewed     MEDICATIONS  (STANDING):  allopurinol 100 milliGRAM(s) Oral daily  atorvastatin 40 milliGRAM(s) Oral at bedtime  buMETAnide 1 milliGRAM(s) Oral two times a day  calcitriol   Capsule 0.25 MICROGram(s) Oral daily  calcium acetate 667 milliGRAM(s) Oral three times a day with meals  diphenhydrAMINE 50 milliGRAM(s) Oral daily  meropenem Injectable 500 milliGRAM(s) IV Push every 12 hours  metoprolol tartrate 12.5 milliGRAM(s) Oral daily  pantoprazole    Tablet 40 milliGRAM(s) Oral before breakfast  PARoxetine 20 milliGRAM(s) Oral daily  sodium bicarbonate 1300 milliGRAM(s) Oral three times a day    MEDICATIONS  (PRN):  acetaminophen     Tablet .. 650 milliGRAM(s) Oral every 6 hours PRN Temp greater or equal to 38C (100.4F), Mild Pain (1 - 3)  albuterol    90 MICROgram(s) HFA Inhaler 1 Puff(s) Inhalation every 6 hours PRN Shortness of Breath and/or Wheezing  ALPRAZolam 0.5 milliGRAM(s) Oral two times a day PRN for anxiety  HYDROmorphone   Tablet 1 milliGRAM(s) Oral every 6 hours PRN Moderate Pain (4 - 6)  HYDROmorphone   Tablet 2 milliGRAM(s) Oral every 6 hours PRN Severe Pain (7 - 10)  melatonin 3 milliGRAM(s) Oral at bedtime PRN Insomnia  ondansetron Injectable 4 milliGRAM(s) IV Push every 8 hours PRN Nausea and/or Vomiting  prochlorperazine   Tablet 10 milliGRAM(s) Oral four times a day PRN for nausea

## 2023-09-18 NOTE — PROGRESS NOTE ADULT - ASSESSMENT
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 Vascular Surgery was consulted.     HTN  CAD (cardiac cath 2021)  hx of LBBB  Chronic Diastolic Heart Failure  AS, severe TR, pHTN  -c/w metoprolol, statin  -Resume ASA 81   -c/w Bumex   -Cardio consult noted     COPD (not on home O2)  -c/w albuterol inhaler PRN    Gout  c/w allopurinol    hx of DM 2  no longer on medication, controlled per patient  A1c 5.4  Carb Consistent Diet     DVT Prophylaxis -- Heparin SQ    Dispo: Likely home once stable.

## 2023-09-18 NOTE — PROGRESS NOTE ADULT - ASSESSMENT
73y Female with PMHx of HTN, COPD, HLD, CRI, LUE brachiobasilic fistula on May31 , 2023 followed by balloon assisted angioplasty on July , 2023 presented to ED of LUE open wound with murky fluid drainage. POD3 from LUE washout. LUE packing and dressing changed during AM rounds.    PLAN:    - daily dressing changes  - Broad spectrum IV abx per ID   - pain control  - strict I/Os  - B/L LE wound compression dressing 73y Female with PMHx of HTN, COPD, HLD, CRI, LUE brachiobasilic fistula on May 31, 2023 followed by balloon assisted angioplasty on July, 2023 presented to ED of LUE open wound with murky fluid drainage. POD3 from LUE washout. LUE packing and dressing changed during AM rounds. Pt has PSHx of L axillary lymph node dissection for L breast CA.    PLAN:    - daily dressing changes  - Vancomycin/Meropenem IV abx per ID   - pain control  - strict I/Os  - B/L LE wound compression dressing 73y Female with PMHx of HTN, COPD, HLD, CRI, LUE brachiobasilic fistula on May 31, 2023 followed by balloon assisted angioplasty on July, 2023 presented to ED of LUE open wound with murky fluid drainage. POD3 from LUE washout. LUE packing and dressing changed during AM rounds. Pt has PSHx of L breast lumpectomy and L axillary lymph node dissection.    PLAN:    - daily dressing changes  - Vancomycin/Meropenem IV abx per ID   - pain control  - strict I/Os  - B/L LE wound compression dressing

## 2023-09-18 NOTE — PROGRESS NOTE ADULT - SUBJECTIVE AND OBJECTIVE BOX
Vital Signs Last 24 Hrs  T(C): 36.8 (18 Sep 2023 08:12), Max: 36.8 (18 Sep 2023 08:12)  T(F): 98.2 (18 Sep 2023 08:12), Max: 98.2 (18 Sep 2023 08:12)  HR: 67 (18 Sep 2023 08:12) (67 - 92)  BP: 106/67 (18 Sep 2023 08:12) (94/58 - 128/76)  BP(mean): --  RR: 20 (18 Sep 2023 08:12) (16 - 20)  SpO2: 94% (18 Sep 2023 08:12) (94% - 97%)    Parameters below as of 18 Sep 2023 08:12  Patient On (Oxygen Delivery Method): room air    I&O's Summary    Physical Exam  General: WDWN female   HEENT: Normocephalic  Cardiac:   Respiratory:  Abdomen:   Extremities:  Neuro:  Psych:    09-18    133<L>  |  93<L>  |  96.0<H>  ----------------------------<  102<H>  4.2   |  16.0<L>  |  4.94<H>    Ca    8.8      18 Sep 2023 06:15  Phos  6.7     09-17  Mg     1.9     09-18                        10.1   12.20 )-----------( 123      ( 18 Sep 2023 06:15 )             33.3     MEDICATIONS  (STANDING):  allopurinol 100 milliGRAM(s) Oral daily  atorvastatin 40 milliGRAM(s) Oral at bedtime  buMETAnide 1 milliGRAM(s) Oral two times a day  calcitriol   Capsule 0.25 MICROGram(s) Oral daily  calcium acetate 667 milliGRAM(s) Oral three times a day with meals  diphenhydrAMINE 50 milliGRAM(s) Oral daily  magnesium oxide 400 milliGRAM(s) Oral three times a day with meals  meropenem Injectable 500 milliGRAM(s) IV Push every 12 hours  metoprolol tartrate 12.5 milliGRAM(s) Oral daily  pantoprazole    Tablet 40 milliGRAM(s) Oral before breakfast  PARoxetine 20 milliGRAM(s) Oral daily  sodium bicarbonate 650 milliGRAM(s) Oral three times a day    MEDICATIONS  (PRN):  acetaminophen     Tablet .. 650 milliGRAM(s) Oral every 6 hours PRN Temp greater or equal to 38C (100.4F), Mild Pain (1 - 3)  albuterol    90 MICROgram(s) HFA Inhaler 1 Puff(s) Inhalation every 6 hours PRN Shortness of Breath and/or Wheezing  ALPRAZolam 0.5 milliGRAM(s) Oral two times a day PRN for anxiety  HYDROmorphone   Tablet 2 milliGRAM(s) Oral every 6 hours PRN Severe Pain (7 - 10)  HYDROmorphone   Tablet 1 milliGRAM(s) Oral every 6 hours PRN Moderate Pain (4 - 6)  melatonin 3 milliGRAM(s) Oral at bedtime PRN Insomnia  ondansetron Injectable 4 milliGRAM(s) IV Push every 8 hours PRN Nausea and/or Vomiting  prochlorperazine   Tablet 10 milliGRAM(s) Oral four times a day PRN for nausea   Renal function is worsening.     Vital Signs Last 24 Hrs  T(C): 36.8 (18 Sep 2023 08:12), Max: 36.8 (18 Sep 2023 08:12)  T(F): 98.2 (18 Sep 2023 08:12), Max: 98.2 (18 Sep 2023 08:12)  HR: 67 (18 Sep 2023 08:12) (67 - 92)  BP: 106/67 (18 Sep 2023 08:12) (94/58 - 128/76)  BP(mean): --  RR: 20 (18 Sep 2023 08:12) (16 - 20)  SpO2: 94% (18 Sep 2023 08:12) (94% - 97%)    Parameters below as of 18 Sep 2023 08:12  Patient On (Oxygen Delivery Method): room air    I&O's Summary    Physical Exam  General: WDWN female   HEENT: Normocephalic  Cardiac: S1S2 RRR  Respiratory: +Use of accessory muscles of respiration; on room air    Abdomen: Obese  Extremities: ACE to b/l LE  Neuro: AAOx3  Psych: Calm    09-18    133<L>  |  93<L>  |  96.0<H>  ----------------------------<  102<H>  4.2   |  16.0<L>  |  4.94<H>    Ca    8.8      18 Sep 2023 06:15  Phos  6.7     09-17  Mg     1.9     09-18                        10.1   12.20 )-----------( 123      ( 18 Sep 2023 06:15 )             33.3     MEDICATIONS  (STANDING):  allopurinol 100 milliGRAM(s) Oral daily  atorvastatin 40 milliGRAM(s) Oral at bedtime  buMETAnide 1 milliGRAM(s) Oral two times a day  calcitriol   Capsule 0.25 MICROGram(s) Oral daily  calcium acetate 667 milliGRAM(s) Oral three times a day with meals  diphenhydrAMINE 50 milliGRAM(s) Oral daily  magnesium oxide 400 milliGRAM(s) Oral three times a day with meals  meropenem Injectable 500 milliGRAM(s) IV Push every 12 hours  metoprolol tartrate 12.5 milliGRAM(s) Oral daily  pantoprazole    Tablet 40 milliGRAM(s) Oral before breakfast  PARoxetine 20 milliGRAM(s) Oral daily  sodium bicarbonate 650 milliGRAM(s) Oral three times a day    MEDICATIONS  (PRN):  acetaminophen     Tablet .. 650 milliGRAM(s) Oral every 6 hours PRN Temp greater or equal to 38C (100.4F), Mild Pain (1 - 3)  albuterol    90 MICROgram(s) HFA Inhaler 1 Puff(s) Inhalation every 6 hours PRN Shortness of Breath and/or Wheezing  ALPRAZolam 0.5 milliGRAM(s) Oral two times a day PRN for anxiety  HYDROmorphone   Tablet 2 milliGRAM(s) Oral every 6 hours PRN Severe Pain (7 - 10)  HYDROmorphone   Tablet 1 milliGRAM(s) Oral every 6 hours PRN Moderate Pain (4 - 6)  melatonin 3 milliGRAM(s) Oral at bedtime PRN Insomnia  ondansetron Injectable 4 milliGRAM(s) IV Push every 8 hours PRN Nausea and/or Vomiting  prochlorperazine   Tablet 10 milliGRAM(s) Oral four times a day PRN for nausea

## 2023-09-18 NOTE — PROGRESS NOTE ADULT - SUBJECTIVE AND OBJECTIVE BOX
Subjective: 73F s/p LUE wound washout was examined at bedside. Pt in NAD. No acute events overnight      MEDICATIONS  (STANDING):  acetaminophen     Tablet .. 975 milliGRAM(s) Oral every 6 hours  allopurinol 100 milliGRAM(s) Oral daily  atorvastatin 40 milliGRAM(s) Oral at bedtime  buMETAnide 1 milliGRAM(s) Oral two times a day  calcitriol   Capsule 0.25 MICROGram(s) Oral daily  calcium acetate 667 milliGRAM(s) Oral three times a day with meals  diphenhydrAMINE 50 milliGRAM(s) Oral daily  magnesium oxide 400 milliGRAM(s) Oral three times a day with meals  meropenem Injectable 500 milliGRAM(s) IV Push every 12 hours  metoprolol tartrate 12.5 milliGRAM(s) Oral daily  pantoprazole    Tablet 40 milliGRAM(s) Oral before breakfast  PARoxetine 20 milliGRAM(s) Oral daily  vancomycin  IVPB 750 milliGRAM(s) IV Intermittent every 24 hours    MEDICATIONS  (PRN):  albuterol    90 MICROgram(s) HFA Inhaler 1 Puff(s) Inhalation every 6 hours PRN Shortness of Breath and/or Wheezing  ALPRAZolam 0.5 milliGRAM(s) Oral two times a day PRN for anxiety  aluminum hydroxide/magnesium hydroxide/simethicone Suspension 30 milliLiter(s) Oral every 4 hours PRN Dyspepsia  melatonin 3 milliGRAM(s) Oral at bedtime PRN Insomnia  ondansetron Injectable 4 milliGRAM(s) IV Push every 8 hours PRN Nausea and/or Vomiting  prochlorperazine   Tablet 10 milliGRAM(s) Oral four times a day PRN for nausea      Vital Signs Last 24 Hrs  T(C): 36.4 (18 Sep 2023 05:00), Max: 36.6 (17 Sep 2023 12:00)  T(F): 97.6 (18 Sep 2023 05:00), Max: 97.9 (17 Sep 2023 12:00)  HR: 86 (18 Sep 2023 05:00) (76 - 92)  BP: 128/76 (18 Sep 2023 05:00) (94/58 - 128/76)  BP(mean): --  RR: 20 (18 Sep 2023 05:00) (16 - 20)  SpO2: 95% (18 Sep 2023 05:00) (94% - 97%)    Parameters below as of 18 Sep 2023 05:00  Patient On (Oxygen Delivery Method): room air        Physical Exam:    Constitutional: NAD  HEENT: PERRL, EOMI  Neck: No JVD, FROM without pain  Respiratory: Respirations non-labored, no accessory muscle use  Gastrointestinal: Soft, non-tender, non-distended  Extremities: No peripheral edema, No cyanosis  Neurological: A&O x 3; without gross deficit  Musculoskeletal: LUE dressing C/D/I   Vascular: Extremities warm and well perfused.      LABS:                        9.3    10.37 )-----------( 132      ( 17 Sep 2023 06:26 )             31.3     09-17    136  |  96  |  95.0<H>  ----------------------------<  130<H>  4.0   |  23.0  |  4.37<H>    Ca    8.7      17 Sep 2023 06:26  Phos  6.7     09-17  Mg     1.7     09-17        Urinalysis Basic - ( 17 Sep 2023 06:26 )    Color: x / Appearance: x / SG: x / pH: x  Gluc: 130 mg/dL / Ketone: x  / Bili: x / Urobili: x   Blood: x / Protein: x / Nitrite: x   Leuk Esterase: x / RBC: x / WBC x   Sq Epi: x / Non Sq Epi: x / Bacteria: x     Subjective: 73F s/p LUE wound washout was examined at bedside. Pt in NAD. No acute events overnight      MEDICATIONS  (STANDING):  acetaminophen     Tablet .. 975 milliGRAM(s) Oral every 6 hours  allopurinol 100 milliGRAM(s) Oral daily  atorvastatin 40 milliGRAM(s) Oral at bedtime  buMETAnide 1 milliGRAM(s) Oral two times a day  calcitriol   Capsule 0.25 MICROGram(s) Oral daily  calcium acetate 667 milliGRAM(s) Oral three times a day with meals  diphenhydrAMINE 50 milliGRAM(s) Oral daily  magnesium oxide 400 milliGRAM(s) Oral three times a day with meals  meropenem Injectable 500 milliGRAM(s) IV Push every 12 hours  metoprolol tartrate 12.5 milliGRAM(s) Oral daily  pantoprazole    Tablet 40 milliGRAM(s) Oral before breakfast  PARoxetine 20 milliGRAM(s) Oral daily  vancomycin  IVPB 750 milliGRAM(s) IV Intermittent every 24 hours    MEDICATIONS  (PRN):  albuterol    90 MICROgram(s) HFA Inhaler 1 Puff(s) Inhalation every 6 hours PRN Shortness of Breath and/or Wheezing  ALPRAZolam 0.5 milliGRAM(s) Oral two times a day PRN for anxiety  aluminum hydroxide/magnesium hydroxide/simethicone Suspension 30 milliLiter(s) Oral every 4 hours PRN Dyspepsia  melatonin 3 milliGRAM(s) Oral at bedtime PRN Insomnia  ondansetron Injectable 4 milliGRAM(s) IV Push every 8 hours PRN Nausea and/or Vomiting  prochlorperazine   Tablet 10 milliGRAM(s) Oral four times a day PRN for nausea      PAST MEDICAL & SURGICAL HISTORY:  2019 novel coronavirus disease (COVID-19)      HTN (hypertension)      COPD, moderate      Diabetes mellitus      CAD (coronary artery disease)      Anemia      S/P arteriovenous (AV) fistula repair      H/O total knee replacement      History of partial nephrectomy      H/O lumpectomy          Vital Signs Last 24 Hrs  T(C): 36.4 (18 Sep 2023 05:00), Max: 36.6 (17 Sep 2023 12:00)  T(F): 97.6 (18 Sep 2023 05:00), Max: 97.9 (17 Sep 2023 12:00)  HR: 86 (18 Sep 2023 05:00) (76 - 92)  BP: 128/76 (18 Sep 2023 05:00) (94/58 - 128/76)  BP(mean): --  RR: 20 (18 Sep 2023 05:00) (16 - 20)  SpO2: 95% (18 Sep 2023 05:00) (94% - 97%)    Parameters below as of 18 Sep 2023 05:00  Patient On (Oxygen Delivery Method): room air        I&O's Summary                            10.1   12.20 )-----------( x        ( 18 Sep 2023 06:15 )             33.3     09-17    136  |  96  |  95.0<H>  ----------------------------<  130<H>  4.0   |  23.0  |  4.37<H>    Ca    8.7      17 Sep 2023 06:26  Phos  6.7     09-17  Mg     1.7     09-17            Culture - Tissue with Gram Stain (collected 09-15-23 @ 20:01)  Source: .Tissue Left Upper Extremity Wound  Gram Stain (09-16-23 @ 03:14):    No polymorphonuclear leukocytes seen per low power field    Moderate Gram positive cocci in pairs seen per oil power field    Rare Gram Negative Rods seen per oil power field  Preliminary Report (09-16-23 @ 23:50):    Moderate Streptococcus agalactiae (Group B) isolated    Group B streptococci are susceptible to ampicillin,    penicillin and cefazolin, but may be resistant to    erythromycin and clindamycin.    Recommendations for intrapartum prophylaxis for Group B    streptococci are penicillin or ampicillin.    Culture - Surgical Swab (collected 09-15-23 @ 20:01)  Source: .Surgical Swab Deep Left Forearm Wound  Preliminary Report (09-17-23 @ 11:16):    Numerous Streptococcus agalactiae (Group B) isolated    Group B streptococci are susceptible to ampicillin,    penicillin and cefazolin, but may be resistant to    erythromycin and clindamycin.    Recommendations for intrapartum prophylaxis for Group B    streptococci are penicillin or ampicillin.    Culture - Blood (collected 09-14-23 @ 16:33)  Source: .Blood Blood-Peripheral  Preliminary Report (09-17-23 @ 22:01):    No growth at 72 Hours    Culture - Blood (collected 09-14-23 @ 16:27)  Source: .Blood Blood-Peripheral  Preliminary Report (09-17-23 @ 22:01):    No growth at 72 Hours    Culture - Other (collected 09-14-23 @ 16:15)  Source: .Other left fistula site  Final Report (09-16-23 @ 16:12):    Moderate Streptococcus agalactiae (Group B) isolated    Group B streptococci are susceptible to ampicillin,    penicillin and cefazolin, but may be resistant to    erythromycin and clindamycin.    Recommendations for intrapartum prophylaxis for Group B    streptococci are penicillin or ampicillin.        Physical Exam:    Constitutional: NAD  HEENT: PERRL, EOMI  Neck: No JVD, FROM without pain  Respiratory: Respirations non-labored, no accessory muscle use  Gastrointestinal: Soft, non-tender, non-distended  Extremities: No peripheral edema, No cyanosis  Neurological: A&O x 3; without gross deficit  Musculoskeletal: LUE dressing C/D/I. moderate swelling of LLUE   Vascular: Extremities warm and well perfused.        Urinalysis Basic - ( 17 Sep 2023 06:26 )    Color: x / Appearance: x / SG: x / pH: x  Gluc: 130 mg/dL / Ketone: x  / Bili: x / Urobili: x   Blood: x / Protein: x / Nitrite: x   Leuk Esterase: x / RBC: x / WBC x   Sq Epi: x / Non Sq Epi: x / Bacteria: x

## 2023-09-18 NOTE — PROGRESS NOTE ADULT - ASSESSMENT
73 year old female with PMH of DM, HTN, gout, COPD, papillary renal cancer (2018) s/p partial nephrectomy, CKD 5 s/p preemptive AV fistula placement presents with swelling and pain associated with drainage from her AV fistula; underwent washout and ligation on 09/15. Nephrology is managing CKD 5 not on dialysis.     -Progressive worsening of renal function while here (eGFR 12 --> 9)  -Also with worsening labs (new onset hyponatremia, worsening metabolic acidosis)  -Recommend initiating hemodialysis as inpatient (rather than waiting as outpatient) given above   -Discussed with patient   -BP acceptable  -Volume Status - continue   -Anemia - hemoglobin is at goal  -Mineral Bone Disease in setting of CKD - continue oral phos binder     Libby Perkins DO  Nephrology    73 year old female with PMH of DM, HTN, gout, COPD, papillary renal cancer (2018) s/p partial nephrectomy, CKD 5 s/p preemptive AV fistula placement presents with swelling and pain associated with drainage from her AV fistula; underwent washout and ligation on 09/15. Nephrology is managing CKD 5 not on dialysis.     -CKD 5 not on hemodialysis   -Progressive worsening of renal function while here (eGFR 12 --> 9)  -Also with worsening labs (new onset hyponatremia, worsening metabolic acidosis + BUN)  -Recommend initiating hemodialysis as inpatient (rather than waiting as outpatient) given above   -Discussed my recommendations re: initiation of hemodialysis however patient wishes to have the conversation with her  present at bedside (patient was not amenable to me speaking with him over the phone); discussed with RN to notify me once spouse is at bedside   -BP acceptable  -Volume Status - continue bumex 1mg BID   -Metabolic Acidosis - will increase sodium bicarb tabs to 1300mg TID   -Anemia - hemoglobin is at goal  -Mineral Bone Disease in setting of CKD - continue oral phos binder + calcitriol     Libby Perkins,   Nephrology    73 year old female with PMH of DM, HTN, gout, COPD, 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 Nephrology is managing CKD 5 not on dialysis.     -CKD 5 not on hemodialysis   -Progressive worsening of renal function while here (eGFR 12 --> 9)  -Also with worsening labs (new onset hyponatremia, worsening metabolic acidosis + BUN)  -Recommend initiating hemodialysis as inpatient (rather than waiting as outpatient) given above   -Discussed my recommendations re: initiation of hemodialysis however patient wishes to have the conversation with her  present at bedside (patient was not amenable to me speaking with him over the phone); discussed with RN to notify me once spouse is at bedside   -BP acceptable  -Volume Status - continue bumex 1mg BID   -Metabolic Acidosis - will increase sodium bicarb tabs to 1300mg TID   -Anemia - hemoglobin is at goal  -Mineral Bone Disease in setting of CKD - continue oral phos binder + calcitriol     ADDENDUM: Went back to patient's room this afternoon as her spouse was at bedside. Went over the blood work with both the patient and her spouse. Encouraged initiation of hemodialysis while inpatient. Also discussed risks associated with foregoing dialysis which includes life threatening electrolyte abnormalities, acid base disturbance, uremia, and volume overload. Patient now amenable to starting dialysis. Will consult Vascular Surgery for placement of non tunneled dialysis catheter.     Libby Perkins DO  Nephrology

## 2023-09-19 NOTE — PROGRESS NOTE ADULT - SUBJECTIVE AND OBJECTIVE BOX
GARRETT ALARCONFABIANA    [id number]    History:     vascular surgery follow up  s/p left upper ext debridement 9/15  no acute events overnight   No reports of nausea, vomiting, chest pain, shortness of breath, abdominal pain or fever. No new complaints. No acute motor or sensory changes are reported.    Vital Signs Last 24 Hrs  T(C): 36.4 (19 Sep 2023 05:19), Max: 37 (18 Sep 2023 17:30)  T(F): 97.6 (19 Sep 2023 05:19), Max: 98.6 (18 Sep 2023 17:30)  HR: 85 (19 Sep 2023 05:19) (67 - 90)  BP: 124/69 (19 Sep 2023 05:19) (106/67 - 124/69)  BP(mean): --  RR: 18 (19 Sep 2023 05:19) (18 - 20)  SpO2: 100% (19 Sep 2023 05:19) (94% - 100%)    Parameters below as of 19 Sep 2023 05:19  Patient On (Oxygen Delivery Method): room air      I&O's Summary    18 Sep 2023 07:01  -  19 Sep 2023 07:00  --------------------------------------------------------  IN: 980 mL / OUT: 300 mL / NET: 680 mL                              9.6    12.37 )-----------( 102      ( 19 Sep 2023 04:40 )             32.8     09-19    133<L>  |  94<L>  |  102.6<H>  ----------------------------<  103<H>  4.7   |  18.0<L>  |  5.16<H>    Ca    8.6      19 Sep 2023 04:40  Phos  6.6     09-19  Mg     2.0     09-19        MEDICATIONS  (STANDING):  allopurinol 100 milliGRAM(s) Oral daily  aspirin  chewable 81 milliGRAM(s) Oral daily  atorvastatin 40 milliGRAM(s) Oral at bedtime  buMETAnide 1 milliGRAM(s) Oral two times a day  calcitriol   Capsule 0.25 MICROGram(s) Oral daily  calcium acetate 667 milliGRAM(s) Oral three times a day with meals  diphenhydrAMINE 50 milliGRAM(s) Oral daily  heparin   Injectable 5000 Unit(s) SubCutaneous every 8 hours  meropenem Injectable 500 milliGRAM(s) IV Push every 12 hours  metoprolol tartrate 12.5 milliGRAM(s) Oral daily  pantoprazole    Tablet 40 milliGRAM(s) Oral before breakfast  PARoxetine 20 milliGRAM(s) Oral daily  sodium bicarbonate 1300 milliGRAM(s) Oral three times a day    MEDICATIONS  (PRN):  acetaminophen     Tablet .. 650 milliGRAM(s) Oral every 6 hours PRN Temp greater or equal to 38C (100.4F), Mild Pain (1 - 3)  albuterol    90 MICROgram(s) HFA Inhaler 1 Puff(s) Inhalation every 6 hours PRN Shortness of Breath and/or Wheezing  ALPRAZolam 0.5 milliGRAM(s) Oral two times a day PRN for anxiety  HYDROmorphone   Tablet 2 milliGRAM(s) Oral every 6 hours PRN Severe Pain (7 - 10)  HYDROmorphone   Tablet 1 milliGRAM(s) Oral every 6 hours PRN Moderate Pain (4 - 6)  melatonin 3 milliGRAM(s) Oral at bedtime PRN Insomnia  ondansetron Injectable 4 milliGRAM(s) IV Push every 8 hours PRN Nausea and/or Vomiting  prochlorperazine   Tablet 10 milliGRAM(s) Oral four times a day PRN for nausea      Physical exam:    No distress  a bit confused but, reoriented  non labored breathing   abdomen is soft, non tender  left upper ext with distal edema affecting the hand  left medial antecubital wound fairly clean, dressing replaced, + thrill to the venous outflow of the fistula   + radial pulse      Primary Orthopedic Assessment:  • s/p left upper ext debridement on 9/15  - surgical wound clean  - extremity edema due to faulty compression wrap    Plan:   • plan for wound vac placement today  - ensure left upper ext is elevated  - compression wrap applied

## 2023-09-19 NOTE — PROGRESS NOTE ADULT - SUBJECTIVE AND OBJECTIVE BOX
No subjective complaints. Spouse was at bedside.     Vital Signs Last 24 Hrs  T(C): 36.5 (19 Sep 2023 11:34), Max: 37 (18 Sep 2023 17:30)  T(F): 97.7 (19 Sep 2023 11:34), Max: 98.6 (18 Sep 2023 17:30)  HR: 78 (19 Sep 2023 11:34) (78 - 90)  BP: 113/65 (19 Sep 2023 11:34) (106/63 - 124/69)  BP(mean): --  RR: 18 (19 Sep 2023 11:34) (18 - 18)  SpO2: 95% (19 Sep 2023 11:34) (95% - 100%)    Parameters below as of 19 Sep 2023 11:34  Patient On (Oxygen Delivery Method): nasal cannula  O2 Flow (L/min): 4    I&O's Summary    18 Sep 2023 07:01  -  19 Sep 2023 07:00  --------------------------------------------------------  IN: 980 mL / OUT: 300 mL / NET: 680 mL    Physical Exam  General: WDWN female in NAD, sitting in chair   HEENT: Normocephalic  Cardiac: S1S2 RRR  Respiratory: +Use of accessory muscles of respiration; on nasal cannula   Abdomen: Obese; soft  Extremities: ACE to b/l lower extremities  Neuro: AAOx3  Psych: Calm    09-19    133<L>  |  94<L>  |  102.6<H>  ----------------------------<  103<H>  4.7   |  18.0<L>  |  5.16<H>    Ca    8.6      19 Sep 2023 04:40  Phos  6.6     09-19  Mg     2.0     09-19                        9.6    12.37 )-----------( 102      ( 19 Sep 2023 04:40 )             32.8     MEDICATIONS  (STANDING):  allopurinol 100 milliGRAM(s) Oral daily  aspirin  chewable 81 milliGRAM(s) Oral daily  atorvastatin 40 milliGRAM(s) Oral at bedtime  buMETAnide 1 milliGRAM(s) Oral two times a day  calcitriol   Capsule 0.25 MICROGram(s) Oral daily  calcium acetate 667 milliGRAM(s) Oral three times a day with meals  diphenhydrAMINE 50 milliGRAM(s) Oral daily  heparin   Injectable 5000 Unit(s) SubCutaneous every 8 hours  meropenem Injectable 500 milliGRAM(s) IV Push every 12 hours  metoprolol tartrate 12.5 milliGRAM(s) Oral daily  pantoprazole    Tablet 40 milliGRAM(s) Oral before breakfast  PARoxetine 20 milliGRAM(s) Oral daily  sodium bicarbonate 1300 milliGRAM(s) Oral three times a day    MEDICATIONS  (PRN):  acetaminophen     Tablet .. 650 milliGRAM(s) Oral every 6 hours PRN Temp greater or equal to 38C (100.4F), Mild Pain (1 - 3)  albuterol    90 MICROgram(s) HFA Inhaler 1 Puff(s) Inhalation every 6 hours PRN Shortness of Breath and/or Wheezing  ALPRAZolam 0.5 milliGRAM(s) Oral two times a day PRN for anxiety  HYDROmorphone   Tablet 2 milliGRAM(s) Oral every 6 hours PRN Severe Pain (7 - 10)  HYDROmorphone   Tablet 1 milliGRAM(s) Oral every 6 hours PRN Moderate Pain (4 - 6)  melatonin 3 milliGRAM(s) Oral at bedtime PRN Insomnia  ondansetron Injectable 4 milliGRAM(s) IV Push every 8 hours PRN Nausea and/or Vomiting  prochlorperazine   Tablet 10 milliGRAM(s) Oral four times a day PRN for nausea

## 2023-09-19 NOTE — PROCEDURE NOTE - ADDITIONAL PROCEDURE DETAILS
Asked by nursing to place IV for 73 y.o. Female for IV access and IV abx. Nursing tried first.  18 g IV placed with utilizing ultrasound guidance. #18 angio inserted right arm, good blood return, secured well, easily flushed with 5cc normal saline flushes multiple times. Nursing aware. Patient tolerated procedure well. Sharps disposed of in a safe manner.

## 2023-09-19 NOTE — PROGRESS NOTE ADULT - SUBJECTIVE AND OBJECTIVE BOX
HOSPITALIST PROGRESS NOTE    GARRETT BARCENAS  [id number]  73yFemale    Patient is a 73y old  Female who presents with a chief complaint of Fistula evaluation (19 Sep 2023 12:48)      SUBJECTIVE:   Chart reviewed since last visit.  Patient seen and examined at bedside for sepsis, LUE AVF infection  Pain controlled, denies any fever or chills      OBJECTIVE:  Vital Signs Last 24 Hrs  T(C): 36.6 (19 Sep 2023 16:36), Max: 36.7 (18 Sep 2023 21:25)  T(F): 97.8 (19 Sep 2023 16:36), Max: 98.1 (18 Sep 2023 21:25)  HR: 81 (19 Sep 2023 16:36) (78 - 90)  BP: 99/61 (19 Sep 2023 16:36) (99/61 - 124/69)   RR: 18 (19 Sep 2023 16:36) (18 - 18)  SpO2: 95% (19 Sep 2023 16:36) (95% - 100%)    Parameters below as of 19 Sep 2023 16:36  Patient On (Oxygen Delivery Method): nasal cannula  O2 Flow (L/min): 4      PHYSICAL EXAMINATION  General: Elderly female sitting up in bed, comfortable  HEENT:  AT NC, EOMI  NECK:  Supple. RIJ HD catheter (+)  CVS: regular rate and rhythm S1 S2  RESP:  Fair air entry bilaterally  GI:  Soft nondistended nontender BS+  : No suprapubic tenderness  MSK:  LUE with dressing.  CNS: AOx3. Fluent speech, follows commands    INTEG:  LUE with dressing  PSYCH:  Fair mood    MONITOR:  CAPILLARY BLOOD GLUCOSE            I&O's Summary    18 Sep 2023 07:01  -  19 Sep 2023 07:00  --------------------------------------------------------  IN: 980 mL / OUT: 300 mL / NET: 680 mL    19 Sep 2023 07:01  -  19 Sep 2023 20:09  --------------------------------------------------------  IN: 400 mL / OUT: 0 mL / NET: 400 mL                            9.6    12.37 )-----------( 102      ( 19 Sep 2023 04:40 )             32.8       09-19    133<L>  |  94<L>  |  102.6<H>  ----------------------------<  103<H>  4.7   |  18.0<L>  |  5.16<H>    Ca    8.6      19 Sep 2023 04:40  Phos  6.6     09-19  Mg     2.0     09-19          Urinalysis Basic - ( 19 Sep 2023 04:40 )    Color: x / Appearance: x / SG: x / pH: x  Gluc: 103 mg/dL / Ketone: x  / Bili: x / Urobili: x   Blood: x / Protein: x / Nitrite: x   Leuk Esterase: x / RBC: x / WBC x   Sq Epi: x / Non Sq Epi: x / Bacteria: x        Culture:    TTE:    RADIOLOGY        MEDICATIONS  (STANDING):  allopurinol 100 milliGRAM(s) Oral daily  aspirin  chewable 81 milliGRAM(s) Oral daily  atorvastatin 40 milliGRAM(s) Oral at bedtime  buMETAnide 1 milliGRAM(s) Oral two times a day  calcitriol   Capsule 0.25 MICROGram(s) Oral daily  calcium acetate 667 milliGRAM(s) Oral three times a day with meals  chlorhexidine 2% Cloths 1 Application(s) Topical <User Schedule>  diphenhydrAMINE 50 milliGRAM(s) Oral daily  heparin   Injectable 5000 Unit(s) SubCutaneous every 8 hours  meropenem Injectable 500 milliGRAM(s) IV Push every 12 hours  metoprolol tartrate 12.5 milliGRAM(s) Oral daily  pantoprazole    Tablet 40 milliGRAM(s) Oral before breakfast  PARoxetine 20 milliGRAM(s) Oral daily  sodium bicarbonate 1300 milliGRAM(s) Oral three times a day      MEDICATIONS  (PRN):  acetaminophen     Tablet .. 650 milliGRAM(s) Oral every 6 hours PRN Temp greater or equal to 38C (100.4F), Mild Pain (1 - 3)  albuterol    90 MICROgram(s) HFA Inhaler 1 Puff(s) Inhalation every 6 hours PRN Shortness of Breath and/or Wheezing  ALPRAZolam 0.5 milliGRAM(s) Oral two times a day PRN for anxiety  HYDROmorphone   Tablet 2 milliGRAM(s) Oral every 6 hours PRN Severe Pain (7 - 10)  HYDROmorphone   Tablet 1 milliGRAM(s) Oral every 6 hours PRN Moderate Pain (4 - 6)  melatonin 3 milliGRAM(s) Oral at bedtime PRN Insomnia  ondansetron Injectable 4 milliGRAM(s) IV Push every 8 hours PRN Nausea and/or Vomiting  prochlorperazine   Tablet 10 milliGRAM(s) Oral four times a day PRN for nausea  sodium chloride 0.9% lock flush 10 milliLiter(s) IV Push every 1 hour PRN Pre/post blood products, medications, blood draw, and to maintain line patency

## 2023-09-19 NOTE — PROGRESS NOTE ADULT - ASSESSMENT
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 Vascular Surgery was consulted.     HTN  CAD (cardiac cath 2021)  hx of LBBB  Chronic Diastolic Heart Failure  AS, severe TR, pHTN  -c/w metoprolol, statin  -Resume ASA 81   -c/w Bumex   -Cardio consult noted     COPD (not on home O2)  -c/w albuterol inhaler PRN    Gout  c/w allopurinol    hx of DM 2  no longer on medication, controlled per patient  A1c 5.4  Carb Consistent Diet     DVT Prophylaxis -- Heparin SQ    Dispo: Acutely ill; requiring HD, on IV antibiotics. Eventual home anticipated

## 2023-09-19 NOTE — PROGRESS NOTE ADULT - ASSESSMENT
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 Nephrology is managing CKD 5 not on dialysis.     -CKD 5 not on hemodialysis   -Progressive worsening of renal function while here   -Discussed my recommendations re: initiation of hemodialysis which patient was amenable to    -Vascular was consulted for placement of non tunneled dialysis catheter   -Will dialyze today once non tunneled dialysis catheter have been placed by Vascular team   -Next hemodialysis will tomorrow (09/20) then again on Wednesday (09/21)  -BP acceptable  -Volume Status - continue bumex 1mg BID for now - will add UF to HD starting tomorrow   -Metabolic Acidosis - continue sodium bicarb tabs to 1300mg TID for now; will d/c once on hemodialysis   -Anemia - will add low dose YEISON to be given during hemodialysis starting tomorrow   -Mineral Bone Disease in setting of CKD - continue oral phos binder + calcitriol   -Consent for dialysis was signed by patient, witnessed by staff and placed into chart     Libby Perkins DO  Nephrology

## 2023-09-20 NOTE — PROGRESS NOTE ADULT - SUBJECTIVE AND OBJECTIVE BOX
[facility]                                                INFECTIOUS DISEASES  =======================================================                   Tevin Federico#   Juan F Thornton MD#    Ok Zeng MD*                           Janis Gunn MD*   Jessie Mccurdy MD*           Diplomates American Board of Internal Medicine & Infectious Diseases                  # [place] Office - Appt - Tel  [phone] Fax [phone]                * [place] Office - Appt - Tel [phone] Fax [phone]                                  Hospital Consult line:  [phone]  =======================================================      [id number]  GARRETT BARCENAS   follow up for: avf infection  s/p washout of left avf  pt seen on bipap  lethargic, denies complaints  patient seen and examined.       I have personally reviewed the labs and data; pertinent labs and data are listed in this note; please see below.   ===================================================  REVIEW OF SYSTEMS:  CONSTITUTIONAL:  No Fever or chills  HEENT:  No diplopia or blurred vision.  No earache, sore throat or runny nose.  CARDIOVASCULAR:  No pressure, squeezing, strangling, tightness, heaviness or aching about the chest, neck, axilla or epigastrium.  RESPIRATORY:  No cough, shortness of breath  GASTROINTESTINAL:  No nausea, vomiting or diarrhea.  GENITOURINARY:  No dysuria, frequency or urgency. No Blood in urine  MUSCULOSKELETAL:  no joint aches, no muscle pain  SKIN:  No change in skin, hair or nails.  NEUROLOGIC:  No Headaches, seizures or weakness.  PSYCHIATRIC:  No disorder of thought or mood.  ENDOCRINE:  No heat or cold intolerance  HEMATOLOGICAL:  No easy bruising or bleeding.    =======================================================  Allergies    Cefzil (Rash; Angioedema)  Mushrooms (Rash)  Orange (Rash)  Pineapple (Rash)    Intolerances    Antibiotics:  meropenem Injectable 500 milliGRAM(s) IV Push every 12 hours    Other medications:  acetaminophen     Tablet .. 975 milliGRAM(s) Oral every 6 hours  allopurinol 100 milliGRAM(s) Oral daily  atorvastatin 40 milliGRAM(s) Oral at bedtime  buMETAnide 1 milliGRAM(s) Oral two times a day  calcitriol   Capsule 0.25 MICROGram(s) Oral daily  diphenhydrAMINE 50 milliGRAM(s) Oral daily  metoprolol tartrate 12.5 milliGRAM(s) Oral daily  pantoprazole    Tablet 40 milliGRAM(s) Oral before breakfast  PARoxetine 20 milliGRAM(s) Oral daily    ======================================================  Physical Exam:  ============  Vital Signs Last 24 Hrs  T(C): 36.2 (20 Sep 2023 17:45), Max: 36.8 (20 Sep 2023 11:23)  T(F): 97.2 (20 Sep 2023 17:45), Max: 98.2 (20 Sep 2023 11:23)  HR: 86 (20 Sep 2023 17:45) (74 - 95)  BP: 129/58 (20 Sep 2023 17:45) (105/46 - 129/58)  BP(mean): 61 (20 Sep 2023 16:15) (61 - 61)  RR: 22 (20 Sep 2023 17:45) (18 - 22)  SpO2: 92% (20 Sep 2023 17:45) (92% - 100%)    Parameters below as of 20 Sep 2023 17:45  Patient On (Oxygen Delivery Method): nasal cannula  O2 Flow (L/min): 3      General:  No acute distress. on bipap  Eye: no conjunctival pallor, no scleral icterus  Neck: Supple, No lymphadenopathy. rt hd catheter  Respiratory: Lungs are clear to auscultation, Respirations are non-labored.  Cardiovascular: Normal rate, Regular rhythm,  s1+s2  Gastrointestinal: Soft, Non-tender, Non-distended, Normal bowel sounds.  Musculoskeletal: b/l knee scars well healed, lue ace wrap in place. b/l Le ace wraps  Integumentary: No rash.    =======================================================  Labs:                                     9.0    11.76 )-----------( 105      ( 20 Sep 2023 10:43 )             29.1       09-20    136  |  95<L>  |  81.0<H>  ----------------------------<  97  3.9   |  24.0  |  4.47<H>    Ca    8.5      20 Sep 2023 10:43  Phos  6.6     09-19  Mg     2.0     09-20    TPro  5.6<L>  /  Alb  3.5  /  TBili  0.6  /  DBili  x   /  AST  37<H>  /  ALT  15  /  AlkPhos  169<H>  09-20          ABG - ( 20 Sep 2023 16:31 )  pH, Arterial: 7.370 pH, Blood: x     /  pCO2: 46    /  pO2: 155   / HCO3: 27    / Base Excess: 1.3   /  SaO2: 98.8                Urinalysis Basic - ( 20 Sep 2023 10:43 )    Color: x / Appearance: x / SG: x / pH: x  Gluc: 97 mg/dL / Ketone: x  / Bili: x / Urobili: x   Blood: x / Protein: x / Nitrite: x   Leuk Esterase: x / RBC: x / WBC x   Sq Epi: x / Non Sq Epi: x / Bacteria: x                  CAPILLARY BLOOD GLUCOSE                  Culture - Tissue with Gram Stain (collected 09-15-23 @ 20:01)  Source: .Tissue Left Upper Extremity Wound  Gram Stain (09-16-23 @ 03:14):    No polymorphonuclear leukocytes seen per low power field    Moderate Gram positive cocci in pairs seen per oil power field    Rare Gram Negative Rods seen per oil power field    Culture - Blood (collected 09-14-23 @ 16:33)  Source: .Blood Blood-Peripheral    Culture - Blood (collected 09-14-23 @ 16:27)  Source: .Blood Blood-Peripheral    Culture - Other (collected 09-14-23 @ 16:15)  Source: .Other left fistula site         DISPLAY PLAN FREE TEXT

## 2023-09-20 NOTE — PROGRESS NOTE ADULT - SUBJECTIVE AND OBJECTIVE BOX
[facility] DIVISION OF KIDNEY DISEASES AND HYPERTENSION -- HEMODIALYSIS NOTE  --------------------------------------------------------------------------------  Chief Complaint: ESRD/Ongoing hemodialysis requirement    24 hour events/subjective:  started on HD yesterday  Pt seen and examined; feels well        PAST HISTORY  --------------------------------------------------------------------------------  No significant changes to PMH, PSH, FHx, SHx, unless otherwise noted    ALLERGIES & MEDICATIONS  --------------------------------------------------------------------------------  Allergies    Cefzil (Rash; Angioedema)  Mushrooms (Rash)  Orange (Rash)  Pineapple (Rash)    Intolerances      Standing Inpatient Medications  allopurinol 100 milliGRAM(s) Oral daily  aspirin  chewable 81 milliGRAM(s) Oral daily  atorvastatin 40 milliGRAM(s) Oral at bedtime  budesonide  80 MICROgram(s)/formoterol 4.5 MICROgram(s) Inhaler 2 Puff(s) Inhalation two times a day  buMETAnide 1 milliGRAM(s) Oral two times a day  calcitriol   Capsule 0.25 MICROGram(s) Oral daily  calcium acetate 667 milliGRAM(s) Oral three times a day with meals  chlorhexidine 2% Cloths 1 Application(s) Topical <User Schedule>  diphenhydrAMINE 50 milliGRAM(s) Oral daily  heparin   Injectable 5000 Unit(s) SubCutaneous every 8 hours  meropenem Injectable 500 milliGRAM(s) IV Push every 12 hours  metoprolol tartrate 12.5 milliGRAM(s) Oral daily  pantoprazole    Tablet 40 milliGRAM(s) Oral before breakfast  PARoxetine 20 milliGRAM(s) Oral daily    PRN Inpatient Medications  acetaminophen     Tablet .. 650 milliGRAM(s) Oral every 6 hours PRN  albuterol    90 MICROgram(s) HFA Inhaler 1 Puff(s) Inhalation every 6 hours PRN  ALPRAZolam 0.5 milliGRAM(s) Oral two times a day PRN  HYDROmorphone   Tablet 2 milliGRAM(s) Oral every 6 hours PRN  HYDROmorphone   Tablet 1 milliGRAM(s) Oral every 6 hours PRN  melatonin 3 milliGRAM(s) Oral at bedtime PRN  ondansetron Injectable 4 milliGRAM(s) IV Push every 8 hours PRN  prochlorperazine   Tablet 10 milliGRAM(s) Oral four times a day PRN  sodium chloride 0.9% lock flush 10 milliLiter(s) IV Push every 1 hour PRN      REVIEW OF SYSTEMS  --------------------------------------------------------------------------------  Gen: No weight changes, fatigue, fevers/chills, weakness  Skin: No rashes  Head/Eyes/Ears/Mouth: No headache  Respiratory: No dyspnea, cough,  CV: No chest pain, orthopnea  GI: No abdominal pain, diarrhea, constipation, nausea, vomiting,  MSK: No joint pain  Neuro: No dizziness/lightheadedness, weakness  Heme: No bleeding  Psych: No significant nervousness, anxiety, stress, depression    All other systems were reviewed and are negative, except as noted.    VITALS/PHYSICAL EXAM  --------------------------------------------------------------------------------  T(C): 36.2 (09-20-23 @ 20:30), Max: 36.8 (09-20-23 @ 11:23)  HR: 92 (09-20-23 @ 20:30) (74 - 95)  BP: 117/51 (09-20-23 @ 20:30) (105/46 - 129/58)  RR: 22 (09-20-23 @ 20:30) (18 - 22)  SpO2: 98% (09-20-23 @ 20:30) (92% - 100%)  Wt(kg): --        09-19-23 @ 07:01  -  09-20-23 @ 07:00  --------------------------------------------------------  IN: 400 mL / OUT: 0 mL / NET: 400 mL    09-20-23 @ 07:01  -  09-20-23 @ 21:11  --------------------------------------------------------  IN: 0 mL / OUT: 500 mL / NET: -500 mL      Physical Exam:  	Gen: NAD  	HEENT: PERRL, supple neck,  	Pulm: CTA B/L  	CV: RRR, S1S2; no rub  	Abd: +BS, soft, nontender  	UE: Warm  	LE: Warm, + edema  	Neuro: No focal deficits  	Psych: Normal affect and mood  	Skin: Warm, without rashes  	Vascular access: RI non [facility]    LABS/STUDIES  --------------------------------------------------------------------------------              9.0    11.76 >-----------<  105      [09-20-23 @ 10:43]              29.1     136  |  95  |  81.0  ----------------------------<  97      [09-20-23 @ 10:43]  3.9   |  24.0  |  4.47        Ca     8.5     [09-20-23 @ 10:43]      Mg     2.0     [09-20-23 @ 04:50]      Phos  6.6     [09-19-23 @ 04:40]    TPro  5.6  /  Alb  3.5  /  TBili  0.6  /  DBili  x   /  AST  37  /  ALT  15  /  AlkPhos  169  [09-20-23 @ 04:50]          Iron 88, TIBC 305, %sat 29      [09-19-23 @ 04:40]  Ferritin 243      [09-19-23 @ 04:40]  PTH -- (Ca 8.5)      [09-19-23 @ 04:40]   548    HBsAb >1000.0      [09-18-23 @ 17:40]  HBsAb Reactive      [09-18-23 @ 17:40]  HBsAg Nonreact      [09-18-23 @ 17:40]  HBcAb Nonreact      [09-18-23 @ 17:40]  HCV 0.06, Nonreact      [09-18-23 @ 17:40]

## 2023-09-20 NOTE — PROGRESS NOTE ADULT - SUBJECTIVE AND OBJECTIVE BOX
VASCULAR SURGERY PROGRESS NOTE    Subjective:   Patient examined at bedside this AM. Reports she feels well today. HD was tolerated well yesterday. No new complaints or concerns. NAEO     Objective:  Vital Signs  T(C): 36.6 (09-20 @ 04:24), Max: 36.7 (09-19 @ 09:27)  HR: 89 (09-20 @ 07:35) (78 - 95)  BP: 117/67 (09-20 @ 07:35) (99/61 - 117/67)  RR: 20 (09-20 @ 07:35) (18 - 20)  SpO2: 93% (09-20 @ 07:35) (93% - 100%)      Physical Exam:  General: alert and oriented, NAD  Resp: airway patent, respirations unlabored  Extremities: no edema, wound vac in place with adequate suction, palpable thrill in fistula        Labs:                        9.6    12.21 )-----------( 114      ( 20 Sep 2023 04:50 )             32.3   09-20    135  |  93<L>  |  78.1<H>  ----------------------------<  86  4.0   |  21.0<L>  |  4.15<H>    Ca    8.6      20 Sep 2023 04:50  Phos  6.6     09-19  Mg     2.0     09-20    TPro  5.6<L>  /  Alb  3.5  /  TBili  0.6  /  DBili  x   /  AST  37<H>  /  ALT  15  /  AlkPhos  169<H>  09-20

## 2023-09-20 NOTE — PROGRESS NOTE ADULT - ASSESSMENT
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 Nephrology is managing CKD 5 not on dialysis.     -CKD5/ now ESRD  - Started on HD on 09/19  Plan for HD # 2 today, 3rd session tomorrow  s/p RIJ TDC; will need a TDC  SW for outpt HD set up  Has a LUE AVF - not yet mature      HTN  BP soft  d/c Bumex;  UF with HD as tolerated    Anemia   Hb below goal  Start YEISON      Mineral Bone Disease in setting of CKD - continue oral phos binders + calcitriol

## 2023-09-20 NOTE — PROGRESS NOTE ADULT - SUBJECTIVE AND OBJECTIVE BOX
HOSPITALIST PROGRESS NOTE    GARRETT BARCENAS  [id number]  73yFemale    Patient is a 73y old  Female who presents with a chief complaint of Fistula evaluation (20 Sep 2023 08:30)      SUBJECTIVE:   Chart reviewed since last visit.  Patient seen and examined at bedside for LUE cellulitis, ESRD.   Somnolent since earlier today; ABG with hypercarbic respiratory acidosis       OBJECTIVE:  Vital Signs Last 24 Hrs  T(C): 36.8 (20 Sep 2023 11:23), Max: 36.8 (20 Sep 2023 11:23)  T(F): 98.2 (20 Sep 2023 11:23), Max: 98.2 (20 Sep 2023 11:23)  HR: 84 (20 Sep 2023 16:49) (74 - 95)  BP: 105/46 (20 Sep 2023 16:15) (105/46 - 117/67)  BP(mean): 61 (20 Sep 2023 16:15) (61 - 61)  RR: 20 (20 Sep 2023 16:15) (18 - 20)  SpO2: 95% (20 Sep 2023 16:49) (93% - 100%)    Parameters below as of 20 Sep 2023 16:15  Patient On (Oxygen Delivery Method): BiPAP/CPAP    PHYSICAL EXAMINATION  General: Elderly female sitting up in bed, somnolent  HEENT: BiPAP (+)  NECK:  Supple. RIJ HD catheter (+)  CVS: regular rate and rhythm S1 S2  RESP:  Fair air entry bilaterally  GI:  Soft nondistended nontender BS+  : No suprapubic tenderness  MSK:  LUE with dressing.  CNS: Somnolent, awakens, mumbles, then goes back to sleep   INTEG:  LUE with dressing  PSYCH: Somnolent      MONITOR:  CAPILLARY BLOOD GLUCOSE            I&O's Summary    19 Sep 2023 07:01  -  20 Sep 2023 07:00  --------------------------------------------------------  IN: 400 mL / OUT: 0 mL / NET: 400 mL                            9.0    11.76 )-----------( 105      ( 20 Sep 2023 10:43 )             29.1       09-20    136  |  95<L>  |  81.0<H>  ----------------------------<  97  3.9   |  24.0  |  4.47<H>    Ca    8.5      20 Sep 2023 10:43  Phos  6.6     09-19  Mg     2.0     09-20    TPro  5.6<L>  /  Alb  3.5  /  TBili  0.6  /  DBili  x   /  AST  37<H>  /  ALT  15  /  AlkPhos  169<H>  09-20        Urinalysis Basic - ( 20 Sep 2023 10:43 )    Color: x / Appearance: x / SG: x / pH: x  Gluc: 97 mg/dL / Ketone: x  / Bili: x / Urobili: x   Blood: x / Protein: x / Nitrite: x   Leuk Esterase: x / RBC: x / WBC x   Sq Epi: x / Non Sq Epi: x / Bacteria: x        Culture:    TTE:    RADIOLOGY        MEDICATIONS  (STANDING):  allopurinol 100 milliGRAM(s) Oral daily  aspirin  chewable 81 milliGRAM(s) Oral daily  atorvastatin 40 milliGRAM(s) Oral at bedtime  buMETAnide 1 milliGRAM(s) Oral two times a day  calcitriol   Capsule 0.25 MICROGram(s) Oral daily  calcium acetate 667 milliGRAM(s) Oral three times a day with meals  chlorhexidine 2% Cloths 1 Application(s) Topical <User Schedule>  diphenhydrAMINE 50 milliGRAM(s) Oral daily  epoetin themla-epbx (RETACRIT) Injectable 2000 Unit(s) IV Push once  heparin   Injectable 5000 Unit(s) SubCutaneous every 8 hours  meropenem Injectable 500 milliGRAM(s) IV Push every 12 hours  metoprolol tartrate 12.5 milliGRAM(s) Oral daily  pantoprazole    Tablet 40 milliGRAM(s) Oral before breakfast  PARoxetine 20 milliGRAM(s) Oral daily      MEDICATIONS  (PRN):  acetaminophen     Tablet .. 650 milliGRAM(s) Oral every 6 hours PRN Temp greater or equal to 38C (100.4F), Mild Pain (1 - 3)  albuterol    90 MICROgram(s) HFA Inhaler 1 Puff(s) Inhalation every 6 hours PRN Shortness of Breath and/or Wheezing  ALPRAZolam 0.5 milliGRAM(s) Oral two times a day PRN for anxiety  HYDROmorphone   Tablet 2 milliGRAM(s) Oral every 6 hours PRN Severe Pain (7 - 10)  HYDROmorphone   Tablet 1 milliGRAM(s) Oral every 6 hours PRN Moderate Pain (4 - 6)  melatonin 3 milliGRAM(s) Oral at bedtime PRN Insomnia  ondansetron Injectable 4 milliGRAM(s) IV Push every 8 hours PRN Nausea and/or Vomiting  prochlorperazine   Tablet 10 milliGRAM(s) Oral four times a day PRN for nausea  sodium chloride 0.9% lock flush 10 milliLiter(s) IV Push every 1 hour PRN Pre/post blood products, medications, blood draw, and to maintain line patency

## 2023-09-20 NOTE — PROGRESS NOTE ADULT - ASSESSMENT
Assessment:   74yo Female with infection overlying AVF site, now POD 5 s/p washout    Plan:   - Cont diet  - F/U long term HD need  - Set up wound vac for home

## 2023-09-20 NOTE — PROGRESS NOTE ADULT - ASSESSMENT
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  now ESRD requiring HD; Catheter placed RIJ, HD started  Anemia Chronic Disease  - avoid nephrotoxic medications  - Bumetanide, Calcium acetate, calcitriol  - YEISON    COPD   Metabolic Encephalopathy due to hypercarbia  - SpO2<94%  - NIV  - Albuterol PRN  - Budosenide/Formeterol    HTN  CAD (cardiac cath 2021)  hx of LBBB  Chronic Diastolic Heart Failure  AS, severe TR, pHTN  - ASA, Metoprolol, atorvastatin    Gout  - allopurinol    hx of DM 2  no longer on medication, controlled per patient  A1c 5.4  Carb Consistent Diet     DVT Prophylaxis -- Heparin SQ    Dispo: Acutely ill; requiring HD, on IV antibiotics.

## 2023-09-20 NOTE — PROGRESS NOTE ADULT - ASSESSMENT
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 On May 31, pt had a left brachiocephalic ateriovenous fistula placed by Dr Hodge and Dr Coleman with apparent difficulty in accessing the site with difficulty with the conscious sedation at that time requiring close monitoring in SICU and NIV respiratory support. Pt now reports LUE open wound, initially presenting as LUE swelling for the last 2 weeks, from which a blister developed at the area of the fistula which ruptured today with drainage of murky fluid. Pt endorsed 2 episodes of fever one two days ago of 101.7 and another yesterday with 101.3, also endorsed decreased range of motion of the LUE as well has chills, pt reportedly on duricef at home prescribed by Dr Hodge    In the ED vitals stable, Labs stable. Bcx collected. Medicine consulted for admission to medicine service. (14 Sep 2023 21:24)    LUE cellulitis r/o AVF infection  Fever  esrd on hd    - f/u BCx ngtd  - f/u wound CX grp b strep  - mrsa screen (-) for mrsa  - Continue empiric meropenem   - vanco stopped  - s/p 9/15 left AVF washout  - f/u Or cx 9/15 grp B strep, GNR  - Trend Fever  - Trend Leukocytosis    d/w   Will Follow

## 2023-09-21 PROBLEM — L03.116 CELLULITIS OF LEFT FOOT: Status: ACTIVE | Noted: 2020-02-18

## 2023-09-21 PROBLEM — R60.0 BILATERAL LOWER EXTREMITY EDEMA: Status: ACTIVE | Noted: 2019-07-02

## 2023-09-21 PROBLEM — I89.0 LYMPHEDEMA OF BOTH LOWER EXTREMITIES: Status: ACTIVE | Noted: 2023-01-01

## 2023-09-21 PROBLEM — I25.10 CAD (CORONARY ARTERY DISEASE): Status: ACTIVE | Noted: 2021-07-07

## 2023-09-21 PROBLEM — L03.115 CELLULITIS OF RIGHT LOWER EXTREMITY: Status: ACTIVE | Noted: 2019-05-07

## 2023-09-21 NOTE — CONSULT NOTE ADULT - SUBJECTIVE AND OBJECTIVE BOX
Patient is a 73y old  Female who presents with a chief complaint of Fistula evaluation (21 Sep 2023 14:19)      BRIEF HOSPITAL COURSE:   72 y/o F with hx of anxiety/depression, CHF (Ef 60%), CAD, HTN, Dm, COPD (not on home o2), CKD 5 (placed AVF but no HD yet), 50 pack year smoker, s/p partial nephrectomy for papillary renal cancer 2018, present on 9/14/23 for LUE swelling for 2 weeks, admitted for possible infected wound at the AV fistula site.  S/p I & D by vascular on 9/15, all wound culture grew group b strept agalactiae. Course complicated with worsening renal function, and dialysis cath was palced by vascular on 9/19.  Patient then developed hypercapnea, placed on bipap, pulmonary is then consulted.     Patient s/p trial of bipap, pco2 improved, and today patient is awake and alert, fully conversant, reports feeling better overall.   Patient also s/p HD (no fluid removal) on 9/19, then 500 ml on 9/20, and 1L on 9/21.        PAST MEDICAL & SURGICAL HISTORY:  2019 novel coronavirus disease (COVID-19)      HTN (hypertension)      COPD, moderate      Diabetes mellitus      CAD (coronary artery disease)      Anemia      S/P arteriovenous (AV) fistula repair      H/O total knee replacement      History of partial nephrectomy      H/O lumpectomy        Allergies    Cefzil (Rash; Angioedema)  Mushrooms (Rash)  Orange (Rash)  Pineapple (Rash)    Intolerances      FAMILY HISTORY:  FH: COPD (chronic obstructive pulmonary disease) (Father)    FH: diabetes mellitus (Father)    Family history of renal failure (Father, Mother)        Family history otherwise noncontributory.    Social History:   Review of Systems:  negative except as above     ALL OTHER REVIEW OF SYSTEMS EXCEPT PER HPI NEGATIVE.      Medications:  meropenem Injectable 500 milliGRAM(s) IV Push every 12 hours    buMETAnide 1 milliGRAM(s) Oral two times a day  metoprolol tartrate 12.5 milliGRAM(s) Oral daily    albuterol    90 MICROgram(s) HFA Inhaler 1 Puff(s) Inhalation every 6 hours PRN  budesonide  80 MICROgram(s)/formoterol 4.5 MICROgram(s) Inhaler 2 Puff(s) Inhalation two times a day    acetaminophen     Tablet .. 650 milliGRAM(s) Oral every 6 hours PRN  ALPRAZolam 0.5 milliGRAM(s) Oral two times a day PRN  diphenhydrAMINE 50 milliGRAM(s) Oral daily  HYDROmorphone   Tablet 2 milliGRAM(s) Oral every 6 hours PRN  HYDROmorphone   Tablet 1 milliGRAM(s) Oral every 6 hours PRN  melatonin 3 milliGRAM(s) Oral at bedtime PRN  ondansetron Injectable 4 milliGRAM(s) IV Push every 8 hours PRN  PARoxetine 20 milliGRAM(s) Oral daily  prochlorperazine   Tablet 10 milliGRAM(s) Oral four times a day PRN      aspirin  chewable 81 milliGRAM(s) Oral daily  heparin   Injectable 5000 Unit(s) SubCutaneous every 8 hours    pantoprazole    Tablet 40 milliGRAM(s) Oral before breakfast      allopurinol 100 milliGRAM(s) Oral daily  atorvastatin 40 milliGRAM(s) Oral at bedtime    calcitriol   Capsule 0.25 MICROGram(s) Oral daily  calcium acetate 667 milliGRAM(s) Oral three times a day with meals  sodium chloride 0.9% lock flush 10 milliLiter(s) IV Push every 1 hour PRN      chlorhexidine 2% Cloths 1 Application(s) Topical <User Schedule>      Vital Signs Last 24 Hrs  T(C): 36.9 (21 Sep 2023 07:16), Max: 36.9 (21 Sep 2023 07:16)  T(F): 98.4 (21 Sep 2023 07:16), Max: 98.4 (21 Sep 2023 07:16)  HR: 87 (21 Sep 2023 13:05) (74 - 94)  BP: 104/43 (21 Sep 2023 13:05) (100/41 - 129/58)  BP(mean): 56 (21 Sep 2023 13:05) (52 - 68)  RR: 20 (21 Sep 2023 13:05) (16 - 22)  SpO2: 95% (21 Sep 2023 13:05) (92% - 100%)    Parameters below as of 21 Sep 2023 13:05  Patient On (Oxygen Delivery Method): nasal cannula  O2 Flow (L/min): 1.5      ABG - ( 20 Sep 2023 16:31 )  pH, Arterial: 7.370 pH, Blood: x     /  pCO2: 46    /  pO2: 155   / HCO3: 27    / Base Excess: 1.3   /  SaO2: 98.8                I&O's Detail    20 Sep 2023 07:01  -  21 Sep 2023 07:00  --------------------------------------------------------  IN:  Total IN: 0 mL    OUT:    Other (mL): 500 mL  Total OUT: 500 mL    Total NET: -500 mL      21 Sep 2023 07:01  -  21 Sep 2023 15:18  --------------------------------------------------------  IN:  Total IN: 0 mL    OUT:    Voided (mL): 100 mL  Total OUT: 100 mL    Total NET: -100 mL            LABS:                        8.4    10.42 )-----------( 90       ( 21 Sep 2023 06:00 )             27.7     09-21    136  |  95<L>  |  51.6<H>  ----------------------------<  79  3.7   |  26.0  |  3.38<H>    Ca    8.5      21 Sep 2023 06:00  Mg     2.0     09-20    TPro  5.2<L>  /  Alb  3.4  /  TBili  0.6  /  DBili  x   /  AST  21  /  ALT  9   /  AlkPhos  153<H>  09-21          CAPILLARY BLOOD GLUCOSE          Urinalysis Basic - ( 21 Sep 2023 06:00 )    Color: x / Appearance: x / SG: x / pH: x  Gluc: 79 mg/dL / Ketone: x  / Bili: x / Urobili: x   Blood: x / Protein: x / Nitrite: x   Leuk Esterase: x / RBC: x / WBC x   Sq Epi: x / Non Sq Epi: x / Bacteria: x      CULTURES:  Culture Results:   Numerous Streptococcus agalactiae (Group B)  Numerous Streptococcus agalactiae (Group B) isolated  Group B streptococci are susceptible to ampicillin,  penicillin and cefazolin, but may be resistant to  erythromycin and clindamycin.  Recommendations for intrapartum prophylaxis for Group B  streptococci are penicillin or ampicillin. (09-15 @ 20:01)  Culture Results:   Moderate Streptococcus agalactiae (Group B) isolated  Group B streptococci are susceptible to ampicillin,  penicillin and cefazolin, but may be resistant to  erythromycin and clindamycin.  Recommendations for intrapartum prophylaxis for Group B  streptococci are penicillin or ampicillin. (09-15 @ 20:01)  Culture Results:   No growth at 5 days (09-14 @ 16:33)  Culture Results:   No growth at 5 days (09-14 @ 16:27)  Culture Results:   Moderate Streptococcus agalactiae (Group B) isolated  Group B streptococci are susceptible to ampicillin,  penicillin and cefazolin, but may be resistant to  erythromycin and clindamycin.  Recommendations for intrapartum prophylaxis for Group B  streptococci are penicillin or ampicillin. (09-14 @ 16:15)      Physical Examination:  GENERAL: In NAD   HEENT: NC/AT  NECK: Supple, trachea midline  PULM: bibasilar crackles   CVS: +S1, S2, RRR  ABD: Soft, non-tender  EXTREMITIES: b/l LE wrapped, and also left Upper extremity was wrapped with ACE band.   SKIN: warm   NEURO: Grossly non-focal    DEVICES:     RADIOLOGY: reviewed

## 2023-09-21 NOTE — PROGRESS NOTE ADULT - SUBJECTIVE AND OBJECTIVE BOX
[facility]                                                INFECTIOUS DISEASES  =======================================================                   Tevin Caballero#   Juan F Thornton MD#    Ok Zeng MD*                           Janis Gunn MD*   Jessie Mccurdy MD*           Diplomates American Board of Internal Medicine & Infectious Diseases                  # [place] Office - Appt - Tel  [phone] Fax [phone]                * [place] Office - Appt - Tel [phone] Fax [phone]                                  Hospital Consult line:  [phone]  =======================================================      [id number]  GARRETT BARCENAS   follow up for: avf infection  s/p washout of left avf  pt now on nasal canula  feels ok  patient seen and examined.       I have personally reviewed the labs and data; pertinent labs and data are listed in this note; please see below.   ===================================================  REVIEW OF SYSTEMS:  CONSTITUTIONAL:  No Fever or chills  HEENT:  No diplopia or blurred vision.  No earache, sore throat or runny nose.  CARDIOVASCULAR:  No pressure, squeezing, strangling, tightness, heaviness or aching about the chest, neck, axilla or epigastrium.  RESPIRATORY:  No cough, shortness of breath  GASTROINTESTINAL:  No nausea, vomiting or diarrhea.  GENITOURINARY:  No dysuria, frequency or urgency. No Blood in urine  MUSCULOSKELETAL:  no joint aches, no muscle pain  SKIN:  No change in skin, hair or nails.  NEUROLOGIC:  No Headaches, seizures or weakness.  PSYCHIATRIC:  No disorder of thought or mood.  ENDOCRINE:  No heat or cold intolerance  HEMATOLOGICAL:  No easy bruising or bleeding.    =======================================================  Allergies    Cefzil (Rash; Angioedema)  Mushrooms (Rash)  Orange (Rash)  Pineapple (Rash)    Intolerances    Antibiotics:  meropenem Injectable 500 milliGRAM(s) IV Push every 12 hours    Other medications:  acetaminophen     Tablet .. 975 milliGRAM(s) Oral every 6 hours  allopurinol 100 milliGRAM(s) Oral daily  atorvastatin 40 milliGRAM(s) Oral at bedtime  buMETAnide 1 milliGRAM(s) Oral two times a day  calcitriol   Capsule 0.25 MICROGram(s) Oral daily  diphenhydrAMINE 50 milliGRAM(s) Oral daily  metoprolol tartrate 12.5 milliGRAM(s) Oral daily  pantoprazole    Tablet 40 milliGRAM(s) Oral before breakfast  PARoxetine 20 milliGRAM(s) Oral daily    ======================================================  Physical Exam:  ============  Vital Signs Last 24 Hrs  T(C): 36.2 (20 Sep 2023 17:45), Max: 36.8 (20 Sep 2023 11:23)  T(F): 97.2 (20 Sep 2023 17:45), Max: 98.2 (20 Sep 2023 11:23)  HR: 86 (20 Sep 2023 17:45) (74 - 95)  BP: 129/58 (20 Sep 2023 17:45) (105/46 - 129/58)  BP(mean): 61 (20 Sep 2023 16:15) (61 - 61)  RR: 22 (20 Sep 2023 17:45) (18 - 22)  SpO2: 92% (20 Sep 2023 17:45) (92% - 100%)    Parameters below as of 20 Sep 2023 17:45  Patient On (Oxygen Delivery Method): nasal cannula  O2 Flow (L/min): 3      General:  No acute distress. on nc  Eye: no conjunctival pallor, no scleral icterus  Neck: Supple, No lymphadenopathy. rt hd catheter  Respiratory: Lungs are clear to auscultation, Respirations are non-labored.  Cardiovascular: Normal rate, Regular rhythm,  s1+s2  Gastrointestinal: Soft, Non-tender, Non-distended, Normal bowel sounds.  Musculoskeletal: b/l knee scars well healed, lue ace wrap in place. b/l Le ace wraps  Integumentary: No rash.    =======================================================  Labs:                                     8.4    10.42 )-----------( 90       ( 21 Sep 2023 06:00 )             27.7       09-21    136  |  95<L>  |  51.6<H>  ----------------------------<  79  3.7   |  26.0  |  3.38<H>    Ca    8.5      21 Sep 2023 06:00  Mg     2.0     09-20    TPro  5.2<L>  /  Alb  3.4  /  TBili  0.6  /  DBili  x   /  AST  21  /  ALT  9   /  AlkPhos  153<H>  09-21          ABG - ( 20 Sep 2023 16:31 )  pH, Arterial: 7.370 pH, Blood: x     /  pCO2: 46    /  pO2: 155   / HCO3: 27    / Base Excess: 1.3   /  SaO2: 98.8                Urinalysis Basic - ( 21 Sep 2023 06:00 )    Color: x / Appearance: x / SG: x / pH: x  Gluc: 79 mg/dL / Ketone: x  / Bili: x / Urobili: x   Blood: x / Protein: x / Nitrite: x   Leuk Esterase: x / RBC: x / WBC x   Sq Epi: x / Non Sq Epi: x / Bacteria: x                  CAPILLARY BLOOD GLUCOSE                ABG - ( 20 Sep 2023 16:31 )  pH, Arterial: 7.370 pH, Blood: x     /  pCO2: 46    /  pO2: 155   / HCO3: 27    / Base Excess: 1.3   /  SaO2: 98.8                Urinalysis Basic - ( 20 Sep 2023 10:43 )    Color: x / Appearance: x / SG: x / pH: x  Gluc: 97 mg/dL / Ketone: x  / Bili: x / Urobili: x   Blood: x / Protein: x / Nitrite: x   Leuk Esterase: x / RBC: x / WBC x   Sq Epi: x / Non Sq Epi: x / Bacteria: x                  CAPILLARY BLOOD GLUCOSE                  Culture - Tissue with Gram Stain (collected 09-15-23 @ 20:01)  Source: .Tissue Left Upper Extremity Wound  Gram Stain (09-16-23 @ 03:14):    No polymorphonuclear leukocytes seen per low power field    Moderate Gram positive cocci in pairs seen per oil power field    Rare Gram Negative Rods seen per oil power field    Culture - Blood (collected 09-14-23 @ 16:33)  Source: .Blood Blood-Peripheral    Culture - Blood (collected 09-14-23 @ 16:27)  Source: .Blood Blood-Peripheral    Culture - Other (collected 09-14-23 @ 16:15)  Source: .Other left fistula site

## 2023-09-21 NOTE — PROGRESS NOTE ADULT - ASSESSMENT
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  now ESRD requiring HD; Catheter placed RIJ, HD started  Anemia Chronic Disease  - avoid nephrotoxic medications  - Bumetanide, Calcium acetate, calcitriol  - YEISON  - Will need PermCath; vascular to schedule    COPD   Metabolic Encephalopathy due to hypercarbia resolved  - SpO2<94%  - Albuterol PRN  - Budosenide/Formeterol  - nocturnal NIV  - AM ABG  - Pulmonology follow up    HTN  CAD (cardiac cath 2021)  hx of LBBB  Chronic Diastolic Heart Failure  AS, severe TR, pHTN  - ASA, Metoprolol, atorvastatin    Gout  - allopurinol    hx of DM 2  no longer on medication, controlled per patient  A1c 5.4  Carb Consistent Diet     DVT Prophylaxis -- Heparin SQ    Dispo: Acutely ill; requiring HD, on IV antibiotics.      Discussed with patient, spouse, ID, RN

## 2023-09-21 NOTE — PROGRESS NOTE ADULT - SUBJECTIVE AND OBJECTIVE BOX
[facility] DIVISION OF KIDNEY DISEASES AND HYPERTENSION -- HEMODIALYSIS NOTE  --------------------------------------------------------------------------------  Chief Complaint: ESRD/Ongoing hemodialysis requirement    24 hour events/subjective:  s/p HD yesterday  Pt seen and examined- feels well    PAST HISTORY  --------------------------------------------------------------------------------  No significant changes to PMH, PSH, FHx, SHx, unless otherwise noted    ALLERGIES & MEDICATIONS  --------------------------------------------------------------------------------  Allergies    Cefzil (Rash; Angioedema)  Mushrooms (Rash)  Orange (Rash)  Pineapple (Rash)    Intolerances      Standing Inpatient Medications  allopurinol 100 milliGRAM(s) Oral daily  aspirin  chewable 81 milliGRAM(s) Oral daily  atorvastatin 40 milliGRAM(s) Oral at bedtime  budesonide  80 MICROgram(s)/formoterol 4.5 MICROgram(s) Inhaler 2 Puff(s) Inhalation two times a day  buMETAnide 1 milliGRAM(s) Oral two times a day  calcitriol   Capsule 0.25 MICROGram(s) Oral daily  calcium acetate 667 milliGRAM(s) Oral three times a day with meals  chlorhexidine 2% Cloths 1 Application(s) Topical <User Schedule>  diphenhydrAMINE 50 milliGRAM(s) Oral daily  heparin   Injectable 5000 Unit(s) SubCutaneous every 8 hours  meropenem Injectable 500 milliGRAM(s) IV Push every 12 hours  metoprolol tartrate 12.5 milliGRAM(s) Oral daily  pantoprazole    Tablet 40 milliGRAM(s) Oral before breakfast  PARoxetine 20 milliGRAM(s) Oral daily    PRN Inpatient Medications  acetaminophen     Tablet .. 650 milliGRAM(s) Oral every 6 hours PRN  albuterol/ipratropium for Nebulization 3 milliLiter(s) Nebulizer every 6 hours PRN  ALPRAZolam 0.5 milliGRAM(s) Oral two times a day PRN  HYDROmorphone   Tablet 2 milliGRAM(s) Oral every 6 hours PRN  HYDROmorphone   Tablet 1 milliGRAM(s) Oral every 6 hours PRN  melatonin 3 milliGRAM(s) Oral at bedtime PRN  ondansetron Injectable 4 milliGRAM(s) IV Push every 8 hours PRN  prochlorperazine   Tablet 10 milliGRAM(s) Oral four times a day PRN  sodium chloride 0.9% lock flush 10 milliLiter(s) IV Push every 1 hour PRN      REVIEW OF SYSTEMS  --------------------------------------------------------------------------------  Gen: No weight changes, fatigue, fevers/chills, weakness  Skin: No rashes  Head/Eyes/Ears/Mouth: No headache  Respiratory: No dyspnea, cough,  CV: No chest pain, orthopnea  GI: No abdominal pain, diarrhea, constipation, nausea, vomiting,  MSK: No joint pain  Neuro: No dizziness/lightheadedness, weakness  Heme: No bleeding  Psych: No significant nervousness, anxiety, stress, depression    All other systems were reviewed and are negative, except as noted.    VITALS/PHYSICAL EXAM  --------------------------------------------------------------------------------  T(C): 37.1 (09-21-23 @ 21:45), Max: 37.1 (09-21-23 @ 21:45)  HR: 96 (09-21-23 @ 21:45) (85 - 96)  BP: 110/64 (09-21-23 @ 21:45) (95/48 - 117/51)  RR: 20 (09-21-23 @ 21:45) (16 - 20)  SpO2: 96% (09-21-23 @ 21:45) (93% - 100%)  Wt(kg): --        09-20-23 @ 07:01  -  09-21-23 @ 07:00  --------------------------------------------------------  IN: 0 mL / OUT: 500 mL / NET: -500 mL    09-21-23 @ 07:01  -  09-21-23 @ 22:47  --------------------------------------------------------  IN: 0 mL / OUT: 1201 mL / NET: -1201 mL      Physical Exam:  	Gen: NAD  	HEENT:supple neck,  	Pulm: CTA B/L  	CV: RRR, S1S2; no rub  	Abd: +BS, soft, nontender  	UE: Warm  	LE: Warm, + edema  	Neuro: No focal deficits  	Psych: Normal affect and mood  	Skin: Warm  	Vascular access: RIj non [facility]    LABS/STUDIES  --------------------------------------------------------------------------------              8.4    10.42 >-----------<  90       [09-21-23 @ 06:00]              27.7     136  |  95  |  51.6  ----------------------------<  79      [09-21-23 @ 06:00]  3.7   |  26.0  |  3.38        Ca     8.5     [09-21-23 @ 06:00]      Mg     2.0     [09-20-23 @ 04:50]    TPro  5.2  /  Alb  3.4  /  TBili  0.6  /  DBili  x   /  AST  21  /  ALT  9   /  AlkPhos  153  [09-21-23 @ 06:00]          Iron 88, TIBC 305, %sat 29      [09-19-23 @ 04:40]  Ferritin 243      [09-19-23 @ 04:40]  PTH -- (Ca 8.5)      [09-19-23 @ 04:40]   548    HBsAb >1000.0      [09-18-23 @ 17:40]  HBsAb Reactive      [09-18-23 @ 17:40]  HBsAg Nonreact      [09-18-23 @ 17:40]  HBcAb Nonreact      [09-18-23 @ 17:40]  HCV 0.06, Nonreact      [09-18-23 @ 17:40]

## 2023-09-21 NOTE — PROGRESS NOTE ADULT - ASSESSMENT
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 Nephrology is managing CKD 5 not on dialysis.     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 Gunn

## 2023-09-21 NOTE — PROGRESS NOTE ADULT - SUBJECTIVE AND OBJECTIVE BOX
INTERVAL HPI/OVERNIGHT EVENTS:    Patient evaluated at bedside. No acute distress. No acute events overnight. Patient states she is hanging in there. Labs and vitals were stable overnight. HD yesterday, tolerated well other than a bit of nausea afterwards.        MEDICATIONS  (STANDING):  allopurinol 100 milliGRAM(s) Oral daily  aspirin  chewable 81 milliGRAM(s) Oral daily  atorvastatin 40 milliGRAM(s) Oral at bedtime  budesonide  80 MICROgram(s)/formoterol 4.5 MICROgram(s) Inhaler 2 Puff(s) Inhalation two times a day  buMETAnide 1 milliGRAM(s) Oral two times a day  calcitriol   Capsule 0.25 MICROGram(s) Oral daily  calcium acetate 667 milliGRAM(s) Oral three times a day with meals  chlorhexidine 2% Cloths 1 Application(s) Topical <User Schedule>  diphenhydrAMINE 50 milliGRAM(s) Oral daily  heparin   Injectable 5000 Unit(s) SubCutaneous every 8 hours  meropenem Injectable 500 milliGRAM(s) IV Push every 12 hours  metoprolol tartrate 12.5 milliGRAM(s) Oral daily  pantoprazole    Tablet 40 milliGRAM(s) Oral before breakfast  PARoxetine 20 milliGRAM(s) Oral daily    MEDICATIONS  (PRN):  acetaminophen     Tablet .. 650 milliGRAM(s) Oral every 6 hours PRN Temp greater or equal to 38C (100.4F), Mild Pain (1 - 3)  albuterol    90 MICROgram(s) HFA Inhaler 1 Puff(s) Inhalation every 6 hours PRN Shortness of Breath and/or Wheezing  ALPRAZolam 0.5 milliGRAM(s) Oral two times a day PRN for anxiety  HYDROmorphone   Tablet 2 milliGRAM(s) Oral every 6 hours PRN Severe Pain (7 - 10)  HYDROmorphone   Tablet 1 milliGRAM(s) Oral every 6 hours PRN Moderate Pain (4 - 6)  melatonin 3 milliGRAM(s) Oral at bedtime PRN Insomnia  ondansetron Injectable 4 milliGRAM(s) IV Push every 8 hours PRN Nausea and/or Vomiting  prochlorperazine   Tablet 10 milliGRAM(s) Oral four times a day PRN for nausea  sodium chloride 0.9% lock flush 10 milliLiter(s) IV Push every 1 hour PRN Pre/post blood products, medications, blood draw, and to maintain line patency      Vital Signs Last 24 Hrs  T(C): 36.7 (21 Sep 2023 04:02), Max: 36.8 (20 Sep 2023 11:23)  T(F): 98.1 (21 Sep 2023 04:02), Max: 98.3 (21 Sep 2023 00:02)  HR: 85 (21 Sep 2023 04:33) (74 - 94)  BP: 114/51 (21 Sep 2023 04:00) (101/55 - 129/58)  BP(mean): 61 (20 Sep 2023 16:15) (61 - 61)  RR: 17 (21 Sep 2023 04:00) (17 - 22)  SpO2: 99% (21 Sep 2023 04:33) (92% - 100%)    Parameters below as of 21 Sep 2023 04:00  Patient On (Oxygen Delivery Method): BiPAP/CPAP        Constitutional: NAD  Respiratory: No excessive effort in breathing, no accessory muscle use, 2L NC, taken off BIPAP at 4 am  Cardiovascular: Regular rate & rhyth  Neurological: A&O x 3; back to her normal self, no longer confused    I&O's Detail    19 Sep 2023 07:01  -  20 Sep 2023 07:00  --------------------------------------------------------  IN:    Oral Fluid: 400 mL  Total IN: 400 mL    OUT:    Other (mL): 0 mL  Total OUT: 0 mL    Total NET: 400 mL      20 Sep 2023 07:01  -  21 Sep 2023 06:47  --------------------------------------------------------  IN:  Total IN: 0 mL    OUT:    Other (mL): 500 mL  Total OUT: 500 mL    Total NET: -500 mL          LABS:                        8.4    10.42 )-----------( x        ( 21 Sep 2023 06:00 )             27.7     09-20    136  |  95<L>  |  81.0<H>  ----------------------------<  97  3.9   |  24.0  |  4.47<H>    Ca    8.5      20 Sep 2023 10:43  Mg     2.0     09-20    TPro  5.6<L>  /  Alb  3.5  /  TBili  0.6  /  DBili  x   /  AST  37<H>  /  ALT  15  /  AlkPhos  169<H>  09-20      Urinalysis Basic - ( 20 Sep 2023 10:43 )    Color: x / Appearance: x / SG: x / pH: x  Gluc: 97 mg/dL / Ketone: x  / Bili: x / Urobili: x   Blood: x / Protein: x / Nitrite: x   Leuk Esterase: x / RBC: x / WBC x   Sq Epi: x / Non Sq Epi: x / Bacteria: x        RADIOLOGY & ADDITIONAL STUDIES:     INTERVAL HPI/OVERNIGHT EVENTS:    Patient evaluated at bedside. No acute distress. No acute events overnight. Patient states she is hanging in there. Labs and vitals were stable overnight. HD yesterday, tolerated well other than a bit of nausea afterwards.        MEDICATIONS  (STANDING):  allopurinol 100 milliGRAM(s) Oral daily  aspirin  chewable 81 milliGRAM(s) Oral daily  atorvastatin 40 milliGRAM(s) Oral at bedtime  budesonide  80 MICROgram(s)/formoterol 4.5 MICROgram(s) Inhaler 2 Puff(s) Inhalation two times a day  buMETAnide 1 milliGRAM(s) Oral two times a day  calcitriol   Capsule 0.25 MICROGram(s) Oral daily  calcium acetate 667 milliGRAM(s) Oral three times a day with meals  chlorhexidine 2% Cloths 1 Application(s) Topical <User Schedule>  diphenhydrAMINE 50 milliGRAM(s) Oral daily  heparin   Injectable 5000 Unit(s) SubCutaneous every 8 hours  meropenem Injectable 500 milliGRAM(s) IV Push every 12 hours  metoprolol tartrate 12.5 milliGRAM(s) Oral daily  pantoprazole    Tablet 40 milliGRAM(s) Oral before breakfast  PARoxetine 20 milliGRAM(s) Oral daily    MEDICATIONS  (PRN):  acetaminophen     Tablet .. 650 milliGRAM(s) Oral every 6 hours PRN Temp greater or equal to 38C (100.4F), Mild Pain (1 - 3)  albuterol    90 MICROgram(s) HFA Inhaler 1 Puff(s) Inhalation every 6 hours PRN Shortness of Breath and/or Wheezing  ALPRAZolam 0.5 milliGRAM(s) Oral two times a day PRN for anxiety  HYDROmorphone   Tablet 2 milliGRAM(s) Oral every 6 hours PRN Severe Pain (7 - 10)  HYDROmorphone   Tablet 1 milliGRAM(s) Oral every 6 hours PRN Moderate Pain (4 - 6)  melatonin 3 milliGRAM(s) Oral at bedtime PRN Insomnia  ondansetron Injectable 4 milliGRAM(s) IV Push every 8 hours PRN Nausea and/or Vomiting  prochlorperazine   Tablet 10 milliGRAM(s) Oral four times a day PRN for nausea  sodium chloride 0.9% lock flush 10 milliLiter(s) IV Push every 1 hour PRN Pre/post blood products, medications, blood draw, and to maintain line patency      Vital Signs Last 24 Hrs  T(C): 36.7 (21 Sep 2023 04:02), Max: 36.8 (20 Sep 2023 11:23)  T(F): 98.1 (21 Sep 2023 04:02), Max: 98.3 (21 Sep 2023 00:02)  HR: 85 (21 Sep 2023 04:33) (74 - 94)  BP: 114/51 (21 Sep 2023 04:00) (101/55 - 129/58)  BP(mean): 61 (20 Sep 2023 16:15) (61 - 61)  RR: 17 (21 Sep 2023 04:00) (17 - 22)  SpO2: 99% (21 Sep 2023 04:33) (92% - 100%)    Parameters below as of 21 Sep 2023 04:00  Patient On (Oxygen Delivery Method): BiPAP/CPAP      Constitutional: NAD  Respiratory: No excessive effort in breathing, no accessory muscle use, 2L NC  Cardiovascular: Regular rate & rhythm  Neurological: A&O x 3  Ext: Arm with wound vac in place with good suction      I&O's Detail    19 Sep 2023 07:01  -  20 Sep 2023 07:00  --------------------------------------------------------  IN:    Oral Fluid: 400 mL  Total IN: 400 mL    OUT:    Other (mL): 0 mL  Total OUT: 0 mL    Total NET: 400 mL      20 Sep 2023 07:01  -  21 Sep 2023 06:47  --------------------------------------------------------  IN:  Total IN: 0 mL    OUT:    Other (mL): 500 mL  Total OUT: 500 mL    Total NET: -500 mL          LABS:                        8.4    10.42 )-----------( x        ( 21 Sep 2023 06:00 )             27.7     09-20    136  |  95<L>  |  81.0<H>  ----------------------------<  97  3.9   |  24.0  |  4.47<H>    Ca    8.5      20 Sep 2023 10:43  Mg     2.0     09-20    TPro  5.6<L>  /  Alb  3.5  /  TBili  0.6  /  DBili  x   /  AST  37<H>  /  ALT  15  /  AlkPhos  169<H>  09-20      Urinalysis Basic - ( 20 Sep 2023 10:43 )    Color: x / Appearance: x / SG: x / pH: x  Gluc: 97 mg/dL / Ketone: x  / Bili: x / Urobili: x   Blood: x / Protein: x / Nitrite: x   Leuk Esterase: x / RBC: x / WBC x   Sq Epi: x / Non Sq Epi: x / Bacteria: x

## 2023-09-21 NOTE — CONSULT NOTE ADULT - ASSESSMENT
73y Female with PMHx of HTN, COPD, HLD, CRI, LUE brachiobasilic fistula on May31 , 2023 followed by balloon assisted angioplasty on July , 2023 presented to ED of LUE open wound with murky fluid drainage. afebrile , HR and BP WNL.     PLAN:    - Tentative plan for OR for washout debridement   - keep NPO/IVF  - Broad spectrum IV abx   - pain control  - strict I/Os      
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 Pt reported fever and chills. She was taken to OR by vascular Sx yesterday and had washout w/ ligation done 9/15.     RECOMMENDATIONS:  1.  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 patient follows Dr. Mcclure outpatient.   -UA 30 protein, otherwise bland  -Renal US NA  -Not anticipating immediate need of HD  -F/u CKD in outpatient CKD clinic post discharge.  2.  Hypertension: controlled. c/w current medications.    3.  Anemia of CKD:  management at outpatient CKD clinic  4.  Mineral bone disease of chronic kidney disease: calcium okay; C/w calcitriol and allopurinol.   5.  Edema:   We will continue patient on home dose of bumex 1 milligram one tablet orally b.i.d.  6.  Access planning: Patient now w/ AVF ligation. Will discuss further course w/ outpatient nephrologist. 
#acute hypercapnic respiratory failure  #mild pulmonary edema in the setting of ESPERANZA on CKD, improved with HD  #ESPERANZA on CKD stage 5, started HD 9/19/23   # infected LUE AVF s/p drainage 9/15 - strept agalactiae   # COPD , not in acute exacerbation     - repeat another ABG tomorrow AM to check Co2   - if patient has persistent elevated co2, may need outpatient bipap   - COPD does not appear in exacerbation now, no need for steroid   - continue HD per renal  - start duoneb q6h PRn   - continue symbicort   - vascular and ID following for wound infection  - patient will need to follow up with outpatient pulmonary upon discharge  - pulm will follow       
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 Presents with infected L AVF. Cardiology consulted for RCRI
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 On May 31, pt had a left brachiocephalic ateriovenous fistula placed by Dr Hodge and Dr Coleman with apparent difficulty in accessing the site with difficulty with the conscious sedation at that time requiring close monitoring in SICU and NIV respiratory support. Pt now reports LUE open wound, initially presenting as LUE swelling for the last 2 weeks, from which a blister developed at the area of the fistula which ruptured today with drainage of murky fluid. Pt endorsed 2 episodes of fever one two days ago of 101.7 and another yesterday with 101.3, also endorsed decreased range of motion of the LUE as well has chills, pt reportedly on duricef at home prescribed by Dr Hodge    In the ED vitals stable, Labs stable. Bcx collected. Medicine consulted for admission to medicine service. (14 Sep 2023 21:24)    LUE cellulitis r/o AVF infection  Fever  CKD    - f/u BCx  - f/u wound CX  - mrsa screen (-) for mrsa  - Continue empiric meropenem and vancomycin adjusted for renal function  - monitor vanco trough  - plan for OR washout and debridement, f/u Or CX  - Trend Fever  - Trend Leukocytosis    d/w pharmacy  Will Follow

## 2023-09-21 NOTE — PROGRESS NOTE ADULT - ASSESSMENT
Pt is a 73y female who presented with left upper extremity infection av fistula site. Pt underwent wound w/a 9/15 and Shiley RIJ placement 9/19. Decreased mentation yesterday. Back to old self,  L nasal canula hemodynamically stable. Taken off bipap 4 am this morning.    Plan:  Working to get wound vac for home  Working on timing for placement of Permacath Pt is a 73y female who presented with left upper extremity infection av fistula site. Pt underwent wound w/a 9/15 and Shiley RIJ placement 9/19. Decreased mentation yesterday. Back to old self, 2L nasal canula hemodynamically normal. Taken off bipap 4 am this morning, doing well.    Plan:  Working to get wound vac for home  ID cleared patient for PC placement

## 2023-09-21 NOTE — PROGRESS NOTE ADULT - ASSESSMENT
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 On May 31, pt had a left brachiocephalic ateriovenous fistula placed by Dr Hodge and Dr Coleman with apparent difficulty in accessing the site with difficulty with the conscious sedation at that time requiring close monitoring in SICU and NIV respiratory support. Pt now reports LUE open wound, initially presenting as LUE swelling for the last 2 weeks, from which a blister developed at the area of the fistula which ruptured today with drainage of murky fluid. Pt endorsed 2 episodes of fever one two days ago of 101.7 and another yesterday with 101.3, also endorsed decreased range of motion of the LUE as well has chills, pt reportedly on duricef at home prescribed by Dr Hodge    In the ED vitals stable, Labs stable. Bcx collected. Medicine consulted for admission to medicine service. (14 Sep 2023 21:24)    LUE cellulitis r/o AVF infection  Fever  ESRD on HD    - f/u BCx ngtd  - f/u wound CX grp b strep  - mrsa screen (-) for mrsa  - Continue empiric meropenem   - vanco stopped  - s/p 9/15 left AVF washout  - f/u Or cx 9/15 grp B strep only-per core lab no GNR  - Trend Fever  - Trend Leukocytosis, improved  - pending permacath placement    d/w , hospitalist, pharmacy  Will Follow

## 2023-09-21 NOTE — PROGRESS NOTE ADULT - SUBJECTIVE AND OBJECTIVE BOX
HOSPITALIST PROGRESS NOTE    GARRETT BARCENAS  [id number]  73yFemale    Patient is a 73y old  Female who presents with a chief complaint of Fistula evaluation (21 Sep 2023 15:17)      SUBJECTIVE:   Chart reviewed since last visit.  Patient seen and examined at bedside earlier today for LUE infection, hypercarbic respiratory failure, ESRD  Denies pain, chills, dyspnea.  When asked about BiPAP she started crying stating that she doesnt want to wear it any more as it is too tight.      OBJECTIVE:  Vital Signs Last 24 Hrs  T(C): 36.9 (21 Sep 2023 18:00), Max: 36.9 (21 Sep 2023 07:16)  T(F): 98.4 (21 Sep 2023 18:00), Max: 98.4 (21 Sep 2023 07:16)  HR: 90 (21 Sep 2023 18:00) (85 - 94)  BP: 95/48 (21 Sep 2023 18:00) (95/48 - 117/51)  BP(mean): 71 (21 Sep 2023 16:00) (52 - 71)  RR: 18 (21 Sep 2023 18:00) (16 - 22)  SpO2: 99% (21 Sep 2023 18:00) (93% - 100%)    Parameters below as of 21 Sep 2023 18:00  Patient On (Oxygen Delivery Method): nasal cannula  O2 Flow (L/min): 1.5      PHYSICAL EXAMINATION  General: Elderly female sitting up in bed, somnolent  HEENT: 2LNC  NECK:  Supple. RIJ HD catheter (+)  CVS: regular rate and rhythm S1 S2  RESP:  Fair air entry bilaterally  GI:  Soft nondistended nontender BS+  : No suprapubic tenderness  MSK:  LUE with dressing.  CNS: Awake, alert. Follows commands  INTEG:  LUE with dressing  PSYCH: Somnolent    MONITOR:  CAPILLARY BLOOD GLUCOSE            I&O's Summary    20 Sep 2023 07:01  -  21 Sep 2023 07:00  --------------------------------------------------------  IN: 0 mL / OUT: 500 mL / NET: -500 mL    21 Sep 2023 07:01  -  21 Sep 2023 19:48  --------------------------------------------------------  IN: 0 mL / OUT: 201 mL / NET: -201 mL                            8.4    10.42 )-----------( 90       ( 21 Sep 2023 06:00 )             27.7       09-21    136  |  95<L>  |  51.6<H>  ----------------------------<  79  3.7   |  26.0  |  3.38<H>    Ca    8.5      21 Sep 2023 06:00  Mg     2.0     09-20    TPro  5.2<L>  /  Alb  3.4  /  TBili  0.6  /  DBili  x   /  AST  21  /  ALT  9   /  AlkPhos  153<H>  09-21        Urinalysis Basic - ( 21 Sep 2023 06:00 )    Color: x / Appearance: x / SG: x / pH: x  Gluc: 79 mg/dL / Ketone: x  / Bili: x / Urobili: x   Blood: x / Protein: x / Nitrite: x   Leuk Esterase: x / RBC: x / WBC x   Sq Epi: x / Non Sq Epi: x / Bacteria: x        Culture:    TTE:    RADIOLOGY        MEDICATIONS  (STANDING):  allopurinol 100 milliGRAM(s) Oral daily  aspirin  chewable 81 milliGRAM(s) Oral daily  atorvastatin 40 milliGRAM(s) Oral at bedtime  budesonide  80 MICROgram(s)/formoterol 4.5 MICROgram(s) Inhaler 2 Puff(s) Inhalation two times a day  buMETAnide 1 milliGRAM(s) Oral two times a day  calcitriol   Capsule 0.25 MICROGram(s) Oral daily  calcium acetate 667 milliGRAM(s) Oral three times a day with meals  chlorhexidine 2% Cloths 1 Application(s) Topical <User Schedule>  diphenhydrAMINE 50 milliGRAM(s) Oral daily  heparin   Injectable 5000 Unit(s) SubCutaneous every 8 hours  meropenem Injectable 500 milliGRAM(s) IV Push every 12 hours  metoprolol tartrate 12.5 milliGRAM(s) Oral daily  pantoprazole    Tablet 40 milliGRAM(s) Oral before breakfast  PARoxetine 20 milliGRAM(s) Oral daily      MEDICATIONS  (PRN):  acetaminophen     Tablet .. 650 milliGRAM(s) Oral every 6 hours PRN Temp greater or equal to 38C (100.4F), Mild Pain (1 - 3)  albuterol/ipratropium for Nebulization 3 milliLiter(s) Nebulizer every 6 hours PRN Shortness of Breath  ALPRAZolam 0.5 milliGRAM(s) Oral two times a day PRN for anxiety  HYDROmorphone   Tablet 1 milliGRAM(s) Oral every 6 hours PRN Moderate Pain (4 - 6)  HYDROmorphone   Tablet 2 milliGRAM(s) Oral every 6 hours PRN Severe Pain (7 - 10)  melatonin 3 milliGRAM(s) Oral at bedtime PRN Insomnia  ondansetron Injectable 4 milliGRAM(s) IV Push every 8 hours PRN Nausea and/or Vomiting  prochlorperazine   Tablet 10 milliGRAM(s) Oral four times a day PRN for nausea  sodium chloride 0.9% lock flush 10 milliLiter(s) IV Push every 1 hour PRN Pre/post blood products, medications, blood draw, and to maintain line patency

## 2023-09-22 NOTE — PROGRESS NOTE ADULT - ASSESSMENT
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 On May 31, pt had a left brachiocephalic ateriovenous fistula placed by Dr Hodge and Dr Coleman with apparent difficulty in accessing the site with difficulty with the conscious sedation at that time requiring close monitoring in SICU and NIV respiratory support. Pt now reports LUE open wound, initially presenting as LUE swelling for the last 2 weeks, from which a blister developed at the area of the fistula which ruptured today with drainage of murky fluid. Pt endorsed 2 episodes of fever one two days ago of 101.7 and another yesterday with 101.3, also endorsed decreased range of motion of the LUE as well has chills, pt reportedly on duricef at home prescribed by Dr Hodge    In the ED vitals stable, Labs stable. Bcx collected. Medicine consulted for admission to medicine service. (14 Sep 2023 21:24)    LUE cellulitis r/o AVF infection  Fever  ESRD on HD    - f/u BCx ngtd  - f/u wound CX grp b strep  - mrsa screen (-) for mrsa  - Continue empiric meropenem   - vanco stopped  - s/p 9/15 left AVF washout  - f/u Or cx 9/15 grp B strep only-per core lab no GNR  - Trend Fever  - Trend Leukocytosis, improved  - change in mental status today suspected due to hypercarbia. no fever  - pending permacath placement by vascular surgery- no acute ID contraindication.     d/w  hospitalist,  RN  Will Follow

## 2023-09-22 NOTE — PROGRESS NOTE ADULT - ASSESSMENT
Pt is a 73y female who presented with left upper extremity infection av fistula site. Pt underwent wound w/a 9/15 and Shiley RIJ placement 9/19. Decreased mentation yesterday. Back to old self, 2L nasal canula hemodynamically normal. Taken off bipap 4 am this morning, doing well.    Plan:  Working to get wound vac for home  Cleared by ID for PC placement  Pt is a 73y female who presented with left upper extremity infection av fistula site. Pt underwent wound w/a 9/15 and Shiley RIJ placement 9/19. Decreased mentation yesterday. Back to old self, 2L nasal canula hemodynamically normal. Taken off bipap 4 am this morning, doing well.    Plan:  wound vac changed today by vascular team  Working to get wound vac for home  Cleared by ID for PC placement

## 2023-09-22 NOTE — PROGRESS NOTE ADULT - ASSESSMENT
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  now ESRD requiring HD; Catheter placed RIJ, HD started  Anemia Chronic Disease  - avoid nephrotoxic medications  - Bumetanide, Calcium acetate, calcitriol  - YEISON  - Will need PermCath; vascular to schedule early next week    COPD   Metabolic Encephalopathy due to hypercarbia - intermittent  - SpO2<94%  - Albuterol PRN  - Budosenide/Formeterol  - nocturnal and PRN NIV  - Pulmonology follow up    HTN  CAD (cardiac cath 2021)  hx of LBBB  Chronic Diastolic Heart Failure  AS, severe TR, pHTN  - ASA, Metoprolol, atorvastatin    Gout  - allopurinol    hx of DM 2  no longer on medication, controlled per patient  A1c 5.4  Carb Consistent Diet     DVT Prophylaxis -- Heparin SQ    Dispo: Acutely ill; requiring HD, on IV antibiotics.      Discussed with patient,  ID, RN, IR and vascular

## 2023-09-22 NOTE — PROVIDER CONTACT NOTE (CHANGE IN STATUS NOTIFICATION) - SITUATION
patient has seemed increasingly lethargic throughout the day, unable to arouse at this time to have a conversation with

## 2023-09-22 NOTE — PROGRESS NOTE ADULT - SUBJECTIVE AND OBJECTIVE BOX
[facility]                                                INFECTIOUS DISEASES  =======================================================                   Tevin Caballero#   Juan F Thornton MD#    Ok Zeng MD*                           Janis Gunn MD*   Jessie Mccurdy MD*           Diplomates American Board of Internal Medicine & Infectious Diseases                  # [place] Office - Appt - Tel  [phone] Fax [phone]                * [place] Office - Appt - Tel [phone] Fax [phone]                                  Hospital Consult line:  [phone]  =======================================================      [id number]  GARRETT BARCENAS   follow up for: avf infection, s/p washout left AVF  seen earlier today with vascular team at bedside  during dressing change-wound appears clean  pt denied complaints  patient seen and examined.       I have personally reviewed the labs and data; pertinent labs and data are listed in this note; please see below.   ===================================================  REVIEW OF SYSTEMS:  CONSTITUTIONAL:  No Fever or chills  HEENT:  No diplopia or blurred vision.  No earache, sore throat or runny nose.  CARDIOVASCULAR:  No pressure, squeezing, strangling, tightness, heaviness or aching about the chest, neck, axilla or epigastrium.  RESPIRATORY:  No cough, shortness of breath  GASTROINTESTINAL:  No nausea, vomiting or diarrhea.  GENITOURINARY:  No dysuria, frequency or urgency. No Blood in urine  MUSCULOSKELETAL:  no joint aches, no muscle pain  SKIN:  No change in skin, hair or nails.  NEUROLOGIC:  No Headaches, seizures or weakness.  PSYCHIATRIC:  No disorder of thought or mood.  ENDOCRINE:  No heat or cold intolerance  HEMATOLOGICAL:  No easy bruising or bleeding.    =======================================================  Allergies    Cefzil (Rash; Angioedema)  Mushrooms (Rash)  Orange (Rash)  Pineapple (Rash)    Intolerances    Antibiotics:  meropenem Injectable 500 milliGRAM(s) IV Push every 12 hours    Other medications:  acetaminophen     Tablet .. 975 milliGRAM(s) Oral every 6 hours  allopurinol 100 milliGRAM(s) Oral daily  atorvastatin 40 milliGRAM(s) Oral at bedtime  buMETAnide 1 milliGRAM(s) Oral two times a day  calcitriol   Capsule 0.25 MICROGram(s) Oral daily  diphenhydrAMINE 50 milliGRAM(s) Oral daily  metoprolol tartrate 12.5 milliGRAM(s) Oral daily  pantoprazole    Tablet 40 milliGRAM(s) Oral before breakfast  PARoxetine 20 milliGRAM(s) Oral daily    ======================================================  Physical Exam:  ============  Vital Signs Last 24 Hrs  T(C): 36.8 (22 Sep 2023 10:52), Max: 37.1 (21 Sep 2023 21:45)  T(F): 98.2 (22 Sep 2023 10:52), Max: 98.8 (21 Sep 2023 21:45)  HR: 81 (22 Sep 2023 10:52) (78 - 96)  BP: 118/71 (22 Sep 2023 10:52) (95/48 - 121/66)  BP(mean): 71 (21 Sep 2023 16:00) (71 - 71)  RR: 20 (22 Sep 2023 13:30) (18 - 20)  SpO2: 99% (22 Sep 2023 13:30) (93% - 99%)    Parameters below as of 22 Sep 2023 13:30  Patient On (Oxygen Delivery Method): BiPAP/CPAP    O2 Concentration (%): 50    General:  No acute distress. on nc  Eye: no conjunctival pallor, no scleral icterus  Neck: Supple, No lymphadenopathy. rt hd catheter  Respiratory: Lungs are clear to auscultation, Respirations are non-labored.  Cardiovascular: Normal rate, Regular rhythm,  s1+s2  Gastrointestinal: Soft, Non-tender, Non-distended, Normal bowel sounds.  Musculoskeletal: b/l knee scars well healed, lue ace wrap in place. b/l Le ace wraps  Integumentary: No rash.    =======================================================  Labs:                                                   8.4    10.42 )-----------( 90       ( 21 Sep 2023 06:00 )             27.7       09-21    136  |  95<L>  |  51.6<H>  ----------------------------<  79  3.7   |  26.0  |  3.38<H>    Ca    8.5      21 Sep 2023 06:00    TPro  5.2<L>  /  Alb  3.4  /  TBili  0.6  /  DBili  x   /  AST  21  /  ALT  9   /  AlkPhos  153<H>  09-21          ABG - ( 22 Sep 2023 13:19 )  pH, Arterial: 7.380 pH, Blood: x     /  pCO2: 58    /  pO2: 76    / HCO3: 34    / Base Excess: 9.2   /  SaO2: 97.4                Urinalysis Basic - ( 21 Sep 2023 06:00 )    Color: x / Appearance: x / SG: x / pH: x  Gluc: 79 mg/dL / Ketone: x  / Bili: x / Urobili: x   Blood: x / Protein: x / Nitrite: x   Leuk Esterase: x / RBC: x / WBC x   Sq Epi: x / Non Sq Epi: x / Bacteria: x                  CAPILLARY BLOOD GLUCOSE                      Urinalysis Basic - ( 21 Sep 2023 06:00 )    Color: x / Appearance: x / SG: x / pH: x  Gluc: 79 mg/dL / Ketone: x  / Bili: x / Urobili: x   Blood: x / Protein: x / Nitrite: x   Leuk Esterase: x / RBC: x / WBC x   Sq Epi: x / Non Sq Epi: x / Bacteria: x                  CAPILLARY BLOOD GLUCOSE                ABG - ( 20 Sep 2023 16:31 )  pH, Arterial: 7.370 pH, Blood: x     /  pCO2: 46    /  pO2: 155   / HCO3: 27    / Base Excess: 1.3   /  SaO2: 98.8                Urinalysis Basic - ( 20 Sep 2023 10:43 )    Color: x / Appearance: x / SG: x / pH: x  Gluc: 97 mg/dL / Ketone: x  / Bili: x / Urobili: x   Blood: x / Protein: x / Nitrite: x   Leuk Esterase: x / RBC: x / WBC x   Sq Epi: x / Non Sq Epi: x / Bacteria: x                  CAPILLARY BLOOD GLUCOSE                  Culture - Tissue with Gram Stain (collected 09-15-23 @ 20:01)  Source: .Tissue Left Upper Extremity Wound  Gram Stain (09-16-23 @ 03:14):    No polymorphonuclear leukocytes seen per low power field    Moderate Gram positive cocci in pairs seen per oil power field    Rare Gram Negative Rods seen per oil power field    Culture - Blood (collected 09-14-23 @ 16:33)  Source: .Blood Blood-Peripheral    Culture - Blood (collected 09-14-23 @ 16:27)  Source: .Blood Blood-Peripheral    Culture - Other (collected 09-14-23 @ 16:15)  Source: .Other left fistula site

## 2023-09-22 NOTE — PROGRESS NOTE ADULT - SUBJECTIVE AND OBJECTIVE BOX
[facility] DIVISION OF KIDNEY DISEASES AND HYPERTENSION -- HEMODIALYSIS NOTE  --------------------------------------------------------------------------------  Chief Complaint: ESRD/Ongoing hemodialysis requirement    24 hour events/subjective:  Pt placed on Bipap for worsening hypoxia/ hypercpania   at bedside        PAST HISTORY  --------------------------------------------------------------------------------  No significant changes to PMH, PSH, FHx, SHx, unless otherwise noted    ALLERGIES & MEDICATIONS  --------------------------------------------------------------------------------  Allergies    Cefzil (Rash; Angioedema)  Mushrooms (Rash)  Orange (Rash)  Pineapple (Rash)    Intolerances      Standing Inpatient Medications  allopurinol 100 milliGRAM(s) Oral daily  aspirin  chewable 81 milliGRAM(s) Oral daily  atorvastatin 40 milliGRAM(s) Oral at bedtime  budesonide  80 MICROgram(s)/formoterol 4.5 MICROgram(s) Inhaler 2 Puff(s) Inhalation two times a day  calcitriol   Capsule 0.25 MICROGram(s) Oral daily  calcium acetate 667 milliGRAM(s) Oral three times a day with meals  chlorhexidine 2% Cloths 1 Application(s) Topical <User Schedule>  epoetin thelma (EPOGEN) Injectable 38801 Unit(s) IV Push <User Schedule>  heparin   Injectable 5000 Unit(s) SubCutaneous every 8 hours  meropenem Injectable 500 milliGRAM(s) IV Push every 12 hours  metoprolol tartrate 12.5 milliGRAM(s) Oral daily  midodrine. 10 milliGRAM(s) Oral three times a day  pantoprazole    Tablet 40 milliGRAM(s) Oral before breakfast  PARoxetine 20 milliGRAM(s) Oral daily    PRN Inpatient Medications  acetaminophen     Tablet .. 650 milliGRAM(s) Oral every 6 hours PRN  albuterol/ipratropium for Nebulization 3 milliLiter(s) Nebulizer every 6 hours PRN  ALPRAZolam 0.5 milliGRAM(s) Oral two times a day PRN  HYDROmorphone   Tablet 1 milliGRAM(s) Oral every 6 hours PRN  HYDROmorphone   Tablet 2 milliGRAM(s) Oral every 6 hours PRN  melatonin 3 milliGRAM(s) Oral at bedtime PRN  ondansetron Injectable 4 milliGRAM(s) IV Push every 8 hours PRN  prochlorperazine   Tablet 10 milliGRAM(s) Oral four times a day PRN  sodium chloride 0.9% lock flush 10 milliLiter(s) IV Push every 1 hour PRN      REVIEW OF SYSTEMS  --------------------------------------------------------------------------------  Gen: No weight changes, fatigue, fevers/chills, weakness  Skin: No rashes  Head/Eyes/Ears/Mouth: No headache  Respiratory: No dyspnea, cough,  CV: No chest pain, orthopnea  GI: No abdominal pain, diarrhea, constipation, nausea, vomiting,  MSK: No joint pain  Neuro: No dizziness/lightheadedness, weakness  Heme: No bleeding  Psych: No significant nervousness, anxiety, stress, depression    All other systems were reviewed and are negative, except as noted.    VITALS/PHYSICAL EXAM  --------------------------------------------------------------------------------  ANAYA): 36.2 (09-22-23 @ 19:55), Max: 36.9 (09-22-23 @ 16:27)  HR: 80 (09-23-23 @ 00:00) (64 - 88)  BP: 107/43 (09-23-23 @ 00:00) (74/30 - 118/71)  RR: 20 (09-23-23 @ 00:00) (20 - 20)  SpO2: 96% (09-23-23 @ 00:00) (95% - 99%)  Wt(kg): --        09-21-23 @ 07:01  -  09-22-23 @ 07:00  --------------------------------------------------------  IN: 0 mL / OUT: 1201 mL / NET: -1201 mL      Physical Exam:  	Gen: lethargic  	HEENT: on bipap  	Pulm: CTA B/L  	CV: RRR, S1S2; no rub  	Abd: +BS, soft, nontender  	UE: Warm  	LE: Warm, legs in ACE bandage  	Neuro: answers questions  	Skin: Warm  	Vascular access: RIJ non [facility]    LABS/STUDIES  --------------------------------------------------------------------------------              7.7    6.78  >-----------<  73       [09-22-23 @ 17:45]              25.0     136  |  95  |  51.6  ----------------------------<  79      [09-21-23 @ 06:00]  3.7   |  26.0  |  3.38        Ca     8.5     [09-21-23 @ 06:00]    TPro  5.2  /  Alb  3.4  /  TBili  0.6  /  DBili  x   /  AST  21  /  ALT  9   /  AlkPhos  153  [09-21-23 @ 06:00]          Iron 88, TIBC 305, %sat 29      [09-19-23 @ 04:40]  Ferritin 243      [09-19-23 @ 04:40]  PTH -- (Ca 8.5)      [09-19-23 @ 04:40]   548    HBsAb >1000.0      [09-18-23 @ 17:40]  HBsAb Reactive      [09-18-23 @ 17:40]  HBsAg Nonreact      [09-18-23 @ 17:40]  HBcAb Nonreact      [09-18-23 @ 17:40]  HCV 0.06, Nonreact      [09-18-23 @ 17:40]

## 2023-09-22 NOTE — PROGRESS NOTE ADULT - SUBJECTIVE AND OBJECTIVE BOX
HPI/OVERNIGHT EVENTS: Patient seen and examined at bedside this AM.     Vital Signs Last 24 Hrs  T(C): 36.7 (22 Sep 2023 06:30), Max: 37.1 (21 Sep 2023 21:45)  T(F): 98 (22 Sep 2023 06:30), Max: 98.8 (21 Sep 2023 21:45)  HR: 78 (22 Sep 2023 06:30) (78 - 96)  BP: 108/57 (22 Sep 2023 06:30) (95/48 - 121/66)  BP(mean): 71 (21 Sep 2023 16:00) (52 - 71)  RR: 20 (22 Sep 2023 06:30) (16 - 20)  SpO2: 98% (22 Sep 2023 06:30) (93% - 99%)    Parameters below as of 22 Sep 2023 06:30  Patient On (Oxygen Delivery Method): BiPAP/CPAP        I&O's Detail    21 Sep 2023 07:01  -  22 Sep 2023 07:00  --------------------------------------------------------  IN:  Total IN: 0 mL    OUT:    Other (mL): 1000 mL    Stool (mL): 1 mL    Voided (mL): 200 mL  Total OUT: 1201 mL    Total NET: -1201 mL            Musculoskeletal: Constitutional: NAD  Respiratory: No excessive effort in breathing, no accessory muscle use, 2L NC  Cardiovascular: Regular rate & rhythm  Neurological: A&O x 3  Ext: Arm with wound vac in place with good suction      LABS:                        8.4    10.42 )-----------( 90       ( 21 Sep 2023 06:00 )             27.7     09-21    136  |  95<L>  |  51.6<H>  ----------------------------<  79  3.7   |  26.0  |  3.38<H>    Ca    8.5      21 Sep 2023 06:00    TPro  5.2<L>  /  Alb  3.4  /  TBili  0.6  /  DBili  x   /  AST  21  /  ALT  9   /  AlkPhos  153<H>  09-21      Urinalysis Basic - ( 21 Sep 2023 06:00 )    Color: x / Appearance: x / SG: x / pH: x  Gluc: 79 mg/dL / Ketone: x  / Bili: x / Urobili: x   Blood: x / Protein: x / Nitrite: x   Leuk Esterase: x / RBC: x / WBC x   Sq Epi: x / Non Sq Epi: x / Bacteria: x        MEDICATIONS  (STANDING):  allopurinol 100 milliGRAM(s) Oral daily  aspirin  chewable 81 milliGRAM(s) Oral daily  atorvastatin 40 milliGRAM(s) Oral at bedtime  budesonide  80 MICROgram(s)/formoterol 4.5 MICROgram(s) Inhaler 2 Puff(s) Inhalation two times a day  calcitriol   Capsule 0.25 MICROGram(s) Oral daily  calcium acetate 667 milliGRAM(s) Oral three times a day with meals  chlorhexidine 2% Cloths 1 Application(s) Topical <User Schedule>  diphenhydrAMINE 50 milliGRAM(s) Oral daily  epoetin thelma (EPOGEN) Injectable 99521 Unit(s) IV Push <User Schedule>  heparin   Injectable 5000 Unit(s) SubCutaneous every 8 hours  meropenem Injectable 500 milliGRAM(s) IV Push every 12 hours  metoprolol tartrate 12.5 milliGRAM(s) Oral daily  pantoprazole    Tablet 40 milliGRAM(s) Oral before breakfast  PARoxetine 20 milliGRAM(s) Oral daily    MEDICATIONS  (PRN):  acetaminophen     Tablet .. 650 milliGRAM(s) Oral every 6 hours PRN Temp greater or equal to 38C (100.4F), Mild Pain (1 - 3)  albuterol/ipratropium for Nebulization 3 milliLiter(s) Nebulizer every 6 hours PRN Shortness of Breath  ALPRAZolam 0.5 milliGRAM(s) Oral two times a day PRN for anxiety  HYDROmorphone   Tablet 2 milliGRAM(s) Oral every 6 hours PRN Severe Pain (7 - 10)  HYDROmorphone   Tablet 1 milliGRAM(s) Oral every 6 hours PRN Moderate Pain (4 - 6)  melatonin 3 milliGRAM(s) Oral at bedtime PRN Insomnia  ondansetron Injectable 4 milliGRAM(s) IV Push every 8 hours PRN Nausea and/or Vomiting  prochlorperazine   Tablet 10 milliGRAM(s) Oral four times a day PRN for nausea  sodium chloride 0.9% lock flush 10 milliLiter(s) IV Push every 1 hour PRN Pre/post blood products, medications, blood draw, and to maintain line patency      MICRO:   Cultures     STUDIES:   EKG, CXR, U/S, CT, MRI

## 2023-09-22 NOTE — PROGRESS NOTE ADULT - SUBJECTIVE AND OBJECTIVE BOX
consulted for permcath placement  please notify IR when ID clears patient for tunneled line placement    thank you

## 2023-09-22 NOTE — PROGRESS NOTE ADULT - SUBJECTIVE AND OBJECTIVE BOX
HOSPITALIST PROGRESS NOTE    GARRETT BARCENAS  [id number]  73yFemale    Patient is a 73y old  Female who presents with a chief complaint of Fistula evaluation (22 Sep 2023 12:44)      SUBJECTIVE:   Chart reviewed since last visit.  Patient seen and examined at bedside for LUE infection, hypercarbic respiratory failure.  Denies any pain, fever or dyspnea      OBJECTIVE:  Vital Signs Last 24 Hrs  T(C): 36.8 (22 Sep 2023 10:52), Max: 37.1 (21 Sep 2023 21:45)  T(F): 98.2 (22 Sep 2023 10:52), Max: 98.8 (21 Sep 2023 21:45)  HR: 81 (22 Sep 2023 10:52) (78 - 96)  BP: 118/71 (22 Sep 2023 10:52) (95/48 - 121/66)  BP(mean): 71 (21 Sep 2023 16:00) (71 - 71)  RR: 20 (22 Sep 2023 13:30) (18 - 20)  SpO2: 99% (22 Sep 2023 13:30) (93% - 99%)    Parameters below as of 22 Sep 2023 13:30  Patient On (Oxygen Delivery Method): BiPAP/CPAP    O2 Concentration (%): 50      PHYSICAL EXAMINATION  General: Elderly female sitting up in bed, less somnolent  HEENT: 2LNC  NECK:  Supple. RIJ HD catheter (+)  CVS: regular rate and rhythm S1 S2  RESP:  Fair air entry bilaterally  GI:  Soft nondistended nontender BS+  : No suprapubic tenderness  MSK:  LUE with dressing.  CNS: Awake, alert. Follows commands  INTEG:  LUE with dressing  PSYCH: Somnolent    MONITOR:  CAPILLARY BLOOD GLUCOSE            I&O's Summary    21 Sep 2023 07:01  -  22 Sep 2023 07:00  --------------------------------------------------------  IN: 0 mL / OUT: 1201 mL / NET: -1201 mL                            8.4    10.42 )-----------( 90       ( 21 Sep 2023 06:00 )             27.7       09-21    136  |  95<L>  |  51.6<H>  ----------------------------<  79  3.7   |  26.0  |  3.38<H>    Ca    8.5      21 Sep 2023 06:00    TPro  5.2<L>  /  Alb  3.4  /  TBili  0.6  /  DBili  x   /  AST  21  /  ALT  9   /  AlkPhos  153<H>  09-21        Urinalysis Basic - ( 21 Sep 2023 06:00 )    Color: x / Appearance: x / SG: x / pH: x  Gluc: 79 mg/dL / Ketone: x  / Bili: x / Urobili: x   Blood: x / Protein: x / Nitrite: x   Leuk Esterase: x / RBC: x / WBC x   Sq Epi: x / Non Sq Epi: x / Bacteria: x        Culture:    TTE:    RADIOLOGY        MEDICATIONS  (STANDING):  allopurinol 100 milliGRAM(s) Oral daily  aspirin  chewable 81 milliGRAM(s) Oral daily  atorvastatin 40 milliGRAM(s) Oral at bedtime  budesonide  80 MICROgram(s)/formoterol 4.5 MICROgram(s) Inhaler 2 Puff(s) Inhalation two times a day  calcitriol   Capsule 0.25 MICROGram(s) Oral daily  calcium acetate 667 milliGRAM(s) Oral three times a day with meals  chlorhexidine 2% Cloths 1 Application(s) Topical <User Schedule>  epoetin thelma (EPOGEN) Injectable 86559 Unit(s) IV Push <User Schedule>  heparin   Injectable 5000 Unit(s) SubCutaneous every 8 hours  meropenem Injectable 500 milliGRAM(s) IV Push every 12 hours  metoprolol tartrate 12.5 milliGRAM(s) Oral daily  pantoprazole    Tablet 40 milliGRAM(s) Oral before breakfast  PARoxetine 20 milliGRAM(s) Oral daily      MEDICATIONS  (PRN):  acetaminophen     Tablet .. 650 milliGRAM(s) Oral every 6 hours PRN Temp greater or equal to 38C (100.4F), Mild Pain (1 - 3)  albuterol/ipratropium for Nebulization 3 milliLiter(s) Nebulizer every 6 hours PRN Shortness of Breath  ALPRAZolam 0.5 milliGRAM(s) Oral two times a day PRN for anxiety  HYDROmorphone   Tablet 1 milliGRAM(s) Oral every 6 hours PRN Moderate Pain (4 - 6)  HYDROmorphone   Tablet 2 milliGRAM(s) Oral every 6 hours PRN Severe Pain (7 - 10)  melatonin 3 milliGRAM(s) Oral at bedtime PRN Insomnia  ondansetron Injectable 4 milliGRAM(s) IV Push every 8 hours PRN Nausea and/or Vomiting  prochlorperazine   Tablet 10 milliGRAM(s) Oral four times a day PRN for nausea  sodium chloride 0.9% lock flush 10 milliLiter(s) IV Push every 1 hour PRN Pre/post blood products, medications, blood draw, and to maintain line patency

## 2023-09-22 NOTE — PROGRESS NOTE ADULT - ASSESSMENT
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 Nephrology is managing CKD 5 not on dialysis.     -CKD5/ now ESRD  Started on HD on 09/19  s/p 3 HD sessions- now placed on  TTS schedule  s/p RIJ TDC; will need a TDC  SW for outpt HD set up  Has a LUE AVF - not yet mature  Continue antibiotics for AVF site infection    Resp failure in setting of COPD exacerbation  on Bipap  CXR reviewed; no CHF      HTN  BP stable  off Bumex;  UF with HD as tolerated    Anemia   Hb below goal  Started YEISON      Mineral Bone Disease in setting of CKD - continue oral phos binders + calcitriol

## 2023-09-23 NOTE — PROGRESS NOTE ADULT - ASSESSMENT
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 Hospital course notable for acute hypercapnic respiratory failure requiring BiPAP use. Nephrology is managing CKD 5 with progression to ESRD s/p initiation of hemodialysis.      -CKD 5 not on hemodialysis with progressive worsening of renal function while here   -Ultimately started on hemodialysis   -Access: R IJ non tunneled dialysis catheter   -Dialysis today; next hemodialysis will be this upcoming Tuesday 09/26 (unless clinical indication warrants it to be sooner)  -BP acceptable - currently on midodrine 10mg TID   -Volume Status - UF as tolerated hemodialysis   -Anemia - continue YEISON during hemodialysis    -Mineral Bone Disease in setting of CKD - continue oral phos binder + calcitriol   -Placement of tunneled dialysis catheter as per Interventional Radiology     Libby Perkins DO  Nephrology        73 year old female with PMH of DM, HTN, gout, COPD, 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 Hospital course notable for acute hypercapnic respiratory failure requiring BiPAP use. Nephrology is managing CKD 5 with progression to ESRD s/p initiation of hemodialysis.      -CKD 5 not on hemodialysis with progressive worsening of renal function while here   -Ultimately started on hemodialysis   -Access: R IJ non tunneled dialysis catheter   -Dialysis today; next hemodialysis will be this upcoming Tuesday 09/26 (unless clinical indication warrants it to be sooner)  -BP - hypotensive in the systolic 70's mmHg yesterday - s/p IV fluid + midodrine yesterday; blood cultures have been negative; will reorder with a.m. labs for completeness - currently on midodrine 10mg TID   -Volume Status - UF as tolerated hemodialysis   -Anemia - continue YEISON during hemodialysis    -Mineral Bone Disease in setting of CKD - continue oral phos binder + calcitriol   -Placement of tunneled dialysis catheter as per Interventional Radiology     Libby Perkins DO  Nephrology

## 2023-09-23 NOTE — PROGRESS NOTE ADULT - ASSESSMENT
Assessment:   Pt is a 73y female who presented with left upper extremity infection av fistula site. Pt underwent wound w/a 9/15 and Shiley RIJ placement 9/19.     Plan:  wound vac to be changed Mon 9/25  Working to get wound vac for home  Cleared by ID for PC placement   Earliest [facility] time available for AVF is early next week, recommend IR for possible PC placement

## 2023-09-23 NOTE — PROGRESS NOTE ADULT - SUBJECTIVE AND OBJECTIVE BOX
Hypotensive yesterday; received 250cc NS bolus then started on midodrine yesterday by primary team. Asleep this a.m. but responsive to voice and touch. On nasal cannula.     Vital Signs Last 24 Hrs  T(C): 36.2 (23 Sep 2023 09:35), Max: 36.9 (22 Sep 2023 16:27)  T(F): 97.2 (23 Sep 2023 09:35), Max: 98.4 (22 Sep 2023 16:27)  HR: 76 (23 Sep 2023 09:35) (64 - 88)  BP: 103/43 (23 Sep 2023 09:35) (74/30 - 115/68)  BP(mean): 55 (23 Sep 2023 08:09) (50 - 68)  RR: 19 (23 Sep 2023 09:35) (17 - 21)  SpO2: 98% (23 Sep 2023 09:35) (93% - 99%)    Parameters below as of 23 Sep 2023 09:35  Patient On (Oxygen Delivery Method): nasal cannula  O2 Flow (L/min): 2    I&O's Summary    22 Sep 2023 07:01  -  23 Sep 2023 07:00  --------------------------------------------------------  IN: 300 mL / OUT: 0 mL / NET: 300 mL    Physical Exam  General: WDWN female on nasal cannula    HEENT: Normocephalic  Cardiac: S1S2 RRR  Respiratory: CTAB  Abdomen: Obese; soft  Extremities: ACE to b/l lower extremities  Neuro: Asleep but wakes up to voice and touch   Psych: Calm    09-23    139  |  99  |  33.5<H>  ----------------------------<  91  3.8   |  28.0  |  3.26<H>    Ca    8.7      23 Sep 2023 09:04  Phos  4.5     09-23    TPro  x   /  Alb  3.0<L>  /  TBili  x   /  DBili  x   /  AST  x   /  ALT  x   /  AlkPhos  x   09-23                        8.6    9.87  )-----------( 86       ( 23 Sep 2023 09:04 )             29.1     MEDICATIONS  (STANDING):  allopurinol 100 milliGRAM(s) Oral daily  aspirin  chewable 81 milliGRAM(s) Oral daily  atorvastatin 40 milliGRAM(s) Oral at bedtime  budesonide  80 MICROgram(s)/formoterol 4.5 MICROgram(s) Inhaler 2 Puff(s) Inhalation two times a day  calcitriol   Capsule 0.25 MICROGram(s) Oral daily  calcium acetate 667 milliGRAM(s) Oral three times a day with meals  chlorhexidine 2% Cloths 1 Application(s) Topical <User Schedule>  epoetin thelma (EPOGEN) Injectable 64106 Unit(s) IV Push <User Schedule>  meropenem Injectable 500 milliGRAM(s) IV Push every 12 hours  metoprolol tartrate 12.5 milliGRAM(s) Oral daily  midodrine. 10 milliGRAM(s) Oral three times a day  pantoprazole    Tablet 40 milliGRAM(s) Oral before breakfast  PARoxetine 20 milliGRAM(s) Oral daily    MEDICATIONS  (PRN):  acetaminophen     Tablet .. 650 milliGRAM(s) Oral every 6 hours PRN Temp greater or equal to 38C (100.4F), Mild Pain (1 - 3)  albuterol/ipratropium for Nebulization 3 milliLiter(s) Nebulizer every 6 hours PRN Shortness of Breath  ALPRAZolam 0.5 milliGRAM(s) Oral two times a day PRN for anxiety  melatonin 3 milliGRAM(s) Oral at bedtime PRN Insomnia  ondansetron Injectable 4 milliGRAM(s) IV Push every 8 hours PRN Nausea and/or Vomiting  prochlorperazine   Tablet 10 milliGRAM(s) Oral four times a day PRN for nausea  sodium chloride 0.9% lock flush 10 milliLiter(s) IV Push every 1 hour PRN Pre/post blood products, medications, blood draw, and to maintain line patency

## 2023-09-23 NOTE — PROGRESS NOTE ADULT - ASSESSMENT
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 ESRD requiring HD; Catheter placed RIJ, HD started  Anemia Chronic Disease  - avoid nephrotoxic medications  - Calcium acetate, calcitriol  - YEISON  - Tunneled dialysis catheter next week    COPD   Metabolic Encephalopathy due to hypercarbia - intermittent  - DuoNeb PRN  - Budesonide / Formoterol  - nocturnal and PRN NIV  - Pulmonology recs noted     HTN  CAD (cardiac cath 2021)  hx of LBBB  Chronic Diastolic Heart Failure  AS, severe TR, pHTN  - ASA, Metoprolol, atorvastatin    Gout  - allopurinol    hx of DM 2  no longer on medication, controlled per patient  A1c 5.4  Carb Consistent Diet     Thrombocytopenia  Chronic  - No signs of active bleeding   - Check Heparin Induced Ab     DVT Prophylaxis -- Venodyne     Dispo: Unclear, home vs [facility] once stable. PT eval.

## 2023-09-23 NOTE — PROGRESS NOTE ADULT - SUBJECTIVE AND OBJECTIVE BOX
VASCULAR SURGERY PROGRESS NOTE    Subjective:   Patient examined at bedside this AM. Reports     Objective:  Vital Signs  T(C): 36.4 (09-23 @ 05:10), Max: 36.9 (09-22 @ 16:27)  HR: 80 (09-23 @ 06:00) (64 - 88)  BP: 112/41 (09-23 @ 06:00) (74/30 - 118/71)  RR: 18 (09-23 @ 06:00) (18 - 20)  SpO2: 95% (09-23 @ 06:00) (93% - 99%)      Physical Exam:  General: alert and oriented, NAD  Resp: airway patent, respirations unlabored  CVS: regular rate and rhythm  Abdomen: soft, nontender, nondistended  Extremities: no edema  Skin: warm, dry, appropriate color

## 2023-09-23 NOTE — PROGRESS NOTE ADULT - SUBJECTIVE AND OBJECTIVE BOX
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 On May 31, pt had a left brachiocephalic ateriovenous fistula placed by Dr Hodge and Dr Coleman with apparent difficulty in accessing the site with difficulty with the conscious sedation at that time requiring close monitoring in SICU and NIV respiratory support. Pt now reports LUE open wound, initially presenting as LUE swelling for the last 2 weeks, from which a blister developed at the area of hte fistula which ruptured today with drainage of murky fluid. Pt endorsed 2 episodes of fever one two days ago of 101.7 and another yesterday with 101.3, also endorsed decreased range of motion of the LUE as well has chills, No nausea/vomiting, no SOB/chest pain, no palpitaitions.     Lives with . Uses a walker.    In the ED vitals stable, Labs stable. Bcx collected. Medicine consulted for admission to medicine service. (14 Sep 2023 21:24)    Interval History:  Patient was seen and examined at bedside around 11 am while getting dialysis.  Feels very tired.   Denies chest pain, palpitations, shortness of breath, headache, dizziness, visual symptoms, nausea, vomiting or abdominal pain.  Denies pain or paresthesia in left arm/hand.     ROS:  As per interval history otherwise unremarkable.    PHYSICAL EXAM:  Vital Signs   T(C): 36.4 (23 Sep 2023 12:35), Max: 36.9 (22 Sep 2023 16:27)  T(F): 97.5 (23 Sep 2023 12:35), Max: 98.4 (22 Sep 2023 16:27)  HR: 80 (23 Sep 2023 12:35) (64 - 88)  BP: 109/46 (23 Sep 2023 12:35) (74/30 - 112/41)  BP(mean): 55 (23 Sep 2023 08:09) (50 - 68)  RR: 18 (23 Sep 2023 12:35) (17 - 21)  SpO2: 98% (23 Sep 2023 12:35) (93% - 99%)  Parameters below as of 23 Sep 2023 12:35  Patient On (Oxygen Delivery Method): nasal cannula  O2 Flow (L/min): 2  General: Elderly female lying in bed comfortably. No acute distress  HEENT: EOMI. Clear conjunctivae. Moist mucus membrane  Neck: Supple. Right shiley in place.   Chest: Good air entry. No wheezing, rales or rhonchi.   Heart: Normal S1 & S2. RRR.   Abdomen: Non distended. Soft. Non-tender. + BS  Ext: No calf tenderness. LUE swollen. Wound vac in place. Ace wraps on lower extremities.    Neuro: Lethargic. Moves all extremities. No speech disorder  Skin: Warm and Dry  Psychiatry: Normal mood and affect    I&O's Summary    22 Sep 2023 07:01  -  23 Sep 2023 07:00  --------------------------------------------------------  IN: 300 mL / OUT: 0 mL / NET: 300 mL    23 Sep 2023 07:01  -  23 Sep 2023 14:04  --------------------------------------------------------  IN: 0 mL / OUT: 1500 mL / NET: -1500 mL    LABS:                      8.6    9.87  )-----------( 86       ( 23 Sep 2023 09:04 )             29.1     09-23    139  |  99  |  33.5<H>  ----------------------------<  91  3.8   |  28.0  |  3.26<H>    Ca    8.7      23 Sep 2023 09:04  Phos  4.5     09-23    TPro  x   /  Alb  3.0<L>  /  TBili  x   /  DBili  x   /  AST  x   /  ALT  x   /  AlkPhos  x   09-23    Urinalysis Basic - ( 23 Sep 2023 09:04 )    Color: x / Appearance: x / SG: x / pH: x  Gluc: 91 mg/dL / Ketone: x  / Bili: x / Urobili: x   Blood: x / Protein: x / Nitrite: x   Leuk Esterase: x / RBC: x / WBC x   Sq Epi: x / Non Sq Epi: x / Bacteria: x    RADIOLOGY & ADDITIONAL STUDIES:  Reviewed     MEDICATIONS  (STANDING):  allopurinol 100 milliGRAM(s) Oral daily  aspirin  chewable 81 milliGRAM(s) Oral daily  atorvastatin 40 milliGRAM(s) Oral at bedtime  budesonide  80 MICROgram(s)/formoterol 4.5 MICROgram(s) Inhaler 2 Puff(s) Inhalation two times a day  calcitriol   Capsule 0.25 MICROGram(s) Oral daily  calcium acetate 667 milliGRAM(s) Oral three times a day with meals  chlorhexidine 2% Cloths 1 Application(s) Topical <User Schedule>  epoetin thelma (EPOGEN) Injectable 62600 Unit(s) IV Push <User Schedule>  meropenem Injectable 500 milliGRAM(s) IV Push every 12 hours  metoprolol tartrate 12.5 milliGRAM(s) Oral daily  midodrine. 10 milliGRAM(s) Oral three times a day  pantoprazole    Tablet 40 milliGRAM(s) Oral before breakfast  PARoxetine 20 milliGRAM(s) Oral daily    MEDICATIONS  (PRN):  acetaminophen     Tablet .. 650 milliGRAM(s) Oral every 6 hours PRN Temp greater or equal to 38C (100.4F), Mild Pain (1 - 3)  albuterol/ipratropium for Nebulization 3 milliLiter(s) Nebulizer every 6 hours PRN Shortness of Breath  ALPRAZolam 0.5 milliGRAM(s) Oral two times a day PRN for anxiety  melatonin 3 milliGRAM(s) Oral at bedtime PRN Insomnia  ondansetron Injectable 4 milliGRAM(s) IV Push every 8 hours PRN Nausea and/or Vomiting  prochlorperazine   Tablet 10 milliGRAM(s) Oral four times a day PRN for nausea  sodium chloride 0.9% lock flush 10 milliLiter(s) IV Push every 1 hour PRN Pre/post blood products, medications, blood draw, and to maintain line patency

## 2023-09-23 NOTE — PROGRESS NOTE ADULT - NS ATTEST RISK PROBLEM GEN_ALL_CORE FT
progressive renal failure now requiring HD  Bacteremia, LUE infection
GARRETTE Edouard.  Progressive CKD.
Progressive CKD.  Metabolic Acidosis.
Metabolic Encephalopathy.
GARRETTE Edouard.  Progressive CKD.
encephalopathy, respiratory acidosis requiring NIV

## 2023-09-24 NOTE — PROGRESS NOTE ADULT - SUBJECTIVE AND OBJECTIVE BOX
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 On May 31, pt had a left brachiocephalic ateriovenous fistula placed by Dr Hodge and Dr Coleman with apparent difficulty in accessing the site with difficulty with the conscious sedation at that time requiring close monitoring in SICU and NIV respiratory support. Pt now reports LUE open wound, initially presenting as LUE swelling for the last 2 weeks, from which a blister developed at the area of hte fistula which ruptured today with drainage of murky fluid. Pt endorsed 2 episodes of fever one two days ago of 101.7 and another yesterday with 101.3, also endorsed decreased range of motion of the LUE as well has chills, No nausea/vomiting, no SOB/chest pain, no palpitaitions.     Lives with . Uses a walker.    In the ED vitals stable, Labs stable. Bcx collected. Medicine consulted for admission to medicine service. (14 Sep 2023 21:24)    Interval History:  Patient was seen and examined at bedside around 9:15 am.   Lethargic overnight, found to have CO2 retention. BiPAP settings adjusted.  Feeling better now.   Got good night sleep.   Denies chest pain, palpitations, shortness of breath, headache, dizziness, visual symptoms, nausea, vomiting or abdominal pain.  Denies pain or paresthesia in left arm/hand.     ROS:  As per interval history otherwise unremarkable.    PHYSICAL EXAM:  Vital Signs   T(C): 37 (24 Sep 2023 08:00), Max: 37 (24 Sep 2023 08:00)  T(F): 98.6 (24 Sep 2023 08:00), Max: 98.6 (24 Sep 2023 08:00)  HR: 79 (24 Sep 2023 12:00) (72 - 150)  BP: 130/53 (24 Sep 2023 12:00) (74/40 - 130/53)  BP(mean): 70 (24 Sep 2023 12:00) (56 - 75)  RR: 18 (24 Sep 2023 12:00) (17 - 25)  SpO2: 94% (24 Sep 2023 12:00) (91% - 99%)  Parameters below as of 24 Sep 2023 12:00  Patient On (Oxygen Delivery Method): nasal cannula  O2 Flow (L/min): 2  General: Elderly female lying in bed comfortably. No acute distress  HEENT: EOMI. Clear conjunctivae. Moist mucus membrane  Neck: Supple. Right shiley in place.   Chest: Good air entry. No wheezing, rales or rhonchi.   Heart: Normal S1 & S2. RRR.   Abdomen: Non distended. Soft. Non-tender. + BS  Ext: No calf tenderness. LUE swollen, neurovascular intact. Wound vac in place. Ace wraps on lower extremities.    Neuro: Awake and alert. No focal deficit. No speech disorder  Skin: Warm and Dry  Psychiatry: Normal mood and affect    I&O's Summary    23 Sep 2023 07:01  -  24 Sep 2023 07:00  --------------------------------------------------------  IN: 1100 mL / OUT: 1500 mL / NET: -400 mL    LABS:                        8.6    9.87  )-----------( 86       ( 23 Sep 2023 09:04 )             29.1     09-23    140  |  101  |  16.9  ----------------------------<  163<H>  3.7   |  26.0  |  2.01<H>    Ca    8.7      23 Sep 2023 18:40  Phos  4.5     09-23  Mg     1.8     09-23    TPro  x   /  Alb  3.0<L>  /  TBili  x   /  DBili  x   /  AST  x   /  ALT  x   /  AlkPhos  x   09-23    RADIOLOGY & ADDITIONAL STUDIES:  Reviewed     MEDICATIONS  (STANDING):  allopurinol 100 milliGRAM(s) Oral daily  aspirin  chewable 81 milliGRAM(s) Oral daily  atorvastatin 40 milliGRAM(s) Oral at bedtime  budesonide  80 MICROgram(s)/formoterol 4.5 MICROgram(s) Inhaler 2 Puff(s) Inhalation two times a day  calcitriol   Capsule 0.25 MICROGram(s) Oral daily  calcium acetate 667 milliGRAM(s) Oral three times a day with meals  chlorhexidine 2% Cloths 1 Application(s) Topical <User Schedule>  epoetin thelma (EPOGEN) Injectable 26303 Unit(s) IV Push <User Schedule>  heparin   Injectable 5000 Unit(s) SubCutaneous every 8 hours  meropenem Injectable 500 milliGRAM(s) IV Push every 12 hours  metoprolol tartrate 12.5 milliGRAM(s) Oral daily  midodrine. 10 milliGRAM(s) Oral three times a day  pantoprazole    Tablet 40 milliGRAM(s) Oral before breakfast  PARoxetine 20 milliGRAM(s) Oral daily    MEDICATIONS  (PRN):  acetaminophen     Tablet .. 650 milliGRAM(s) Oral every 6 hours PRN Temp greater or equal to 38C (100.4F), Mild Pain (1 - 3)  albuterol/ipratropium for Nebulization 3 milliLiter(s) Nebulizer every 6 hours PRN Shortness of Breath  melatonin 3 milliGRAM(s) Oral at bedtime PRN Insomnia  ondansetron Injectable 4 milliGRAM(s) IV Push every 8 hours PRN Nausea and/or Vomiting  sodium chloride 0.9% lock flush 10 milliLiter(s) IV Push every 1 hour PRN Pre/post blood products, medications, blood draw, and to maintain line patency

## 2023-09-24 NOTE — PROGRESS NOTE ADULT - SUBJECTIVE AND OBJECTIVE BOX
Patient seen and examined at bedside. Intermittently lethargic with hypercapnia has been on BiPAP in stepdown. No acute complaints this morning.    MEDICATIONS  (STANDING):  allopurinol 100 milliGRAM(s) Oral daily  aspirin  chewable 81 milliGRAM(s) Oral daily  atorvastatin 40 milliGRAM(s) Oral at bedtime  budesonide  80 MICROgram(s)/formoterol 4.5 MICROgram(s) Inhaler 2 Puff(s) Inhalation two times a day  calcitriol   Capsule 0.25 MICROGram(s) Oral daily  calcium acetate 667 milliGRAM(s) Oral three times a day with meals  chlorhexidine 2% Cloths 1 Application(s) Topical <User Schedule>  epoetin thelma (EPOGEN) Injectable 00799 Unit(s) IV Push <User Schedule>  meropenem Injectable 500 milliGRAM(s) IV Push every 12 hours  metoprolol tartrate 12.5 milliGRAM(s) Oral daily  midodrine. 10 milliGRAM(s) Oral three times a day  pantoprazole    Tablet 40 milliGRAM(s) Oral before breakfast  PARoxetine 20 milliGRAM(s) Oral daily    MEDICATIONS  (PRN):  acetaminophen     Tablet .. 650 milliGRAM(s) Oral every 6 hours PRN Temp greater or equal to 38C (100.4F), Mild Pain (1 - 3)  albuterol/ipratropium for Nebulization 3 milliLiter(s) Nebulizer every 6 hours PRN Shortness of Breath  ALPRAZolam 0.5 milliGRAM(s) Oral two times a day PRN for anxiety  melatonin 3 milliGRAM(s) Oral at bedtime PRN Insomnia  ondansetron Injectable 4 milliGRAM(s) IV Push every 8 hours PRN Nausea and/or Vomiting  sodium chloride 0.9% lock flush 10 milliLiter(s) IV Push every 1 hour PRN Pre/post blood products, medications, blood draw, and to maintain line patency      Vital Signs Last 24 Hrs  T(C): 36.8 (24 Sep 2023 00:50), Max: 36.8 (23 Sep 2023 16:16)  T(F): 98.2 (24 Sep 2023 00:50), Max: 98.3 (23 Sep 2023 16:16)  HR: 72 (24 Sep 2023 00:00) (72 - 150)  BP: 104/38 (24 Sep 2023 00:00) (74/40 - 130/40)  BP(mean): 60 (24 Sep 2023 00:00) (50 - 75)  RR: 20 (24 Sep 2023 00:00) (17 - 25)  SpO2: 92% (24 Sep 2023 00:00) (91% - 98%)    Parameters below as of 24 Sep 2023 00:00  Patient On (Oxygen Delivery Method): BiPAP/CPAP    O2 Concentration (%): 30                        8.6    9.87  )-----------( 86       ( 23 Sep 2023 09:04 )             29.1   09-23    140  |  101  |  16.9  ----------------------------<  163<H>  3.7   |  26.0  |  2.01<H>    Ca    8.7      23 Sep 2023 18:40  Phos  4.5     09-23  Mg     1.8     09-23    TPro  x   /  Alb  3.0<L>  /  TBili  x   /  DBili  x   /  AST  x   /  ALT  x   /  AlkPhos  x   09-23      Exam:  Gen: pt lying in bed, a bit lethargic, in NAD  Resp: unlabored, currently on BiPAP  CVS: RRR  Abd: soft, NT, ND  Ext: moving all extremities spontaneously, sensation intact, pulses 2+. LUE Vac in place holding suction. Wound without evidence of surrounding infection

## 2023-09-24 NOTE — PROGRESS NOTE ADULT - ASSESSMENT
Assessment:   Pt is a 73y female who presented with left upper extremity infection av fistula site. Pt underwent wound w/a 9/15 and Shiley RIJ placement 9/19.     Plan:  wound vac to be changed Mon 9/25  Set up home vac  Cleared by ID for PC placement   Earliest Vasc Surgery time available for AVF is early next week  Remainder of care per primary

## 2023-09-24 NOTE — CHART NOTE - NSCHARTNOTEFT_GEN_A_CORE
Chart/Event Note  [facility] 3TWS 3213 01  GARRETT BARCENAS, 73y, Female  [id number]    Reason for Notification: Increased lethargy  Notified by RN that the above patient had worsening lethargy as compared to previous night.     Events/History of Present Illness  Patient in bed on bipap, in no apparent distress. Vital signs are stable. Patient wakes to voices and answers questions appropriately but falls asleep easily afterwards. Patient's only complaint is of feeling tired. Patient denies shortness of breath, chest pain/ pressure, dizziness. ABG performed that showed respiratory acidosis, CO2 worse than ABG on 9/22.   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 Hospital course complicated by worsening CKD now ESRD requiring HD and metabolic encephalopathy secondary to hypercapnic respiratory failure. Pulmonology consulted for COPD, hypercarbia and patient was started on bipap. ABG  performed on 9/20 showed respiratory acidosis that improved on repeat ABG on 9/22.      Review of Systems:  Constitutional: "feels tired", No fever, chills, or fatigue.  Neurologic: No headache, dizziness, vision/speech changes, numbness, or weakness.  Respiratory: No cough, wheezing, shortness of breath, or dyspnea.   Cardiovascular: No chest pain, pressure, or palpitations.   GI: No abdominal pain, nausea, vomiting, diarrhea, constipation.   : No dysuria, burning, frequency, incontinence, or retention.     T(C): 36.8 (09-23-23 @ 20:00), Max: 36.8 (09-23-23 @ 16:16)  HR: 73 (09-23-23 @ 22:00) (73 - 150)  BP: 103/39 (09-23-23 @ 22:00) (74/40 - 130/40)  RR: 20 (09-23-23 @ 22:00) (17 - 25)  SpO2: 98% (09-23-23 @ 22:00) (91% - 98%)                        8.6    9.87  )-----------( 86       ( 23 Sep 2023 09:04 )             29.1        Physical Examination:  GENERAL: No acute distress noted during examination.   NERVOUS SYSTEM:  lethargic, responds to voice but quickly falls asleep after conversation  HEAD: Atraumatic and normocephalic.  EYES: EOMI/PERRLA. Conjunctiva and sclera clear  ENMT: Moist mucous membranes.   NECK: Supple with no JVD.   CHEST: Clear to ascultation bilaterally. No rales, rhonchi, wheezing, or rubs heard. Symmetrical/bilateral chest wall rise. No use of accessory muscles.    HEART: Regular rate and rhythm. Murmur appreciated.  ABDOMEN: Soft, nontender, and nondistended.   EXTREMITIES:  +LUE edema, + peripheral pulses without cyanosis.    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     1) Bipap settings adjusted   2) ABG ordered for 1 hour after changes to bipap  3) If no improvement in ABG will consult ICU  *Discuused with nocturnist    Louise Soriano NP  Department of Medicine Chart/Event Note  [facility] 3TWS 3213 01  GARRETT BARCENAS, 73y, Female  [id number]    Reason for Notification: Increased lethargy  Notified by RN that the above patient had worsening lethargy as compared to previous night.     Events/History of Present Illness  Patient in bed on bipap, in no apparent distress. Vital signs are stable. Patient wakes to voices and answers questions appropriately but falls asleep easily afterwards. Patient's only complaint is of feeling tired. Patient denies shortness of breath, chest pain/ pressure, dizziness. ABG performed that showed respiratory acidosis, CO2 worse than ABG on 9/22.   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 Hospital course complicated by worsening CKD now ESRD requiring HD and metabolic encephalopathy secondary to hypercapnic respiratory failure. Pulmonology consulted for COPD, hypercarbia and patient was started on bipap. ABG  performed on 9/20 showed respiratory acidosis that improved on repeat ABG on 9/22.      Review of Systems:  Constitutional: "feels tired", No fever, chills, or fatigue.  Neurologic: No headache, dizziness, vision/speech changes, numbness, or weakness.  Respiratory: No cough, wheezing, shortness of breath, or dyspnea.   Cardiovascular: No chest pain, pressure, or palpitations.   GI: No abdominal pain, nausea, vomiting, diarrhea, constipation.   : No dysuria, burning, frequency, incontinence, or retention.     T(C): 36.8 (09-23-23 @ 20:00), Max: 36.8 (09-23-23 @ 16:16)  HR: 73 (09-23-23 @ 22:00) (73 - 150)  BP: 103/39 (09-23-23 @ 22:00) (74/40 - 130/40)  RR: 20 (09-23-23 @ 22:00) (17 - 25)  SpO2: 98% (09-23-23 @ 22:00) (91% - 98%)                        8.6    9.87  )-----------( 86       ( 23 Sep 2023 09:04 )             29.1        Physical Examination:  GENERAL: No acute distress noted during examination.   NERVOUS SYSTEM:  lethargic, responds to voice but quickly falls asleep after conversation  HEAD: Atraumatic and normocephalic.  EYES: EOMI/PERRLA. Conjunctiva and sclera clear  ENMT: Moist mucous membranes.   NECK: Supple with no JVD.   CHEST: Clear to ascultation bilaterally. No rales, rhonchi, wheezing, or rubs heard. Symmetrical/bilateral chest wall rise. No use of accessory muscles.    HEART: Regular rate and rhythm. Murmur appreciated.  ABDOMEN: Soft, nontender, and nondistended.   EXTREMITIES:  +LUE edema, + peripheral pulses without cyanosis.    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     1) Bipap settings adjusted: 14/6, 30%   2) ABG ordered for 1 hour after changes to bipap  3) If no improvement in ABG will consult ICU  *Discused with nocturnist    *Addendum 01:28: ABG with slight improvement in pH and pCo2. PO2 62, will increase FiO2 to 35%.     Louise Soriano, NP  Department of Medicine Chart/Event Note  [facility] 3TWS 3213 01  GARRETT BARCENAS, 73y, Female  [id number]    Reason for Notification: Increased lethargy  Notified by RN that the above patient had worsening lethargy as compared to previous night.     Events/History of Present Illness  Patient in bed on bipap, in no apparent distress. Vital signs are stable. Patient wakes to voices and answers questions appropriately but falls asleep easily afterwards. Patient's only complaint is of feeling tired. Patient denies shortness of breath, chest pain/ pressure, dizziness. ABG performed that showed respiratory acidosis, CO2 worse than ABG on 9/22.   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 Hospital course complicated by worsening CKD now ESRD requiring HD and metabolic encephalopathy secondary to hypercapnic respiratory failure. Pulmonology consulted for COPD, hypercarbia and patient was started on bipap. ABG  performed on 9/20 showed respiratory acidosis that improved on repeat ABG on 9/22.      Review of Systems:  Constitutional: "feels tired", No fever, chills, or fatigue.  Neurologic: No headache, dizziness, vision/speech changes, numbness, or weakness.  Respiratory: No cough, wheezing, shortness of breath, or dyspnea.   Cardiovascular: No chest pain, pressure, or palpitations.   GI: No abdominal pain, nausea, vomiting, diarrhea, constipation.   : No dysuria, burning, frequency, incontinence, or retention.     T(C): 36.8 (09-23-23 @ 20:00), Max: 36.8 (09-23-23 @ 16:16)  HR: 73 (09-23-23 @ 22:00) (73 - 150)  BP: 103/39 (09-23-23 @ 22:00) (74/40 - 130/40)  RR: 20 (09-23-23 @ 22:00) (17 - 25)  SpO2: 98% (09-23-23 @ 22:00) (91% - 98%)                        8.6    9.87  )-----------( 86       ( 23 Sep 2023 09:04 )             29.1        Physical Examination:  GENERAL: No acute distress noted during examination.   NERVOUS SYSTEM:  lethargic, responds to voice but quickly falls asleep after conversation  HEAD: Atraumatic and normocephalic.  EYES: EOMI/PERRLA. Conjunctiva and sclera clear  ENMT: Moist mucous membranes.   NECK: Supple with no JVD.   CHEST: Clear to ascultation bilaterally. No rales, rhonchi, wheezing, or rubs heard. Symmetrical/bilateral chest wall rise. No use of accessory muscles.    HEART: Regular rate and rhythm. Murmur appreciated.  ABDOMEN: Soft, nontender, and nondistended.   EXTREMITIES:  +LUE edema, + peripheral pulses without cyanosis.    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     1) Bipap settings adjusted: 14/6, 30%   2) ABG ordered for 1 hour after changes to bipap  3) If no improvement in ABG will consult ICU  *Discussed with nocturnist    *Addendum 01:28: ABG with slight improvement in pH and pCo2. PO2 62, will increase FiO2 to 35%. Repeat ABG in SHAMEKA Soriano NP  Department of Medicine Chart/Event Note  [facility] 3TWS 3213 01  GARRETT BARCENAS, 73y, Female  [id number]    Reason for Notification: Increased lethargy  Notified by RN that the above patient had worsening lethargy as compared to previous night.     Events/History of Present Illness  Patient in bed on bipap, in no apparent distress. Vital signs are stable. Patient wakes to voices and answers questions appropriately but falls asleep easily afterwards. Patient's only complaint is of feeling tired. Patient denies shortness of breath, chest pain/ pressure, dizziness. ABG performed that showed respiratory acidosis, CO2 worse than ABG on 9/22.   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 Hospital course complicated by worsening CKD now ESRD requiring HD and metabolic encephalopathy secondary to hypercapnic respiratory failure. Pulmonology consulted for COPD, hypercarbia and patient was started on bipap. ABG  performed on 9/20 showed respiratory acidosis that improved on repeat ABG on 9/22.      Review of Systems:  Constitutional: "feels tired", No fever, chills, or fatigue.  Neurologic: No headache, dizziness, vision/speech changes, numbness, or weakness.  Respiratory: No cough, wheezing, shortness of breath, or dyspnea.   Cardiovascular: No chest pain, pressure, or palpitations.   GI: No abdominal pain, nausea, vomiting, diarrhea, constipation.   : No dysuria, burning, frequency, incontinence, or retention.     T(C): 36.8 (09-23-23 @ 20:00), Max: 36.8 (09-23-23 @ 16:16)  HR: 73 (09-23-23 @ 22:00) (73 - 150)  BP: 103/39 (09-23-23 @ 22:00) (74/40 - 130/40)  RR: 20 (09-23-23 @ 22:00) (17 - 25)  SpO2: 98% (09-23-23 @ 22:00) (91% - 98%)                        8.6    9.87  )-----------( 86       ( 23 Sep 2023 09:04 )             29.1        Physical Examination:  GENERAL: No acute distress noted during examination.   NERVOUS SYSTEM:  lethargic, responds to voice but quickly falls asleep after conversation  HEAD: Atraumatic and normocephalic.  EYES: EOMI/PERRLA. Conjunctiva and sclera clear  ENMT: Moist mucous membranes.   NECK: Supple with no JVD.   CHEST: Clear to ascultation bilaterally. No rales, rhonchi, wheezing, or rubs heard. Symmetrical/bilateral chest wall rise. No use of accessory muscles.    HEART: Regular rate and rhythm. Murmur appreciated.  ABDOMEN: Soft, nontender, and nondistended.   EXTREMITIES:  +LUE edema, + peripheral pulses without cyanosis.    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     1) Bipap settings adjusted: 14/6, 30%   2) ABG ordered for 1 hour after changes to bipap  3) If no improvement in ABG will consult ICU  *Discussed with nocturnist    *Addendum 01:28: ABG with slight improvement in pH and pCo2. PO2 62, will increase FiO2 to 35%. Repeat ABG in AM  **Addendum 06:05: Repeat ABG with improved pCO2. Patient is more alert and refusing bipap. Comfortable on nasal cannula.     Louise Soriano NP  Department of Medicine Chart/Event Note  [facility] 3TWS 3213 01  GARRETT BARCENAS, 73y, Female  [id number]    Reason for Notification: Increased lethargy  Notified by RN that the above patient had worsening lethargy as compared to previous night.     Events/History of Present Illness  Patient in bed on bipap, in no apparent distress. Vital signs are stable. Patient wakes to voices and answers questions appropriately but falls asleep easily afterwards. Patient's only complaint is of feeling tired. Patient denies shortness of breath, chest pain/ pressure, dizziness. ABG performed that showed respiratory acidosis, CO2 worse than ABG on 9/22.   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 Hospital course complicated by worsening CKD now ESRD requiring HD and metabolic encephalopathy secondary to hypercapnic respiratory failure. Pulmonology consulted for COPD, hypercarbia and patient was started on bipap. ABG  performed on 9/20 showed respiratory acidosis that improved on repeat ABG on 9/22.      Review of Systems:  Constitutional: "feels tired", No fever, chills, or fatigue.  Neurologic: No headache, dizziness, vision/speech changes, numbness, or weakness.  Respiratory: No cough, wheezing, shortness of breath, or dyspnea.   Cardiovascular: No chest pain, pressure, or palpitations.   GI: No abdominal pain, nausea, vomiting, diarrhea, constipation.   : No dysuria, burning, frequency, incontinence, or retention.     T(C): 36.8 (09-23-23 @ 20:00), Max: 36.8 (09-23-23 @ 16:16)  HR: 73 (09-23-23 @ 22:00) (73 - 150)  BP: 103/39 (09-23-23 @ 22:00) (74/40 - 130/40)  RR: 20 (09-23-23 @ 22:00) (17 - 25)  SpO2: 98% (09-23-23 @ 22:00) (91% - 98%)                        8.6    9.87  )-----------( 86       ( 23 Sep 2023 09:04 )             29.1        Physical Examination:  GENERAL: No acute distress noted during examination.   NERVOUS SYSTEM:  lethargic, responds to voice but quickly falls asleep after conversation  HEAD: Atraumatic and normocephalic.  EYES: EOMI/PERRLA. Conjunctiva and sclera clear  ENMT: Moist mucous membranes.   NECK: Supple with no JVD.   CHEST: Clear to ascultation bilaterally. No rales, rhonchi, wheezing, or rubs heard. Symmetrical/bilateral chest wall rise. No use of accessory muscles.    HEART: Regular rate and rhythm. Murmur appreciated.  ABDOMEN: Soft, nontender, and nondistended.   EXTREMITIES:  +LUE edema, + peripheral pulses without cyanosis.    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     1) Bipap settings adjusted: 14/6, 30%   2) ABG ordered for 1 hour after changes to bipap  3) If no improvement in ABG will consult ICU  4) Pulmonology already consulted  *Discussed with nocturnist    *Addendum 01:28: ABG with slight improvement in pH and pCo2. PO2 62, will increase FiO2 to 35%. Repeat ABG in AM  **Addendum 06:05: Repeat ABG with improved pCO2. Patient is more alert and refusing bipap. Comfortable on 2L nasal cannula with SpO2 94-95%.     Louise Soriano NP  Department of Medicine

## 2023-09-24 NOTE — PROGRESS NOTE ADULT - ASSESSMENT
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  now ESRD requiring HD; Catheter placed RIJ, HD started  Anemia Chronic Disease  - avoid nephrotoxic medications  - Calcium acetate, calcitriol  - YEISON  - Tunneled dialysis catheter next week    COPD   Metabolic Encephalopathy due to hypercarbia - intermittent  - DuoNeb PRN  - Budesonide / Formoterol  - nocturnal and PRN NIV  - Pulmonology recs noted     HTN  CAD (cardiac cath 2021)  hx of LBBB  Chronic Diastolic Heart Failure  AS, severe TR, pHTN  - ASA, Metoprolol, atorvastatin    Gout  - allopurinol    hx of DM 2  no longer on medication, controlled per patient  A1c 5.4  Carb Consistent Diet     Thrombocytopenia  Chronic  - No signs of active bleeding   - Heparin Induced Ab negative   - Monitor     DVT Prophylaxis -- Heparin SQ    Dispo: Unclear, home vs [facility] once stable. PT eval.

## 2023-09-25 NOTE — PROGRESS NOTE ADULT - SUBJECTIVE AND OBJECTIVE BOX
Reason for visit: ESRD on HD    Subjective: Patient seen and evaluated this morning.  She is going for placement of tunneled hemodialysis catheter today, temporary hemodialysis catheter was removed this morning    ROS: All systems were reviewed in detail pertinent positive and negative mentioned above, rest are negative.    Physical Exam:  Gen: no acute distress  MS: sleepy  HENT: NCAT, MMM  CV: rhythm reg reg, rate normal, no m/g/r  Chest: CTAB, no w/r/r,  Abd: soft, NT, ND  Extremities: No edema, ACE b/l LE, LUE Vac    =======================================================  Vital Signs Last 24 Hrs  T(C): 36.7 (25 Sep 2023 08:00), Max: 36.8 (24 Sep 2023 14:57)  T(F): 98.1 (25 Sep 2023 08:00), Max: 98.3 (25 Sep 2023 00:06)  HR: 84 (25 Sep 2023 09:41) (75 - 98)  BP: 102/58 (25 Sep 2023 08:00) (98/54 - 130/53)  BP(mean): 82 (25 Sep 2023 00:06) (67 - 82)  RR: 20 (25 Sep 2023 08:00) (16 - 20)  SpO2: 93% (25 Sep 2023 09:41) (90% - 97%)    Parameters below as of 25 Sep 2023 09:41  Patient On (Oxygen Delivery Method): nasal cannula, 3 lpm      I&O's Summary    =======================================================  Current Antibiotics:  meropenem Injectable 500 milliGRAM(s) IV Push every 12 hours    Other medications:  allopurinol 100 milliGRAM(s) Oral daily  aspirin  chewable 81 milliGRAM(s) Oral daily  atorvastatin 40 milliGRAM(s) Oral at bedtime  budesonide  80 MICROgram(s)/formoterol 4.5 MICROgram(s) Inhaler 2 Puff(s) Inhalation two times a day  calcitriol   Capsule 0.25 MICROGram(s) Oral daily  calcium acetate 667 milliGRAM(s) Oral three times a day with meals  chlorhexidine 2% Cloths 1 Application(s) Topical <User Schedule>  epoetin thelma (EPOGEN) Injectable 75486 Unit(s) IV Push <User Schedule>  heparin   Injectable 5000 Unit(s) SubCutaneous every 8 hours  magnesium oxide 400 milliGRAM(s) Oral three times a day with meals  metoprolol tartrate 12.5 milliGRAM(s) Oral daily  midodrine. 10 milliGRAM(s) Oral three times a day  pantoprazole    Tablet 40 milliGRAM(s) Oral before breakfast  PARoxetine 20 milliGRAM(s) Oral daily    =======================================================  09-25    140  |  100  |  26.8<H>  ----------------------------<  80  4.1   |  29.0  |  3.31<H>    Ca    9.0      25 Sep 2023 04:40  Phos  3.6     09-25  Mg     1.9     09-25      Creatinine: 3.31 mg/dL (09-25-23 @ 04:40)  Creatinine: 2.01 mg/dL (09-23-23 @ 18:40)  Creatinine: 3.26 mg/dL (09-23-23 @ 09:04)  Creatinine: 3.38 mg/dL (09-21-23 @ 06:00)    =======================================================

## 2023-09-25 NOTE — BRIEF OPERATIVE NOTE - NSICDXBRIEFPOSTOP_GEN_ALL_CORE_FT
POST-OP DIAGNOSIS:  Open wound of skin 20-Sep-2023 11:51:18  Karina Fuentes  
POST-OP DIAGNOSIS:  ESRD (end stage renal disease) 25-Sep-2023 14:45:02  Lukas Rodriguez

## 2023-09-25 NOTE — PROGRESS NOTE ADULT - SUBJECTIVE AND OBJECTIVE BOX
[facility] Division of Hospital Medicine  Jose Middleton MD    Chief Complaint:  Patient is a 73y old  Female who presents with a chief complaint of Fistula evaluation (25 Sep 2023 06:55)      SUBJECTIVE / OVERNIGHT EVENTS:  Patient seen and examined at bedside. No acute events reported overnight. No new complaints.    MEDICATIONS  (STANDING):  allopurinol 100 milliGRAM(s) Oral daily  aspirin  chewable 81 milliGRAM(s) Oral daily  atorvastatin 40 milliGRAM(s) Oral at bedtime  budesonide  80 MICROgram(s)/formoterol 4.5 MICROgram(s) Inhaler 2 Puff(s) Inhalation two times a day  calcitriol   Capsule 0.25 MICROGram(s) Oral daily  calcium acetate 667 milliGRAM(s) Oral three times a day with meals  chlorhexidine 2% Cloths 1 Application(s) Topical <User Schedule>  epoetin thelma (EPOGEN) Injectable 69976 Unit(s) IV Push <User Schedule>  heparin   Injectable 5000 Unit(s) SubCutaneous every 8 hours  magnesium oxide 400 milliGRAM(s) Oral three times a day with meals  meropenem Injectable 500 milliGRAM(s) IV Push every 12 hours  metoprolol tartrate 12.5 milliGRAM(s) Oral daily  midodrine. 10 milliGRAM(s) Oral three times a day  pantoprazole    Tablet 40 milliGRAM(s) Oral before breakfast  PARoxetine 20 milliGRAM(s) Oral daily    MEDICATIONS  (PRN):  acetaminophen     Tablet .. 650 milliGRAM(s) Oral every 6 hours PRN Temp greater or equal to 38C (100.4F), Mild Pain (1 - 3)  albuterol/ipratropium for Nebulization 3 milliLiter(s) Nebulizer every 6 hours PRN Shortness of Breath  melatonin 3 milliGRAM(s) Oral at bedtime PRN Insomnia  ondansetron Injectable 4 milliGRAM(s) IV Push every 8 hours PRN Nausea and/or Vomiting  sodium chloride 0.9% lock flush 10 milliLiter(s) IV Push every 1 hour PRN Pre/post blood products, medications, blood draw, and to maintain line patency        I&O's Summary      PHYSICAL EXAM:  Vital Signs Last 24 Hrs  T(C): 36.7 (25 Sep 2023 08:00), Max: 36.8 (24 Sep 2023 14:57)  T(F): 98.1 (25 Sep 2023 08:00), Max: 98.3 (25 Sep 2023 00:06)  HR: 87 (25 Sep 2023 08:00) (75 - 98)  BP: 102/58 (25 Sep 2023 08:00) (98/54 - 130/53)  BP(mean): 82 (25 Sep 2023 00:06) (67 - 82)  RR: 20 (25 Sep 2023 08:00) (16 - 20)  SpO2: 96% (25 Sep 2023 08:00) (90% - 97%)    Parameters below as of 25 Sep 2023 08:00  Patient On (Oxygen Delivery Method): nasal cannula  O2 Flow (L/min): 2    General: Elderly female lying in bed comfortably. No acute distress  HEENT: EOMI. Clear conjunctivae. Moist mucus membrane  Neck: Supple. Right shiley in place.   Chest: Good air entry. No wheezing, rales or rhonchi.   Heart: Normal S1 & S2. RRR.   Abdomen: Non distended. Soft. Non-tender. + BS  Ext: No calf tenderness. LUE swollen, neurovascular intact. Wound vac in place. Ace wraps on lower extremities.    Neuro: Awake and alert. No focal deficit. No speech disorder  Skin: Warm and Dry  Psychiatry: Normal mood and affect    LABS:                        8.2    7.83  )-----------( 80       ( 25 Sep 2023 04:40 )             28.3     09-25    140  |  100  |  26.8<H>  ----------------------------<  80  4.1   |  29.0  |  3.31<H>    Ca    9.0      25 Sep 2023 04:40  Phos  3.6     09-25  Mg     1.9     09-25      PT/INR - ( 25 Sep 2023 06:36 )   PT: 15.0 sec;   INR: 1.36 ratio         PTT - ( 25 Sep 2023 06:36 )  PTT:28.9 sec      Urinalysis Basic - ( 25 Sep 2023 04:40 )    Color: x / Appearance: x / SG: x / pH: x  Gluc: 80 mg/dL / Ketone: x  / Bili: x / Urobili: x   Blood: x / Protein: x / Nitrite: x   Leuk Esterase: x / RBC: x / WBC x   Sq Epi: x / Non Sq Epi: x / Bacteria: x        CAPILLARY BLOOD GLUCOSE            RADIOLOGY & ADDITIONAL TESTS:  Results Reviewed:   Imaging Personally Reviewed:  Electrocardiogram Personally Reviewed:

## 2023-09-25 NOTE — CHART NOTE - NSCHARTNOTEFT_GEN_A_CORE
Patient with 5 beats of VTACH, patient now back to baseline rhythm  Patient is asymptomatic, VSS   Mag, Phos, and BMP in AM.  RN to notify with any acute changes

## 2023-09-25 NOTE — BRIEF OPERATIVE NOTE - OPERATION/FINDINGS
RIJ unable to be cannulated as it had thrombosed. Under US/xray guidance a left IJ permacath was successfully placed. Tip position confirmed w/ fluoro. PACU CXR does not she pneumothorax. RIJ unable to be cannulated as it had thrombosed. Under US/xray guidance a left IJ permacath was successfully placed. Tip position confirmed w/ fluoro to be at cavoatrial junction. No kinking in tunnel. PACU CXR does not show pneumothorax.

## 2023-09-25 NOTE — CHART NOTE - NSCHARTNOTEFT_GEN_A_CORE
Patient seen and examined at bedside. Doing well post PC placement. No acute complaints.     MEDICATIONS  (STANDING):  allopurinol 100 milliGRAM(s) Oral daily  aspirin  chewable 81 milliGRAM(s) Oral daily  atorvastatin 40 milliGRAM(s) Oral at bedtime  budesonide  80 MICROgram(s)/formoterol 4.5 MICROgram(s) Inhaler 2 Puff(s) Inhalation two times a day  calcitriol   Capsule 0.25 MICROGram(s) Oral daily  calcium acetate 667 milliGRAM(s) Oral three times a day with meals  chlorhexidine 2% Cloths 1 Application(s) Topical <User Schedule>  chlorhexidine 2% Cloths 1 Application(s) Topical <User Schedule>  epoetin thelma (EPOGEN) Injectable 43892 Unit(s) IV Push <User Schedule>  heparin   Injectable 5000 Unit(s) SubCutaneous every 8 hours  magnesium oxide 400 milliGRAM(s) Oral three times a day with meals  meropenem Injectable 500 milliGRAM(s) IV Push every 12 hours  metoprolol tartrate 12.5 milliGRAM(s) Oral daily  midodrine. 10 milliGRAM(s) Oral three times a day  pantoprazole    Tablet 40 milliGRAM(s) Oral before breakfast  PARoxetine 20 milliGRAM(s) Oral daily    MEDICATIONS  (PRN):  acetaminophen     Tablet .. 650 milliGRAM(s) Oral every 6 hours PRN Temp greater or equal to 38C (100.4F), Mild Pain (1 - 3)  albuterol/ipratropium for Nebulization 3 milliLiter(s) Nebulizer every 6 hours PRN Shortness of Breath  melatonin 3 milliGRAM(s) Oral at bedtime PRN Insomnia  ondansetron Injectable 4 milliGRAM(s) IV Push every 8 hours PRN Nausea and/or Vomiting  sodium chloride 0.9% lock flush 10 milliLiter(s) IV Push every 1 hour PRN Pre/post blood products, medications, blood draw, and to maintain line patency  sodium chloride 0.9% lock flush 10 milliLiter(s) IV Push every 1 hour PRN Pre/post blood products, medications, blood draw, and to maintain line patency      Vital Signs Last 24 Hrs  T(C): 36.7 (25 Sep 2023 16:59), Max: 36.8 (25 Sep 2023 00:06)  T(F): 98 (25 Sep 2023 16:59), Max: 98.3 (25 Sep 2023 00:06)  HR: 81 (25 Sep 2023 16:59) (69 - 98)  BP: 110/62 (25 Sep 2023 16:59) (102/58 - 136/71)  BP(mean): 85 (25 Sep 2023 11:08) (82 - 85)  RR: 18 (25 Sep 2023 16:59) (16 - 24)  SpO2: 98% (25 Sep 2023 16:59) (90% - 100%)    Parameters below as of 25 Sep 2023 16:59  Patient On (Oxygen Delivery Method): room air                            8.2    7.83  )-----------( 80       ( 25 Sep 2023 04:40 )             28.3   09-25    140  |  100  |  26.8<H>  ----------------------------<  80  4.1   |  29.0  |  3.31<H>    Ca    9.0      25 Sep 2023 04:40  Phos  3.6     09-25  Mg     1.9     09-25        Exam:  Gen: pt lying in bed, alert, in NAD  HEENT: RIJ access site without hematoma. LIJ permacath insertion site without hematoma. Stable ecchymosis around neck that has been present since before RIJ shiley removal/PC placement today  Resp: unlabored  CVS: RRR  Abd: soft, NT, ND  Ext: moving all extremities spontaneously, sensation intact, pulses 2+. LUE with stable edema, wrapped with ace. Wound vac in place and holding suction.      Assessment: Patient is a 72 y/o female s/p washout of infected LUE fistula, now s/p permacath placement by vascular surgery.    Plan:  -Post op CXR without pneumo  -HD via perma cath  -Vac change on thursday   -continuing wrapping LUE for compression  -oob/IS as tolerated  -dvt ppx  -Remainder of care per primary

## 2023-09-25 NOTE — BRIEF OPERATIVE NOTE - NSICDXBRIEFPROCEDURE_GEN_ALL_CORE_FT
PROCEDURES:  Replacement of Permacath catheter with imaging guidance 25-Sep-2023 14:44:32  Lukas Rodriguez  
PROCEDURES:  Washout of wound of upper extremity 20-Sep-2023 11:49:10  Karina Fuentes

## 2023-09-25 NOTE — PROGRESS NOTE ADULT - ASSESSMENT
72 YO F with DM/hypertension/papillary renal cell cancer s/p partial nephrectomy and now progressive CKD 5 with new onset ESRD presented with swelling and pain associated with drainage from her AV fistula; underwent washout debridement on 9/15.  Hospital course notable for acute hypercapnic respiratory failure requiring BiPAP use.  Nephrology is managing ESRD s/p initiation of hemodialysis.    New onset ESRD  Access: Going for tunneled hemodialysis catheter placement today  No indication for dialysis today, will arrange for next session tomorrow  Blood pressure: Soft, continue on midodrine 10 mg 3 times daily  Volume status: UF as tolerated with hemodialysis  Anemia: We will continue with the YEISON during hemodialysis  Mineral and bone disease and CKD: We will continue on Phos binder + calcitriol    She will need outpatient placement for hemodialysis.

## 2023-09-25 NOTE — PROGRESS NOTE ADULT - ASSESSMENT
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  now ESRD requiring HD; Catheter placed RIJ, HD started  Anemia Chronic Disease  - avoid nephrotoxic medications  - Calcium acetate, calcitriol  - YEISON  - Tunneled dialysis today by [facility]    COPD   Metabolic Encephalopathy due to hypercarbia - intermittent  - DuoNeb PRN  - Budesonide / Formoterol  - nocturnal and PRN NIV  - Pulmonology recs noted     HTN  CAD (cardiac cath 2021)  hx of LBBB  Chronic Diastolic Heart Failure  AS, severe TR, pHTN  - ASA, Metoprolol, atorvastatin    Gout  - allopurinol    hx of DM 2  no longer on medication, controlled per patient  A1c 5.4  Carb Consistent Diet     Thrombocytopenia  Chronic  - No signs of active bleeding   - Heparin Induced Ab negative   - Monitor     DVT Prophylaxis -- Heparin SQ    Dispo: Unclear, home vs [facility] once stable. PT eval.

## 2023-09-25 NOTE — PROGRESS NOTE ADULT - ASSESSMENT
Assessment:   Pt is a 73y female who presented with left upper extremity infection av fistula site. Pt underwent wound w/a 9/15 and Shiley RIJ placement 9/19. Shiley removed this morning and going for PC w/ IR today.     Plan:  wound vac to be changed Tuesday 9/26  Home vac set up  Cleared by ID for PC placement, going with IR today at noon   Remainder of care per primary Assessment:   Pt is a 73y female who presented with left upper extremity infection av fistula site. Pt underwent wound w/a 9/15 and Shiley RIJ placement 9/19. Shiley removed this morning and going for PC w/ vascular surgery today.     Plan:  wound vac to be changed Tuesday 9/26  Home vac set up  Cleared by ID for PC placement, going with vascular surgery today at noon   Remainder of care per primary

## 2023-09-25 NOTE — PROGRESS NOTE ADULT - SUBJECTIVE AND OBJECTIVE BOX
Narrative:     72 yo F PMHx anxiety/depression, CHF, CAD, HTN, DM (now off DM meds), gout, anemia of chronic disease, COPD (not on home O2), 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  LUE cellulitis and LUE open wound -s/p AVF washout on 9/15/2023, LLE chronic wounds, wrapped in ACE dressing   -pt was on eliquis for concern that she had a clot in the LUE; however was discontinued in the OP after DVT was ruled out, OR cultures with Group B Strep, -Blood cultures negative On  Meropenem  Metabolic Encephalopathy due to hypercarbia - intermittent, abg stable, patient a&ox4, awake, alert  Shiley was removed and patient scheduled for permacath  Patient now presents to [facility] CCL for Perma cath placement       Antiplatelets/Anticoagulants:        Plavix:  No           NOAC: No       < from: US Duplex Venous Upper Ext Ltd, Left (09.16.23 @ 12:28) >  IMPRESSION:  No evidence of left upper extremity deep venous thrombosis.    < end of copied text >      ROS:  CONSTITUTIONAL: No weakness, fevers or chills  EYES/ENT: No visual changes;  No vertigo or throat pain   NECK: No pain or stiffness  RESPIRATORY: No cough, wheezing, hemoptysis; No shortness of breath, on NC 2l/mt  CARDIOVASCULAR: No chest pain or palpitations  GASTROINTESTINAL: No abdominal or epigastric pain. No nausea, vomiting, or hematemesis; No diarrhea or constipation. No melena or hematochezia.  GENITOURINARY: No dysuria, frequency or hematuria  NEUROLOGICAL: No numbness or weakness  SKIN: No itching, burning, rashes, or lesions , LUE wrapped in ACE dressing, LUE Wound vac , B/L LE wrapped up in dressing   All other review of systems is negative unless indicated above    PHYSICAL EXAM:    Vital Signs Last 24 Hrs  T(C): 36.7 (25 Sep 2023 08:00), Max: 36.8 (24 Sep 2023 14:57)  T(F): 98.1 (25 Sep 2023 08:00), Max: 98.3 (25 Sep 2023 00:06)  HR: 84 (25 Sep 2023 09:41) (75 - 98)  BP: 102/58 (25 Sep 2023 08:00) (98/54 - 130/53)  BP(mean): 82 (25 Sep 2023 00:06) (67 - 82)  RR: 20 (25 Sep 2023 08:00) (16 - 20)  SpO2: 93% (25 Sep 2023 09:41) (90% - 97%)    Parameters below as of 25 Sep 2023 09:41  Patient On (Oxygen Delivery Method): nasal cannula, 3 lpm      GENERAL: Pt lying comfortably, NAD.  ENMT: PERRL, +EOMI.  NECK: soft, Supple, No JVD,   CHEST/LUNG: Clear to auscultatation bilaterally; No wheezing.  HEART: S1S2+, Regular rate and rhythm; No murmurs.  ABDOMEN: Soft, Nontender, Nondistended; Bowel sounds present.  MUSCULOSKELETAL: Normal range of motion.  SKIN: No rashes or lesions.  NEURO: AAOX3, no focal deficits, no motor r sensory loss.  PSYCH: normal mood.  VASCULAR:   Radial +2 R/+2 L  Femoral +2 R/+2 L  PT +2 R/+2 L  DP +2 R/+2 L   LUE wrapped in ACE dressing, LUE Wound vac , B/L LE wrapped up in dressing   EKG: SR, PVC'S, Nonspecific T wave changes in lateral leads

## 2023-09-25 NOTE — BRIEF OPERATIVE NOTE - NSICDXBRIEFPREOP_GEN_ALL_CORE_FT
PRE-OP DIAGNOSIS:  ESRD (end stage renal disease) 25-Sep-2023 14:44:49  Lukas Rodriguez  
PRE-OP DIAGNOSIS:  Open wound of skin 20-Sep-2023 11:51:14  Karina Fuentes

## 2023-09-25 NOTE — PROGRESS NOTE ADULT - ASSESSMENT
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  LUE cellulitis and LUE open wound -s/p AVF washout on 9/15/2023, LLE chronic wounds, wrapped in ACE dressing   -pt was on eliquis for concern that she had a clot in the LUE; however was discontinued in the OP after DVT was ruled out, OR cultures with Group B Strep, -Blood cultures negative On  Meropenem  Metabolic Encephalopathy due to hypercarbia - intermittent, abg stable, patient a&ox4, awake, alert  Shiley was removed and patient scheduled for permacath  Patient now presents to [facility] CCL for Perma cath placement      Risk Assessments:  ASA: 3  Mallampati: 3  GFR: 14  Cr: 3.31  BRA: 11.7%      Impression: ESRD, infected LUE fistula, For permacath today     Plan:    -plan for Perma cath  -patient seen and examined  -confirmed appropriate NPO duration  -ECG and Labs reviewed  -Consent to be obtained by Attending   -Normal Saline 0.9%  250ml/hr IV: pre procedure TAHIRA ppx   -procedure discussed with patient; risks and benefits explained, questions answered  -consent obtained by attending  72 yo F PMHx anxiety/depression, CHF, CAD, HTN, DM (now off DM meds), gout, anemia of chronic disease, COPD (not on home O2), 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  LUE cellulitis and LUE open wound -s/p AVF washout on 9/15/2023, LLE chronic wounds, wrapped in ACE dressing   -pt was on eliquis for concern that she had a clot in the LUE; however was discontinued in the OP after DVT was ruled out, OR cultures with Group B Strep, -Blood cultures negative On  Meropenem  Metabolic Encephalopathy due to hypercarbia - intermittent, abg stable, patient a&ox4, awake, alert  Shiley was removed and patient scheduled for permacath  Patient now presents to [facility] CCL for Perma cath placement      Risk Assessments:  ASA: 3  Mallampati: 3  GFR: 14  Cr: 3.31  BRA: 11.7%      Impression: ESRD, infected LUE fistula, For permacath today     Plan:    -plan for Perma cath  -patient seen and examined  -confirmed appropriate NPO duration  -ECG and Labs reviewed  -Consent to be obtained by Attending   -Normal Saline 0.9%  250ml/hr IV: pre procedure TAHIRA ppx   -procedure discussed with patient; risks and benefits explained, questions answered  -consent obtained by attending       Interventional Cardiology NP post procedure note    Patient now s/p left subclavian permacath placement, Arrived in recovery room hemodynamically stable, A%OX4, No acute focal deficits    Left chest permacath site with no bleeding/hematonma    Intraprocedurally: No sedation received    Findings  S/P LEFT SUBCLAVIAN PERMACATH Placement  Site benign  Post procedure CXR ordered,  awaiting CXR to confirm permacath placement by vascular  Post permacath placement orders placed  -Monitor permacath site   -Further management per Vascular /Nphrology/ hospitalisut   -Transfer patient back to unit once criteria met.  -Discussed with Vascular Attending DR Fenton

## 2023-09-26 NOTE — PROGRESS NOTE ADULT - SUBJECTIVE AND OBJECTIVE BOX
GARRETT BARCENAS    [id number]    History:  The patient is status post right IJ permacath placement 9/25, lUE wash out 9/15 with negative pressure vac placement.  Patient is doing well. The patient's pain is controlled using the prescribed pain medications. Denies nausea, vomiting, chest pain, shortness of breath, abdominal pain or fever. No new complaints. No acute motor or sensory changes are reported.    Vital Signs Last 24 Hrs  T(C): 36.9 (26 Sep 2023 04:51), Max: 36.9 (26 Sep 2023 04:51)  T(F): 98.4 (26 Sep 2023 04:51), Max: 98.4 (26 Sep 2023 04:51)  HR: 83 (26 Sep 2023 04:51) (69 - 87)  BP: 119/67 (26 Sep 2023 04:51) (102/58 - 136/71)  BP(mean): 85 (25 Sep 2023 11:08) (85 - 85)  RR: 18 (26 Sep 2023 04:51) (17 - 24)  SpO2: 98% (26 Sep 2023 04:51) (90% - 100%)    Parameters below as of 25 Sep 2023 23:44  Patient On (Oxygen Delivery Method): nasal cannula  O2 Flow (L/min): 2    I&O's Summary                            8.3    6.35  )-----------( 71       ( 26 Sep 2023 05:00 )             28.8     09-26    134<L>  |  96  |  34.8<H>  ----------------------------<  85  4.1   |  26.0  |  3.83<H>    Ca    8.8      26 Sep 2023 05:00  Phos  3.6     09-25  Mg     1.9     09-25    TPro  4.9<L>  /  Alb  3.0<L>  /  TBili  0.7  /  DBili  x   /  AST  23  /  ALT  <5  /  AlkPhos  158<H>  09-26      MEDICATIONS  (STANDING):  allopurinol 100 milliGRAM(s) Oral daily  aspirin  chewable 81 milliGRAM(s) Oral daily  atorvastatin 40 milliGRAM(s) Oral at bedtime  budesonide  80 MICROgram(s)/formoterol 4.5 MICROgram(s) Inhaler 2 Puff(s) Inhalation two times a day  calcitriol   Capsule 0.25 MICROGram(s) Oral daily  calcium acetate 667 milliGRAM(s) Oral three times a day with meals  chlorhexidine 2% Cloths 1 Application(s) Topical <User Schedule>  chlorhexidine 2% Cloths 1 Application(s) Topical <User Schedule>  epoetin thelma (EPOGEN) Injectable 33877 Unit(s) IV Push <User Schedule>  heparin   Injectable 5000 Unit(s) SubCutaneous every 8 hours  magnesium oxide 400 milliGRAM(s) Oral three times a day with meals  meropenem Injectable 500 milliGRAM(s) IV Push every 12 hours  metoprolol tartrate 12.5 milliGRAM(s) Oral daily  midodrine. 10 milliGRAM(s) Oral three times a day  pantoprazole    Tablet 40 milliGRAM(s) Oral before breakfast  PARoxetine 20 milliGRAM(s) Oral daily    MEDICATIONS  (PRN):  acetaminophen     Tablet .. 650 milliGRAM(s) Oral every 6 hours PRN Temp greater or equal to 38C (100.4F), Mild Pain (1 - 3)  albuterol/ipratropium for Nebulization 3 milliLiter(s) Nebulizer every 6 hours PRN Shortness of Breath  melatonin 3 milliGRAM(s) Oral at bedtime PRN Insomnia  ondansetron Injectable 4 milliGRAM(s) IV Push every 8 hours PRN Nausea and/or Vomiting  sodium chloride 0.9% lock flush 10 milliLiter(s) IV Push every 1 hour PRN Pre/post blood products, medications, blood draw, and to maintain line patency  sodium chloride 0.9% lock flush 10 milliLiter(s) IV Push every 1 hour PRN Pre/post blood products, medications, blood draw, and to maintain line patency      Physical exam:     No distress, alert and oriented  non labored breathing  right neck with extensive ecchymosis  Left neck with permacath in place, clean dressing no crepitus   extremities with no extensive edema, warm     Primary Assessment:  S/p excision of left upper ext soft tissue infection, placement of right Ij permacath  surgical stable   •   Plan:   • Continue negative pressure dressings to the left upper ext   - HD permitted to the left Permacath   - discharge planning per medicine team   - Patient to follow up in the office for revaluation to restart HD via the left AVF     GARRETT BARCENAS    [id number]    History:  The patient is status post right IJ permacath placement 9/25, lUE wash out 9/15 with negative pressure vac placement.  Patient is doing well. The patient's pain is controlled using the prescribed pain medications. Denies nausea, vomiting, chest pain, shortness of breath, abdominal pain or fever. No new complaints. No acute motor or sensory changes are reported.    Vital Signs Last 24 Hrs  T(C): 36.9 (26 Sep 2023 04:51), Max: 36.9 (26 Sep 2023 04:51)  T(F): 98.4 (26 Sep 2023 04:51), Max: 98.4 (26 Sep 2023 04:51)  HR: 83 (26 Sep 2023 04:51) (69 - 87)  BP: 119/67 (26 Sep 2023 04:51) (102/58 - 136/71)  BP(mean): 85 (25 Sep 2023 11:08) (85 - 85)  RR: 18 (26 Sep 2023 04:51) (17 - 24)  SpO2: 98% (26 Sep 2023 04:51) (90% - 100%)    Parameters below as of 25 Sep 2023 23:44  Patient On (Oxygen Delivery Method): nasal cannula  O2 Flow (L/min): 2    I&O's Summary                            8.3    6.35  )-----------( 71       ( 26 Sep 2023 05:00 )             28.8     09-26    134<L>  |  96  |  34.8<H>  ----------------------------<  85  4.1   |  26.0  |  3.83<H>    Ca    8.8      26 Sep 2023 05:00  Phos  3.6     09-25  Mg     1.9     09-25    TPro  4.9<L>  /  Alb  3.0<L>  /  TBili  0.7  /  DBili  x   /  AST  23  /  ALT  <5  /  AlkPhos  158<H>  09-26      MEDICATIONS  (STANDING):  allopurinol 100 milliGRAM(s) Oral daily  aspirin  chewable 81 milliGRAM(s) Oral daily  atorvastatin 40 milliGRAM(s) Oral at bedtime  budesonide  80 MICROgram(s)/formoterol 4.5 MICROgram(s) Inhaler 2 Puff(s) Inhalation two times a day  calcitriol   Capsule 0.25 MICROGram(s) Oral daily  calcium acetate 667 milliGRAM(s) Oral three times a day with meals  chlorhexidine 2% Cloths 1 Application(s) Topical <User Schedule>  chlorhexidine 2% Cloths 1 Application(s) Topical <User Schedule>  epoetin thelma (EPOGEN) Injectable 62571 Unit(s) IV Push <User Schedule>  heparin   Injectable 5000 Unit(s) SubCutaneous every 8 hours  magnesium oxide 400 milliGRAM(s) Oral three times a day with meals  meropenem Injectable 500 milliGRAM(s) IV Push every 12 hours  metoprolol tartrate 12.5 milliGRAM(s) Oral daily  midodrine. 10 milliGRAM(s) Oral three times a day  pantoprazole    Tablet 40 milliGRAM(s) Oral before breakfast  PARoxetine 20 milliGRAM(s) Oral daily    MEDICATIONS  (PRN):  acetaminophen     Tablet .. 650 milliGRAM(s) Oral every 6 hours PRN Temp greater or equal to 38C (100.4F), Mild Pain (1 - 3)  albuterol/ipratropium for Nebulization 3 milliLiter(s) Nebulizer every 6 hours PRN Shortness of Breath  melatonin 3 milliGRAM(s) Oral at bedtime PRN Insomnia  ondansetron Injectable 4 milliGRAM(s) IV Push every 8 hours PRN Nausea and/or Vomiting  sodium chloride 0.9% lock flush 10 milliLiter(s) IV Push every 1 hour PRN Pre/post blood products, medications, blood draw, and to maintain line patency  sodium chloride 0.9% lock flush 10 milliLiter(s) IV Push every 1 hour PRN Pre/post blood products, medications, blood draw, and to maintain line patency      Physical exam:     No distress, alert and oriented  non labored breathing  right neck with extensive ecchymosis  Left neck with permacath in place, clean dressing no crepitus   extremities with no extensive edema, warm     Primary Assessment:  S/p excision of left upper ext soft tissue infection, placement of right Ij permacath  catheter associated right IJ thrombus encountered yesterday, Catheter has since been removed, No need for therapeutic anticoagulation due to noted thrombocytopenia   surgical stable   •   Plan:   • Continue negative pressure dressings to the left upper ext   - HD permitted to the left PermCath   - discharge planning per medicine team   - Patient to follow up in the office for revaluation to restart HD via the left AVF

## 2023-09-26 NOTE — PROGRESS NOTE ADULT - SUBJECTIVE AND OBJECTIVE BOX
Reason for visit: ESRD on HD    Subjective: Patient seen and evaluated this morning.  She is going for placement of tunneled hemodialysis catheter today, temporary hemodialysis catheter was removed this morning    ROS: All systems were reviewed in detail pertinent positive and negative mentioned above, rest are negative.    Physical Exam:  Gen: no acute distress  MS: sleepy  HENT: NCAT, MMM  CV: rhythm reg reg, rate normal, no m/g/r  Chest: CTAB, no w/r/r,  Abd: soft, NT, ND  Extremities: No edema, ACE b/l LE, LUE Vac    =======================================================  Vital Signs Last 24 Hrs  T(C): 37 (26 Sep 2023 09:00), Max: 37.1 (26 Sep 2023 08:28)  T(F): 98.6 (26 Sep 2023 09:00), Max: 98.7 (26 Sep 2023 08:28)  HR: 76 (26 Sep 2023 09:00) (69 - 87)  BP: 117/52 (26 Sep 2023 09:00) (103/65 - 136/71)  BP(mean): 85 (25 Sep 2023 11:08) (85 - 85)  RR: 16 (26 Sep 2023 09:00) (16 - 24)  SpO2: 100% (26 Sep 2023 09:00) (90% - 100%)    Parameters below as of 26 Sep 2023 09:23  Patient On (Oxygen Delivery Method): nasal cannula, 2L      I&O's Summary    25 Sep 2023 07:01  -  26 Sep 2023 07:00  --------------------------------------------------------  IN: 240 mL / OUT: 0 mL / NET: 240 mL      =======================================================  Current Antibiotics:  vancomycin  IVPB 750 milliGRAM(s) IV Intermittent <User Schedule>    Other medications:  allopurinol 100 milliGRAM(s) Oral daily  aspirin  chewable 81 milliGRAM(s) Oral daily  atorvastatin 40 milliGRAM(s) Oral at bedtime  budesonide  80 MICROgram(s)/formoterol 4.5 MICROgram(s) Inhaler 2 Puff(s) Inhalation two times a day  calcitriol   Capsule 0.25 MICROGram(s) Oral daily  calcium acetate 667 milliGRAM(s) Oral three times a day with meals  chlorhexidine 2% Cloths 1 Application(s) Topical <User Schedule>  chlorhexidine 2% Cloths 1 Application(s) Topical <User Schedule>  epoetin thelma-epbx (RETACRIT) Injectable 63564 Unit(s) IV Push <User Schedule>  heparin   Injectable 5000 Unit(s) SubCutaneous every 8 hours  magnesium oxide 400 milliGRAM(s) Oral three times a day with meals  metoprolol tartrate 12.5 milliGRAM(s) Oral daily  midodrine. 10 milliGRAM(s) Oral three times a day  pantoprazole    Tablet 40 milliGRAM(s) Oral before breakfast  PARoxetine 20 milliGRAM(s) Oral daily    =======================================================  09-26    134<L>  |  96  |  34.8<H>  ----------------------------<  85  4.1   |  26.0  |  3.83<H>    Ca    8.8      26 Sep 2023 05:00  Phos  3.6     09-25  Mg     1.9     09-25    TPro  4.9<L>  /  Alb  3.0<L>  /  TBili  0.7  /  DBili  x   /  AST  23  /  ALT  <5  /  AlkPhos  158<H>  09-26    Creatinine: 3.83 mg/dL (09-26-23 @ 05:00)  Creatinine: 3.31 mg/dL (09-25-23 @ 04:40)  Creatinine: 2.01 mg/dL (09-23-23 @ 18:40)  Creatinine: 3.26 mg/dL (09-23-23 @ 09:04)    Urinalysis Basic - ( 26 Sep 2023 05:00 )    Color: x / Appearance: x / SG: x / pH: x  Gluc: 85 mg/dL / Ketone: x  / Bili: x / Urobili: x   Blood: x / Protein: x / Nitrite: x   Leuk Esterase: x / RBC: x / WBC x   Sq Epi: x / Non Sq Epi: x / Bacteria: x      =======================================================       Reason for visit: ESRD on HD    Subjective: Patient seen and evaluated on hemodialysis this morning.  No new complaints    ROS: All systems were reviewed in detail pertinent positive and negative mentioned above, rest are negative.    Physical Exam:  Gen: no acute distress  MS: sleepy  HENT: NCAT, MMM  CV: rhythm reg reg, rate normal, no m/g/r  Chest: CTAB, no w/r/r,  Abd: soft, NT, ND  Extremities: No edema, ACE b/l LE, LUE Vac  Access: Left IJ tunneled hemodialysis catheter.  Ecchymosis around on skin    =======================================================  Vital Signs Last 24 Hrs  T(C): 36.8 (26 Sep 2023 15:16), Max: 37.1 (26 Sep 2023 08:28)  T(F): 98.2 (26 Sep 2023 15:16), Max: 98.7 (26 Sep 2023 08:28)  HR: 71 (26 Sep 2023 15:16) (71 - 83)  BP: 101/60 (26 Sep 2023 15:16) (101/60 - 119/67)  BP(mean): --  RR: 16 (26 Sep 2023 15:16) (16 - 18)  SpO2: 95% (26 Sep 2023 15:16) (93% - 100%)    Parameters below as of 26 Sep 2023 15:16  Patient On (Oxygen Delivery Method): nasal cannula  O2 Flow (L/min): 2    I&O's Summary    25 Sep 2023 07:01  -  26 Sep 2023 07:00  --------------------------------------------------------  IN: 240 mL / OUT: 0 mL / NET: 240 mL    26 Sep 2023 07:01  -  26 Sep 2023 19:49  --------------------------------------------------------  IN: 0 mL / OUT: 1501 mL / NET: -1501 mL      =======================================================  Current Antibiotics:  vancomycin  IVPB 750 milliGRAM(s) IV Intermittent <User Schedule>    Other medications:  allopurinol 100 milliGRAM(s) Oral daily  aspirin  chewable 81 milliGRAM(s) Oral daily  atorvastatin 40 milliGRAM(s) Oral at bedtime  budesonide  80 MICROgram(s)/formoterol 4.5 MICROgram(s) Inhaler 2 Puff(s) Inhalation two times a day  calcitriol   Capsule 0.25 MICROGram(s) Oral daily  calcium acetate 667 milliGRAM(s) Oral three times a day with meals  chlorhexidine 2% Cloths 1 Application(s) Topical <User Schedule>  chlorhexidine 2% Cloths 1 Application(s) Topical <User Schedule>  epoetin thelma-epbx (RETACRIT) Injectable 65971 Unit(s) IV Push <User Schedule>  heparin   Injectable 5000 Unit(s) SubCutaneous every 8 hours  metoprolol tartrate 12.5 milliGRAM(s) Oral daily  midodrine. 10 milliGRAM(s) Oral three times a day  pantoprazole    Tablet 40 milliGRAM(s) Oral before breakfast  PARoxetine 20 milliGRAM(s) Oral daily    =======================================================  09-26    134<L>  |  96  |  34.8<H>  ----------------------------<  85  4.1   |  26.0  |  3.83<H>    Ca    8.8      26 Sep 2023 05:00  Phos  3.6     09-25  Mg     1.9     09-25    TPro  4.9<L>  /  Alb  3.0<L>  /  TBili  0.7  /  DBili  x   /  AST  23  /  ALT  <5  /  AlkPhos  158<H>  09-26    Creatinine: 3.83 mg/dL (09-26-23 @ 05:00)  Creatinine: 3.31 mg/dL (09-25-23 @ 04:40)  Creatinine: 2.01 mg/dL (09-23-23 @ 18:40)  Creatinine: 3.26 mg/dL (09-23-23 @ 09:04)  =======================================================

## 2023-09-26 NOTE — PROGRESS NOTE ADULT - ASSESSMENT
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 On May 31, pt had a left brachiocephalic ateriovenous fistula placed by Dr Hodge and Dr Coleman with apparent difficulty in accessing the site with difficulty with the conscious sedation at that time requiring close monitoring in SICU and NIV respiratory support. Pt now reports LUE open wound, initially presenting as LUE swelling for the last 2 weeks, from which a blister developed at the area of the fistula which ruptured today with drainage of murky fluid. Pt endorsed 2 episodes of fever one two days ago of 101.7 and another yesterday with 101.3, also endorsed decreased range of motion of the LUE as well has chills, pt reportedly on duricef at home prescribed by Dr Hodge    In the ED vitals stable, Labs stable. Bcx collected. Medicine consulted for admission to medicine service. (14 Sep 2023 21:24)    LUE cellulitis r/o AVF infection  Fever  ESRD on HD  Cefzil allergy-reportd tongue swelling in 2022, however prior to this tolerated amoxicillin    - f/u BCX ngtd  - f/u wound CX grp b strep. patient is allergic to cefzil so will avoid cephalosporin and amoxicillin   - mrsa screen (-) for mrsa  - s/p 9/15/23 left AVF washout  - f/u OR CX 9/15/23 grp B strep only-per core lab no GNR  - s/p left permacath placement for HD on 9/25/23  - DC meropenem  - allergy eval outpatient  - plan for vancomycin 750mg IVPB via permacath post HD T/T/S with weekly CBC CMP and vanco trough end date 10/13/23 fax [phone]. goal vanco trough 15-20    d/w hospitalist, pharmacy  Will Follow

## 2023-09-26 NOTE — PROGRESS NOTE ADULT - SUBJECTIVE AND OBJECTIVE BOX
[facility]                                                INFECTIOUS DISEASES  =======================================================                   Tevinrukhsana Caballero#   Juan F Thornton MD#    Ok Zeng MD*                           Janis Gunn MD*   Jessie Mccurdy MD*           Diplomates American Board of Internal Medicine & Infectious Diseases                  # [place] [facility] - Appt - Tel  [phone] Fax [phone]                * [place] [facility] - Appt - Tel [phone] Fax [phone]                                  Hospital Consult line:  [phone]  =======================================================      [id number]  GARRETT ALARCONFABIANA   follow up for: left avf infection  seen in HD  denies complaints  patient seen and examined.       I have personally reviewed the labs and data; pertinent labs and data are listed in this note; please see below.   ===================================================  REVIEW OF SYSTEMS:  CONSTITUTIONAL:  No Fever or chills  HEENT:  No diplopia or blurred vision.  No earache, sore throat or runny nose.  CARDIOVASCULAR:  No pressure, squeezing, strangling, tightness, heaviness or aching about the chest, neck, axilla or epigastrium.  RESPIRATORY:  No cough, shortness of breath  GASTROINTESTINAL:  No nausea, vomiting or diarrhea.  GENITOURINARY:  No dysuria, frequency or urgency. No Blood in urine  MUSCULOSKELETAL:  no joint aches, no muscle pain  SKIN:  No change in skin, hair or nails.  NEUROLOGIC:  No Headaches, seizures or weakness.  PSYCHIATRIC:  No disorder of thought or mood.  ENDOCRINE:  No heat or cold intolerance  HEMATOLOGICAL:  No easy bruising or bleeding.    =======================================================  Allergies    Cefzil (Rash; Angioedema)  Mushrooms (Rash)  Orange (Rash)  Pineapple (Rash)    Intolerances    Antibiotics:  vancomycin  IVPB 750 milliGRAM(s) IV Intermittent <User Schedule>    Other medications:  allopurinol 100 milliGRAM(s) Oral daily  aspirin  chewable 81 milliGRAM(s) Oral daily  atorvastatin 40 milliGRAM(s) Oral at bedtime  budesonide  80 MICROgram(s)/formoterol 4.5 MICROgram(s) Inhaler 2 Puff(s) Inhalation two times a day  calcitriol   Capsule 0.25 MICROGram(s) Oral daily  calcium acetate 667 milliGRAM(s) Oral three times a day with meals  chlorhexidine 2% Cloths 1 Application(s) Topical <User Schedule>  chlorhexidine 2% Cloths 1 Application(s) Topical <User Schedule>  epoetin thelma-epbx (RETACRIT) Injectable 74790 Unit(s) IV Push <User Schedule>  heparin   Injectable 5000 Unit(s) SubCutaneous every 8 hours  magnesium oxide 400 milliGRAM(s) Oral three times a day with meals  metoprolol tartrate 12.5 milliGRAM(s) Oral daily  midodrine. 10 milliGRAM(s) Oral three times a day  pantoprazole    Tablet 40 milliGRAM(s) Oral before breakfast  PARoxetine 20 milliGRAM(s) Oral daily    ======================================================  Physical Exam:  ============  T(F): 97.7 (26 Sep 2023 12:03), Max: 98.7 (26 Sep 2023 08:28)  HR: 80 (26 Sep 2023 12:03)  BP: 111/47 (26 Sep 2023 12:03)  RR: 18 (26 Sep 2023 12:03)  SpO2: 100% (26 Sep 2023 12:03) (90% - 100%)  temp max in last 48H T(F): , Max: 98.7 (09-26-23 @ 08:28)    General:  No acute distress. on o2, seen in hd unit  Eye: no conjunctival pallor, no scleral icterus  Neck: Supple, No lymphadenopathy.  Respiratory: Lungs are clear to auscultation, Respirations are non-labored.  Cardiovascular: Normal rate, Regular rhythm,  s1+s2 +murmur  Gastrointestinal: Soft, Non-tender, Non-distended, Normal bowel sounds  Musculoskeletal: Normal range of motion, Normal strength.  Integumentary: lue and b/l Le ace wraps in place left Ij permacath in place    =======================================================  Labs:                        8.3    6.35  )-----------( 71       ( 26 Sep 2023 05:00 )             28.8     09-26    134<L>  |  96  |  34.8<H>  ----------------------------<  85  4.1   |  26.0  |  3.83<H>    Ca    8.8      26 Sep 2023 05:00  Phos  3.6     09-25  Mg     1.9     09-25    TPro  4.9<L>  /  Alb  3.0<L>  /  TBili  0.7  /  DBili  x   /  AST  23  /  ALT  <5  /  AlkPhos  158<H>  09-26      Culture - Blood (collected 09-24-23 @ 07:24)  Source: .Blood Blood    Culture - Blood (collected 09-24-23 @ 07:24)  Source: .Blood Blood    Culture - Tissue with Gram Stain (collected 09-15-23 @ 20:01)  Source: .Tissue Left Upper Extremity Wound  Gram Stain (09-20-23 @ 17:44):    Upon re-evaluation of gram stain:    No polymorphonuclear cells seen per low power field    Moderate Gram Positive Cocci in Pairs and Chains seen per oil power field    Previously reported as:    No polymorphonuclear leukocytes seen per low power field    Moderate Gram positive cocci in pairs seen per oil power field    Rare Gram Negative Rods seen per oil power field  Final Report (09-20-23 @ 17:44):    Moderate Streptococcus agalactiae (Group B) isolated    Group B streptococci are susceptible to ampicillin,    penicillin and cefazolin, but may be resistant to    erythromycin and clindamycin.    Recommendations for intrapartum prophylaxis for Group B    streptococci are penicillin or ampicillin.    Culture - Surgical Swab (collected 09-15-23 @ 20:01)  Source: .Surgical Swab Deep Left Forearm Wound  Final Report (09-21-23 @ 08:52):    Numerous Streptococcus agalactiae (Group B)    Numerous Streptococcus agalactiae (Group B) isolated    Group B streptococci are susceptible to ampicillin,    penicillin and cefazolin, but may be resistant to    erythromycin and clindamycin.    Recommendations for intrapartum prophylaxis for Group B    streptococci are penicillin or ampicillin.    Culture - Blood (collected 09-14-23 @ 16:33)  Source: .Blood Blood-Peripheral  Final Report (09-19-23 @ 22:00):    No growth at 5 days    Culture - Blood (collected 09-14-23 @ 16:27)  Source: .Blood Blood-Peripheral  Final Report (09-19-23 @ 22:00):    No growth at 5 days    Culture - Other (collected 09-14-23 @ 16:15)  Source: .Other left fistula site  Final Report (09-16-23 @ 16:12):    Moderate Streptococcus agalactiae (Group B) isolated    Group B streptococci are susceptible to ampicillin,    penicillin and cefazolin, but may be resistant to    erythromycin and clindamycin.    Recommendations for intrapartum prophylaxis for Group B    streptococci are penicillin or ampicillin.

## 2023-09-26 NOTE — PROGRESS NOTE ADULT - SUBJECTIVE AND OBJECTIVE BOX
[facility] Division of Hospital Medicine  Jose Middleton MD    Chief Complaint:  Patient is a 73y old  Female who presents with a chief complaint of Fistula evaluation (26 Sep 2023 10:36)      SUBJECTIVE / OVERNIGHT EVENTS:  Patient seen and examined at bedside. No acute events reported overnight. No new complaints.    MEDICATIONS  (STANDING):  allopurinol 100 milliGRAM(s) Oral daily  aspirin  chewable 81 milliGRAM(s) Oral daily  atorvastatin 40 milliGRAM(s) Oral at bedtime  budesonide  80 MICROgram(s)/formoterol 4.5 MICROgram(s) Inhaler 2 Puff(s) Inhalation two times a day  calcitriol   Capsule 0.25 MICROGram(s) Oral daily  calcium acetate 667 milliGRAM(s) Oral three times a day with meals  chlorhexidine 2% Cloths 1 Application(s) Topical <User Schedule>  chlorhexidine 2% Cloths 1 Application(s) Topical <User Schedule>  epoetin thelma-epbx (RETACRIT) Injectable 59020 Unit(s) IV Push <User Schedule>  heparin   Injectable 5000 Unit(s) SubCutaneous every 8 hours  magnesium oxide 400 milliGRAM(s) Oral three times a day with meals  metoprolol tartrate 12.5 milliGRAM(s) Oral daily  midodrine. 10 milliGRAM(s) Oral three times a day  pantoprazole    Tablet 40 milliGRAM(s) Oral before breakfast  PARoxetine 20 milliGRAM(s) Oral daily  vancomycin  IVPB 750 milliGRAM(s) IV Intermittent <User Schedule>    MEDICATIONS  (PRN):  acetaminophen     Tablet .. 650 milliGRAM(s) Oral every 6 hours PRN Temp greater or equal to 38C (100.4F), Mild Pain (1 - 3)  albuterol/ipratropium for Nebulization 3 milliLiter(s) Nebulizer every 6 hours PRN Shortness of Breath  melatonin 3 milliGRAM(s) Oral at bedtime PRN Insomnia  ondansetron Injectable 4 milliGRAM(s) IV Push every 8 hours PRN Nausea and/or Vomiting  sodium chloride 0.9% lock flush 10 milliLiter(s) IV Push every 1 hour PRN Pre/post blood products, medications, blood draw, and to maintain line patency  sodium chloride 0.9% lock flush 10 milliLiter(s) IV Push every 1 hour PRN Pre/post blood products, medications, blood draw, and to maintain line patency        I&O's Summary    25 Sep 2023 07:01  -  26 Sep 2023 07:00  --------------------------------------------------------  IN: 240 mL / OUT: 0 mL / NET: 240 mL        PHYSICAL EXAM:  Vital Signs Last 24 Hrs  T(C): 37 (26 Sep 2023 09:00), Max: 37.1 (26 Sep 2023 08:28)  T(F): 98.6 (26 Sep 2023 09:00), Max: 98.7 (26 Sep 2023 08:28)  HR: 76 (26 Sep 2023 09:00) (70 - 87)  BP: 117/52 (26 Sep 2023 09:00) (103/65 - 136/71)  BP(mean): --  RR: 16 (26 Sep 2023 09:00) (16 - 24)  SpO2: 100% (26 Sep 2023 09:00) (90% - 100%)    Parameters below as of 26 Sep 2023 09:23  Patient On (Oxygen Delivery Method): nasal cannula, 2L        General: Elderly female lying in bed comfortably. No acute distress  HEENT: L PC   Chest: Good air entry. No wheezing, rales or rhonchi.   Heart: Normal S1 & S2. RRR.   Abdomen: Non distended. Soft. Non-tender. + BS  Ext: No calf tenderness. LUE swollen, neurovascular intact. Wound vac in place. Ace wraps on lower extremities.    Neuro: Awake and alert. No focal deficit. No speech disorder  Skin: Warm and Dry  Psychiatry: Normal mood and affect      LABS:                        8.3    6.35  )-----------( 71       ( 26 Sep 2023 05:00 )             28.8     09-26    134<L>  |  96  |  34.8<H>  ----------------------------<  85  4.1   |  26.0  |  3.83<H>    Ca    8.8      26 Sep 2023 05:00  Phos  3.6     09-25  Mg     1.9     09-25    TPro  4.9<L>  /  Alb  3.0<L>  /  TBili  0.7  /  DBili  x   /  AST  23  /  ALT  <5  /  AlkPhos  158<H>  09-26    PT/INR - ( 25 Sep 2023 06:36 )   PT: 15.0 sec;   INR: 1.36 ratio         PTT - ( 25 Sep 2023 06:36 )  PTT:28.9 sec      Urinalysis Basic - ( 26 Sep 2023 05:00 )    Color: x / Appearance: x / SG: x / pH: x  Gluc: 85 mg/dL / Ketone: x  / Bili: x / Urobili: x   Blood: x / Protein: x / Nitrite: x   Leuk Esterase: x / RBC: x / WBC x   Sq Epi: x / Non Sq Epi: x / Bacteria: x        Culture - Blood (collected 24 Sep 2023 07:24)  Source: .Blood Blood  Preliminary Report (25 Sep 2023 13:02):    No growth at 24 hours    Culture - Blood (collected 24 Sep 2023 07:24)  Source: .Blood Blood  Preliminary Report (25 Sep 2023 13:02):    No growth at 24 hours      CAPILLARY BLOOD GLUCOSE            RADIOLOGY & ADDITIONAL TESTS:  Results Reviewed:   Imaging Personally Reviewed:  Electrocardiogram Personally Reviewed:

## 2023-09-26 NOTE — DIETITIAN INITIAL EVALUATION ADULT - NS FNS DIET ORDER
Diet, Consistent Carbohydrate Renal/No Snacks:   DASH/TLC {Sodium & Cholesterol Restricted} (DASH)  Supplement Feeding Modality:  Oral  Nepro Cans or Servings Per Day:  1       Frequency:  Two Times a day (09-25-23 @ 16:37)

## 2023-09-26 NOTE — PHYSICAL THERAPY INITIAL EVALUATION ADULT - ADDITIONAL COMMENTS
Pt lives in a house with 2 FAVIAN with a HR. Uses a rollator. Spouse assists pt with her legs to get into bed at baseline.

## 2023-09-26 NOTE — DIETITIAN INITIAL EVALUATION ADULT - OTHER INFO
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 On May 31, pt had a left brachiocephalic ateriovenous fistula placed by Dr Hodge and Dr Coleman with apparent difficulty in accessing the site with difficulty with the conscious sedation at that time requiring close monitoring in SICU and NIV respiratory support. Pt now reports LUE open wound, initially presenting as LUE swelling for the last 2 weeks, from which a blister developed at the area of hte fistula which ruptured today with drainage of murky fluid. Pt endorsed 2 episodes of fever one two days ago of 101.7 and another yesterday with 101.3, also endorsed decreased range of motion of the LUE as well has chills

## 2023-09-26 NOTE — PROGRESS NOTE ADULT - ASSESSMENT
74 YO F with DM/hypertension/papillary renal cell cancer s/p partial nephrectomy and now progressive CKD 5 with new onset ESRD presented with swelling and pain associated with drainage from her AV fistula; underwent washout debridement on 9/15.  Hospital course notable for acute hypercapnic respiratory failure requiring BiPAP use.  Nephrology is managing ESRD s/p initiation of hemodialysis.    New onset ESRD  Access: Post left IJ tunneled hemodialysis catheter placement on 9/25  Seen on HD.  Qd, Qb, UF rate reviewed.  Vitals stable.  Access working well.  Patient tolerating HD well.  Blood pressure: Soft, continue on midodrine 10 mg 3 times daily  Volume status: UF as tolerated with hemodialysis  Anemia: We will continue with the YEISON during hemodialysis  Mineral and bone disease and CKD: We will continue on Phos binder + calcitriol    She is going to [facility] unit for outpatient hemodialysis on TTS chair time (2pm start).

## 2023-09-26 NOTE — DIETITIAN INITIAL EVALUATION ADULT - PERTINENT LABORATORY DATA
09-26    134<L>  |  96  |  34.8<H>  ----------------------------<  85  4.1   |  26.0  |  3.83<H>    Ca    8.8      26 Sep 2023 05:00  Phos  3.6     09-25  Mg     1.9     09-25    TPro  4.9<L>  /  Alb  3.0<L>  /  TBili  0.7  /  DBili  x   /  AST  23  /  ALT  <5  /  AlkPhos  158<H>  09-26  A1C with Estimated Average Glucose Result: 5.4 % (09-16-23 @ 07:28)  A1C with Estimated Average Glucose Result: 5.5 % (05-16-23 @ 15:35)

## 2023-09-26 NOTE — PHYSICAL THERAPY INITIAL EVALUATION ADULT - ASSISTIVE DEVICE FOR TRANSFER: GAIT, REHAB EVAL
Patient contacted our office to speak with DANIKA Whitaker about her wound. She stated that the nurse told her told her to call back in three to four weeks if her wound has not healed and she really would like to see Julianne but if not she can see another nurse.  
rolling walker

## 2023-09-26 NOTE — DIETITIAN INITIAL EVALUATION ADULT - NSFNSGIIOFT_GEN_A_CORE
09-26-23 @ 07:01  -  09-26-23 @ 15:42  --------------------------------------------------------  OUT:    Stool (mL): 1 mL  Total OUT: 1 mL    Total NET: -1 mL

## 2023-09-26 NOTE — DIETITIAN INITIAL EVALUATION ADULT - PERTINENT MEDS FT
MEDICATIONS  (STANDING):  allopurinol 100 milliGRAM(s) Oral daily  aspirin  chewable 81 milliGRAM(s) Oral daily  atorvastatin 40 milliGRAM(s) Oral at bedtime  budesonide  80 MICROgram(s)/formoterol 4.5 MICROgram(s) Inhaler 2 Puff(s) Inhalation two times a day  calcitriol   Capsule 0.25 MICROGram(s) Oral daily  calcium acetate 667 milliGRAM(s) Oral three times a day with meals  chlorhexidine 2% Cloths 1 Application(s) Topical <User Schedule>  chlorhexidine 2% Cloths 1 Application(s) Topical <User Schedule>  epoetin thelma-epbx (RETACRIT) Injectable 61491 Unit(s) IV Push <User Schedule>  heparin   Injectable 5000 Unit(s) SubCutaneous every 8 hours  metoprolol tartrate 12.5 milliGRAM(s) Oral daily  midodrine. 10 milliGRAM(s) Oral three times a day  pantoprazole    Tablet 40 milliGRAM(s) Oral before breakfast  PARoxetine 20 milliGRAM(s) Oral daily  vancomycin  IVPB 750 milliGRAM(s) IV Intermittent <User Schedule>    MEDICATIONS  (PRN):  acetaminophen     Tablet .. 650 milliGRAM(s) Oral every 6 hours PRN Temp greater or equal to 38C (100.4F), Mild Pain (1 - 3)  albuterol/ipratropium for Nebulization 3 milliLiter(s) Nebulizer every 6 hours PRN Shortness of Breath  melatonin 3 milliGRAM(s) Oral at bedtime PRN Insomnia  ondansetron Injectable 4 milliGRAM(s) IV Push every 8 hours PRN Nausea and/or Vomiting  sodium chloride 0.9% lock flush 10 milliLiter(s) IV Push every 1 hour PRN Pre/post blood products, medications, blood draw, and to maintain line patency  sodium chloride 0.9% lock flush 10 milliLiter(s) IV Push every 1 hour PRN Pre/post blood products, medications, blood draw, and to maintain line patency

## 2023-09-26 NOTE — PROGRESS NOTE ADULT - ASSESSMENT
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    #LUE cellulitis and LUE open wound   #LLE chronic wounds  -pt was on eliquis for concern that she had a clot in the LUE; however was discontinued in the OP after DVT was ruled out  -MRSA PCR negative   -Wound culture with Group B Strep  -Blood cultures negative   -s/p AVF washout on 9/15/2023  -OR cultures with Group B Strep   -s/p Vanco  -Continue Meropenem  -Vascular Surgery / ID following   -Vascular Surgery wrapped chronic LE wounds    Progressive CKD  ESRD on HD  - S/p PC yesterday  - HD per Renal  - Continue Calcium acetate, calcitriol    RIJ thrombus  - Vasc consult appreciated, no need for AC due to thrombocytopenia    Anemia Chronic Disease  - YEISON  - monitor     COPD   Metabolic Encephalopathy due to hypercarbia - intermittent  - DuoNeb PRN  - Budesonide / Formoterol  - nocturnal and PRN NIV  - Pulmonology recs noted     HTN, CAD (cardiac cath 2021), Chronic Diastolic Heart Failure, AS, severe TR, pHTN  - ASA, Metoprolol, atorvastatin    Gout  - allopurinol    hx of DM 2  - no longer on medication, controlled per patient  - A1c 5.4  - Carb Consistent Diet     Thrombocytopenia  - No signs of active bleeding   - Heparin Induced Ab negative   - Monitor     DVT Prophylaxis -- Heparin SQ    Dispo: Unclear, home vs [facility] once stable. PT eval.

## 2023-09-26 NOTE — PHYSICAL THERAPY INITIAL EVALUATION ADULT - PERTINENT HX OF CURRENT PROBLEM, REHAB EVAL
Pt is a 72 y/o female who presented from home with LUE fistula site pain and swelling. (+) cellulitis. s/p LUE washout on 9/15 and placement of wound vac. s/p replacement of permacath.

## 2023-09-27 NOTE — DISCHARGE NOTE PROVIDER - ATTENDING DISCHARGE PHYSICAL EXAMINATION:
Vital Signs Last 24 Hrs  T(F): 97.6 (27 Sep 2023 11:11), Max: 98.4 (27 Sep 2023 04:42)  HR: 65 (27 Sep 2023 11:11) (65 - 83)  BP: 105/64 (27 Sep 2023 11:11) (101/53 - 108/67)  RR: 19 (27 Sep 2023 11:11) (16 - 19)  SpO2: 98% (27 Sep 2023 11:11) (94% - 99%)    Physical Exam:  Constitutional: NAD  HEENT: NC/AT, PERRL, EOMI, trachea midline, no JVD  Respiratory: CTA bilaterally, symmetrical chest rise  Cardiovascular: RRR, no m/g/r  Gastrointestinal: Soft, NT/ND, BS+  Vascular: 2+ peripheral pulses  Neurological: A/O x 3, no focal neurological deficits  Psych: Fair mood/affect  Musculoskeletal: No edema, cyanosis, deformities. ROM normal  Skin: No obvious rash, lesions. No jaundice.

## 2023-09-27 NOTE — PROGRESS NOTE ADULT - ASSESSMENT
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 On May 31, pt had a left brachiocephalic ateriovenous fistula placed by Dr Hodge and Dr Coleman with apparent difficulty in accessing the site with difficulty with the conscious sedation at that time requiring close monitoring in SICU and NIV respiratory support. Pt now reports LUE open wound, initially presenting as LUE swelling for the last 2 weeks, from which a blister developed at the area of the fistula which ruptured today with drainage of murky fluid. Pt endorsed 2 episodes of fever one two days ago of 101.7 and another yesterday with 101.3, also endorsed decreased range of motion of the LUE as well has chills, pt reportedly on duricef at home prescribed by Dr Hodge    In the ED vitals stable, Labs stable. Bcx collected. Medicine consulted for admission to medicine service. (14 Sep 2023 21:24)    LUE cellulitis r/o AVF infection  Fever  ESRD on HD  Cefzil allergy-reportd tongue swelling in 2022, however prior to this tolerated amoxicillin    - f/u BCX ngtd  - f/u wound CX grp b strep. patient is allergic to cefzil so will avoid cephalosporin and amoxicillin   - mrsa screen (-) for mrsa  - s/p 9/15/23 left AVF washout  - f/u OR CX 9/15/23 grp B strep only-per core lab no GNR  - s/p left permacath placement for HD on 9/25/23  - allergy eval outpatient  - plan for vancomycin 750mg IVPB via permacath post HD T/T/S with weekly CBC CMP and vanco trough end date 10/13/23 fax [phone]. goal vanco trough 15-20. plan for dc [facility] labs can be monitored by Md at [facility]    ID f/u outpatient 2-3 weeks    signing off

## 2023-09-27 NOTE — PHARMACOTHERAPY INTERVENTION NOTE - COMMENTS
Vancomycin level ordered 
Adjusted vanco dose to 750mg and ordered random level for 9/18 with AM labs
Vanco 1g dose by level. Vanco level with AM labs on 9/17.

## 2023-09-27 NOTE — DISCHARGE NOTE PROVIDER - CARE PROVIDERS DIRECT ADDRESSES
,DirectAddress_Unknown,DirectAddress_Unknown,DirectAddress_Unknown,DirectAddress_Unknown,DirectAddress_Unknown ,DirectAddress_Unknown,DirectAddress_Unknown,DirectAddress_Unknown,DirectAddress_Unknown,[email or web address]

## 2023-09-27 NOTE — PROGRESS NOTE ADULT - ASSESSMENT
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    #LUE cellulitis and LUE open wound   #LLE chronic wounds  -pt was on eliquis for concern that she had a clot in the LUE; however was discontinued in the OP after DVT was ruled out  -MRSA PCR negative   -Wound culture with Group B Strep  -Blood cultures negative   -s/p AVF washout on 9/15/2023  -OR cultures with Group B Strep   -s/p Vanco  - Merrem d/christal  -Vascular Surgery / ID following   -Vascular Surgery wrapped chronic LE wounds  - Per ID, planf or Vanco 750mg IVP psot HD T/T/Sa until 10/13.     Progressive CKD, ESRD on HD  - S/p PC  - HD per Renal  - Continue Calcium acetate, calcitriol    RIJ thrombus  - Vasc consult appreciated, no need for AC due to thrombocytopenia    Anemia Chronic Disease  - YEISON  - monitor     COPD   Metabolic Encephalopathy due to hypercarbia - intermittent  - DuoNeb PRN  - Budesonide / Formoterol  - nocturnal and PRN NIV  - Pulmonology recs noted     HTN, CAD (cardiac cath 2021), Chronic Diastolic Heart Failure, AS, severe TR, pHTN  - ASA, Metoprolol, atorvastatin    Gout  - allopurinol    hx of DM 2  - no longer on medication, controlled per patient  - A1c 5.4  - Carb Consistent Diet     Thrombocytopenia  - No signs of active bleeding   - Heparin Induced Ab negative   - Monitor     DVT Prophylaxis -- Heparin SQ    Dispo: [facility]

## 2023-09-27 NOTE — DISCHARGE NOTE PROVIDER - NSDCFUSCHEDAPPT_GEN_ALL_CORE_FT
[facility]  FAMILYMED [facility]  Scheduled Appointment: 11/06/2023    Nancy Meraz  [place] [facility]  CARDIOLOGY 9 [facility]  Scheduled Appointment: 12/18/2023    
Quality 130: Documentation Of Current Medications In The Medical Record: Current Medications Documented
Quality 226: Preventive Care And Screening: Tobacco Use: Screening And Cessation Intervention: Patient screened for tobacco use and is an ex/non-smoker
Quality 110: Preventive Care And Screening: Influenza Immunization: Influenza Immunization Administered during Influenza season
Detail Level: Detailed

## 2023-09-27 NOTE — PROGRESS NOTE ADULT - SUBJECTIVE AND OBJECTIVE BOX
[facility] Division of Hospital Medicine  Jose Middleton MD    Chief Complaint:  Patient is a 73y old  Female who presents with a chief complaint of Fistula evaluation (27 Sep 2023 10:07)      SUBJECTIVE / OVERNIGHT EVENTS:  Patient seen and examined at bedside. No acute events reported overnight. No new complaints.    MEDICATIONS  (STANDING):  allopurinol 100 milliGRAM(s) Oral daily  aspirin  chewable 81 milliGRAM(s) Oral daily  atorvastatin 40 milliGRAM(s) Oral at bedtime  budesonide  80 MICROgram(s)/formoterol 4.5 MICROgram(s) Inhaler 2 Puff(s) Inhalation two times a day  calcitriol   Capsule 0.25 MICROGram(s) Oral daily  calcium acetate 667 milliGRAM(s) Oral three times a day with meals  chlorhexidine 2% Cloths 1 Application(s) Topical <User Schedule>  chlorhexidine 2% Cloths 1 Application(s) Topical <User Schedule>  epoetin thelma-epbx (RETACRIT) Injectable 09886 Unit(s) IV Push <User Schedule>  heparin   Injectable 5000 Unit(s) SubCutaneous every 8 hours  metoprolol tartrate 12.5 milliGRAM(s) Oral daily  midodrine. 10 milliGRAM(s) Oral three times a day  pantoprazole    Tablet 40 milliGRAM(s) Oral before breakfast  PARoxetine 20 milliGRAM(s) Oral daily  vancomycin  IVPB 750 milliGRAM(s) IV Intermittent <User Schedule>    MEDICATIONS  (PRN):  acetaminophen     Tablet .. 650 milliGRAM(s) Oral every 6 hours PRN Temp greater or equal to 38C (100.4F), Mild Pain (1 - 3)  albuterol/ipratropium for Nebulization 3 milliLiter(s) Nebulizer every 6 hours PRN Shortness of Breath  melatonin 3 milliGRAM(s) Oral at bedtime PRN Insomnia  ondansetron Injectable 4 milliGRAM(s) IV Push every 8 hours PRN Nausea and/or Vomiting  sodium chloride 0.9% lock flush 10 milliLiter(s) IV Push every 1 hour PRN Pre/post blood products, medications, blood draw, and to maintain line patency  sodium chloride 0.9% lock flush 10 milliLiter(s) IV Push every 1 hour PRN Pre/post blood products, medications, blood draw, and to maintain line patency        I&O's Summary    26 Sep 2023 07:01  -  27 Sep 2023 07:00  --------------------------------------------------------  IN: 0 mL / OUT: 1501 mL / NET: -1501 mL    27 Sep 2023 07:01  -  27 Sep 2023 11:33  --------------------------------------------------------  IN: 0 mL / OUT: 1 mL / NET: -1 mL        PHYSICAL EXAM:  Vital Signs Last 24 Hrs  T(C): 36.4 (27 Sep 2023 11:11), Max: 36.9 (27 Sep 2023 04:42)  T(F): 97.6 (27 Sep 2023 11:11), Max: 98.4 (27 Sep 2023 04:42)  HR: 65 (27 Sep 2023 11:11) (65 - 83)  BP: 105/64 (27 Sep 2023 11:11) (101/53 - 111/47)  BP(mean): --  RR: 19 (27 Sep 2023 11:11) (16 - 19)  SpO2: 98% (27 Sep 2023 11:11) (94% - 100%)    Parameters below as of 27 Sep 2023 11:11  Patient On (Oxygen Delivery Method): nasal cannula, 2L      CONSTITUTIONAL: NAD  CHEST WALL: +PC  HEENT: NC/AT, PERRL, no JVD  RESPIRATORY: CTA bilaterally, normal effort  CARDIOVASCULAR: RRR, S1/S2+, no m/g/r  ABDOMEN: Nontender to palpation, normoactive bowel sounds, no rebound/guarding  MUSCULOSKELETAL: No edema, cyanosis or deformities.  PSYCH: Calm, affect appropriate.  NEUROLOGY: Awake, alert, no focal neurological deficits.   SKIN: No rashes; no palpable lesions  VASC: Distal pulses palpable    LABS:                        8.3    6.35  )-----------( 71       ( 26 Sep 2023 05:00 )             28.8     09-26    134<L>  |  96  |  34.8<H>  ----------------------------<  85  4.1   |  26.0  |  3.83<H>    Ca    8.8      26 Sep 2023 05:00    TPro  4.9<L>  /  Alb  3.0<L>  /  TBili  0.7  /  DBili  x   /  AST  23  /  ALT  <5  /  AlkPhos  158<H>  09-26          Urinalysis Basic - ( 26 Sep 2023 05:00 )    Color: x / Appearance: x / SG: x / pH: x  Gluc: 85 mg/dL / Ketone: x  / Bili: x / Urobili: x   Blood: x / Protein: x / Nitrite: x   Leuk Esterase: x / RBC: x / WBC x   Sq Epi: x / Non Sq Epi: x / Bacteria: x        CAPILLARY BLOOD GLUCOSE            RADIOLOGY & ADDITIONAL TESTS:  Results Reviewed:   Imaging Personally Reviewed:  Electrocardiogram Personally Reviewed:

## 2023-09-27 NOTE — DISCHARGE NOTE PROVIDER - HOSPITAL COURSE
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 Pt was admitted to [facility] for LUE cellultitis and LUE open wound. ID, Vascular surgery and Nephrology were consulted. Initial concern for DVT as pt was off Eliquis, DVT ruled out. Pt went for AVF washout 9/15/23. OR cx +GBS. ID recommended plan for vancomycin 750mg IVPB via permacath post HD T/T/S with weekly CBC CMP and vanco through 10/13/2023. Pt is ESRD and had permacath placed 9/25 for HD. Pt was also found to have a RIJ thrombus. Vascular following and not recommending AC 2/2 thrombocytopenia. Pt has since improved overall and is now medically stable for discharge with appropriate outpatient follow up.    All electrolyte abnormalities were monitored carefully and repleted as necessary during this hospitalization. At the time of discharge patient was hemodynamically stable and amenable to all terms of discharge. 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 Pt was admitted to [facility] for LUE cellulitis and LUE open wound. ID, Vascular surgery and Nephrology were consulted. Initial concern for DVT as pt was off Eliquis, DVT ruled out. Pt went for AVF washout 9/15/23. OR cx +GBS. ID recommended plan for vancomycin 750mg IVPB via permacath post HD T/T/S with weekly CBC CMP and vanco through 10/13/2023. Pt is ESRD and had permacath placed 9/25 for HD. Pt was also found to have a RIJ thrombus. Vascular following and not recommending AC 2/2 thrombocytopenia. Pt has since improved overall and is now medically stable for discharge with appropriate outpatient follow up.    All electrolyte abnormalities were monitored carefully and repleted as necessary during this hospitalization. At the time of discharge patient was hemodynamically stable and amenable to all terms of discharge. 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 Pt was admitted to [facility] for LUE cellulitis and LUE open wound. ID, Vascular surgery and Nephrology were consulted. Initial concern for DVT as pt was off Eliquis, DVT ruled out. Pt went for AVF washout 9/15/23. OR cx +GBS. ID recommended plan for vancomycin 750mg IVPB via permacath post HD T/T/S with weekly CBC CMP and vanco through 10/13/2023. Pt is ESRD and had permacath placed 9/25 for HD. Patient with wound VAC palced to [facility]. To follow up with Surgery outpatient. Pt was also found to have a RIJ thrombus. Vascular following and not recommending AC 2/2 thrombocytopenia. Pt has since improved overall and is now medically stable for discharge with appropriate outpatient follow up.    All electrolyte abnormalities were monitored carefully and repleted as necessary during this hospitalization. At the time of discharge patient was hemodynamically stable and amenable to all terms of discharge.

## 2023-09-27 NOTE — PROGRESS NOTE ADULT - SUBJECTIVE AND OBJECTIVE BOX
Reason for visit: ESRD on HD    Subjective: Patient seen and evaluated this morning.  No new complaints    ROS: All systems were reviewed in detail pertinent positive and negative mentioned above, rest are negative.    Physical Exam:  Gen: no acute distress  MS: sleepy  HENT: NCAT, MMM  CV: rhythm reg reg, rate normal, no m/g/r  Chest: CTAB, no w/r/r,  Abd: soft, NT, ND  Extremities: No edema, ACE b/l LE, LUE Vac  Access: Left IJ tunneled hemodialysis catheter.  Ecchymosis around on skin    =======================================================  Vital Signs Last 24 Hrs  T(C): 36.9 (27 Sep 2023 04:42), Max: 36.9 (27 Sep 2023 04:42)  T(F): 98.4 (27 Sep 2023 04:42), Max: 98.4 (27 Sep 2023 04:42)  HR: 83 (27 Sep 2023 04:42) (71 - 83)  BP: 101/53 (27 Sep 2023 04:42) (101/53 - 111/47)  BP(mean): --  RR: 18 (27 Sep 2023 04:42) (16 - 18)  SpO2: 94% (27 Sep 2023 04:42) (94% - 100%)    Parameters below as of 27 Sep 2023 04:42  Patient On (Oxygen Delivery Method): nasal cannula, 2      I&O's Summary    26 Sep 2023 07:01  -  27 Sep 2023 07:00  --------------------------------------------------------  IN: 0 mL / OUT: 1501 mL / NET: -1501 mL      =======================================================  Current Antibiotics:  vancomycin  IVPB 750 milliGRAM(s) IV Intermittent <User Schedule>    Other medications:  allopurinol 100 milliGRAM(s) Oral daily  aspirin  chewable 81 milliGRAM(s) Oral daily  atorvastatin 40 milliGRAM(s) Oral at bedtime  budesonide  80 MICROgram(s)/formoterol 4.5 MICROgram(s) Inhaler 2 Puff(s) Inhalation two times a day  calcitriol   Capsule 0.25 MICROGram(s) Oral daily  calcium acetate 667 milliGRAM(s) Oral three times a day with meals  chlorhexidine 2% Cloths 1 Application(s) Topical <User Schedule>  chlorhexidine 2% Cloths 1 Application(s) Topical <User Schedule>  epoetin thelma-epbx (RETACRIT) Injectable 55656 Unit(s) IV Push <User Schedule>  heparin   Injectable 5000 Unit(s) SubCutaneous every 8 hours  metoprolol tartrate 12.5 milliGRAM(s) Oral daily  midodrine. 10 milliGRAM(s) Oral three times a day  pantoprazole    Tablet 40 milliGRAM(s) Oral before breakfast  PARoxetine 20 milliGRAM(s) Oral daily    =======================================================  09-26    134<L>  |  96  |  34.8<H>  ----------------------------<  85  4.1   |  26.0  |  3.83<H>    Ca    8.8      26 Sep 2023 05:00    TPro  4.9<L>  /  Alb  3.0<L>  /  TBili  0.7  /  DBili  x   /  AST  23  /  ALT  <5  /  AlkPhos  158<H>  09-26    Creatinine: 3.83 mg/dL (09-26-23 @ 05:00)  Creatinine: 3.31 mg/dL (09-25-23 @ 04:40)  Creatinine: 2.01 mg/dL (09-23-23 @ 18:40)  Creatinine: 3.26 mg/dL (09-23-23 @ 09:04)    =======================================================

## 2023-09-27 NOTE — DISCHARGE NOTE PROVIDER - NPI NUMBER (FOR SYSADMIN USE ONLY) :
[[id number]],[[id number]],[[id number]],[UNKNOWN],[[id number]] [[id number]],[[id number]],[[id number]],[UNKNOWN],[[id number]]

## 2023-09-27 NOTE — CHART NOTE - NSCHARTNOTEFT_GEN_A_CORE
Wound vac last changed 9/27/2023    Last measurement on 9/27/2023 :   5cm x 2cm x 1.5 cm deep  granular clean bed    The wound vac is to be changed every 3 days, 125mmh negative pressure    The wound vac us to be removed prior to discharge, wound is to be cleansed with normal saline, wet to dry normal saline gauze is to be used as a temporary substitute till patient arrives at the accepting [facility] facility     At the [facility], the black granuliform vac dressing needs to be replaced after the wound in reevaluated     --Please call  if any questions arise

## 2023-09-27 NOTE — DISCHARGE NOTE PROVIDER - CARE PROVIDER_API CALL
Yohannes Shelby  Nephrology  [address] [place] [address]  Phone: [phone]  Fax: [phone]  Follow Up Time: 1 week    Janis Gunn  Infectious Disease  [address] [place] [address]  Phone: [phone]  Fax: [phone]  Follow Up Time: 1 week    PCP,   Phone: (   )    -  Fax: (   )    -  Follow Up Time: 1 week    Rowena Goldstein  Vascular Surgery  Phone: (   )    -  Fax: (   )    -  Follow Up Time: 1 week    Nate Dorado  Critical Care Medicine  [address] [place] [address]  Phone: [phone]  Fax: [phone]  Follow Up Time: 1 week   Yohannes Shelby  Nephrology  [address] [place] [address]  Phone: [phone]  Fax: [phone]  Follow Up Time: 1 week    Janis Gunn  Infectious Disease  [address] [place] [address]  Phone: [phone]  Fax: [phone]  Follow Up Time: 1 week    Nate Dorado  Critical Care Medicine  [address] [place] [address]  Phone: [phone]  Fax: [phone]  Follow Up Time: 1 week    PCP,   Phone: (   )    -  Fax: (   )    -  Follow Up Time: 1 week    Manvar-Singh, Pallavi Buddhadev  Vascular Surgery  [address] [facility], [address] [place] [address]  Phone: [phone]  Fax: [phone]  Follow Up Time: 1 week

## 2023-09-27 NOTE — PROGRESS NOTE ADULT - REASON FOR ADMISSION
Fistula evaluation

## 2023-09-27 NOTE — DISCHARGE NOTE PROVIDER - PROVIDER TOKENS
PROVIDER:[TOKEN:[[id number]],FOLLOWUP:[1 week]],PROVIDER:[TOKEN:[[id number]],FOLLOWUP:[1 week]],FREE:[LAST:[PCP],PHONE:[(   )    -],FAX:[(   )    -],FOLLOWUP:[1 week]],FREE:[LAST:[Angelita],FIRST:[Turna],PHONE:[(   )    -],FAX:[(   )    -],ADDRESS:[Vascular Surgery],FOLLOWUP:[1 week]],PROVIDER:[TOKEN:[[id number]],FOLLOWUP:[1 week]] PROVIDER:[TOKEN:[[id number]],FOLLOWUP:[1 week]],PROVIDER:[TOKEN:[[id number]],FOLLOWUP:[1 week]],PROVIDER:[TOKEN:[[id number]],FOLLOWUP:[1 week]],FREE:[LAST:[PCP],PHONE:[(   )    -],FAX:[(   )    -],FOLLOWUP:[1 week]],PROVIDER:[TOKEN:[[id number]],FOLLOWUP:[1 week]]

## 2023-09-27 NOTE — DISCHARGE NOTE NURSING/CASE MANAGEMENT/SOCIAL WORK - NSDCFUADDAPPT_GEN_ALL_CORE_FT
D/C to [facility] 9/27  [address] [place] [address] [phone]    Outpatient Dialysis Arranged  T/TH/S 2pm  Kayleen Rod  [address] [place] [address] [phone]

## 2023-09-27 NOTE — PROGRESS NOTE ADULT - ASSESSMENT
72 YO F with DM/hypertension/papillary renal cell cancer s/p partial nephrectomy and now progressive CKD 5 with new onset ESRD presented with swelling and pain associated with drainage from her AV fistula; underwent washout debridement on 9/15.  Hospital course notable for acute hypercapnic respiratory failure requiring BiPAP use.  Nephrology is managing ESRD s/p initiation of hemodialysis.    New onset ESRD  Access: Post left IJ tunneled hemodialysis catheter placement on 9/25  No HD planned for today.  Blood pressure: Soft, continue on midodrine 10 mg 3 times daily  Volume status: UF as tolerated with hemodialysis  Anemia: We will continue with the YEISON during hemodialysis  Mineral and bone disease and CKD: We will continue on Phos binder + calcitriol    She is going to [facility] unit for outpatient hemodialysis on TTS chair time (2pm start).  Stable form nephrology standpoint for discharge.

## 2023-09-27 NOTE — DISCHARGE NOTE PROVIDER - NSDCCPCAREPLAN_GEN_ALL_CORE_FT
PRINCIPAL DISCHARGE DIAGNOSIS  Diagnosis: Left arm cellulitis  Assessment and Plan of Treatment: - Continue vancomycin 750mg IVPB via permacath post HD T/T/S with weekly CBC CMP and vanco through 10/13/2023  - Countinue with wound care  - Follow up with ID and vascular surgery in 1 week      SECONDARY DISCHARGE DIAGNOSES  Diagnosis: ESRD on dialysis  Assessment and Plan of Treatment: - Permacath placed 9/25/23  - Follow up with Nephrology in 1 week    Diagnosis: Thrombus  Assessment and Plan of Treatment: - Of RIJ  - Follow up with PCP and Vascular Surgery in 1 week    Diagnosis: Chronic obstructive pulmonary disease, unspecified COPD type  Assessment and Plan of Treatment: - Nebs as needed  - Nocturnal and PRN NIV  - Follow up with Pulmonolgy as per routine    Diagnosis: HTN (hypertension)  Assessment and Plan of Treatment: - Continue current medications as directed  - Follow up with PCP in 1 week    Diagnosis: Gout  Assessment and Plan of Treatment: - Continue current medications as directed  - Follow up with PCP in 1 week    Diagnosis: DM (diabetes mellitus)  Assessment and Plan of Treatment: - Continue current medications as directed  - Follow up with PCP in 1 week    Diagnosis: Thrombocytopenia  Assessment and Plan of Treatment: - No AC at this time  - Follow up with PCP in 1 week for repeat CBC     PRINCIPAL DISCHARGE DIAGNOSIS  Diagnosis: Left arm cellulitis  Assessment and Plan of Treatment: - Continue vancomycin 750mg IVPB via permacath post HD T/T/S with weekly CBC CMP and vanco through 10/13/2023  - Countinue with wound care  - Follow up with ID and vascular surgery in 1 week      SECONDARY DISCHARGE DIAGNOSES  Diagnosis: ESRD on dialysis  Assessment and Plan of Treatment: - Permacath placed 9/25/23  - Follow up with Nephrology in 1 week    Diagnosis: Thrombus  Assessment and Plan of Treatment: - Of RIJ  - Follow up with PCP and Vascular Surgery in 1 week    Diagnosis: Chronic obstructive pulmonary disease, unspecified COPD type  Assessment and Plan of Treatment: - Nebs as needed  - Nocturnal and PRN NIV  - Follow up with Pulmonolgy as per routine    Diagnosis: HTN (hypertension)  Assessment and Plan of Treatment: - Continue current medications as directed  - Follow up with PCP in 1 week    Diagnosis: Gout  Assessment and Plan of Treatment: - Continue current medications as directed  - Follow up with PCP in 1 week    Diagnosis: DM (diabetes mellitus)  Assessment and Plan of Treatment: - Continue current medications as directed  - Follow up with PCP in 1 week    Diagnosis: Thrombocytopenia  Assessment and Plan of Treatment: - No AC at this time  - Follow up with PCP in 1 week for repeat CBC    Diagnosis: Wound of skin  Assessment and Plan of Treatment: - Bilateral of LE  - Continue with compression dressings, change daily

## 2023-09-27 NOTE — DISCHARGE NOTE PROVIDER - NSDCADMDATE_GEN_ALL_CORE_FT
Class II - visualization of the soft palate, fauces, and uvula 14-Sep-2023 19:40 Class II - visualization of the soft palate, fauces, and uvula

## 2023-09-27 NOTE — DISCHARGE NOTE NURSING/CASE MANAGEMENT/SOCIAL WORK - PATIENT PORTAL LINK FT
You can access the FollowMyHealth Patient Portal offered by [facility] by registering at the following website: [email or web address]. By joining [employer]’s FollowMyHealth portal, you will also be able to view your health information using other applications (apps) compatible with our system.

## 2023-09-27 NOTE — PROGRESS NOTE ADULT - SUBJECTIVE AND OBJECTIVE BOX
[facility]                                                INFECTIOUS DISEASES  =======================================================                   Tevinrukhsana Caballero#   Juan F Thornton MD#    Ok Zeng MD*                           Janis Gunn MD*   Jessie Mccurdy MD*           Diplomates American Board of Internal Medicine & Infectious Diseases                  # [place] Office - Appt - Tel  [phone] Fax [phone]                * [place] Office - Appt - Tel [phone] Fax [phone]                                  Hospital Consult line:  [phone]  =======================================================      [facility]-[id number]  GARRETT ALARCONFABIANA   follow up for: left avf infection    denies complaints  patient seen and examined.       I have personally reviewed the labs and data; pertinent labs and data are listed in this note; please see below.   ===================================================  REVIEW OF SYSTEMS:  CONSTITUTIONAL:  No Fever or chills  HEENT:  No diplopia or blurred vision.  No earache, sore throat or runny nose.  CARDIOVASCULAR:  No pressure, squeezing, strangling, tightness, heaviness or aching about the chest, neck, axilla or epigastrium.  RESPIRATORY:  No cough, shortness of breath  GASTROINTESTINAL:  No nausea, vomiting or diarrhea.  GENITOURINARY:  No dysuria, frequency or urgency. No Blood in urine  MUSCULOSKELETAL:  no joint aches, no muscle pain  SKIN:  No change in skin, hair or nails.  NEUROLOGIC:  No Headaches, seizures or weakness.  PSYCHIATRIC:  No disorder of thought or mood.  ENDOCRINE:  No heat or cold intolerance  HEMATOLOGICAL:  No easy bruising or bleeding.    =======================================================  Allergies    Cefzil (Rash; Angioedema)  Mushrooms (Rash)  Orange (Rash)  Pineapple (Rash)    Intolerances    Antibiotics:  vancomycin  IVPB 750 milliGRAM(s) IV Intermittent <User Schedule>    Other medications:  allopurinol 100 milliGRAM(s) Oral daily  aspirin  chewable 81 milliGRAM(s) Oral daily  atorvastatin 40 milliGRAM(s) Oral at bedtime  budesonide  80 MICROgram(s)/formoterol 4.5 MICROgram(s) Inhaler 2 Puff(s) Inhalation two times a day  calcitriol   Capsule 0.25 MICROGram(s) Oral daily  calcium acetate 667 milliGRAM(s) Oral three times a day with meals  chlorhexidine 2% Cloths 1 Application(s) Topical <User Schedule>  chlorhexidine 2% Cloths 1 Application(s) Topical <User Schedule>  epoetin thelma-epbx (RETACRIT) Injectable 98584 Unit(s) IV Push <User Schedule>  heparin   Injectable 5000 Unit(s) SubCutaneous every 8 hours  magnesium oxide 400 milliGRAM(s) Oral three times a day with meals  metoprolol tartrate 12.5 milliGRAM(s) Oral daily  midodrine. 10 milliGRAM(s) Oral three times a day  pantoprazole    Tablet 40 milliGRAM(s) Oral before breakfast  PARoxetine 20 milliGRAM(s) Oral daily    ======================================================  Physical Exam:  ============  Vital Signs Last 24 Hrs  T(C): 36.3 (27 Sep 2023 16:54), Max: 36.9 (27 Sep 2023 04:42)  T(F): 97.4 (27 Sep 2023 16:54), Max: 98.4 (27 Sep 2023 04:42)  HR: 66 (27 Sep 2023 16:54) (65 - 83)  BP: 105/53 (27 Sep 2023 16:54) (101/53 - 108/67)  BP(mean): --  RR: 18 (27 Sep 2023 16:54) (18 - 19)  SpO2: 99% (27 Sep 2023 16:54) (94% - 99%)    Parameters below as of 27 Sep 2023 11:11  Patient On (Oxygen Delivery Method): nasal cannula, 2L        General:  No acute distress. on o2, seen in hd unit  Eye: no conjunctival pallor, no scleral icterus  Neck: Supple, No lymphadenopathy.  Respiratory: Lungs are clear to auscultation, Respirations are non-labored.  Cardiovascular: Normal rate, Regular rhythm,  s1+s2 +murmur  Gastrointestinal: Soft, Non-tender, Non-distended, Normal bowel sounds  Musculoskeletal: Normal range of motion, Normal strength.  Integumentary: lue and b/l Le ace wraps in place left Ij permacath in place    =======================================================  Labs:                           8.3    6.35  )-----------( 71       ( 26 Sep 2023 05:00 )             28.8       09-26    134<L>  |  96  |  34.8<H>  ----------------------------<  85  4.1   |  26.0  |  3.83<H>    Ca    8.8      26 Sep 2023 05:00    TPro  4.9<L>  /  Alb  3.0<L>  /  TBili  0.7  /  DBili  x   /  AST  23  /  ALT  <5  /  AlkPhos  158<H>  09-26              Urinalysis Basic - ( 26 Sep 2023 05:00 )    Color: x / Appearance: x / SG: x / pH: x  Gluc: 85 mg/dL / Ketone: x  / Bili: x / Urobili: x   Blood: x / Protein: x / Nitrite: x   Leuk Esterase: x / RBC: x / WBC x   Sq Epi: x / Non Sq Epi: x / Bacteria: x                  CAPILLARY BLOOD GLUCOSE                  Culture - Blood (collected 09-24-23 @ 07:24)  Source: .Blood Blood    Culture - Blood (collected 09-24-23 @ 07:24)  Source: .Blood Blood    Culture - Tissue with Gram Stain (collected 09-15-23 @ 20:01)  Source: .Tissue Left Upper Extremity Wound  Gram Stain (09-20-23 @ 17:44):    Upon re-evaluation of gram stain:    No polymorphonuclear cells seen per low power field    Moderate Gram Positive Cocci in Pairs and Chains seen per oil power field    Previously reported as:    No polymorphonuclear leukocytes seen per low power field    Moderate Gram positive cocci in pairs seen per oil power field    Rare Gram Negative Rods seen per oil power field  Final Report (09-20-23 @ 17:44):    Moderate Streptococcus agalactiae (Group B) isolated    Group B streptococci are susceptible to ampicillin,    penicillin and cefazolin, but may be resistant to    erythromycin and clindamycin.    Recommendations for intrapartum prophylaxis for Group B    streptococci are penicillin or ampicillin.    Culture - Surgical Swab (collected 09-15-23 @ 20:01)  Source: .Surgical Swab Deep Left Forearm Wound  Final Report (09-21-23 @ 08:52):    Numerous Streptococcus agalactiae (Group B)    Numerous Streptococcus agalactiae (Group B) isolated    Group B streptococci are susceptible to ampicillin,    penicillin and cefazolin, but may be resistant to    erythromycin and clindamycin.    Recommendations for intrapartum prophylaxis for Group B    streptococci are penicillin or ampicillin.    Culture - Blood (collected 09-14-23 @ 16:33)  Source: .Blood Blood-Peripheral  Final Report (09-19-23 @ 22:00):    No growth at 5 days    Culture - Blood (collected 09-14-23 @ 16:27)  Source: .Blood Blood-Peripheral  Final Report (09-19-23 @ 22:00):    No growth at 5 days    Culture - Other (collected 09-14-23 @ 16:15)  Source: .Other left fistula site  Final Report (09-16-23 @ 16:12):    Moderate Streptococcus agalactiae (Group B) isolated    Group B streptococci are susceptible to ampicillin,    penicillin and cefazolin, but may be resistant to    erythromycin and clindamycin.    Recommendations for intrapartum prophylaxis for Group B    streptococci are penicillin or ampicillin.

## 2023-09-27 NOTE — DISCHARGE NOTE PROVIDER - NSDCMRMEDTOKEN_GEN_ALL_CORE_FT
allopurinol 100 mg oral tablet: orally once a day  ALPRAZolam 0.5 mg oral tablet: 1 orally 2 times a day as needed for  anxiety  aspirin 81 mg oral capsule: 1 orally once a day  atorvastatin 40 mg oral tablet: 1 orally once a day  calcitriol 0.25 mcg oral capsule: 1 orally once a day  metoprolol succinate 25 mg oral capsule, extended release: 0.5 orally once a day  omeprazole 40 mg oral delayed release capsule: 1 orally once a day  PARoxetine 20 mg oral tablet: 1 orally once a day  prochlorperazine 10 mg oral tablet: 1 orally 4 times a day as needed for  nausea  torsemide 20 mg oral tablet: 1 orally once a day  Vitamin D3 25 mcg (1000 intl units) oral tablet: 1 orally   allopurinol 100 mg oral tablet: orally once a day  ALPRAZolam 0.5 mg oral tablet: 1 orally 2 times a day as needed for  anxiety  aspirin 81 mg oral capsule: 1 orally once a day  atorvastatin 40 mg oral tablet: 1 orally once a day  calcitriol 0.25 mcg oral capsule: 1 orally once a day  chlorhexidine 2% topical pad: Apply topically to affected area once a day 1 Apply topically to affected area  metoprolol succinate 25 mg oral capsule, extended release: 0.5 orally once a day  midodrine 10 mg oral tablet: 1 tab(s) orally 3 times a day  omeprazole 40 mg oral delayed release capsule: 1 orally once a day  PARoxetine 20 mg oral tablet: 1 orally once a day  PhosLo 667 mg oral capsule: 3 cap(s) orally 3 times a day  prochlorperazine 10 mg oral tablet: 1 orally 4 times a day as needed for  nausea  vancomycin 750 mg intravenous injection: 750 milligram(s) intravenous once a day Vancomycin 750mg IVPB via permacath post HD T/T/S with weekly CBC CMP and vanco through 10/13/2023.

## 2023-09-27 NOTE — PROGRESS NOTE ADULT - PROVIDER SPECIALTY LIST ADULT
Hospitalist
Hospitalist
Infectious Disease
Intervent Radiology
Nephrology
Vascular Surgery
Vascular Surgery
Hospitalist
Infectious Disease
Nephrology
Vascular Surgery
Hospitalist
Intervent Cardiology
Nephrology
Vascular Surgery
Hospitalist
Infectious Disease
Nephrology
Nephrology
Vascular Surgery
Vascular Surgery
Hospitalist

## 2023-11-11 PROBLEM — N18.6 END-STAGE RENAL DISEASE (ESRD): Status: ACTIVE | Noted: 2022-05-18

## 2023-11-16 NOTE — PROCEDURE NOTE - NSPERFORMEDBY_GEN_A_CORE
Myself
Patient seen/examined.     P/w ischemic right foot. Significant ischemic changes/wounds of right foot. Unable to move foot. Foot is insensate. Reports he was recently admitted at OSH, unclear what was done or recommended. CTA shows occluded right SFA/popliteal artery with severe chronic BLE atherosclerosis. Of note left iliac artery is also occluded. Patient denies any sensorimotor deficits on left side and denies any symptoms of arterial insufficiency. Discussed with patient that RLE is non-salvageable and recommended amputation (BKA vs AKA). Patient continues to refuse any amputation. Continue care per primary team. Patient reports son Nik is next of kin and would make medical decisions if he is unable to do so.
Myself/PA

## 2023-12-18 ENCOUNTER — APPOINTMENT (OUTPATIENT)
Dept: CARDIOLOGY | Facility: CLINIC | Age: 73
End: 2023-12-18

## 2024-01-19 NOTE — ASSESSMENT
Pt calls, reports rash to arm that started off small. Pt has tried oral medications (medrol dose pack and hydroxyzine) and rash is worsening. Pt reports painful rash that is on arm and extending into hands and fingers and all over body now. Describes as itchy, painful, and red. Rash is in patches and some look blister like.     Dispo is to go to office now. Pt does not have PCP. Unable to schedule new patient appts. Recommended UC/ED as a source of care. Care advice provided. Pt verbalized understanding.    Reason for Disposition   Joint pain or swelling    Additional Information   Negative: Sudden onset of rash (within last 2 hours) and difficulty with breathing or swallowing   Negative: Difficult to awaken or acting confused (e.g., disoriented, slurred speech)   Negative: Fever and purple or blood-colored spots or dots   Negative: Too weak or sick to stand   Negative: Life-threatening reaction (anaphylaxis) in the past to similar substance (e.g., food, insect bite/sting, chemical, etc.) and < 2 hours since exposure   Negative: Sounds like a life-threatening emergency to the triager   Negative: Bright red, sunburn-like rash and current tampon use or nasal packing   Negative: Bright red, sunburn-like rash and wound infection or recent surgery   Negative: Bright red skin that peels off in sheets   Negative: Stiff neck (can't touch chin to chest)   Negative: Patient sounds very sick or weak to the triager   Negative: Fever   Negative: Face becomes swollen   Negative: Headache   Negative: Purple or blood-colored spots or dots (no fever and sounds well to triager)    Protocols used: Rash or Redness - Widespread-A-OH     [FreeTextEntry1] : 1. CKD stage 4 - creatinine was 2.43 2021, increased to 2.99 with BUN 68 in December 2022, 2.68 with BUN of 60 early January 2023 and now 2.97 with BUN of 58.  GFR fluctuating between stage 4 and 5 (currently at 16).  Issue remains to be of optimizing intravascular volumes, renal perfusion while addressing edema and overall fluid balance.  She has significant pulmonary hypertension which is contributing to the fluid accumulation.  We discussed the need to continue with compression stockings/wraps of lower extremities, minimizing salt intake, monitoring of daily weights and optimization of the rest of her regimen.  She is very high risk for renal function deterioration.  Avoid all nephrotoxic agents as well as use of intravenous contrast whenever possible.  She was instructed to increase Torsemide to 30 mg per day.  Spot urine protein showed only 300 mg per day.  Will periodically monitor. \par 2. Lower extremity edema - As per above.  For now, continue with Torsemide at adjusted dosing and continue with daily weights.  The severe pulmonary hypertension may limit the amount of fluid removal she will hemodynamically tolerate.  Further recommendations per course.\par 3. Hypertension - monitor on current regimen. \par 4. Anemia - Procrit with goal hemoglobin 10-11 gm/dl.  She is receiving Procrit per medicine according to the patient.  \par 5. Secondary HPT - PTH was 504 in 2021, now 735.  Will start on Calcitriol at .25 mcg per day and monitor response, titrating to course.  Will also need a binder for elevated phosphorus.  Phosphorus dietary modifications reviewed with patient and . \par 6. Vitamin D deficiency -  levels currently stable as noted above\par 7. Renal cysts - Was previously monitored by Dr. Castañeda (Urology).  Given the finding of a complex cyst, I recommended that she arrange a follow up with Urology for consideration of further testing (i.e. CT or MRI). \par 8. Hyperkalemia - should remain on potassium moderated diet. Most current level is 3.7.  \par Reviewed at length with patient and .  We started discussions regarding dialysis.  She was strongly encouraged to stop all smoking.  \par Follow up 8 weeks.

## 2024-02-05 NOTE — CONSULT NOTE ADULT - SUBJECTIVE AND OBJECTIVE BOX
[facility]                                                INFECTIOUS DISEASES  =======================================================                     Tevin Caballero#   Juan F Thornton MD#   Ok Zeng MD*                           Janis Gunn MD*   Jessie Mccurdy MD*            Diplomates American Board of Internal Medicine & Infectious Diseases                  # [place] Office - Appt - Tel  [phone] Fax [phone]                * [place] Office - Appt - Tel [phone] Fax [phone]                                  Hospital Consult line:  [phone]  =======================================================      MRN-[id number]  GARRETT BARCENAS   HPI:  72 yo F PMHx anxiety/depression, CHF, CAD, HTN, DM (now off DM meds), gout, anemia of chronic disease, COPD (not on home O2), 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 On May 31, pt had a left brachiocephalic ateriovenous fistula placed by Dr Hodge and Dr Coleman with apparent difficulty in accessing the site with difficulty with the conscious sedation at that time requiring close monitoring in SICU and NIV respiratory support. Pt now reports LUE open wound, initially presenting as LUE swelling for the last 2 weeks, from which a blister developed at the area of hte fistula which ruptured today with drainage of murky fluid. Pt endorsed 2 episodes of fever one two days ago of 101.7 and another yesterday with 101.3, also endorsed decreased range of motion of the LUE as well has chills, No nausea/vomiting, no SOB/chest pain, no palpitaitions.     Lives with . Uses a walker.    In the ED vitals stable, Labs stable. Bcx collected. Medicine consulted for admission to medicine service. (14 Sep 2023 21:24)          I have personally reviewed the labs and data; pertinent labs and data are listed in this note; please see below.   =======================================================  Past Medical & Surgical Hx:  =====================  PAST MEDICAL & SURGICAL HISTORY:  2019 novel coronavirus disease (COVID-19)      HTN (hypertension)      COPD, moderate      Diabetes mellitus      CAD (coronary artery disease)      Anemia      S/P arteriovenous (AV) fistula repair      H/O total knee replacement      History of partial nephrectomy      H/O lumpectomy        Problem List:  ==========  HEALTH ISSUES - PROBLEM Dx:        Social Hx:  =======  no toxic habits currently    FAMILY HISTORY:  FH: COPD (chronic obstructive pulmonary disease) (Father)    FH: diabetes mellitus (Father)    Family history of renal failure (Father, Mother)    no significant family history of immunosuppressive disorders in mother or father   =======================================================    REVIEW OF SYSTEMS:  CONSTITUTIONAL:  +fever, chills  HEENT:  No diplopia or blurred vision.  No earache, sore throat or runny nose.  CARDIOVASCULAR:  No pressure, squeezing, strangling, tightness, heaviness or aching about the chest, neck, axilla or epigastrium.  RESPIRATORY:  No cough, shortness of breath  GASTROINTESTINAL:  No nausea, vomiting or diarrhea.  GENITOURINARY:  No dysuria, frequency or urgency. No Blood in urine  MUSCULOSKELETAL:  no joint aches, no muscle pain left arm AVf site pain and swelling, blister  SKIN:  No change in skin, hair or nails.  NEUROLOGIC:  No Headaches, seizures or weakness.  PSYCHIATRIC:  No disorder of thought or mood.  ENDOCRINE:  No heat or cold intolerance  HEMATOLOGICAL:  No easy bruising or bleeding.    =======================================================  Allergies    Cefzil (Rash; Angioedema)  Mushrooms (Rash)  Orange (Rash)  Pineapple (Rash)    Intolerances    Antibiotics:  meropenem Injectable 500 milliGRAM(s) IV Push every 12 hours  vancomycin  IVPB 500 milliGRAM(s) IV Intermittent every 24 hours    Other medications:  allopurinol 100 milliGRAM(s) Oral daily  atorvastatin 40 milliGRAM(s) Oral at bedtime  calcitriol   Capsule 0.25 MICROGram(s) Oral daily  diphenhydrAMINE 50 milliGRAM(s) Oral daily  metoprolol tartrate 12.5 milliGRAM(s) Oral daily  pantoprazole    Tablet 40 milliGRAM(s) Oral before breakfast  PARoxetine 20 milliGRAM(s) Oral daily  torsemide 20 milliGRAM(s) Oral daily     meropenem Injectable   1000 milliGRAM(s) IV Push (09-14-23 @ 17:19)    meropenem Injectable   500 milliGRAM(s) IV Push (09-15-23 @ 06:17)    vancomycin  IVPB.   250 mL/Hr IV Intermittent (09-14-23 @ 17:23)      ======================================================  Physical Exam:  ============  T(F): 98.2 (15 Sep 2023 07:52), Max: 98.5 (15 Sep 2023 04:22)  HR: 98 (15 Sep 2023 07:52)  BP: 100/62 (15 Sep 2023 07:52)  RR: 18 (15 Sep 2023 07:52)  SpO2: 92% (15 Sep 2023 07:52) (92% - 95%)  temp max in last 48H T(F): , Max: 98.5 (09-15-23 @ 04:22)Height (cm): 157.5 (09-14-23 @ 14:11)  Weight (kg): 82.6 (09-14-23 @ 14:11)  BMI (kg/m2): 33.3 (09-14-23 @ 14:11)  BSA (m2): 1.84 (09-14-23 @ 14:11)    General:  No acute distress.  Neck: Supple, No lymphadenopathy.  Respiratory: Lungs are clear to auscultation, Respirations are non-labored.  Cardiovascular: Normal rate, Regular rhythm, s1 + s2 +dorcas  Gastrointestinal: Soft, Non-tender, Non-distended, Normal bowel sounds.  Genitourinary: No costovertebral angle tenderness.  Integumentary: LUE edema, mild erythema and warmth, on upper arm there is a small wound with packing in place,  surrounding tenderness and erythema  MSK: b/l LE ace wraps intact  Neurologic: Alert, Oriented, No focal deficits  Psychiatric: Appropriate mood & affect.    =======================================================  Labs:                        8.5    10.19 )-----------( 115      ( 15 Sep 2023 07:37 )             28.3     09-15    138  |  96  |  76.8<H>  ----------------------------<  118<H>  3.6   |  23.0  |  3.58<H>    Ca    8.8      15 Sep 2023 07:37    TPro  5.3<L>  /  Alb  3.0<L>  /  TBili  0.4  /  DBili  x   /  AST  22  /  ALT  10  /  AlkPhos  169<H>  09-15       SARS-CoV-2 Result: NotDetec (09-14-23 @ 16:33)        no

## 2024-03-11 ENCOUNTER — APPOINTMENT (OUTPATIENT)
Dept: VASCULAR SURGERY | Facility: CLINIC | Age: 74
End: 2024-03-11

## 2024-05-27 NOTE — PHYSICAL THERAPY INITIAL EVALUATION ADULT - GENERAL OBSERVATIONS, REHAB EVAL
63F, pmh HTN, szs on Keppra, alcoholic cirrhosis, tremors, OA presented from [facility] to OSH w/ generalized weakness found to have b/l sulcal SAH and small occipital IVH. CTA shows R V4 5mm fusiform aneurysm and severe stenosis of b/l V4, BA, and b/l MCAs. She was recently started on Keflex for UTI. Plts 400, Coags wnl. , AST/ALT = 38/104. WBC 13. Afebrile. Exam on admission: lethargic appearing, EOV, FC, AO3, PERRL, not compliant with EOMs, BUE 5/5, BLE 2/5.    Now s/p 5/17 Angio: severe b/l anterior and posterior cerebral vasospasm (given IA verapamil and milrinone) Pt received sitting at EOB, + wound vac.

## 2024-07-24 NOTE — INPATIENT CERTIFICATION FOR MEDICARE PATIENTS - PHYSICIAN CONCUR
[facility] DISCHARGE NOTE    Patient discharged to nursing home at 10:53 AM via wheel chair. Accompanied by spouse and staff. Discharge instructions reviewed with patient, opportunity offered to ask questions. Prescriptions sent to patients preferred pharmacy. All belongings sent with patient.    Chan Fraga RN   I concur with the Admission Order and I certify that services are provided in accordance with Section 42 CFR § 412.3

## 2025-01-23 NOTE — PROCEDURE NOTE - NSPOSTCAREGUIDE_GEN_A_CORE
Detail Level: Detailed
Add 84689 Cpt? (Important Note: In 2017 The Use Of 00341 Is Being Tracked By Cms To Determine Future Global Period Reimbursement For Global Periods): yes
Verbal/written post procedure instructions were given to patient/caregiver/Instructed patient/caregiver to follow-up with primary care physician/Instructed patient/caregiver regarding signs and symptoms of infection/Keep the cast/splint/dressing clean and dry/Care for catheter as per unit/ICU protocols
Verbal/written post procedure instructions were given to patient/caregiver

## 2025-03-10 NOTE — HISTORY OF PRESENT ILLNESS
[FreeTextEntry1] : Patient presents  to Vascular clinic  for   recheck of lower extremities  and  possible  Unna boot change  Presently patient wearing  Unna boots  for   with  no  consequences  patient  denies  any  fevers  no  chills   Maintains unna boots  weekly Yes

## (undated) DEVICE — SUT SILK 3-0 30" TIES

## (undated) DEVICE — DRAPE XL SHEET 77X98"

## (undated) DEVICE — SOL INJ NS 0.9% 500ML 1-PORT

## (undated) DEVICE — GLV 7.5 PROTEXIS (WHITE)

## (undated) DEVICE — SYR CONTROL LUER LOK 10CC

## (undated) DEVICE — PREP CHLORAPREP HI-LITE ORANGE 26ML

## (undated) DEVICE — SUT SILK 2-0 30" TIES

## (undated) DEVICE — SUT PROLENE 6-0 30" BV-1

## (undated) DEVICE — SUT VICRYL 3-0 27" SH UNDYED

## (undated) DEVICE — VENODYNE/SCD SLEEVE CALF MEDIUM

## (undated) DEVICE — VESSEL LOOP MINI-BLUE 0.075" X 16"

## (undated) DEVICE — CATH IV INTROCAN SAFETY 16G X 1.25" (GRAY) PUR

## (undated) DEVICE — DRSG MASTISOL

## (undated) DEVICE — SUT SILK 4-0 30" TIES

## (undated) DEVICE — DRSG STERISTRIPS 0.5 X 4"

## (undated) DEVICE — SUT MONOCRYL 4-0 27" PS-2 UNDYED

## (undated) DEVICE — PACK VASCULAR

## (undated) DEVICE — DRSG KERLIX ROLL 4.5"

## (undated) DEVICE — BLADE SURGICAL #15 CARBON

## (undated) DEVICE — STAPLER SKIN PROXIMATE

## (undated) DEVICE — COVER ULTRASOUND PROBE T-SHAPED INTRAOP SM

## (undated) DEVICE — Device

## (undated) DEVICE — NDL HYPO REGULAR BEVEL 25G X 1.5" (BLUE)

## (undated) DEVICE — DRSG DERMABOND 0.7ML

## (undated) DEVICE — WARMING BLANKET LOWER ADULT

## (undated) DEVICE — ZIMMER PULSAVAC PLUS FAN KIT

## (undated) DEVICE — ELCTR GROUNDING PAD ADULT COVIDIEN

## (undated) DEVICE — DRAPE TOWEL BLUE 17" X 24"

## (undated) DEVICE — GLV 6.5 PROTEXIS (WHITE)

## (undated) DEVICE — DRAPE HAND 77" X 146"

## (undated) DEVICE — GLV 6 PROTEXIS (WHITE)

## (undated) DEVICE — DRAPE ISOLATION BAG 20X20"